# Patient Record
Sex: FEMALE | Race: WHITE | NOT HISPANIC OR LATINO | Employment: OTHER | URBAN - METROPOLITAN AREA
[De-identification: names, ages, dates, MRNs, and addresses within clinical notes are randomized per-mention and may not be internally consistent; named-entity substitution may affect disease eponyms.]

---

## 2018-10-06 ENCOUNTER — ANESTHESIA (INPATIENT)
Dept: RADIOLOGY | Facility: HOSPITAL | Age: 68
DRG: 023 | End: 2018-10-06
Payer: COMMERCIAL

## 2018-10-06 ENCOUNTER — APPOINTMENT (INPATIENT)
Dept: NON INVASIVE DIAGNOSTICS | Facility: HOSPITAL | Age: 68
DRG: 023 | End: 2018-10-06
Payer: COMMERCIAL

## 2018-10-06 ENCOUNTER — APPOINTMENT (INPATIENT)
Dept: RADIOLOGY | Facility: HOSPITAL | Age: 68
DRG: 023 | End: 2018-10-06
Payer: COMMERCIAL

## 2018-10-06 ENCOUNTER — HOSPITAL ENCOUNTER (INPATIENT)
Facility: HOSPITAL | Age: 68
LOS: 6 days | DRG: 023 | End: 2018-10-12
Attending: RADIOLOGY | Admitting: INTERNAL MEDICINE
Payer: COMMERCIAL

## 2018-10-06 ENCOUNTER — ANESTHESIA EVENT (INPATIENT)
Dept: RADIOLOGY | Facility: HOSPITAL | Age: 68
DRG: 023 | End: 2018-10-06
Payer: COMMERCIAL

## 2018-10-06 DIAGNOSIS — I63.9 ACUTE CVA (CEREBROVASCULAR ACCIDENT) (HCC): Primary | ICD-10-CM

## 2018-10-06 PROBLEM — E03.9 HYPOTHYROIDISM: Status: ACTIVE | Noted: 2018-10-06

## 2018-10-06 LAB
ABO GROUP BLD: NORMAL
ALBUMIN SERPL BCP-MCNC: 2.7 G/DL (ref 3.5–5)
ALP SERPL-CCNC: 59 U/L (ref 46–116)
ALT SERPL W P-5'-P-CCNC: 16 U/L (ref 12–78)
ANION GAP SERPL CALCULATED.3IONS-SCNC: 7 MMOL/L (ref 4–13)
AST SERPL W P-5'-P-CCNC: 10 U/L (ref 5–45)
BASE EXCESS BLDA CALC-SCNC: -2 MMOL/L (ref -2–3)
BILIRUB SERPL-MCNC: 0.25 MG/DL (ref 0.2–1)
BLD GP AB SCN SERPL QL: NEGATIVE
BUN SERPL-MCNC: 13 MG/DL (ref 5–25)
CA-I BLD-SCNC: 1.21 MMOL/L (ref 1.12–1.32)
CALCIUM SERPL-MCNC: 7.6 MG/DL (ref 8.3–10.1)
CHLORIDE SERPL-SCNC: 111 MMOL/L (ref 100–108)
CHOLEST SERPL-MCNC: 120 MG/DL (ref 50–200)
CO2 SERPL-SCNC: 23 MMOL/L (ref 21–32)
CREAT SERPL-MCNC: 0.48 MG/DL (ref 0.6–1.3)
ERYTHROCYTE [DISTWIDTH] IN BLOOD BY AUTOMATED COUNT: 13.5 % (ref 11.6–15.1)
EST. AVERAGE GLUCOSE BLD GHB EST-MCNC: 108 MG/DL
GFR SERPL CREATININE-BSD FRML MDRD: 100 ML/MIN/1.73SQ M
GLUCOSE SERPL-MCNC: 120 MG/DL (ref 65–140)
GLUCOSE SERPL-MCNC: 149 MG/DL (ref 65–140)
GLUCOSE SERPL-MCNC: 149 MG/DL (ref 65–140)
HBA1C MFR BLD: 5.4 % (ref 4.2–6.3)
HCO3 BLDA-SCNC: 24.1 MMOL/L (ref 24–30)
HCT VFR BLD AUTO: 29.8 % (ref 34.8–46.1)
HCT VFR BLD CALC: 30 % (ref 34.8–46.1)
HDLC SERPL-MCNC: 59 MG/DL (ref 40–60)
HGB BLD-MCNC: 9.5 G/DL (ref 11.5–15.4)
HGB BLDA-MCNC: 10.2 G/DL (ref 11.5–15.4)
LDLC SERPL CALC-MCNC: 56 MG/DL (ref 0–100)
MAGNESIUM SERPL-MCNC: 1.7 MG/DL (ref 1.6–2.6)
MCH RBC QN AUTO: 30.2 PG (ref 26.8–34.3)
MCHC RBC AUTO-ENTMCNC: 31.9 G/DL (ref 31.4–37.4)
MCV RBC AUTO: 95 FL (ref 82–98)
PCO2 BLD: 26 MMOL/L (ref 21–32)
PCO2 BLD: 46.2 MM HG (ref 42–50)
PH BLD: 7.33 [PH] (ref 7.3–7.4)
PHOSPHATE SERPL-MCNC: 3.4 MG/DL (ref 2.3–4.1)
PLATELET # BLD AUTO: 278 THOUSANDS/UL (ref 149–390)
PMV BLD AUTO: 9.7 FL (ref 8.9–12.7)
PO2 BLD: 86 MM HG (ref 35–45)
POTASSIUM BLD-SCNC: 3.8 MMOL/L (ref 3.5–5.3)
POTASSIUM SERPL-SCNC: 4.1 MMOL/L (ref 3.5–5.3)
PROT SERPL-MCNC: 5.9 G/DL (ref 6.4–8.2)
RBC # BLD AUTO: 3.15 MILLION/UL (ref 3.81–5.12)
RH BLD: POSITIVE
SAO2 % BLD FROM PO2: 96 % (ref 95–98)
SODIUM BLD-SCNC: 141 MMOL/L (ref 136–145)
SODIUM SERPL-SCNC: 141 MMOL/L (ref 136–145)
SPECIMEN EXPIRATION DATE: NORMAL
SPECIMEN SOURCE: ABNORMAL
TRIGL SERPL-MCNC: 23 MG/DL
TSH SERPL DL<=0.05 MIU/L-ACNC: 0.78 UIU/ML (ref 0.36–3.74)
WBC # BLD AUTO: 8.12 THOUSAND/UL (ref 4.31–10.16)

## 2018-10-06 PROCEDURE — 84100 ASSAY OF PHOSPHORUS: CPT | Performed by: STUDENT IN AN ORGANIZED HEALTH CARE EDUCATION/TRAINING PROGRAM

## 2018-10-06 PROCEDURE — 82948 REAGENT STRIP/BLOOD GLUCOSE: CPT

## 2018-10-06 PROCEDURE — C1894 INTRO/SHEATH, NON-LASER: HCPCS

## 2018-10-06 PROCEDURE — C1757 CATH, THROMBECTOMY/EMBOLECT: HCPCS

## 2018-10-06 PROCEDURE — 61630 BALO ANGIOPLASTY ICR PERQ: CPT

## 2018-10-06 PROCEDURE — 83735 ASSAY OF MAGNESIUM: CPT | Performed by: STUDENT IN AN ORGANIZED HEALTH CARE EDUCATION/TRAINING PROGRAM

## 2018-10-06 PROCEDURE — 86850 RBC ANTIBODY SCREEN: CPT | Performed by: NURSE ANESTHETIST, CERTIFIED REGISTERED

## 2018-10-06 PROCEDURE — C1760 CLOSURE DEV, VASC: HCPCS

## 2018-10-06 PROCEDURE — 85014 HEMATOCRIT: CPT

## 2018-10-06 PROCEDURE — 61645 PERQ ART M-THROMBECT &/NFS: CPT | Performed by: RADIOLOGY

## 2018-10-06 PROCEDURE — C1769 GUIDE WIRE: HCPCS

## 2018-10-06 PROCEDURE — 76937 US GUIDE VASCULAR ACCESS: CPT | Performed by: RADIOLOGY

## 2018-10-06 PROCEDURE — 82330 ASSAY OF CALCIUM: CPT

## 2018-10-06 PROCEDURE — 86900 BLOOD TYPING SEROLOGIC ABO: CPT | Performed by: NURSE ANESTHETIST, CERTIFIED REGISTERED

## 2018-10-06 PROCEDURE — 83036 HEMOGLOBIN GLYCOSYLATED A1C: CPT | Performed by: STUDENT IN AN ORGANIZED HEALTH CARE EDUCATION/TRAINING PROGRAM

## 2018-10-06 PROCEDURE — 82803 BLOOD GASES ANY COMBINATION: CPT

## 2018-10-06 PROCEDURE — 70450 CT HEAD/BRAIN W/O DYE: CPT

## 2018-10-06 PROCEDURE — 03CG3Z7 EXTIRPATION OF MATTER FROM INTRACRANIAL ARTERY USING STENT RETRIEVER, PERCUTANEOUS APPROACH: ICD-10-PCS | Performed by: INTERNAL MEDICINE

## 2018-10-06 PROCEDURE — 84132 ASSAY OF SERUM POTASSIUM: CPT

## 2018-10-06 PROCEDURE — 93306 TTE W/DOPPLER COMPLETE: CPT

## 2018-10-06 PROCEDURE — 99255 IP/OBS CONSLTJ NEW/EST HI 80: CPT | Performed by: PSYCHIATRY & NEUROLOGY

## 2018-10-06 PROCEDURE — C1725 CATH, TRANSLUMIN NON-LASER: HCPCS

## 2018-10-06 PROCEDURE — 80061 LIPID PANEL: CPT | Performed by: STUDENT IN AN ORGANIZED HEALTH CARE EDUCATION/TRAINING PROGRAM

## 2018-10-06 PROCEDURE — 037K3D6: ICD-10-PCS | Performed by: INTERNAL MEDICINE

## 2018-10-06 PROCEDURE — 99291 CRITICAL CARE FIRST HOUR: CPT | Performed by: INTERNAL MEDICINE

## 2018-10-06 PROCEDURE — 99252 IP/OBS CONSLTJ NEW/EST SF 35: CPT | Performed by: RADIOLOGY

## 2018-10-06 PROCEDURE — 82947 ASSAY GLUCOSE BLOOD QUANT: CPT

## 2018-10-06 PROCEDURE — 84443 ASSAY THYROID STIM HORMONE: CPT | Performed by: STUDENT IN AN ORGANIZED HEALTH CARE EDUCATION/TRAINING PROGRAM

## 2018-10-06 PROCEDURE — 03CK3Z7 EXTIRPATION OF MATTER FROM RIGHT INTERNAL CAROTID ARTERY USING STENT RETRIEVER, PERCUTANEOUS APPROACH: ICD-10-PCS | Performed by: INTERNAL MEDICINE

## 2018-10-06 PROCEDURE — 84295 ASSAY OF SERUM SODIUM: CPT

## 2018-10-06 PROCEDURE — 86920 COMPATIBILITY TEST SPIN: CPT

## 2018-10-06 PROCEDURE — C1876 STENT, NON-COA/NON-COV W/DEL: HCPCS

## 2018-10-06 PROCEDURE — 86901 BLOOD TYPING SEROLOGIC RH(D): CPT | Performed by: NURSE ANESTHETIST, CERTIFIED REGISTERED

## 2018-10-06 PROCEDURE — 80053 COMPREHEN METABOLIC PANEL: CPT | Performed by: STUDENT IN AN ORGANIZED HEALTH CARE EDUCATION/TRAINING PROGRAM

## 2018-10-06 PROCEDURE — 61645 PERQ ART M-THROMBECT &/NFS: CPT

## 2018-10-06 PROCEDURE — 0042T HB CT PERFUSION W/CONTRAST CBF: CPT

## 2018-10-06 PROCEDURE — 85027 COMPLETE CBC AUTOMATED: CPT | Performed by: STUDENT IN AN ORGANIZED HEALTH CARE EDUCATION/TRAINING PROGRAM

## 2018-10-06 PROCEDURE — 61635 INTRACRAN ANGIOPLSTY W/STENT: CPT

## 2018-10-06 PROCEDURE — 37215 TRANSCATH STENT CCA W/EPS: CPT | Performed by: RADIOLOGY

## 2018-10-06 RX ORDER — LEVOTHYROXINE SODIUM ANHYDROUS 100 UG/5ML
37.5 INJECTION, POWDER, LYOPHILIZED, FOR SOLUTION INTRAVENOUS DAILY
Status: DISCONTINUED | OUTPATIENT
Start: 2018-10-06 | End: 2018-10-07

## 2018-10-06 RX ORDER — MAGNESIUM SULFATE HEPTAHYDRATE 40 MG/ML
2 INJECTION, SOLUTION INTRAVENOUS ONCE
Status: COMPLETED | OUTPATIENT
Start: 2018-10-06 | End: 2018-10-06

## 2018-10-06 RX ORDER — ATORVASTATIN CALCIUM 40 MG/1
40 TABLET, FILM COATED ORAL EVERY EVENING
Status: DISCONTINUED | OUTPATIENT
Start: 2018-10-06 | End: 2018-10-12 | Stop reason: HOSPADM

## 2018-10-06 RX ORDER — LEVOTHYROXINE SODIUM 0.03 MG/1
25 TABLET ORAL DAILY
Status: ON HOLD | COMMUNITY
End: 2018-10-23

## 2018-10-06 RX ORDER — FENTANYL CITRATE 50 UG/ML
INJECTION, SOLUTION INTRAMUSCULAR; INTRAVENOUS AS NEEDED
Status: DISCONTINUED | OUTPATIENT
Start: 2018-10-06 | End: 2018-10-06 | Stop reason: SURG

## 2018-10-06 RX ORDER — SODIUM CHLORIDE 9 MG/ML
75 INJECTION, SOLUTION INTRAVENOUS CONTINUOUS
Status: DISCONTINUED | OUTPATIENT
Start: 2018-10-06 | End: 2018-10-07

## 2018-10-06 RX ORDER — SODIUM CHLORIDE 9 MG/ML
INJECTION, SOLUTION INTRAVENOUS CONTINUOUS PRN
Status: DISCONTINUED | OUTPATIENT
Start: 2018-10-06 | End: 2018-10-06 | Stop reason: SURG

## 2018-10-06 RX ORDER — HEPARIN SODIUM 5000 [USP'U]/ML
5000 INJECTION, SOLUTION INTRAVENOUS; SUBCUTANEOUS EVERY 8 HOURS SCHEDULED
Status: DISCONTINUED | OUTPATIENT
Start: 2018-10-06 | End: 2018-10-12 | Stop reason: HOSPADM

## 2018-10-06 RX ORDER — HEPARIN SODIUM 1000 [USP'U]/ML
INJECTION, SOLUTION INTRAVENOUS; SUBCUTANEOUS AS NEEDED
Status: DISCONTINUED | OUTPATIENT
Start: 2018-10-06 | End: 2018-10-06 | Stop reason: SURG

## 2018-10-06 RX ORDER — EPTIFIBATIDE 2 MG/ML
INJECTION, SOLUTION INTRAVENOUS
Status: COMPLETED | OUTPATIENT
Start: 2018-10-06 | End: 2018-10-06

## 2018-10-06 RX ORDER — EPTIFIBATIDE 0.75 MG/ML
0.5 INJECTION, SOLUTION INTRAVENOUS CONTINUOUS
Status: DISCONTINUED | OUTPATIENT
Start: 2018-10-06 | End: 2018-10-07

## 2018-10-06 RX ORDER — ONDANSETRON 2 MG/ML
INJECTION INTRAMUSCULAR; INTRAVENOUS AS NEEDED
Status: DISCONTINUED | OUTPATIENT
Start: 2018-10-06 | End: 2018-10-06 | Stop reason: SURG

## 2018-10-06 RX ORDER — FENTANYL CITRATE 50 UG/ML
25 INJECTION, SOLUTION INTRAMUSCULAR; INTRAVENOUS ONCE
Status: COMPLETED | OUTPATIENT
Start: 2018-10-06 | End: 2018-10-06

## 2018-10-06 RX ORDER — EPTIFIBATIDE 0.75 MG/ML
INJECTION, SOLUTION INTRAVENOUS
Status: COMPLETED | OUTPATIENT
Start: 2018-10-06 | End: 2018-10-06

## 2018-10-06 RX ADMIN — ONDANSETRON 4 MG: 2 INJECTION INTRAMUSCULAR; INTRAVENOUS at 10:14

## 2018-10-06 RX ADMIN — EPTIFIBATIDE 0.5 MCG/KG/MIN: 0.75 INJECTION, SOLUTION INTRAVENOUS at 14:11

## 2018-10-06 RX ADMIN — SODIUM CHLORIDE 75 ML/HR: 0.9 INJECTION, SOLUTION INTRAVENOUS at 14:12

## 2018-10-06 RX ADMIN — FENTANYL CITRATE 25 MCG: 50 INJECTION INTRAMUSCULAR; INTRAVENOUS at 23:46

## 2018-10-06 RX ADMIN — MAGNESIUM SULFATE HEPTAHYDRATE 2 G: 40 INJECTION, SOLUTION INTRAVENOUS at 17:05

## 2018-10-06 RX ADMIN — IODIXANOL 80 ML: 320 INJECTION, SOLUTION INTRAVASCULAR at 09:16

## 2018-10-06 RX ADMIN — EPTIFIBATIDE 0.5 MCG/KG/MIN: 0.75 INJECTION, SOLUTION INTRAVENOUS at 11:05

## 2018-10-06 RX ADMIN — HEPARIN SODIUM 5000 UNITS: 5000 INJECTION, SOLUTION INTRAVENOUS; SUBCUTANEOUS at 20:15

## 2018-10-06 RX ADMIN — SODIUM CHLORIDE: 0.9 INJECTION, SOLUTION INTRAVENOUS at 11:55

## 2018-10-06 RX ADMIN — FENTANYL CITRATE 25 MCG: 50 INJECTION, SOLUTION INTRAMUSCULAR; INTRAVENOUS at 11:56

## 2018-10-06 RX ADMIN — FENTANYL CITRATE 50 MCG: 50 INJECTION, SOLUTION INTRAMUSCULAR; INTRAVENOUS at 09:40

## 2018-10-06 RX ADMIN — LEVOTHYROXINE SODIUM ANHYDROUS 37.5 MCG: 100 INJECTION, POWDER, LYOPHILIZED, FOR SOLUTION INTRAVENOUS at 15:32

## 2018-10-06 RX ADMIN — SODIUM CHLORIDE: 0.9 INJECTION, SOLUTION INTRAVENOUS at 09:26

## 2018-10-06 RX ADMIN — PHENYLEPHRINE HYDROCHLORIDE 25 MCG/MIN: 10 INJECTION INTRAVENOUS at 09:59

## 2018-10-06 RX ADMIN — FENTANYL CITRATE 25 MCG: 50 INJECTION, SOLUTION INTRAMUSCULAR; INTRAVENOUS at 10:05

## 2018-10-06 RX ADMIN — HEPARIN SODIUM 2000 UNITS: 1000 INJECTION INTRAVENOUS; SUBCUTANEOUS at 11:38

## 2018-10-06 RX ADMIN — EPTIFIBATIDE 7587 MCG: 20 INJECTION INTRAVENOUS at 10:56

## 2018-10-06 NOTE — CONSULTS
Consultation - Neurosurgery   Felipa Lozano 77 y o  female MRN: [id number]  Unit/Bed#: ICU 09 Encounter: [id number]    Please note this report reflects my initial encounter with the patient at approximately 9:00 AM      Assessment/Plan     Assessment:  Cervical right ICA occlusion  Right MCA M1 occlusion    Plan:  Ms Ramesh Buchanan presents with a tandem occlusion in the cervical and intracranial anterior circulation on the right  CTP revealed 37 0 cc volume of infarct and 97 cc penumbra  She meets DEFUSE-3 eligibility therefore thrombectomy was offered  Given the likely need for carotid angioplasty and stenting which would then require antiplatelet therapy, I have explained that the risks of the procedure, which include worsening stroke or bleeding in the brain, are up to 25%  Following this discussion with her  and daughter, they have elected to proceed  History of Present Illness         History, ROS and PFSH unobtainable from any source  HPI: Felipa Lozano is a 77y o  year old female who presents with acute right MCA syndrome  Mrs Ramesh Buchanan went to bed last night in her usual state of health  She awoke at approximately 5:30 am with inability to move her left side and difficulty talking  She was brought to [facility] where a CTA revealed a right carotid occlusion and right MCA occlusion  She was emergently transported to [facility] for evaluation with CTP and possible thrombectomy  She did not receive tPA  NIHSS upon arrival at [place] was 16  Consults    Review of Systems    Historical Information   Past Medical History:   Diagnosis Date    Disease of thyroid gland      History reviewed  No pertinent surgical history    History   Alcohol Use    Yes     History   Drug Use No     History   Smoking Status    Never Smoker   Smokeless Tobacco    Never Used     Family History   Problem Relation Age of Onset    Family history unknown: Yes       Meds/Allergies   all current active meds have been reviewed  No Known Allergies    Objective     Intake/Output Summary (Last 24 hours) at 10/06/18 1453  Last data filed at 10/06/18 1300   Gross per 24 hour   Intake          1203 79 ml   Output             1350 ml   Net          -146 21 ml       Physical Exam  Neurologic Exam    Vitals:Pulse 78, temperature (!) 97 2 °F (36 2 °C), resp  rate 19, height 5' 4" (1 626 m), weight 82 7 kg (182 lb 5 1 oz), SpO2 100 %  ,Body mass index is 31 3 kg/m²  Lab Results: I have personally reviewed pertinent results  Imaging Studies: I have personally reviewed pertinent reports  EKG, Pathology, and Other Studies: I have personally reviewed pertinent reports        VTE Prophylaxis: Sequential compression device Z[place]ie [place])     Code Status: Level 1 - Full Code  Advance Directive and Living Will:      Power of :    POLST:      Counseling / Coordination of Care  None

## 2018-10-06 NOTE — ANESTHESIA PREPROCEDURE EVALUATION
Physical Exam    Airway       Dental       Cardiovascular      Pulmonary      Other Findings  EMERGENTLY RUSHED INTO IR LIMITED H&P      Anesthesia Plan  ASA Score- 4 Emergent    Anesthesia Type- IV sedation with anesthesia with ASA Monitors  Additional Monitors:   Airway Plan:         Plan Factors-    Induction- intravenous  Postoperative Plan- Plan for postoperative opioid use  Informed Consent- Anesthetic plan and risks discussed with patient  I personally reviewed this patient with the CRNA  Discussed and agreed on the Anesthesia Plan with the CRNA  Moise Falcon

## 2018-10-06 NOTE — PROGRESS NOTES
Ms Musa Danielson is immediately status post right ICA angioplasty and stenting as well as intracranial thrombectomy  She received  mg W[place] in the OSH ER  She requires transition to DAPT for her stent as well as for the residual thrombus in the ICA  Plan is to obtain a repeat head CT in the AM  If no evidence of symptomatic ICH or MCA infarct greater than 2/3rds the territory, she will require Plavix 300 mg load  Integrillin may be trurned off 4 hours after Plavix load  Keep SBP < 160  Will need to consider repeat CTA of the neck to assess for cervical ICA patency in the next few days

## 2018-10-06 NOTE — H&P
History and Physical - [address] 77 y o  female MRN: [id number]  Unit/Bed#: ICU 09 Encounter: [id number]     Reason for Admission / Chief Complaint: Acute CVA, status post mechanical thrombectomy, on Integrilin      History of Present Illness:  Bertin Martínez is a 77 y o  female with a past medical history of hypothyroidism, who presented to [address] [place] [address] as a stroke alert  Per patient's , she awoke at approximately 4:00 am this morning with slurred speech and left sided weakness  EMS was called  Patient found to have right MCA M1 occlusion as well as right ICA occlusion from origin to petrous segment  tPA was not administered but patient did undergo urgent endovascular intervention  Patient had successful balloon angioplasty and stenting of the right ICA origin  Approximately 40% residual stenosis was noted remaining through the stented segment  Focal non-flow-limiting thrombus remained just above the stent  The patient was started on Integrilin  Post endovascular intervention, the patient was brought to ICU for overnight monitoring and proceeding with stroke core measures  History obtained from chart review and the patient  Past Medical History:  Past Medical History:   Diagnosis Date    Disease of thyroid gland         Past Surgical History:  History reviewed  No pertinent surgical history       Past Family History:  Family History   Problem Relation Age of Onset    Family history unknown: Yes        Social History:  History   Smoking Status    Never Smoker   Smokeless Tobacco    Never Used     History   Alcohol Use    Yes     History   Drug Use No     Marital Status:      Medications:  Current Facility-Administered Medications   Medication Dose Route Frequency    atorvastatin (LIPITOR) tablet 40 mg  40 mg Oral QPM    iodixanol (VISIPAQUE) 320 MG/ML injection 250 mL  250 mL Intravenous Once in imaging     Home medications:  Prior to Admission medications    Not on File Allergies:  No Known Allergies     ROS:   Review of Systems   Neurological: Positive for speech difficulty and weakness  All other systems reviewed and are negative  Vitals:  Vitals:    10/06/18 1245 10/06/18 1300 10/06/18 1315 10/06/18 1415   Pulse: 78 78 76 78   Resp: (!) 10 (!) 19   Temp: (!) 96 1 °F (35 6 °C) (!) 96 4 °F (35 8 °C) (!) 96 4 °F (35 8 °C) (!) 97 2 °F (36 2 °C)   TempSrc:       SpO2: 100% 100% 100% 100%   Weight:  82 7 kg (182 lb 5 1 oz)     Height:  5' 4" (1 626 m)       Temperature:   Temp (24hrs), Av 4 °F (35 8 °C), Min:96 1 °F (35 6 °C), Max:97 2 °F (36 2 °C)    Current: Temperature: (!) 96 4 °F (35 8 °C)     Weights:   IBW: -92 5 kg  Body mass index is 31 3 kg/m²  Hemodynamic Monitoring:  N/A     Non-Invasive/Invasive Ventilation Settings:  Respiratory    Lab Data (Last 4 hours)    None         O2/Vent Data (Last 4 hours)    None              No results found for: PHART, SLQ1HYG, PO2ART, MXG6TOH, I7JSDPYN, BEART, SOURCE  SpO2: SpO2: 100 %, SpO2 Activity:  , SpO2 Device:  , Capnography:       Physical Exam:  Physical Exam   Constitutional: She is oriented to person, place, and time  She appears well-developed  Elderly, frail, female, appears stated age   HENT:   Head: Normocephalic and atraumatic  Nose: Nose normal    Mouth/Throat: No oropharyngeal exudate  Eyes: Pupils are equal, round, and reactive to light  Conjunctivae are normal  No scleral icterus  Neck: Neck supple  No JVD present  No tracheal deviation present  Cardiovascular: Normal rate, regular rhythm, normal heart sounds and intact distal pulses  Exam reveals no gallop and no friction rub  No murmur heard  Pulmonary/Chest: Effort normal and breath sounds normal  No respiratory distress  She has no wheezes  She has no rales  She exhibits no tenderness  Abdominal: Soft  Bowel sounds are normal  She exhibits no distension  There is no tenderness  There is no rebound and no guarding  Musculoskeletal: She exhibits no edema or deformity  Neurological: She is alert and oriented to person, place, and time  Left facial droop, left-sided hemiplegia, motor strength 4-/5 RUE and RLE, LLE sensory deficit all the way up to proximal thigh, LUE sensory deficit up to level of shoulder, left erika-neglect   Skin: Skin is warm and dry  No rash noted  She is not diaphoretic  No erythema  Angio access site C/D/I, no hematoma   Psychiatric: She has a normal mood and affect  Her behavior is normal    Nursing note and vitals reviewed  Labs:    Results from last 7 days  Lab Units 10/06/18  1348 10/06/18  1117   WBC Thousand/uL 8 12  --    HEMOGLOBIN g/dL 9 5*  --    I STAT HEMOGLOBIN g/dl  --  10 2*   HEMATOCRIT % 29 8* 30*   PLATELETS Thousands/uL 278  --         Results from last 7 days  Lab Units 10/06/18  1117   GLUCOSE, ISTAT mg/dl 149*                      No results found for: TROPONINI     Imaging: I have personally reviewed pertinent reports  and I have personally reviewed pertinent films in PACS  EKG: This was personally reviewed by myself     Micro:  No results found for: SHADIA Frank    Assessment/Plan:    Neurological/Psychiatric  · Right MCA M1 occlusion, TICI 0 and right ICA occlusion from origin to the petrous segment, status post balloon angioplasty and stenting of right ICA origin POD #0  · Stroke core measures  · Check echocardiogram  · Repeat CT head without contrast in AM  · Check MRI  · Neurology consult  · Check lipid panel, HbA1c, and baseline labs  · PT/OT/ST evaluation  · Will keep NPO for now  · Continue Integrilin infusion per neurosurgery  · If repeat CT head is negative for bleed, will discontinue Integrilin and start full-dose aspirin as well as Plavix load 300 mg tomorrow  · Start statin therapy when safely able to tolerate PO  · If fails swallow eval, will need placement of Keofed tube to begin nutrition and to administer essential medications  · BP goals of MAP >80 and SBP <160 per neurosurgery  · Post-endovascular intervention precautions  · Serial neuro checks  · STAT CT head for any acute change in neurologic examination and/or GCS >2    Cardiovascular  · Right ICA occlusion from origin to petrous segments as noted above with aforementioned intervention  · Noted to have focal non-flow-limiting thrombus remaining just distal to stent  · Management as noted above  · BP goals of MAP >80 and SBP <160 per neurosurgery  · Continue supportive care    Respiratory  · No acute issues  · Encourage incentive spirometry    Gastrointestinal  · No acute issues  · Bowel regimen    Renal  · No acute issues  · Monitor UOP with goal of 0 5-1 mL/kg/hr    Fluids/Electrolytes/Nutrition  · Continue NS at 75 mL/hr for now until safely tolerating oral intake  · Keep NPO for now  · If unable to pass swallow evaluation, will need placement of Keofed tube to begin enteral nutrition and for administration of essential medications  · Monitor electrolytes and replete as needed  · Goal K >4, Phos >3, Mg >2    Heme  · Acute blood loss anemia  · EBL approximately 1 liter intraoperatively  · Monitor blood counts  · Transfuse as needed for hemoglobin <7  · DVT prophylaxis with SCDs for now  · Hold pharmacologic DVT ppx until repeat CT head is performed and confirms there is no bleeding    Endocrine  · Hypothyroidism  · Reports taking levothyroxine 75 mcg daily as outpatient  · As patient is NPO, will order IV levothyroxine 37 5 mcg daily, first dose today  · Check TSH and FT4 levels  · Transition to PO formulation when able  · Monitor blood sugars  · Goal -180  · Avoid hypo or hyperglycemia    Infectious Disease  · No acute issues  · Monitor fever trends    MSK/Skin  · No acute issues  · Frequent repositioning at appropriate time post-procedure for pressure ulcer prophylaxis  · PT/OT evaluation    Disposition: Continue 24-hour monitoring in ICU     VTE Pharmacologic Prophylaxis: Reason for no pharmacologic prophylaxis Status post CVA, mechanical thrombectomy, on Integrilin  VTE Mechanical Prophylaxis: sequential compression device     Invasive lines and devices: Invasive Devices     Peripheral Intravenous Line            Peripheral IV 10/06/18 Left Antecubital less than 1 day    Peripheral IV 10/06/18 Right Antecubital less than 1 day          Arterial Line            Arterial Line 10/06/18 Left Radial less than 1 day          Drain            Urethral Catheter Non-latex; Temperature probe 6 Fr  less than 1 day                 Code Status: Level 1 - Full Code  POA:    POLST:       Given critical illness, patient length of stay will require greater than two midnights  Counseling / Coordination of Care    Portions of the record may have been created with voice recognition software  Occasional wrong word or "sound a like" substitutions may have occurred due to the inherent limitations of voice recognition software  Read the chart carefully and recognize, using context, where substitutions have occurred      Vivian Niite, DO

## 2018-10-06 NOTE — SPEECH THERAPY NOTE
Speech Language/Pathology    Speech/Language Pathology Progress Note    Patient Name: Jonathon Bateman  ROMANA'F Date: 10/6/2018     Consult rec'd for swallowing eval  Pt s/p IR procedure for R ICA occlusion    Spoke w/ RN, pt to remain flat; will follow up and complete evals as appropriate 10/7

## 2018-10-06 NOTE — SEDATION DOCUMENTATION
Cerebral arteriogram completed   Report to ICU RN tanisha   Patient taken to CT post IR procedure with ICU RN   R femoral site CDI no oozing no hematoma present

## 2018-10-06 NOTE — DISCHARGE INSTRUCTIONS
Today, you underwent a cerebral angiogram under the care of Dr Mirna Aranda *** for CVA/Stroke***  ? The following instructions will help you care for yourself, or be cared for upon your return home today  These are guidelines for your care right after your surgery only  ? Notify Your Doctor or Nurse if you have any of the following:  ? SYMPTOMS OF WOUND INFECTION--   Increased pain in or around the incision   Swelling around the incision  Any drainage from the incision  Incision separates or opens up  Warmth in the tissues around the incision  Redness or tenderness on the skin near the incision   Fever (temperature greater than 101 degrees F)   ? NEUROLOGICAL CHANGES--  Change in alertness  Increased sleepiness   Nausea and vomiting   New onset of numbness or weakness in arms or legs   New problems with your bowels or bladder  New or worse problems with balance or walking  Seizures, new or worsening  ? UNRELIEVED HEADACHE PAIN--  New or increased pain unrelieved with pain medications   Pain associated with nausea and vomiting   Pain associated with other symptoms  ? QUESTIONS OR PROBLEMS--  Any questions or problems that you are unsure about  Wound Care:  Keep Incision Clean and Dry   You may shower daily, but do not soak incision  Pat dry after showering  No tub baths, soaking, swimming for 1 week after angiogram    You do not need to cover the incision  Mild to moderate bruising and tenderness to the site is expected and may last up to 1-2 weeks after your procedure  ?  A closure device was placed at the catheter insertion site  This is MRI compatible  Remove the dressing 24 hours after your procedure  If your groin site is bleeding, apply firm pressure for 10 minutes  Reinforce dressing rather than removing and checking frequently  If continues to bleed through the dressing after 1 hour, contact your neurosurgeon's office  Anticipatory Education:  ?   PAIN MED W/ Acetaminophen (Tylenol)  --IF a prescription for pain medicine has been sent home with you:  --Narcotic pain medication may cause constipation  Be sure to take stool softeners or laxatives while you are on narcotic pain medication  --Do not drive after taking prescription pain medicine  ?  If this medicine is too strong, or no longer necessary, or we did NOT recommend/prescribe oral narcotics, you may take:   - Tylenol Extra-strength/Acetaminophen, 2 tablets every 4-6 hours as needed for mild pain  DO NOT TAKE MORE THAN 4000MG PER DAY from combined sources  NOTE: Remember to eat when taking pain medicines in order to avoid nausea  Watch for constipation  Eat plenty of fruits, vegetables, juices, and drink 6-8 glasses of water each day  Constipation: Stay active and drink at least 6-8 cups of fluid each day to prevent constipation  If you need a laxative or stool softener follow the package directions or consult with your local pharmacists if you have questions  ? After anesthesia, rest for 24 hours  Do not drive, drink alcohol beverages or make any important decisions during this time  General anesthesia may cause sore throat, jaw discomfort or muscle aches  These symptoms can last for one or two days  Activity: Please follow these instructions:  Advance your activity as you can tolerate  You may do light house work; nothing strenuous   You may walk all you want  You may go up and down the steps  Use the railing for support  Do not do excessive bending, straining or heavy lifting for 48 hours after your procedure  Do not drive or return to work until you are instructed   It is normal for your energy level and sleep patterns to change after surgery  Get extra sleep at night and take naps during the day to help you feel less tired  Take rest periods during the day  Complete recovery may take several weeks  ?  You may resume driving after 82-93 hours recovery  You may return to work after 48 hours of recovery     ?  Diet:  Your doctor has recommended that you follow these diet instructions at home  Refer to the patient education materials you received during your hospital stay  If you would like more nutrition counseling, ask your doctor about making an appointment with an outpatient dietitian  Resume your home diet  ? Medications:  Please resume your home medications as instructed  ? Home Supplies and Equipment:  none  Additional Contacts:  ? CONTACTS FOR NEUROSURGERY: You may call your neurosurgeons office if you have questions between 8:30 am and 4:30 pm  You may request to speak to the nurse practitioner who is available Monday through Friday  ?  For off hours or the weekend you may call your neurosurgeon's office to leave a message  Carotid Artery Disease   AMBULATORY CARE:   Carotid artery disease  is a condition that causes narrow or blocked carotid arteries  Your carotid arteries are the blood vessels that supply your brain with most of the blood it needs to work  You have 2 carotid arteries, one on each side of your neck  Call 911 for any of the following:   · You have any of the following signs of a stroke:      ¨ Numbness or drooping on one side of your face     ¨ Weakness in an arm or leg    ¨ Confusion or difficulty speaking    ¨ Dizziness, a severe headache, or vision loss    · You have any of the following signs of a heart attack:      ¨ Squeezing, pressure, or pain in your chest that lasts longer than 5 minutes or returns    ¨ Discomfort or pain in your back, neck, jaw, stomach, or arm     ¨ Trouble breathing    ¨ Nausea or vomiting    ¨ Lightheadedness or a sudden cold sweat, especially with chest pain or trouble breathing  Contact your healthcare provider if:   · You have questions or concerns about your condition or care  Signs and symptoms of carotid artery disease: You may have no signs or symptoms   Most commonly, carotid artery disease causes transient ischemic attacks (TIAs), or mini-strokes  You may have numbness, weakness, lack of movement, or vision or speech problems  A TIA goes away quickly and does not cause permanent damage  A TIA may be a warning sign that you are about to have a stroke  If you have any symptoms of a TIA or stroke, seek care immediately  Warning signs of a stroke: The word F A S T  can help you remember and recognize warning signs of a stroke  · F = Face:  One side of the face droops  · A = Arms:  One arm starts to drop when both arms are raised  · S = Speech:  Speech is slurred or sounds different than usual     · T = Time:  A person who is having a stroke needs to be seen immediately  A stroke is a medical emergency that needs immediate treatment  Some medicines and treatments work best if given within a few hours of a stroke  Treatment  for carotid artery disease depends on how narrow your arteries have become, your symptoms, and your general health  The goal of treatment is to lower your risk for a stroke  You may need any of the following:  · Take aspirin if directed  Your healthcare provider may suggest that you take an aspirin a day to prevent blood clots from forming in the carotid arteries  If your healthcare provider wants you to take aspirin daily, do not take acetaminophen or ibuprofen instead  · Control risk factors  High blood pressure, high cholesterol, heart disease, diabetes, and being overweight increase your risk for atherosclerosis  · Procedures can help open blocked arteries  A carotid endarterectomy is used to cut plaque out of the artery  An angioplasty is used to push the plaque against the artery wall with a balloon device  Sometimes a stent is placed during an angioplasty  A stent is a metal mesh tube that is placed in the artery to keep it open  Manage carotid artery disease:   · Eat a variety of healthy foods  Healthy foods include fruit, vegetables, whole-grain breads, low-fat dairy products, lean meat, and fish  Choose fish that are high in omega-3 fatty acids, such as salmon and fresh tuna  Ask your healthcare provider for more information on a heart healthy diet and the DASH eating plan  · Limit sodium (salt)  Sodium may increase your blood pressure  Add less table salt to your foods  Read food labels and choose foods that are low in sodium  Your healthcare provider may suggest you follow a low sodium diet  · Reach or maintain a healthy weight  Extra weight makes your heart work harder  Ask your healthcare provider how much you should weight  He can help you create a safe weight loss plan  Even a weight loss of 10% of your body weight can help your heart function better  · Exercise as directed  Exercise helps improve heart function and can help you manage your weight  Exercise can also help lower your cholesterol and blood sugar levels  Try to get at least 30 minutes of exercise at least 5 times each week  Try to be active every day  Your healthcare provider can help you create an exercise plan that works best for you  · Limit alcohol  Alcohol can increase your blood pressure and triglyceride levels  Men should limit alcohol to 2 drinks per day  Women should limit alcohol to 1 drink per day  A drink of alcohol is 12 ounces of beer, 5 ounces of wine, or 1½ ounces of liquor  · Do not smoke  Nicotine and other chemicals in cigarettes and cigars can cause heart and lung damage  Ask your healthcare provider for information if you currently smoke and need help to quit  E-cigarettes or smokeless tobacco still contain nicotine  Talk to your healthcare provider before you use these products  Follow up with your healthcare provider as directed:  Write down your questions so you remember to ask them during your visits  © 2017 [employer] [employer] Information is for End User's use only and may not be sold, redistributed or otherwise used for commercial purposes   All illustrations and images included in CareNotes® are the copyrighted property of [employer]  or [employer]  The above information is an  only  It is not intended as medical advice for individual conditions or treatments  Talk to your doctor, nurse or pharmacist before following any medical regimen to see if it is safe and effective for you  Effects of a Stroke   AMBULATORY CARE:   The effects of a stroke  may be different for everyone  They may depend on where the stroke happened in your brain and how much damage occurred there  Some people may make a full recovery  Other people may have long-term or lifelong effects  It is important to talk to your healthcare provider about any symptoms you have after a stroke  There are medicines and therapies to help manage the effects of a stroke  Early treatment for a stroke can improve your chances for recovery  What family or support persons should know about the effects of a stroke: It is important for family or support persons to know how a stroke has affected your loved one  Know when to call 911, seek immediate care, or call your loved one's healthcare provider  Call 911 or have someone else call 911 for any of the following:   · You have any of the following signs of a stroke:      ¨ Numbness or drooping on one side of your face     ¨ Weakness in an arm or leg    ¨ Confusion or difficulty speaking    ¨ Dizziness, a severe headache, or vision loss    ·            · You cannot be woken  · You fall and hit your head  · You have a seizure  · You feel lightheaded, short of breath, and have chest pain  · You cough up blood  Seek care immediately if:   · Your arm or leg is painful, red, or larger than normal      · You feel weak, dizzy, or faint  · You have a fall without hitting your head  · Your blood sugar level or blood pressure is higher or lower than your healthcare provider said it should be      · You bleed heavily or cannot stop bleeding from a cut or injury  · You have a new, severe headache  Contact your healthcare provider if:   · You have a fever  · You have a rash  · You have new or worsening symptoms  · You feel extremely sad or anxious  · You feel you are unable to cope with your condition  · You have trouble sleeping  · You have open sores  · You choke or cough when eating or drinking  · You have questions or concerns about your condition or care  Problems with language, speech, and memory:   · Difficulty finding the right words or putting complete sentences together    · Difficulty putting words together that make sense    · Difficulty paying attention or a short attention span    · Difficulty writing or reading    · Problems with memory or being disoriented to time, place, or situation    · Problems thinking clearly or feeling confused    · Difficulty understanding written or spoken language or learning something new  Muscle and nerve problems:  A stroke may affect one side of your body or part of one side  You may be at an increased risk for falling if you have difficulty moving your leg muscles   You may have any of the following:  · Inability to move your arm, leg, or one side of your face (paralysis)    · Muscle weakness, spasms, or muscles that stay in one position (contracted)    · Poor balance, difficulty walking, or difficulty grasping objects    · Changes in your vision or poor vision    · Ignoring, or being unaware of one side of your body    · Numbness of your arm, hand, fingers, leg, foot, or toes    · Pain, tickling, or prickling in weak or paralyzed parts of your body  Bowel and bladder problems:   · Loss of control of your urine or bowel movements    · Feeling like you have to urinate frequently, even when your bladder is not full    · Difficulty emptying your bladder or constipation  Swallowing and eating problems:   · Difficulty swallowing food    · Difficulty feeding yourself    · A lack of taste or a change in the way you taste things    · An increased risk for aspiration (food moves into the lungs) and pneumonia due to swallowing problems  Changes in personality or mood:   · Depression, sadness, irritability, or hopelessness    · Anger, frustration, or anxiety    · Difficulty controlling emotions or expressing inappropriate emotions    · Quick mood changes  Fatigue:   · Low energy levels    · Tiring easily    · A lack of motivation  Problems sleeping:   · Development of sleep apnea    · Changes in sleep such as sleeping too much or not enough  Problems with sexual function:   · Difficulty having or keeping an erection    · Vaginal dryness    · A decreased interest in sex  Self care after a stroke:   · Go to rehabilitation (rehab) as directed  Rehab is an important part of treatment  A speech therapist helps you relearn or improve your ability to talk and swallow  You may start slowly and start doing more difficult tasks over time  Physical therapists can help you gain strength and build endurance  Occupational therapists teach you new ways to do daily activities, such as getting dressed  Therapy can help you improve your ability to walk or keep your balance  Your therapy may include tasks or movements you will need to do for everyday activities  An example is being able to raise or lower yourself from a chair  · Prevent falls in your home  Remove anything you might trip over  Tape electrical cords down  Keep paths clear throughout your home  Make sure your home is well lighted  Put nonslip materials on surfaces that might be slippery  An example is your bathtub or shower floor  · Use assistive devices  A cane or walker may help you keep your balance as you walk  · Manage other medical conditions  Manage your heart conditions, blood pressure, and diabetes  Management of these conditions can reduce your risk for another stroke  Take your medicines as directed   Follow your healthcare provider's instructions for diet  · Join a support group  Life after a stroke can be difficult  It may be helpful to talk to others who have had a stroke  There are also support groups for family members or support persons of those whom have had a stroke  Ask your healthcare provider for more information about support groups  Know the signs of depression: Depression can happen after you have a stroke  Depression can lower your quality of life and cause you to stop doing things to help you be stronger  Depression can cause you to become less independent  Know the signs of depression so that you can receive help  Tell your family and friends that if they see these signs, to let your healthcare provider know  You may show any of the following signs of depression:  · Extreme sadness    · Avoiding social interaction with family or friends    · A lack of interest in things you once enjoyed    · Irritability    · Trouble sleeping    · Low energy levels    · A change in eating habits or sudden weight gain or loss  Driving after a stroke:  Do not drive a car without talking to your healthcare provider or occupational therapist  The effects of a stroke may make it difficult or unsafe for you to drive  You may need to have a  evaluation before you can safely and legally drive a car  You may also need to take a  training program or get special equipment installed in your car  Ask your healthcare provider for more information about driving after a stroke  For more information and support:   · National [employer]  [address] , [place] [address]  Phone: 0- 612 - [phone]  Web Address: [employer]  Follow up with your healthcare provider as directed: You may need to come in for regular tests of your brain function  If you are taking warfarin, you will need to come in for regular blood tests  Your INR levels will also need to be checked   These tests help make sure you are taking the right amount of warfarin  Write down your questions so you remember to ask them during your visits  © 2017 [employer] Information is for End User's use only and may not be sold, redistributed or otherwise used for commercial purposes  All illustrations and images included in CareNotes® are the copyrighted property of [employer]  or [employer]  The above information is an  only  It is not intended as medical advice for individual conditions or treatments  Talk to your doctor, nurse or pharmacist before following any medical regimen to see if it is safe and effective for you  Ischemic Stroke   WHAT YOU NEED TO KNOW:   An ischemic stroke occurs when blood flow to a part of your brain is blocked  The blockage is usually caused by a blood clot that gets stuck in a narrow blood vessel  When oxygen cannot get to an area of the brain, tissue in that area may get damaged  The damage can cause loss of body functions controlled by that area of the brain  DISCHARGE INSTRUCTIONS:   Call 911 for any of the following:   ·            · You have any of the following signs of a stroke:      ¨ Numbness or drooping on one side of your face     ¨ Weakness in an arm or leg    ¨ Confusion or difficulty speaking    ¨ Dizziness, a severe headache, or vision loss    · You have a seizure  · You feel lightheaded, short of breath, and have chest pain  · You cough up blood  · You have a severe headache, or loss of balance or coordination  Seek care immediately if:   · Your arm or leg feels warm, tender, and painful  It may look swollen and red  · You have double vision or vision loss  · You have unusual or heavy bleeding  Contact your healthcare provider or neurologist if:   · Your blood pressure is higher or lower than you were told it should be  · You have questions or concerns about your condition or care  Warning signs of a stroke:   The word F A S T  can help you remember and recognize signs of a stroke:  · F = Face:  One side of the face droops  · A = Arms:  One arm starts to drop when both arms are raised  · S = Speech:  Speech is slurred or sounds different than usual     · T = Time:  A person who is having a stroke needs to be seen immediately  A stroke is a medical emergency that needs immediate treatment  Some medicines and treatments work best if given within a few hours of a stroke  Early treatment can decrease the risk of long-term effects of a stroke  ·        Medicines: You may  need any of the following:  · Thrombolytics  help break apart clots  · Antiplatelets , such as aspirin, help prevent blood clots  Take your antiplatelet medicine exactly as directed  These medicines make it more likely for you to bleed or bruise  If you are told to take aspirin, do not take acetaminophen or ibuprofen instead  · Blood thinners    help prevent blood clots  Examples of blood thinners include heparin and warfarin  Clots can cause strokes, heart attacks, and death  The following are general safety guidelines to follow while you are taking a blood thinner:    ¨ Watch for bleeding and bruising while you take blood thinners  Watch for bleeding from your gums or nose  Watch for blood in your urine and bowel movements  Use a soft washcloth on your skin, and a soft toothbrush to brush your teeth  This can keep your skin and gums from bleeding  If you shave, use an electric shaver  Do not play contact sports  ¨ Tell your dentist and other healthcare providers that you take anticoagulants  Wear a bracelet or necklace that says you take this medicine  ¨ Do not start or stop any medicines unless your healthcare provider tells you to  Many medicines cannot be used with blood thinners  ¨ Tell your healthcare provider right away if you forget to take the medicine, or if you take too much  ¨ Warfarin  is a blood thinner that you may need to take   The following are things you should be aware of if you take warfarin  § Foods and medicines can affect the amount of warfarin in your blood  Do not make major changes to your diet while you take warfarin  Warfarin works best when you eat about the same amount of vitamin K every day  Vitamin K is found in green leafy vegetables and certain other foods  Ask for more information about what to eat when you are taking warfarin  § You will need to see your healthcare provider for follow-up visits when you are on warfarin  You will need regular blood tests  These tests are used to decide how much medicine you need  · Other medicines  may be given to treat other health conditions such as high cholesterol, high blood pressure, or diabetes  · Take your medicine as directed  Contact your healthcare provider if you think your medicine is not helping or if you have side effects  Tell him or her if you are allergic to any medicine  Keep a list of the medicines, vitamins, and herbs you take  Include the amounts, and when and why you take them  Bring the list or the pill bottles to follow-up visits  Carry your medicine list with you in case of an emergency  Follow up with your healthcare provider or neurologist as directed: You may need to come in for regular tests of your brain function  You may also need regular blood tests  Write down your questions so you remember to ask them during your visits  Self-care:   · Go to rehabilitation (rehab) as directed  Rehab is an important part of treatment  A speech therapist helps you relearn or improve your ability to talk and swallow  You may start slowly and start doing more difficult tasks over time  Physical therapists can help you gain strength and build endurance  Occupational therapists teach you new ways to do daily activities, such as getting dressed  Therapy can help you improve your ability to walk or keep your balance   Your therapy may include tasks or movements you will need to do for everyday activities  An example is being able to raise or lower yourself from a chair  · Make your home safe  Remove anything you might trip over  Tape electrical cords down  Keep paths clear throughout your home  Make sure your home is well lit  Put nonslip materials on surfaces that might be slippery  An example is your bathtub or shower floor  · Use walking devices as directed  A cane or walker may help you keep your balance as you walk  What you need to know about depression:  Depression can happen after a stroke  Talk to your healthcare provider if you have depression that continues or is getting worse  Your provider may be able to help treat your depression  Your provider can also recommend support groups for you to join  A support group is a place to talk with others who have had a stroke  It may also help to talk to friends and family members about how you are feeling  Tell your family and friends that if they see these signs, to let your healthcare provider know  You may show any of the following signs of depression:  · Extreme sadness    · Avoiding social interaction with family or friends    · A lack of interest in things you once enjoyed    · Irritability    · Trouble sleeping    · Low energy levels    · A change in eating habits or sudden weight gain or loss  Decrease your risk for another stroke:   · Manage health conditions  Take your medicine as directed  Check your blood pressure and blood sugar levels as directed  Keep a record and bring it to your follow-up visits  · Eat a variety of healthy foods  Healthy foods include whole-grain breads, low-fat dairy products, beans, fish, and lean meats  Eat at least 5 servings of fruits and vegetables each day  Choose foods that are low in salt, unhealthy fats (saturated and trans fat), salt, and sugar  Eat foods that are high in potassium, such as potatoes and bananas   Ask your dietitian what other nutrition guidelines you should follow  He or she can help you choose foods that are right for you  · Maintain a healthy weight  Ask your healthcare provider how much you should weigh  Ask him or her to help you create a weight loss plan if you are overweight  Ask about the best exercise plan for you  · Do not drink alcohol  Alcohol can increase your risk for a stroke  Alcohol may also increase your blood pressure or thin your blood  Blood thinning can increase your risk for hemorrhagic stroke  · Do not smoke cigarettes or use illegal drugs  Smoking and drugs increase your risk for a stroke  Nicotine and other chemicals in cigarettes and cigars can also cause lung damage  Ask your healthcare provider for information if you currently smoke or use drugs and need help to quit  E-cigarettes or smokeless tobacco still contain nicotine  Talk to your healthcare provider before you use these products  For support and more information:   · National Stroke Association  [address] , [place] [address]  Phone: 0- 241 - 230-3577  Web Address: [employer]  org  © 2017 [address] [employer] Information is for End User's use only and may not be sold, redistributed or otherwise used for commercial purposes  All illustrations and images included in CareNotes® are the copyrighted property of A D A M , Inc  or Godwin Rhoades  The above information is an  only  It is not intended as medical advice for individual conditions or treatments  Talk to your doctor, nurse or pharmacist before following any medical regimen to see if it is safe and effective for you  Angiogram   WHAT YOU NEED TO KNOW:   What do I need to know about an angiogram?  An angiogram is used to examine blood flow through your arteries  Arteries carry blood from your heart to your body  How do I prepare for the procedure? Your healthcare provider will tell you how to prepare for your procedure   He may tell you not to eat or drink anything after midnight on the day of your procedure  He will tell you what medicines to take or not take on the day of your procedure  Contrast liquid will be used during the procedure to help your arteries show up better in the pictures  Tell your healthcare provider if you have ever had an allergic reaction to contrast liquid  Arrange to have someone drive you home after your procedure  What will happen during the procedure? · You may be given medicine to help you relax or make you drowsy  You may get local anesthesia to numb the area where the angiogram catheter will go in  Hair may be removed from the procedure site  · A catheter will be put into an artery in your leg near your groin, or in your arm  The catheter travels through the artery to the area being studied  Contrast liquid is put through the catheter to help your blood vessels and organs show up better in the x-ray pictures  You may feel warm as the liquid is put into the catheter  You may get a headache or feel nauseated  These feelings should go away quickly  · Your healthcare providers will remove the catheter and apply pressure to the wound for several minutes  They may place a pressure bandage or other pressure device over the wound to help stop any bleeding  What will happen after the procedure? You will be on a heart monitor until you are fully awake  A heart monitor continuously records the electrical activity of your heart  Healthcare providers will frequently monitor your vital signs and pulses  They will also frequently check your wound for bleeding  Keep your leg straight  Do not  get out of bed until your healthcare provider says it is okay  After you are monitored for several hours, you may be able to go home  What are the risks of the procedure? · You may bleed heavily after your catheter is removed  The catheter may damage your artery, and you may need surgery to fix the damage   You could have kidney problems from the contrast liquid  You could have an allergic reaction to the contrast liquid or numbing medicine  · You may develop a blood clot that causes pain and swelling, and stops blood from flowing  A blood clot in your leg can break loose and travel to your lungs and become life-threatening  CARE AGREEMENT:   You have the right to help plan your care  Learn about your health condition and how it may be treated  Discuss treatment options with your caregivers to decide what care you want to receive  You always have the right to refuse treatment  The above information is an  only  It is not intended as medical advice for individual conditions or treatments  Talk to your doctor, nurse or pharmacist before following any medical regimen to see if it is safe and effective for you  © 2017 [address] [employer] Information is for End User's use only and may not be sold, redistributed or otherwise used for commercial purposes  All illustrations and images included in CareNotes® are the copyrighted property of [employer]  or Godwin Rhoades

## 2018-10-06 NOTE — CONSULTS
Consultation - Neurology   Eliane Gomez 77 y o  female MRN: [id number]  Unit/Bed#: ICU 09 Encounter: [id number]      Assessment/Plan   Assessment:  1  Acute right hemispheric stroke syndrome  2  Extensive right ICA occlusion, recannulized  with stent placement  3  Status post M1 thrombectomy  4  Though she has some persisting deficits, per nursing her left side is moving better than when she arrived in the ICU, and she has clear improvement in her speech intelligibility  Origin of her stroke is not yet clarified  She does report being told past that she had an irregular heartbeat  It is possible this is resultant AFib, verses existing carotid stenosis with subsequent occlusion     Plan:  1  Admit to stroke pathway  2   Currently on Integrilin drip  3  Repeat head CT in a m , and as per Dr Zoraida Brenner, hopefully start DA PT with Plavix load tomorrow  4  Continue on telemetry to evaluate for PAF  5  Continue on statin, check lipid profile, A1c  6  PT/OT/ST  7  Stat head CT for abrupt change in neurologic status  8  Blood pressure goals at this point require balance between optimizing perfusion, but avoiding elevated risk for hemorrhagic conversion  Suggest systolic blood pressure [phone]    Physician Requesting Consult: Sandeep Law MD  Reason for Consult / Principal Problem:  Acute stroke    HPI: Eliane Gomez is a 77y o  year old female who presents with stroke symptoms  She went to bed with her  last night in her usual state of health  Sometime around 5:45 a m , her  woke because she was struggling to to try and speak but her speech was garbled  He tried to assist her out of bed, but she went to the floor  He did notice at that time in addition to her speech changes that she had an asymmetric smile and difficulty moving her left side    EMS was summoned and she was brought to [facility]   The emergency room physician evidently initially spoke with the neurologist on call for [facility] (Dr Rory Reddy)  A stat head CT reportedly showed no acute changes  Subsequently had CTA head and neck  I was contacted shortly after 7:00 a m  to discuss her case further  Per ER physician her NIH SS at that time was 12  The formal read of her CTA returned while we were conversing, but it was already recognized by the ER physician, that there was a cut off or no flow in her right carotid artery  Additionally he recognized a cutoff in her right MCA intracranially, both those impressions were confirmed by Radiology report  In consultation also with the neurovascular interventionalist, Dr Nicole Guardado, we decided to have patient brought over for initial perfusion scan, with plans for potential intravascular treatment  She had a sizable penumbra, and thus had indicated procedure, which included recanalization of her internal carotid with stent placement, and subsequent thrombectomy of the M1 segment  The procedure was technically challenging but eventually stabilized, though he notes a persistent nonocclusive thrombus at the distal end of the stent  Inpatient consult to Neurology  Consult performed by: Demetra Ramirez ordered by: Prince Mathews          Review of Systems   HENT: Negative  Eyes: Negative  Respiratory: Negative  Cardiovascular: Positive for palpitations  Gastrointestinal: Negative  Endocrine: Negative  Genitourinary: Negative  Musculoskeletal: Negative  Neurological: Positive for speech difficulty, weakness and headaches  Negative for numbness  Hematological: Negative  Psychiatric/Behavioral: Negative  Historical Information   Past Medical History:   Diagnosis Date    Disease of thyroid gland      History reviewed  No pertinent surgical history    Social History   History   Alcohol Use    Yes     History   Drug Use No     History   Smoking Status    Never Smoker   Smokeless Tobacco    Never Used     Family History: non-contributory        Meds/Allergies   all current active meds have been reviewed and current meds:   Current Facility-Administered Medications   Medication Dose Route Frequency    atorvastatin (LIPITOR) tablet 40 mg  40 mg Oral QPM    eptifibatide (INTEGRILIN) 75 mg in 100 mL infusion (premix)  0 5 mcg/kg/min Intravenous Continuous    heparin (porcine) subcutaneous injection 5,000 Units  5,000 Units Subcutaneous Q8H [address]    iodixanol (VISIPAQUE) 320 MG/ML injection 250 mL  250 mL Intravenous Once in imaging    levothyroxine injection 37 5 mcg  37 5 mcg Intravenous Daily    sodium chloride 0 9 % infusion  75 mL/hr Intravenous Continuous       No Known Allergies    Objective   Vitals:Pulse 86, temperature 98 2 °F (36 8 °C), resp  rate 19, height 5' 4" (1 626 m), weight 82 7 kg (182 lb 5 1 oz), SpO2 99 %  ,Body mass index is 31 3 kg/m²  Intake/Output Summary (Last 24 hours) at 10/06/18 1555  Last data filed at 10/06/18 1400   Gross per 24 hour   Intake          1213 71 ml   Output             1425 ml   Net          -211 29 ml       Invasive Devices: Invasive Devices     Peripheral Intravenous Line            Peripheral IV 10/06/18 Left Antecubital less than 1 day    Peripheral IV 10/06/18 Right Antecubital less than 1 day          Arterial Line            Arterial Line 10/06/18 Left Radial less than 1 day          Drain            Urethral Catheter Non-latex; Temperature probe 6 Fr  less than 1 day                Physical Exam   Constitutional: She appears well-developed and well-nourished  No distress  HENT:   Head: Normocephalic and atraumatic  Mouth/Throat: Oropharynx is clear and moist    Eyes: Conjunctivae are normal  No scleral icterus  Neck: Normal range of motion  Neck supple  Cardiovascular: Regular rhythm  No murmur heard  Mildly tachycardic   Pulmonary/Chest: Effort normal and breath sounds normal  No respiratory distress  Abdominal: Soft   Bowel sounds are normal  There is no tenderness  Musculoskeletal: She exhibits edema  Skin: Skin is warm and dry  She is not diaphoretic  Vitals reviewed  Neurologic Exam     Mental Status   She is easily awakened, correctly oriented  She speaks fluently, able to repeat and follow commands  Able to name simple objects  Mild dysarthria present intermittently, but speech is 90% intelligible  Cranial Nerves   Pupils are equal and reactive to light  Extraocular movements were intact, requires a little extra cuing for full left deviation  She has full eyelid closure strength, but left lower facial weakness  Tongue protrudes midline  Head turning power intact  Decreased left shoulder shrug but not absent  Decreased sensation left versus right face     Motor Exam She exhibits essentially normal power in the right-sided limbs other than mild shoulder abductor and hip flexor weakness  On the left she is able to raise the arm off the bed (3/5 proximal) her biceps were probably 4/5  She did not produce distal movement at the wrist or hand  In her left lower, she can raise the the leg several inches off the bed and sustain it against gravity, but not resistance  Her knee extension though was better preserved at least 4/5  Sensory Exam   She has severely impaired sensation in the left-sided limbs, unable to appreciate light touch or pinprick  Seems to have at least some mild neglect  Sensation intact in the right-sided limbs  Gait, Coordination, and Reflexes Right finger-to-nose testing intact  Unable to perform on the left due to weakness  Heel-to-shin not tested  No attempt was made to have her stand and walk  Reflexes were slightly more brisk in the left versus right upper, grade 2 at both knees, diminished at the ankles  Plantar response is extensor on the left, flexor on the right       Lab Results:   I have personally reviewed pertinent reports    , CBC:   Results from last 7 days  Lab Units 10/06/18  1348 10/06/18  1117 WBC Thousand/uL 8 12  --    RBC Million/uL 3 15*  --    HEMOGLOBIN g/dL 9 5*  --    I STAT HEMOGLOBIN g/dl  --  10 2*   HEMATOCRIT % 29 8* 30*   MCV fL 95  --    PLATELETS Thousands/uL 278  --    , BMP/CMP:   Results from last 7 days  Lab Units 10/06/18  1349 10/06/18  1117   SODIUM mmol/L 141  --    POTASSIUM mmol/L 4 1  --    CHLORIDE mmol/L 111*  --    CO2 mmol/L 23  --    BUN mg/dL 13  --    CREATININE mg/dL 0 48*  --    GLUCOSE, ISTAT mg/dl  --  149*   CALCIUM mg/dL 7 6*  --    AST U/L 10  --    ALT U/L 16  --    ALK PHOS U/L 59  --    EGFR ml/min/1 73sq m 100  --      Imaging Studies: I have personally reviewed pertinent films in PACS  EKG, Pathology, and Other Studies: I have personally reviewed pertinent reports          Code Status: Level 1 - Full Code  Advance Directive and Living Will:      Power of :    POLST:      Counseling / Coordination of Care

## 2018-10-06 NOTE — BRIEF OP NOTE (RAD/CATH)
IR STROKE ALERT  Procedure Note    PATIENT NAME: Tl Knox  : 1950  MRN: [id number]     Pre-op Diagnosis: No diagnosis found  Post-op Diagnosis: No diagnosis found  Surgeon:   Rc Briones MD  Assistants:     No qualified resident was available, Resident is only observing    Estimated Blood Loss: 1 L  Findings: Right ICA occlusion from origin to petrous segment  Right MCA M1 occlusion, TICI 0     PUNCTURE: 09:47  FIRST PASS: 10:16 , Solumbra  RECAN: 10:21 , TICI 3    Successful balloon angioplasty and stenting of the rt ICA origin  Approximately 40% residual stenosis remains through the stented segment  Focal non-flow-limiting thrombus remains just above the stent  Right femoral sheath removed, closure device deployed       Specimens: none    Complications:  none    Anesthesia: TERRY Briones MD     Date: 10/6/2018  Time: 12:04 PM

## 2018-10-07 ENCOUNTER — APPOINTMENT (INPATIENT)
Dept: RADIOLOGY | Facility: HOSPITAL | Age: 68
DRG: 023 | End: 2018-10-07
Payer: COMMERCIAL

## 2018-10-07 LAB
ALBUMIN SERPL BCP-MCNC: 2.6 G/DL (ref 3.5–5)
ALP SERPL-CCNC: 53 U/L (ref 46–116)
ALT SERPL W P-5'-P-CCNC: 15 U/L (ref 12–78)
ANION GAP SERPL CALCULATED.3IONS-SCNC: 5 MMOL/L (ref 4–13)
AST SERPL W P-5'-P-CCNC: 11 U/L (ref 5–45)
BASOPHILS # BLD AUTO: 0.02 THOUSANDS/ΜL (ref 0–0.1)
BASOPHILS NFR BLD AUTO: 0 % (ref 0–1)
BILIRUB SERPL-MCNC: 0.32 MG/DL (ref 0.2–1)
BUN SERPL-MCNC: 14 MG/DL (ref 5–25)
CA-I BLD-SCNC: 1.08 MMOL/L (ref 1.12–1.32)
CALCIUM SERPL-MCNC: 7.7 MG/DL (ref 8.3–10.1)
CHLORIDE SERPL-SCNC: 110 MMOL/L (ref 100–108)
CO2 SERPL-SCNC: 25 MMOL/L (ref 21–32)
CREAT SERPL-MCNC: 0.52 MG/DL (ref 0.6–1.3)
EOSINOPHIL # BLD AUTO: 0.01 THOUSAND/ΜL (ref 0–0.61)
EOSINOPHIL NFR BLD AUTO: 0 % (ref 0–6)
ERYTHROCYTE [DISTWIDTH] IN BLOOD BY AUTOMATED COUNT: 13.7 % (ref 11.6–15.1)
GFR SERPL CREATININE-BSD FRML MDRD: 97 ML/MIN/1.73SQ M
GLUCOSE SERPL-MCNC: 106 MG/DL (ref 65–140)
GLUCOSE SERPL-MCNC: 116 MG/DL (ref 65–140)
GLUCOSE SERPL-MCNC: 131 MG/DL (ref 65–140)
HCT VFR BLD AUTO: 25.6 % (ref 34.8–46.1)
HGB BLD-MCNC: 8.2 G/DL (ref 11.5–15.4)
IMM GRANULOCYTES # BLD AUTO: 0.04 THOUSAND/UL (ref 0–0.2)
IMM GRANULOCYTES NFR BLD AUTO: 1 % (ref 0–2)
LYMPHOCYTES # BLD AUTO: 1.12 THOUSANDS/ΜL (ref 0.6–4.47)
LYMPHOCYTES NFR BLD AUTO: 13 % (ref 14–44)
MAGNESIUM SERPL-MCNC: 2.3 MG/DL (ref 1.6–2.6)
MCH RBC QN AUTO: 30.3 PG (ref 26.8–34.3)
MCHC RBC AUTO-ENTMCNC: 32 G/DL (ref 31.4–37.4)
MCV RBC AUTO: 95 FL (ref 82–98)
MONOCYTES # BLD AUTO: 0.56 THOUSAND/ΜL (ref 0.17–1.22)
MONOCYTES NFR BLD AUTO: 6 % (ref 4–12)
NEUTROPHILS # BLD AUTO: 7.06 THOUSANDS/ΜL (ref 1.85–7.62)
NEUTS SEG NFR BLD AUTO: 80 % (ref 43–75)
NRBC BLD AUTO-RTO: 0 /100 WBCS
PHOSPHATE SERPL-MCNC: 3.2 MG/DL (ref 2.3–4.1)
PLATELET # BLD AUTO: 264 THOUSANDS/UL (ref 149–390)
PMV BLD AUTO: 10 FL (ref 8.9–12.7)
POTASSIUM SERPL-SCNC: 3.9 MMOL/L (ref 3.5–5.3)
PROT SERPL-MCNC: 5.6 G/DL (ref 6.4–8.2)
RBC # BLD AUTO: 2.71 MILLION/UL (ref 3.81–5.12)
SODIUM SERPL-SCNC: 140 MMOL/L (ref 136–145)
WBC # BLD AUTO: 8.81 THOUSAND/UL (ref 4.31–10.16)

## 2018-10-07 PROCEDURE — G8996 SWALLOW CURRENT STATUS: HCPCS

## 2018-10-07 PROCEDURE — 70450 CT HEAD/BRAIN W/O DYE: CPT

## 2018-10-07 PROCEDURE — 93306 TTE W/DOPPLER COMPLETE: CPT | Performed by: INTERNAL MEDICINE

## 2018-10-07 PROCEDURE — 82948 REAGENT STRIP/BLOOD GLUCOSE: CPT

## 2018-10-07 PROCEDURE — G8988 SELF CARE GOAL STATUS: HCPCS

## 2018-10-07 PROCEDURE — G8987 SELF CARE CURRENT STATUS: HCPCS

## 2018-10-07 PROCEDURE — 99232 SBSQ HOSP IP/OBS MODERATE 35: CPT | Performed by: INTERNAL MEDICINE

## 2018-10-07 PROCEDURE — 97167 OT EVAL HIGH COMPLEX 60 MIN: CPT

## 2018-10-07 PROCEDURE — 82330 ASSAY OF CALCIUM: CPT | Performed by: STUDENT IN AN ORGANIZED HEALTH CARE EDUCATION/TRAINING PROGRAM

## 2018-10-07 PROCEDURE — 85025 COMPLETE CBC W/AUTO DIFF WBC: CPT | Performed by: STUDENT IN AN ORGANIZED HEALTH CARE EDUCATION/TRAINING PROGRAM

## 2018-10-07 PROCEDURE — G8978 MOBILITY CURRENT STATUS: HCPCS

## 2018-10-07 PROCEDURE — G8979 MOBILITY GOAL STATUS: HCPCS

## 2018-10-07 PROCEDURE — 80053 COMPREHEN METABOLIC PANEL: CPT | Performed by: STUDENT IN AN ORGANIZED HEALTH CARE EDUCATION/TRAINING PROGRAM

## 2018-10-07 PROCEDURE — 70551 MRI BRAIN STEM W/O DYE: CPT

## 2018-10-07 PROCEDURE — 92610 EVALUATE SWALLOWING FUNCTION: CPT

## 2018-10-07 PROCEDURE — G8997 SWALLOW GOAL STATUS: HCPCS

## 2018-10-07 PROCEDURE — 84100 ASSAY OF PHOSPHORUS: CPT | Performed by: STUDENT IN AN ORGANIZED HEALTH CARE EDUCATION/TRAINING PROGRAM

## 2018-10-07 PROCEDURE — 83735 ASSAY OF MAGNESIUM: CPT | Performed by: STUDENT IN AN ORGANIZED HEALTH CARE EDUCATION/TRAINING PROGRAM

## 2018-10-07 PROCEDURE — 99232 SBSQ HOSP IP/OBS MODERATE 35: CPT | Performed by: PSYCHIATRY & NEUROLOGY

## 2018-10-07 PROCEDURE — 97116 GAIT TRAINING THERAPY: CPT

## 2018-10-07 PROCEDURE — 97163 PT EVAL HIGH COMPLEX 45 MIN: CPT

## 2018-10-07 RX ORDER — ASPIRIN 325 MG
325 TABLET ORAL DAILY
Status: DISCONTINUED | OUTPATIENT
Start: 2018-10-07 | End: 2018-10-11

## 2018-10-07 RX ORDER — CLOPIDOGREL BISULFATE 75 MG/1
75 TABLET ORAL DAILY
Status: DISCONTINUED | OUTPATIENT
Start: 2018-10-08 | End: 2018-10-11

## 2018-10-07 RX ORDER — LEVOTHYROXINE SODIUM 0.07 MG/1
75 TABLET ORAL
Status: DISCONTINUED | OUTPATIENT
Start: 2018-10-07 | End: 2018-10-12 | Stop reason: HOSPADM

## 2018-10-07 RX ORDER — ASPIRIN 325 MG
325 TABLET ORAL ONCE
Status: COMPLETED | OUTPATIENT
Start: 2018-10-07 | End: 2018-10-07

## 2018-10-07 RX ORDER — CLOPIDOGREL BISULFATE 75 MG/1
300 TABLET ORAL ONCE
Status: COMPLETED | OUTPATIENT
Start: 2018-10-07 | End: 2018-10-07

## 2018-10-07 RX ORDER — ASPIRIN 300 MG/1
300 SUPPOSITORY RECTAL ONCE
Status: COMPLETED | OUTPATIENT
Start: 2018-10-07 | End: 2018-10-07

## 2018-10-07 RX ADMIN — CLOPIDOGREL 300 MG: 75 TABLET, FILM COATED ORAL at 09:36

## 2018-10-07 RX ADMIN — ASPIRIN 300 MG: 300 SUPPOSITORY RECTAL at 08:07

## 2018-10-07 RX ADMIN — HEPARIN SODIUM 5000 UNITS: 5000 INJECTION, SOLUTION INTRAVENOUS; SUBCUTANEOUS at 14:08

## 2018-10-07 RX ADMIN — EPTIFIBATIDE 0.5 MCG/KG/MIN: 0.75 INJECTION, SOLUTION INTRAVENOUS at 08:04

## 2018-10-07 RX ADMIN — HEPARIN SODIUM 5000 UNITS: 5000 INJECTION, SOLUTION INTRAVENOUS; SUBCUTANEOUS at 05:56

## 2018-10-07 RX ADMIN — ASPIRIN 325 MG: 325 TABLET ORAL at 12:53

## 2018-10-07 RX ADMIN — LEVOTHYROXINE SODIUM 75 MCG: 75 TABLET ORAL at 12:06

## 2018-10-07 RX ADMIN — ATORVASTATIN CALCIUM 40 MG: 40 TABLET, FILM COATED ORAL at 18:20

## 2018-10-07 RX ADMIN — HEPARIN SODIUM 5000 UNITS: 5000 INJECTION, SOLUTION INTRAVENOUS; SUBCUTANEOUS at 22:16

## 2018-10-07 NOTE — PROGRESS NOTES
Progress Note - [address] [facility] 77 y o  female MRN: [id number]  Unit/Bed#: ICU 09 Encounter: [id number]    Impression:  Principal Problem:    CVA (cerebral vascular accident) [facility])  Active Problems:    Hypothyroidism      Plan:  Neurological/Psychiatric  ? Right MCA M1 occlusion, TICI 0 and right ICA occlusion from origin to the petrous segment, status post balloon angioplasty and stenting of right ICA origin POD #1  ? Repeat CT in a m  ShowsInterval development of hypodensity within the right temporal lobe, right caudate nucleus and junction of the right temporal occipital lobes consistent with evolution of previously suspected right MCA infarct  Mild edema and regional mass effect without midline shift  Tiny linear focus of increased attenuation along the right temporal lobe image 24 for which petechial gyriform hemorrhage cannot be completely excluded  Otherwise, no evidence of intracranial hemorrhage detected  ?  Integrilin infusion per neurosurgery, if no evidence of symptomatic ICH or MCA infarct greater than 2/3, Integrilin drip to be turned off and patient to be loaded with 300 mg of Plavix  ? BP goals of MAP >80 and SBP <160 per neurosurgery  ? Neurology consulted, appreciate recommendations  ? Will check MRI       Cardiovascular  ? Right ICA occlusion from origin to petrous segments as noted above with aforementioned intervention  ? Noted to have focal non-flow-limiting thrombus remaining just distal to stent  ? Management as noted above  ? BP goals of MAP >80 and SBP <160 per neurosurgery  ? Continue supportive care  ? Continue atorvastatin 40 mg daily     Respiratory  ? No acute issues  ? Encourage incentive spirometry     Gastrointestinal  ? No acute issues  ? Bowel regimen     Renal  ? No acute issues  ? Monitor UOP with goal of 0 5-1 mL/kg/hr  ? Llamas in place     Fluids/Electrolytes/Nutrition  ? Continue NS at 75 mL/hr for now until safely tolerating oral intake  ?  Keep NPO for now, pending speech evaluation  ? Monitor electrolytes and replete as needed  § Goal K >4, Phos >3, Mg >2     Heme  ? Acute blood loss anemia  ? EBL approximately 1 liter intraoperatively  ? Monitor blood counts  ? Transfuse as needed for hemoglobin <7  ? DVT prophylaxis with SCDs, Heparin     Endocrine  ? Hypothyroidism  ? Reports taking levothyroxine 75 mcg daily as outpatient  ? As patient is NPO, will order IV levothyroxine 37 5 mcg daily  ? Check TSH and FT4 levels  ? Transition to PO formulation when able  ? Monitor blood sugars  ? Goal -180  ? Avoid hypo or hyperglycemia     Infectious Disease  ? No acute issues  ? Monitor fever trends     MSK/Skin  ? No acute issues  ? Frequent repositioning at appropriate time post-procedure for pressure ulcer prophylaxis  ? PT/OT evaluation     Disposition: Continue 24-hour monitoring in ICU      HPI/24hr events:  No acute events reported overnight    Physical Exam   Constitutional: She is oriented to person, place, and time  She appears well-developed and well-nourished  No distress  HENT:   Head: Normocephalic and atraumatic  Eyes: Pupils are equal, round, and reactive to light  Neck: Neck supple  No thyromegaly present  Cardiovascular: Normal rate, regular rhythm and normal heart sounds  Pulmonary/Chest: Effort normal and breath sounds normal  No respiratory distress  She has no wheezes  She has no rales  Abdominal: Soft  She exhibits no distension  There is no tenderness  Llamas in place   Musculoskeletal: She exhibits no edema  Neurological: She is alert and oriented to person, place, and time  Left sided neglect  Left Facial droop   Strength 4/5 LUE,LLE  Strength 5/5 RUE,RLE   Skin: Skin is warm  No rash noted  No erythema  Vitals reviewed      Vitals:   Vitals:    10/07/18 0215 10/07/18 0315 10/07/18 0415 10/07/18 0515   Pulse: 74 80 90 72   Resp: 17 17 19 15   Temp: 100 °F (37 8 °C) 99 3 °F (37 4 °C) 99 7 °F (37 6 °C) 99 3 °F (37 4 °C) TempSrc:       SpO2: 97% 98% 98% 98%   Weight:       Height:         Arterial Line BP: 134/44  Arterial Line MAP (mmHg): 72 mmHg    Temperature: Temp (24hrs), Av 8 °F (37 1 °C), Min:96 1 °F (35 6 °C), Max:100 °F (37 8 °C)  Current: Temperature: 99 3 °F (37 4 °C)    Weights: IBW: 54 7 kg  Body mass index is 31 3 kg/m²  Hemodynamic Monitoring:  N/A       Respiratory:  SpO2 Device: O2 Device: None (Room air)       Intake and Outputs:  Intake/Output Summary (Last 24 hours) at 10/07/18 0656  Last data filed at 10/07/18 0400   Gross per 24 hour   Intake           88 ml   Output              ml   Net           349 88 ml     I/O last 24 hours: In:  9 [I V :2291 1; IV Piggyback:53 8]  Out:  [NRMAR:4270; Blood:800]      Stool:      Nutrition:        Diet Orders            Start     Ordered    10/06/18 1308  Diet NPO  Diet effective now     Question Answer Comment   Diet Type NPO    RD to adjust diet per protocol?  Yes        10/06/18 1314            Labs:     Results from last 7 days  Lab Units 10/07/18  0438 10/06/18  1348 10/06/18  1117   WBC Thousand/uL 8 81 8 12  --    HEMOGLOBIN g/dL 8 2* 9 5*  --    I STAT HEMOGLOBIN g/dl  --   --  10 2*   HEMATOCRIT % 25 6* 29 8* 30*   PLATELETS Thousands/uL 264 278  --    NEUTROS PCT % 80*  --   --    MONOS PCT % 6  --   --        Results from last 7 days  Lab Units 10/07/18  0438 10/06/18  1349 10/06/18  1117   SODIUM mmol/L 140 141  --    POTASSIUM mmol/L 3 9 4 1  --    CHLORIDE mmol/L 110* 111*  --    CO2 mmol/L 25 23  --    BUN mg/dL 14 13  --    CREATININE mg/dL 0 52* 0 48*  --    CALCIUM mg/dL 7 7* 7 6*  --    ALK PHOS U/L 53 59  --    ALT U/L 15 16  --    AST U/L 11 10  --    GLUCOSE, ISTAT mg/dl  --   --  149*       Results from last 7 days  Lab Units 10/07/18  0438 10/06/18  1349   MAGNESIUM mg/dL 2 3 1 7       Results from last 7 days  Lab Units 10/07/18  0438 10/06/18  1349   PHOSPHORUS mg/dL 3 2 3 4                        Results from last 7 days  Lab Units 10/06/18  1348   HEMOGLOBIN A1C % 5 4       Results from last 7 days  Lab Units 10/06/18  1349   TSH 3RD GENERATON uIU/mL 0 784       Imaging:   10/07/2018 CT head  Interval development of hypodensity within the right temporal lobe, right caudate nucleus and junction of the right temporal occipital lobes consistent with evolution of previously suspected right MCA infarct  Mild edema and regional mass effect without midline shift    Tiny linear focus of increased attenuation along the right temporal lobe image 24 for which petechial gyriform hemorrhage cannot be completely excluded  Otherwise, no evidence of intracranial hemorrhage detected    Micro:   Blood Culture: No results found for: BLOODCX  Urine Culture: No results found for: URINECX  Sputum Culture: No components found for: SPUTUMCX  Wound Culure: No results found for: WOUNDCULT        Allergies: No Known Allergies    Medications:   Scheduled Meds:  Current Facility-Administered Medications:  atorvastatin 40 mg Oral QPM Bienvenido Swenson MD    eptifibatide 0 5 mcg/kg/min Intravenous Continuous Nae Lakes, DO Last Rate: 0 5 mcg/kg/min (10/06/18 1411)   heparin (porcine) 5,000 Units Subcutaneous [facility] Bienvenido Swenson MD    iodixanol 250 mL Intravenous Once in imaging Bienvenido Swenson MD    levothyroxine 37 5 mcg Intravenous Daily Nae Lakes, DO    sodium chloride 75 mL/hr Intravenous Continuous Nae Lakes, DO Last Rate: 75 mL/hr (10/06/18 1412)     Continuous Infusions:  eptifibatide 0 5 mcg/kg/min Last Rate: 0 5 mcg/kg/min (10/06/18 1411)   sodium chloride 75 mL/hr Last Rate: 75 mL/hr (10/06/18 1412)     PRN Meds:    iodixanol 250 mL Once in imaging       Invasive lines and devices:   Invasive Devices     Peripheral Intravenous Line            Peripheral IV 10/06/18 Right Antecubital 1 day    Peripheral IV 10/06/18 Left Antecubital less than 1 day          Arterial Line            Arterial Line 10/06/18 Left Radial less than 1 day          Drain Urethral Catheter Non-latex; Temperature probe 6 Fr  less than 1 day                Code Status: Level 1 - Full Code      Portions of the record may have been created with voice recognition software  Occasional wrong word or "sound a like" substitutions may have occurred due to the inherent limitations of voice recognition software  Read the chart carefully and recognize, using context, where substitutions have occurred      SIGNATURE: Adriana Zaragoza MD  DATE: October 7, 2018  TIME: 6:56 AM

## 2018-10-07 NOTE — PROGRESS NOTES
Progress Note - ICU Transfer to SD/MS tele   Shiraz Boswell 77 y o  female MRN: [id number]  [address]   Unit/Bed#: ICU 09 Encounter: [id number]    Code Status: Level 1 - Full Code  POA:    POLST:      Reason for ICU admission:     Active problems:   Principal Problem:    CVA (cerebral vascular accident) ([address] )  Active Problems:    Hypothyroidism  Resolved Problems:    * No resolved hospital problems  *      Consultants:  Neurosurgery  Neurology    History of Present Illness:   Per Dr Stephani Alexandre is a 77 y o  female with a past medical history of hypothyroidism, who presented to [facility] as a stroke alert  Per patient's , she awoke at approximately 4:00 am on 10/6 with slurred speech and left sided weakness  EMS was called  Patient found to have right MCA M1 occlusion as well as right ICA occlusion from origin to petrous segment  tPA was not administered but patient did undergo urgent endovascular intervention  Patient had successful balloon angioplasty and stenting of the right ICA origin  Approximately 40% residual stenosis was noted remaining through the stented segment  Focal non-flow-limiting thrombus remained just above the stent  The patient was started on Integrilin  Post endovascular intervention, the patient was brought to ICU for monitoring and proceeding with stroke core measures    Summary of clinical course:  Patient was observed overnight in the ICU, showed marked neurological improvement, her left-sided weakness improved and was actually 4/5 in upper and lower extremity on 10/07/2018 blood pressure goals map greater than 80, SBP < 160  CT head was repeated today which showed Mild edema and regional mass effect without midline shift, there was tiny focus of slightly higher attenuation in right temporal lobe but no other evidence of intracranial hemorrhage  MRI of the brain was done in the a m   And showed extensive infarcts in the right hemisphere but no hemorrhage was identified  Patient was given loading dose of Plavix 300 mg and aspirin 325 mg orally and 300 mg rectally and was weaned off Integrilin drip  Per Neurosurgery patient to be started on Plavix 75 mg daily, aspirin 325 mg daily from tomorrow  Integrilin drip was discontinued once patient was started on aspirin and Plavix  Echo showed EF 55% with no regional wall motion abnormality  DVT prophylaxis heparin  Patient was transferred to step-down level 2  Recent or scheduled procedures:  None    Outstanding/pending diagnostics:  None    Cultures:  None       Mobilization Plan:  Step-down level 2    Nutrition Plan:  Dysphagia level 2 diet      Home medications that are not reordered and reason why:  None       Spoke with Dr Jacobo Goldberg  regarding transfer  Please call  ICU with any questions or concerns  Portions of the record may have been created with voice recognition software  Occasional wrong word or "sound a like" substitutions may have occurred due to the inherent limitations of voice recognition software  Read the chart carefully and recognize, using context, where substitutions have occurred      Jame Snider MD

## 2018-10-07 NOTE — PHYSICAL THERAPY NOTE
Physical Therapy Evaluation     Patient's Name: Nakia Harding    Admitting Diagnosis  CVA (cerebral vascular accident) [facility]) [I63 9]    Problem List  Patient Active Problem List   Diagnosis    CVA (cerebral vascular accident) ([address] )    Hypothyroidism       Past Medical History  Past Medical History:   Diagnosis Date    Disease of thyroid gland        Past Surgical History  History reviewed  No pertinent surgical history  10/07/18 1112   Note Type   Note type Eval/Treat   Pain Assessment   Pain Assessment No/denies pain   Home Living   Type of [address] Two level;Performs ADLs on one level; Able to live on main level with bedroom/bathroom  (1 YOSELIN)   Bathroom Shower/Tub Walk-in shower   Bathroom Toilet Raised   Bathroom Equipment (None)   Bathroom Accessibility Accessible   Home Equipment Walker;Cane  (doesn't use at baseline, but available for use)   Prior Function   Level of [place] Independent with ADLs and functional mobility   Lives With Spouse   Receives Help From Family   ADL Assistance Independent   IADLs Independent   Falls in the last 6 months 0   Vocational Retired   Comments PTA, pt was I with ADLs and functional mobility  She enjoys painting her house and completing wood working projects with her   Restrictions/Precautions   Weight Bearing Precautions Per Order No   Other Precautions Chair Alarm;Cognitive;Visual impairment; Fall Risk;Telemetry;Multiple lines   General   Additional Pertinent History R MCA with left sided weakness in upper and lower extremities   Family/Caregiver Present Yes   Cognition   Overall Cognitive Status WFL   Arousal/Participation Alert   Attention Within functional limits   Orientation Level Oriented X4   Memory Within functional limits   Following Commands Follows one step commands without difficulty   RLE Assessment   RLE Assessment X   Strength RLE   RLE Overall Strength 4-/5   LLE Assessment   LLE Assessment X   Strength LLE   LLE Overall Strength 4-/5   Coordination   Movements are Fluid and Coordinated 0   Coordination and Movement Description Disdiadochokinesia of LUE, ataxic-like gait   Sensation WFL   Light Touch   RLE Light Touch Grossly intact   LLE Light Touch Grossly intact   Proprioception   LLE Proprioception (Unable to assess - difficulty allowing PROM)   Bed Mobility   Additional Comments Pt received ambulating with nursing from stretcher post MRI   Transfers   Sit to Stand 3  Moderate assistance   Additional items Assist x 1; Increased time required;Armrests; Verbal cues   Stand to Sit 3  Moderate assistance   Additional items Increased time required;Armrests;Assist x 2   Stand pivot 2  Maximal assistance   Additional items Assist x 2; Increased time required;Verbal cues  (at rw)   Ambulation/Elevation   Gait pattern L Foot drag;L Hemiparesis; Narrow SATISH  (Pushing to the left)   Gait Assistance 2  Maximal assist   Additional items Assist x 2;Verbal cues; Tactile cues   Assistive Device Rolling walker   Distance 5', 3'   Balance   Static Sitting Poor +   Static Standing Poor   Dynamic Standing Poor -   Ambulatory Poor -   Endurance Deficit   Endurance Deficit Yes   Endurance Deficit Description weakness, fatigue   Activity Tolerance   Activity Tolerance Patient limited by fatigue   Medical Staff Made Aware Yes, OT present due to complexity of evaluation   Nurse Made Aware Yes, RN allowed PT and OT to transfer pt back to room post MRI to assess mobility   Assessment   Prognosis Good   Problem List Decreased strength;Decreased endurance; Impaired balance;Decreased mobility; Decreased coordination; Impaired vision  (Left sided field cut and also possible neglect)   Assessment Pt is 77 y o  female seen for PT evaluation s/p admit to [facility] on 10/6/2018 w/ CVA (cerebral vascular accident) ([facility] [address] )  Pt was found to have slurred speech and L sided weakness with R MCA MI and R ICA occlusion    No tPA intervention; pt received successful balloon angioplasty and stenting of R ICA  PT consulted to assess pt's functional mobility and d/c needs  Order placed for PT eval and tx, with clearance from RN for ambulation  Comorbidities affecting pt's physical performance at time of assessment include: hypothyroidism  PTA, pt was ambulates community distances and elevations and retired  Personal factors affecting pt at time of IE include: ambulating w/ assistive device, inability to ambulate household distances, limited home support, visual impairments, inability to perform IADLs, inability to perform ADLs and inability to live alone  Please find objective findings from PT assessment regarding body systems outlined above with impairments and limitations including weakness, impaired balance, decreased endurance, impaired coordination, gait deviations, decreased functional mobility tolerance, fall risk and left sided field cut/visual impairment  The following objective measures performed on IE also reveal limitations: Barthel Index: 45/100  Pt's clinical presentation is currently unstable/unpredictable seen in pt's presentation of abnormal labs, lines, telemetry, continuous oxygen monitoring, continuous critical care  Pt to benefit from continued PT tx to address deficits as defined above and maximize level of functional independent mobility and consistency  From PT/mobility standpoint, recommendation at time of d/c would be PM&R consult pending progress in order to facilitate return to PLOF     Barriers to Discharge Decreased caregiver support  (Pt requires max A x2-3 for mobility)   Goals   Patient Goals To do chores for her kids and remodel house   STG Expiration Date 10/17/18   Short Term Goal #1 Pt will demonstrate: 1) increased BLE strength >/= 1 grade for improved transfers 2) compensation for visual deficits seen by proper head turning to avoid obstacles on the left during ambulation 3) sit to stand transfer with </= mod I 4) supine to sit transfer with </= min A x1 5) Amb >/=75' using rolling walker with </= mod A x1 with or without chair follow 6) Increased balance >/=1 grade for decreased risk for falls   Treatment Day 1   Plan   Treatment/Interventions Functional transfer training;LE strengthening/ROM; Therapeutic exercise; Endurance training;Patient/family training;Equipment eval/education; Bed mobility;Gait training;Spoke to nursing;OT   PT Frequency (4-6x/wk)   Recommendation   Recommendation (PM&R consult)   Equipment Recommended [address]   PT - OK to Discharge Yes   Additional Comments To inpatient rehab when medically stable   Barthel Index   Feeding 10   Bathing 0   Grooming Score 0   Dressing Score 5   Bladder Score 10   Bowels Score 10   Toilet Use Score 5   Transfers (Bed/Chair) Score 5   Mobility (Level Surface) Score 0   Stairs Score 0   Barthel Index Score 45       PT Treatment  Time In: 1057  Time Out: 1112  Total Time: 15    Pt agreeable for PT treatment session for ambulation  Pt was received ambulating from the stretcher to the bedside deion after approximately 1 foot from the stretcher  Despite demonstrating adequate BLE strength, pt demonstrates left knee buckle and difficulty with hip and knee flexion in the swing phase of gait that appear to be related to impaired initiation of movement and coordination  Pt has visual deficits preventing her from seeing to her left side  Additionally, she required vc and tactile cueing to upright posture due to pushing to the left  She had difficulty with maintaining a wide base of support for stability and required repeated vc  Pt was assisted into sitting with mod A x2 and wheeled back to beside her bed  A second ambulation trial took place, and pt demonstrated improved SATISH with vc to compensate for visual deficits, but she continued to demonstrate difficulty initiating stepping with the LLE and pushing heavily to the left denying awareness of postural positioning    Pt would continue to benefit from skilled PT to improve strength, endurance, balance, mobility, and coordination to return to PLOF      Alyssa Player, PT, DPT

## 2018-10-07 NOTE — OCCUPATIONAL THERAPY NOTE
[address] Evaluation     Patient Name: Hammad Ragsdale  LHXYL'R Date: 10/7/2018  Problem List  Patient Active Problem List   Diagnosis    CVA (cerebral vascular accident) ([address] )    Hypothyroidism     Past Medical History  Past Medical History:   Diagnosis Date    Disease of thyroid gland      Past Surgical History  History reviewed  No pertinent surgical history  10/07/18 1016   Note Type   Note type Eval/Treat   Restrictions/Precautions   Weight Bearing Precautions Per Order No   Other Precautions Chair Alarm;Multiple lines;Telemetry; Fall Risk;Pain;Cognitive;Visual impairment   Pain Assessment   Pain Assessment No/denies pain   Pain Score No Pain   Home Living   Type of Home House   Home Layout Two level;Performs ADLs on one level; Able to live on main level with bedroom/bathroom   Bathroom Shower/Tub Walk-in shower   Bathroom Toilet Raised   Bathroom Equipment Other (Comment)  (denies any)   Home Equipment Walker;Cane  (unused but available if needed)   Additional Comments pt reports living in 2 SH, 1 YOSELIN, 1st floor setup   Prior Function   Level of [place] Independent with ADLs and functional mobility   Lives With Spouse   Receives Help From Family   ADL Assistance Independent   IADLs Independent   Falls in the last 6 months 0   Vocational Retired   Comments Pt reports being I w/ ADLS, IADLS, transfers and functional mobility PTA   Lifestyle   Autonomy Pt reports being I w/ ADLS, IADLS, transfers and functional mobility PTA   Reciprocal Relationships Pt lives w/ spouse   Service to Others Pt is retired   Intrinsic Gratification pt enjoys painting the house and gardening   Psychosocial   Psychosocial (WDL) WDL   Length of Time/Family Visitation 16-30 min  (2 dghts and spouse present)   ADL   Eating Assistance 5  Supervision/Setup   Grooming Assistance 4  Minimal Assistance   [id number]  [address] [facility] [address]  [address] [facility] [address] [place] [address] Minimal Assistance   LB Dressing Assistance 2  Maximal Assistance   Toileting Assistance  2  Maximal Assistance   Functional Assistance 2  Maximal Assistance   Functional Deficit Steadying;Supervision/safety; Increased time to complete   Bed Mobility   Supine to Sit Unable to assess   Sit to Supine Unable to assess   Additional Comments Pt seated OOB in chair prior to and end of session   Transfers   Sit to Stand 3  Moderate assistance   Additional items Assist x 1; Increased time required;Verbal cues;Armrests   Stand to Sit 3  Moderate assistance   Additional items Increased time required;Verbal cues;Armrests;Assist x 2   Stand pivot 2  Maximal assistance   Additional items Assist x 2; Increased time required;Verbal cues   Additional Comments pt performed sit-stand from chair w/ Mod A x1 for safety, balance, moderate force production into standing and SBA of 2nd for safety  Pt performed stand-sit w/ Mod A x2 for VC and manual cues for proper technique and safety  Pt with difficulty advancing L LE  Pt performed SPT from stretcher to chair w/ Max A x2 for LE advancement/managment, RW support, VC for safety/technique and use of RW in standing for steadying balance   Functional Mobility   Functional Mobility 2  Maximal assistance   Additional Comments Pt ambulated few steps forward and backward w/ Max Ax 2 for safety/balance, LE management, RW management and VC for proper technique  Pt used RW for support   Leaned to L side and required verbal/manual cues for repositioning to midline   Additional items Rolling walker   Balance   Static Sitting Poor +   Static Standing Poor   Ambulatory Poor -   Activity Tolerance   Activity Tolerance Patient limited by fatigue   Medical Staff Made Aware PT, Cristina Phelps   Nurse Made Aware yes, [facility] Assessment   RUE Assessment X  (3+/5 grossly)   LUE Assessment   LUE Assessment X  (3-/5 grossly)   Hand Function   Gross Motor Coordination Impaired   Fine Motor Coordination Functional Sensation   Light Touch No apparent deficits   Additional Comments Digits were noticeably cold   Proprioception   Proprioception No apparent deficits  (however had difficulty placing glasses over bilateral ears )   Vision-Basic Assessment   Current Vision Wears glasses only for reading   Vision - Complex Assessment   Tracking Decreased smoothness of horizontal tracking;Decreased smoothness of eye movement to L superior field; Requires cues, head turns, or add eye shifts to track   Visual Fields Difficulty detecting stimulus with OD LUQ; Difficulty detecting stimulus with OD LLQ; Difficulty detecting stimulus with OS LUQ; Difficulty detecting stimulus with OS LLQ   Visual Screen Results Decreased visual acuity; Left homonymous peripheral visual field deficit; Left sided erika-inattention   Additional Comments Pt reports feeling no visual deficits however when asked to read therapist's name off name tag pt unable to identify 1st two letters on L side  When asked to read time on clock pt unable to identify it was 11:10 instead reported 1:10  Appears to have L field cut/visual deficit/inattention- possible homonymous hemianopsia pt however is unaware of any visual deficit  Cognition   Overall Cognitive Status WFL   Arousal/Participation Responsive; Cooperative   Attention Within functional limits   Orientation Level Oriented X4   Memory Within functional limits   Following Commands Follows one step commands without difficulty   Comments Pt is pleasant and cooperative   Assessment   Limitation Decreased ADL status; Decreased UE strength;Decreased UE ROM; Decreased Safe judgement during ADL;Decreased endurance;Visual deficit; Decreased self-care trans;Decreased high-level ADLs   Prognosis Fair   Assessment Pt is a 76 y/o female seen for OT jodi s/p adm to [facility] as a stroke alert- pt's  found pt w/ slurred speech and L sided weakness  Pt found to have R MCA MI occlusion as well as R ICA occlusion   tPA not administered but is s/p successful balloon angioplasty and stenting of R ICA  Pt is dx'd w/ acute R MCA syndrome  Comorbidities include a h/o disease of thyroid gland  Pt with active OT orders  Pt lives with spouse in 2 SH, 1 YOSELIN, 1st floor setup  Pt was I w/ ADLS and IADLS, drove, & required no use of DME PTA but can have a walker and cane available if needed  Pt is currently demonstrating the following occupational deficits: Min A UB ADLS, Max A LB ADLs, Mod A transfers and Max A X2 functional mobility w/ RW  These deficits that are impacting pt's baseline areas of occupation are a result of the following impairments: endurance, activity tolerance, functional mobility, forward functional reach, balance, trunk control, functional standing tolerance, decreased I w/ ADLS/IADLS, strength, ROM, visual deficits, decreased insight into deficits and coordination deficits  The following Occupational Performance Areas to address include: eating, grooming, bathing/shower, toilet hygiene, dressing, medication management, socialization, health maintenance, functional mobility, clothing management, cleaning, meal prep and household maintenance  Pt scored overall 45/100 on the Barthel Index  Based on the aforementioned OT evaluation, functional performance deficits, and assessments, pt has been identified as a high complexity evaluation  Recommend STR upon D/C when medically stable  Pt to continue to benefit from acute immediate OT services to address the following goals 3-5x/week to  w/in 10-14 days:    Goals   Patient Goals to paint house and do chores for kids   LTG Time Frame 10-14   Long Term Goal #1 see below listed goals   Plan   Treatment Interventions ADL retraining;Visual perceptual retraining;Functional transfer training;UE strengthening/ROM; Endurance training;Cognitive reorientation;Patient/family training;Equipment evaluation/education; Neuromuscular reeducation; Fine motor coordination activities; Compensatory technique education;Continued evaluation; Energy conservation; Activityengagement   Goal Expiration Date 10/21/18   OT Frequency 3-5x/wk   Recommendation   Recommendation Physiatry Consult   OT Discharge Recommendation Short Term Rehab   OT - OK to Discharge Yes  (when medically stable)   Barthel Index   Feeding 10   Bathing 0   Grooming Score 0   Dressing Score 5   Bladder Score 10   Bowels Score 10   Toilet Use Score 5   Transfers (Bed/Chair) Score 5   Mobility (Level Surface) Score 0   Stairs Score 0   Barthel Index Score 45   Modified Aries Scale   Modified Aries Scale 4     GOALS    1) Pt will improve activity tolerance to G for min 30 min txment sessions    2) Pt will complete ADLs/self care w/ Min A    3) Pt will complete toileting w/ Min A w/ G hygiene/thoroughness using DME PRN    4) Pt will improve fx'l tfers on/off all surfaces using dME PRN w/ G balance/safety including toileting w/ Min A    5) Pt will improve fx'l mobility during I/ADl/leisure tasks using DME PRN w/ g balance/safety w/ Min A    6) Pt will engage in ongoing cognitive assessment w/ G participation to A w/ safe d/c planning/recommendations    7) Pt will demonstrate G carryover of pt/caregiver education and training as appropriate w/ mod I w/o cues w/ G tolerance    8) Pt will improve UB fx'l use/ROM to [facility] and strength 1/2 MMT via AROM/AAROM/PROM in all planes as tolerated s/p skilled education of HEP w/ mod I w/o cues for carryover    9) Pt will engage in ongoing  assessments, screens, and activities t/o fx'l I/ADL/leisure tasks w/ G participation and mod I to A w/ adaptation and accomodations or rule out visual perceptual impairments    10) Pt will follow 100% simple one step verbal commands and be A/Ox4 consistently t/o use of external environmental cues w/ mod I    Yokasta Marinelli MS, OTR/L

## 2018-10-07 NOTE — PROGRESS NOTES
Patient walked with walker and heavy assist of 2 from chair to strecher, taken for MRI of the brain

## 2018-10-07 NOTE — PLAN OF CARE
Problem: PHYSICAL THERAPY ADULT  Goal: Performs mobility at highest level of function for planned discharge setting  See evaluation for individualized goals  Treatment/Interventions: Functional transfer training, LE strengthening/ROM, Therapeutic exercise, Endurance training, Patient/family training, Equipment eval/education, Bed mobility, Gait training, Spoke to nursing, OT  Equipment Recommended: Geovanny Salamanca       See flowsheet documentation for full assessment, interventions and recommendations  Prognosis: Good  Problem List: Decreased strength, Decreased endurance, Impaired balance, Decreased mobility, Decreased coordination, Impaired vision (Left sided field cut and also possible neglect)  Assessment: Pt is 77 y o  female seen for PT evaluation s/p admit to [facility] on 10/6/2018 w/ CVA (cerebral vascular accident) ([facility] [address] )  Pt was found to have slurred speech and L sided weakness with R MCA MI and R ICA occlusion  No tPA intervention; pt received successful balloon angioplasty and stenting of R ICA  PT consulted to assess pt's functional mobility and d/c needs  Order placed for PT eval and tx, with clearance from RN for ambulation  Comorbidities affecting pt's physical performance at time of assessment include: hypothyroidism  PTA, pt was ambulates community distances and elevations and retired  Personal factors affecting pt at time of IE include: ambulating w/ assistive device, inability to ambulate household distances, limited home support, visual impairments, inability to perform IADLs, inability to perform ADLs and inability to live alone  Please find objective findings from PT assessment regarding body systems outlined above with impairments and limitations including weakness, impaired balance, decreased endurance, impaired coordination, gait deviations, decreased functional mobility tolerance, fall risk and left sided field cut/visual impairment   The following objective measures performed on IE also reveal limitations: Barthel Index: 45/100  Pt's clinical presentation is currently unstable/unpredictable seen in pt's presentation of abnormal labs, lines, telemetry, continuous oxygen monitoring, continuous critical care  Pt to benefit from continued PT tx to address deficits as defined above and maximize level of functional independent mobility and consistency  From PT/mobility standpoint, recommendation at time of d/c would be PM&R consult pending progress in order to facilitate return to PLOF  Barriers to Discharge: Decreased caregiver support (Pt requires max A x2-3 for mobility)     Recommendation:  (PM&R consult)     PT - OK to Discharge: Yes    See flowsheet documentation for full assessment

## 2018-10-07 NOTE — SPEECH THERAPY NOTE
Speech Language/Pathology  Speech-Language Pathology Bedside Swallow Evaluation        Patient Name: Clotilde Russell    Today's Date: 10/7/2018     Problem List  Patient Active Problem List   Diagnosis    CVA (cerebral vascular accident) ([address] )    Hypothyroidism       Past Medical History  Past Medical History:   Diagnosis Date    Disease of thyroid gland        Past Surgical History  History reviewed  No pertinent surgical history  Summary    Pt presents with mild-moderate oropharyngeal dysphagia, characterized by intermittent anterior spill while drinking from straw due to reduced lip seal strength, mildly prolonged mastication of mech soft food (pt did not have lower denture), suspected mild premature spill into pharynx and suspected mildly reduced hyolaryngeal elevation  S/s of aspiration were present with thin liquids  There were no other s/s of aspiration with any other consistency trialed  More challenging solid consistencies were not attempted, as pt does not have lower dentures  Family is bringing in dentures; will plan to trial more challenging foods with upper and lower dentures in place  Educated pt and her  as well as RN on recommendations for diet, aspiration precautions, and POC  Recommendations:   Diet: mechanically altered/level 2 diet and nectar thick liquids   Meds: whole with puree   Aspiration precautions and compensatory swallowing strategies: upright posture, slow rate of feeding, small bites/sips and chew on R side of mouth, check L cheek for pocketing  Other Recommendations/ considerations: Pt may have ice chips, one at a time, after oral care  ST to see pt for dysphagia tx 3-5x per week  Complete speech/language assessment  Current Medical Status    Clotilde Russell is a 77 y o  female with a past medical history of hypothyroidism, who presented to [facility] as a stroke alert   Per patient's , she awoke at approximately 4:00 am this morning with slurred speech and left sided weakness  EMS was called  Patient found to have right MCA M1 occlusion as well as right ICA occlusion from origin to petrous segment  tPA was not administered but patient did undergo urgent endovascular intervention  Patient had successful balloon angioplasty and stenting of the right ICA origin  Approximately 40% residual stenosis was noted remaining through the stented segment  Focal non-flow-limiting thrombus remained just above the stent  The patient was started on Integrilin  Post endovascular intervention, the patient was brought to ICU for overnight monitoring and proceeding with stroke core measures  Pt's  present for eval      Past medical history:   Please see H&P for details    Special Studies:  WBC-WNL    CT-Interval development of hypodensity within the right temporal lobe, right caudate nucleus and junction of the right temporal occipital lobes consistent with evolution of previously suspected right MCA infarct  Mild edema and regional mass effect without   midline shift  Tiny linear focus of increased attenuation along the right temporal lobe image 24 for which petechial gyriform hemorrhage cannot be completely excluded    Otherwise, no evidence of intracranial hemorrhage detected    MRI-ordered    No CXR in chart    Social/Education/Vocational Hx:  Pt lives with family    Swallow Information   Current Risks for Dysphagia & Aspiration: CVA  Current Symptoms/Concerns: coughing with po  Current Diet: NPO   Baseline Diet: regular diet and thin liquids    Baseline Assessment   Behavior/Cognition: fairly alert, but getting progressively more sleepy during eval  Speech/Language Status: able to participate in conversation and able to follow commands  Patient Positioning: upright in chair     Swallow Mechanism Exam   Facial: left facial droop  Labial: decreased ROM left side  Lingual: decreased ROM, bilateral decreased strength and decreased coordination  Velum: symmetrical  Mandible: adequate ROM  Dentition: upper dentures and lower dentures are at home, family will bring in  Pt always eats with upper and lower dentures in place  Vocal quality:weak and strained , mild  Volitional Cough: strong/productive   Respiratory: room air    Consistencies Assessed and Performance   Consistencies Administered: ice chips, thin liquids, nectar thick, puree and mechanical soft solids    Oral Stage: Adequate bolus retrieval from spoon and good lip seal around cup  Mild intermittent anterior spill when sipping from straw  Pt did not have lower dentures, which she always wears while eating; mildly prolonged mastication of [facility] soft food  No pocketing or oral residue noted  Pharyngeal Stage: Swallows appear mildly delayed; hyolaryngeal elevation was palpated and judged mildly reduced  Strong coughing noted on thin liquids by cup and straw  No s/s of aspiration with ice chips nor with any other consistency trialed today  Esophageal Concerns: none reported      Results Reviewed with: patient, RN and family   Dysphagia Goals: 1  Pt will tolerate dysphagia 2 diet and NTL without s/s of aspiration across all meals and snacks  2  Pt will tolerate LRD without s/s of aspiration across all meals and snacks     Discharge recommendation: SNF, ARC and outpt    Speech Therapy Prognosis   Prognosis: good    Prognosis Considerations: age, medical status, cognitive status and therapeutic potential

## 2018-10-07 NOTE — CASE MANAGEMENT
Initial Clinical Review    Thank you,  [address] [facility] Review Department  Phone: [phone]; Fax [phone]  ATTENTION: Please call with any questions or concerns to [phone]  and carefully follow the prompts so that you are directed to the right person  Send all requests for admission clinical reviews, approved or denied determinations and any other requests to fax [phone]  All voicemails are confidential        Admission: Date/Time/Statement: 10/6/18 @ [facility]     Orders Placed This Encounter   Procedures    Inpatient Admission     Standing Status:   Standing     Number of Occurrences:   1     Order Specific Question:   Admitting Physician     Answer:   Skinny Dwyer [[id number]]     Order Specific Question:   Level of Care     Answer:   Critical Care [15]     Order Specific Question:   Estimated length of stay     Answer:   More than 2 Midnights     Order Specific Question:   Certification     Answer:   I certify that inpatient services are medically necessary for this patient for a duration of greater than two midnights  See H&P and MD Progress Notes for additional information about the patient's course of treatment  ED: Date/Time/Mode of Arrival:  Tx from [facility]   ED Arrival Information     Patient not seen in ED                       Chief Complaint: Acute CVA, status post mechanical thrombectomy, on Integrilin    History of Illness: 77 y o  female with a past medical history of hypothyroidism, who presented to [facility] as a stroke alert  Per patient's , she awoke at approximately 4:00 am this morning with slurred speech and left sided weakness  EMS was called  Patient found to have right MCA M1 occlusion as well as right ICA occlusion from origin to petrous segment  tPA was not administered but patient did undergo urgent endovascular intervention  Patient had successful balloon angioplasty and stenting of the right ICA origin  Approximately 40% residual stenosis was noted remaining through the stented segment  Focal non-flow-limiting thrombus remained just above the stent  The patient was started on Integrilin  Post endovascular intervention, the patient was brought to ICU for overnight monitoring and proceeding with stroke core measures  Vital Signs: T 96 1-100, HR 72-94, RR 8-25, //44, Pox %    Abnormal Labs/Diagnostic Test Results: Hgb 9 5, Hct 29 8, glucose 149    Past Medical/Surgical History:   Past Medical History:   Diagnosis Date    Disease of thyroid gland        Admitting Diagnosis: [address] (cerebral vascular accident) ([facility] [address] ) [I63 9]    Age/Sex: 77 y o  female    Assessment/Plan:   ? Right MCA M1 occlusion, TICI 0 and right ICA occlusion from origin to the petrous segment, status post balloon angioplasty and stenting of right ICA origin POD #0  ? Stroke core measures  ? Check echocardiogram  ? Repeat CT head without contrast in AM  ? Check MRI  ? Neurology/NS consult  ? Check lipid panel, HbA1c, and baseline labs  ? PT/OT/ST evaluation  ? Will keep NPO for now  ? Continue Integrilin infusion per neurosurgery  ? If repeat CT head is negative for bleed, will discontinue Integrilin and start full-dose aspirin as well as Plavix load 300 mg tomorrow  ? Start statin therapy when safely able to tolerate PO  ? If fails swallow eval, will need placement of Keofed tube to begin nutrition and to administer essential medications  ? BP goals of MAP >80 and SBP <160 per neurosurgery  ? Post-endovascular intervention precautions  ? Serial neuro checks q1h  ?  STAT CT head for any acute change in neurologic examination and/or GCS >2     Orders:  Scheduled Meds:   Current Facility-Administered Medications:  aspirin 325 mg Oral Daily Conchis Dominguez MD    atorvastatin 40 mg Oral QPM Conchis Dominguez MD    [START ON 10/8/2018] clopidogrel 75 mg Oral Daily Conchis Dominguez MD    eptifibatide 0 5 mcg/kg/min Intravenous Continuous Signa Kazakh, DO Last Rate: 0 5 mcg/kg/min (10/07/18 0804)   heparin (porcine) 5,000 Units Subcutaneous [facility] Sandra Lainez MD    iodixanol 250 mL Intravenous Once in imaging Sandra Lainez MD    levothyroxine 75 mcg Oral Early Morning Linden Davis MD      Continuous Infusions:   eptifibatide 0 5 mcg/kg/min Last Rate: 0 5 mcg/kg/min (10/07/18 0804)     PRN Meds: iodixanol    ICU  Npo  IS q 1h  Monitor I&O's  SCD's

## 2018-10-07 NOTE — PLAN OF CARE
Problem: SLP ADULT - SWALLOWING, IMPAIRED  Goal: Initial SLP swallow eval performed  Outcome: Completed Date Met: 10/07/18

## 2018-10-07 NOTE — PLAN OF CARE
Problem: OCCUPATIONAL THERAPY ADULT  Goal: Performs self-care activities at highest level of function for planned discharge setting  See evaluation for individualized goals  Treatment Interventions: ADL retraining, Visual perceptual retraining, Functional transfer training, UE strengthening/ROM, Endurance training, Cognitive reorientation, Patient/family training, Equipment evaluation/education, Neuromuscular reeducation, Fine motor coordination activities, Compensatory technique education, Continued evaluation, Energy conservation, Activityengagement          See flowsheet documentation for full assessment, interventions and recommendations  Limitation: Decreased ADL status, Decreased UE strength, Decreased UE ROM, Decreased Safe judgement during ADL, Decreased endurance, Visual deficit, Decreased self-care trans, Decreased high-level ADLs  Prognosis: Fair  Assessment: Pt is a 78 y/o female seen for OT eval s/p adm to [facility] as a stroke alert- pt's  found pt w/ slurred speech and L sided weakness  Pt found to have R MCA MI occlusion as well as R ICA occlusion  tPA not administered but is s/p successful balloon angioplasty and stenting of R ICA  Pt is dx'd w/ acute R MCA syndrome  Comorbidities include a h/o disease of thyroid gland  Pt with active OT orders  Pt lives with spouse in 2 , [address], 1st floor setup  Pt was I w/ ADLS and IADLS, drove, & required no use of DME PTA but can have a walker and cane available if needed  Pt is currently demonstrating the following occupational deficits: Min A UB ADLS, Max A LB ADLs, Mod A transfers and Max A X2 functional mobility w/ RW   These deficits that are impacting pt's baseline areas of occupation are a result of the following impairments: endurance, activity tolerance, functional mobility, forward functional reach, balance, trunk control, functional standing tolerance, decreased I w/ ADLS/IADLS, strength, ROM, visual deficits, decreased insight into deficits and coordination deficits  The following Occupational Performance Areas to address include: eating, grooming, bathing/shower, toilet hygiene, dressing, medication management, socialization, health maintenance, functional mobility, clothing management, cleaning, meal prep and household maintenance  Pt scored overall 45/100 on the Barthel Index  Based on the aforementioned OT evaluation, functional performance deficits, and assessments, pt has been identified as a high complexity evaluation  Recommend STR upon D/C when medically stable   Pt to continue to benefit from acute immediate OT services to address the following goals 3-5x/week to  w/in 10-14 days:   Recommendation: (S) Physiatry Consult  OT Discharge Recommendation: Short Term Rehab  OT - OK to Discharge: Yes (when medically stable)      Comments: Yokasta Marinelli MS, OTR/L

## 2018-10-07 NOTE — PROGRESS NOTES
Progress Note - Neurology   Hailey Torrez 77 y o  female MRN: [id number]  Unit/Bed#: ICU 09 Encounter: [id number]    Assessment:  1  Right MCA ischemic infarct, related to ICA/MCA occlusive disease, status post ICA stenting and MCA thrombectomy  She has made an excellent clinical recovery, with only minor residual left hemiparesis and neglect  2  Mild petechial hemorrhage on MRI (clinically insignificant-not identified on head CT)  3   Etiology yet to be determined, pre-existing carotid stenosis with subsequent occlusion versus central (cardiac) embolus  As per discussion with Dr Donnell Gates, the former is suspected based on angiographic findings, likely acute plaque rupture    Plan:  1  Plavix load given this morning, continues on aspirin as well  Integrilin to be discontinued shortly  2   Echo did not suggest an embolic source  I do not think she would need ASIM or loop  3  Continue high-dose statin (despite favorable lipid profile) the  4  A1c is 5 4  5  Continue with therapies      Subjective:   Patient admits to doing better  Does report headache, but other symptoms are denied  ROS:    Review of Systems   Constitutional: Positive for fatigue  HENT: Negative  Eyes: Negative  Respiratory: Negative  Cardiovascular: Negative  Gastrointestinal: Negative  Endocrine: Negative  Genitourinary: Negative  Musculoskeletal: Negative  Skin: Negative  Neurological: Positive for weakness and headaches  Negative for numbness  Psychiatric/Behavioral: Negative  Vitals: Blood pressure 161/57, pulse 76, temperature 98 4 °F (36 9 °C), temperature source Oral, resp  rate (!) 11, height 5' 1" (1 549 m), weight 82 7 kg (182 lb 5 1 oz), SpO2 99 %  ,Body mass index is 34 45 kg/m²  Physical Exam   Constitutional: She appears well-developed and well-nourished  No distress  HENT:   Head: Normocephalic and atraumatic  Eyes: Conjunctivae are normal  No scleral icterus     Neck: Normal range of motion  Neck supple  Cardiovascular: Normal rate and regular rhythm  No murmur heard  Pulmonary/Chest: Effort normal and breath sounds normal  No respiratory distress  Abdominal: Soft  Bowel sounds are normal    Musculoskeletal: Normal range of motion  She exhibits no edema  Neurological: She is alert  Skin: Skin is warm and dry  She is not diaphoretic  Nursing note and vitals reviewed  Neurologic Exam     Mental Status   She is correctly oriented x3  She speaks fluently without dysarthria or paraphasic errors  Able to follow all commands readily  Cranial Nerves   She required a little extraCuing for full left gaze deviation, and has a resting facial asymmetry with slightly asymmetric forced grin (decreased on left), but otherwise cranial nerves are intact  Motor Exam She has some mild (5-to 4+) residual left-sided weakness, with slow fine motor movements in that limb, but otherwise well-preserved power  Per nursing she was up and ambulating with a walker, noting some mild difficulty advancing left leg        Sensory Exam   She continues to demonstrate sensory asymmetry and extinction on the left, though some improvement compared with yesterday  Gait, Coordination, and Reflexes Finger-to-nose testing was slower on the left than the right, but without ataxia  Lab Results: I have personally reviewed pertinent reports  Imaging Studies: I have personally reviewed pertinent films in PACS  EKG, Pathology, and Other Studies: I have personally reviewed pertinent reports          Counseling / Coordination of Care  Total floor time 25 minutes

## 2018-10-08 PROBLEM — D50.0 NORMOCYTIC ANEMIA DUE TO BLOOD LOSS: Status: ACTIVE | Noted: 2018-10-08

## 2018-10-08 LAB
ABO GROUP BLD: NORMAL
ANION GAP SERPL CALCULATED.3IONS-SCNC: 5 MMOL/L (ref 4–13)
BASOPHILS # BLD AUTO: 0.03 THOUSANDS/ΜL (ref 0–0.1)
BASOPHILS NFR BLD AUTO: 0 % (ref 0–1)
BLD GP AB SCN SERPL QL: NEGATIVE
BUN SERPL-MCNC: 10 MG/DL (ref 5–25)
CALCIUM SERPL-MCNC: 8.2 MG/DL (ref 8.3–10.1)
CHLORIDE SERPL-SCNC: 107 MMOL/L (ref 100–108)
CO2 SERPL-SCNC: 28 MMOL/L (ref 21–32)
CREAT SERPL-MCNC: 0.6 MG/DL (ref 0.6–1.3)
EOSINOPHIL # BLD AUTO: 0.07 THOUSAND/ΜL (ref 0–0.61)
EOSINOPHIL NFR BLD AUTO: 1 % (ref 0–6)
ERYTHROCYTE [DISTWIDTH] IN BLOOD BY AUTOMATED COUNT: 13.2 % (ref 11.6–15.1)
ERYTHROCYTE [DISTWIDTH] IN BLOOD BY AUTOMATED COUNT: 13.8 % (ref 11.6–15.1)
EST. AVERAGE GLUCOSE BLD GHB EST-MCNC: 97 MG/DL
GFR SERPL CREATININE-BSD FRML MDRD: 93 ML/MIN/1.73SQ M
GLUCOSE SERPL-MCNC: 86 MG/DL (ref 65–140)
HBA1C MFR BLD: 5 % (ref 4.2–6.3)
HCT VFR BLD AUTO: 23.5 % (ref 34.8–46.1)
HCT VFR BLD AUTO: 28.2 % (ref 34.8–46.1)
HGB BLD-MCNC: 7.5 G/DL (ref 11.5–15.4)
HGB BLD-MCNC: 9.3 G/DL (ref 11.5–15.4)
IMM GRANULOCYTES # BLD AUTO: 0.05 THOUSAND/UL (ref 0–0.2)
IMM GRANULOCYTES NFR BLD AUTO: 1 % (ref 0–2)
LYMPHOCYTES # BLD AUTO: 2.02 THOUSANDS/ΜL (ref 0.6–4.47)
LYMPHOCYTES NFR BLD AUTO: 28 % (ref 14–44)
MCH RBC QN AUTO: 30 PG (ref 26.8–34.3)
MCH RBC QN AUTO: 31.2 PG (ref 26.8–34.3)
MCHC RBC AUTO-ENTMCNC: 31.9 G/DL (ref 31.4–37.4)
MCHC RBC AUTO-ENTMCNC: 33 G/DL (ref 31.4–37.4)
MCV RBC AUTO: 94 FL (ref 82–98)
MCV RBC AUTO: 95 FL (ref 82–98)
MONOCYTES # BLD AUTO: 0.5 THOUSAND/ΜL (ref 0.17–1.22)
MONOCYTES NFR BLD AUTO: 7 % (ref 4–12)
NEUTROPHILS # BLD AUTO: 4.66 THOUSANDS/ΜL (ref 1.85–7.62)
NEUTS SEG NFR BLD AUTO: 63 % (ref 43–75)
NRBC BLD AUTO-RTO: 0 /100 WBCS
PLATELET # BLD AUTO: 234 THOUSANDS/UL (ref 149–390)
PLATELET # BLD AUTO: 234 THOUSANDS/UL (ref 149–390)
PMV BLD AUTO: 10.4 FL (ref 8.9–12.7)
PMV BLD AUTO: 10.5 FL (ref 8.9–12.7)
POTASSIUM SERPL-SCNC: 3.5 MMOL/L (ref 3.5–5.3)
RBC # BLD AUTO: 2.5 MILLION/UL (ref 3.81–5.12)
RBC # BLD AUTO: 2.98 MILLION/UL (ref 3.81–5.12)
RH BLD: POSITIVE
SODIUM SERPL-SCNC: 140 MMOL/L (ref 136–145)
SPECIMEN EXPIRATION DATE: NORMAL
WBC # BLD AUTO: 7.33 THOUSAND/UL (ref 4.31–10.16)
WBC # BLD AUTO: 8.06 THOUSAND/UL (ref 4.31–10.16)

## 2018-10-08 PROCEDURE — 99024 POSTOP FOLLOW-UP VISIT: CPT | Performed by: PHYSICIAN ASSISTANT

## 2018-10-08 PROCEDURE — 30233N1 TRANSFUSION OF NONAUTOLOGOUS RED BLOOD CELLS INTO PERIPHERAL VEIN, PERCUTANEOUS APPROACH: ICD-10-PCS | Performed by: INTERNAL MEDICINE

## 2018-10-08 PROCEDURE — 83036 HEMOGLOBIN GLYCOSYLATED A1C: CPT | Performed by: FAMILY MEDICINE

## 2018-10-08 PROCEDURE — 99222 1ST HOSP IP/OBS MODERATE 55: CPT | Performed by: PHYSICAL MEDICINE & REHABILITATION

## 2018-10-08 PROCEDURE — 97116 GAIT TRAINING THERAPY: CPT

## 2018-10-08 PROCEDURE — 86923 COMPATIBILITY TEST ELECTRIC: CPT

## 2018-10-08 PROCEDURE — 85025 COMPLETE CBC W/AUTO DIFF WBC: CPT | Performed by: FAMILY MEDICINE

## 2018-10-08 PROCEDURE — 80048 BASIC METABOLIC PNL TOTAL CA: CPT | Performed by: FAMILY MEDICINE

## 2018-10-08 PROCEDURE — 85027 COMPLETE CBC AUTOMATED: CPT | Performed by: INTERNAL MEDICINE

## 2018-10-08 PROCEDURE — 99232 SBSQ HOSP IP/OBS MODERATE 35: CPT | Performed by: PSYCHIATRY & NEUROLOGY

## 2018-10-08 PROCEDURE — 97535 SELF CARE MNGMENT TRAINING: CPT

## 2018-10-08 PROCEDURE — 86901 BLOOD TYPING SEROLOGIC RH(D): CPT | Performed by: INTERNAL MEDICINE

## 2018-10-08 PROCEDURE — P9016 RBC LEUKOCYTES REDUCED: HCPCS

## 2018-10-08 PROCEDURE — 99232 SBSQ HOSP IP/OBS MODERATE 35: CPT | Performed by: INTERNAL MEDICINE

## 2018-10-08 PROCEDURE — 97530 THERAPEUTIC ACTIVITIES: CPT

## 2018-10-08 PROCEDURE — 86850 RBC ANTIBODY SCREEN: CPT | Performed by: INTERNAL MEDICINE

## 2018-10-08 PROCEDURE — 86900 BLOOD TYPING SEROLOGIC ABO: CPT | Performed by: INTERNAL MEDICINE

## 2018-10-08 PROCEDURE — C9113 INJ PANTOPRAZOLE SODIUM, VIA: HCPCS | Performed by: INTERNAL MEDICINE

## 2018-10-08 PROCEDURE — 92526 ORAL FUNCTION THERAPY: CPT

## 2018-10-08 RX ORDER — PANTOPRAZOLE SODIUM 40 MG/1
40 INJECTION, POWDER, FOR SOLUTION INTRAVENOUS
Status: DISCONTINUED | OUTPATIENT
Start: 2018-10-09 | End: 2018-10-08

## 2018-10-08 RX ORDER — PANTOPRAZOLE SODIUM 40 MG/1
40 INJECTION, POWDER, FOR SOLUTION INTRAVENOUS EVERY 12 HOURS SCHEDULED
Status: DISCONTINUED | OUTPATIENT
Start: 2018-10-08 | End: 2018-10-08

## 2018-10-08 RX ORDER — FAMOTIDINE 40 MG/5ML
20 POWDER, FOR SUSPENSION ORAL 2 TIMES DAILY
Status: DISCONTINUED | OUTPATIENT
Start: 2018-10-08 | End: 2018-10-12 | Stop reason: HOSPADM

## 2018-10-08 RX ADMIN — PANTOPRAZOLE SODIUM 40 MG: 40 INJECTION, POWDER, FOR SOLUTION INTRAVENOUS at 09:03

## 2018-10-08 RX ADMIN — ATORVASTATIN CALCIUM 40 MG: 40 TABLET, FILM COATED ORAL at 17:41

## 2018-10-08 RX ADMIN — ASPIRIN 325 MG ORAL TABLET 325 MG: 325 PILL ORAL at 08:19

## 2018-10-08 RX ADMIN — CLOPIDOGREL 75 MG: 75 TABLET, FILM COATED ORAL at 08:19

## 2018-10-08 RX ADMIN — HEPARIN SODIUM 5000 UNITS: 5000 INJECTION, SOLUTION INTRAVENOUS; SUBCUTANEOUS at 05:26

## 2018-10-08 RX ADMIN — HEPARIN SODIUM 5000 UNITS: 5000 INJECTION, SOLUTION INTRAVENOUS; SUBCUTANEOUS at 22:37

## 2018-10-08 RX ADMIN — HEPARIN SODIUM 5000 UNITS: 5000 INJECTION, SOLUTION INTRAVENOUS; SUBCUTANEOUS at 14:27

## 2018-10-08 RX ADMIN — LEVOTHYROXINE SODIUM 75 MCG: 75 TABLET ORAL at 05:26

## 2018-10-08 RX ADMIN — FAMOTIDINE 20 MG: 40 POWDER, FOR SUSPENSION ORAL at 20:31

## 2018-10-08 NOTE — SPEECH THERAPY NOTE
Speech/Language Pathology Progress Note    Patient Name: Michele Ballesteros  Today's Date: 10/8/2018       Subjective:  Pt stated she feels "alright" today, unhappy with nectar thick liquids ordered and said "I don't like that goop "    Objective:  Pt seen for diagnostic swallow therapy  Assessed with thin liquid and regular soft solid (toast with butter)  Thin liquids taken via small cup sips and small straw sips  Swallow initiation appears prompt with adequate hyolaryngeal excursion  No s/s aspiration with thin liquid swallows  Upper and lower dentures in place  Mastication was slow with toast, and mild L (weak side) pocketing noted after swallow  Pt was able to clear the residual with a liquid wash given verbal cues  Pt would benefit from a trial diet upgrade with ST prior to diet advancement  Assessment:  Tolerance of thin liquids appears improved/WFL  Need to assess full meal upgrade prior to advancing diet  Plan/Recommendations:  Upgrade liquids to thin  Continue Dysphagia 2/Mechanically altered diet (soft cooked, moist and minced foods) at this time, and trial Dysphagia 3/Soft to [employer] (no raw vegetables, no skins on fruit, avoid very dense chewy meat) with ST during a meal prior to upgrade

## 2018-10-08 NOTE — CONSULTS
PHYSICAL MEDICINE AND REHABILITATION CONSULT NOTE  Shahana Marino 77 y o  female MRN: [id number]  Unit/Bed#: [facility] 713-01 Encounter: [id number]    Requested by (Physician/Service): Yariel Hernandez MD  Reason for Consultation:  Assessment of rehabilitation needs  Reason for Hospitalization:  CVA (cerebral vascular accident) [facility]) [I63 [id number]]  Rehabilitation Diagnosis: CVA    CC: left sided weakness   History of Present Illness:  Shahana Marino is a 77 y o  female who  has a past medical history of Disease of thyroid gland  who presented to the [facility] with slurred speech and and left sided weakness  Patient was found to have Rt MCA infarct 2/2 to Rt ICA/MCA occlusive disease (thought to be due to plaque rupture of preexisting carotid dz), patient underwent ICA stenting and MCA thrombectomy     PM&R consulted for rehabilitation recommendations  Hypothyroidism: on levothyroxine  Stroke: s/p stenting and thrombectomy, also medical management with AP/statin  ABLA: Hg currently 7 5    Hospital Complications/Comorbidities:   Complications: As above  Comorbidities: As above        Functional History:     Prior to Admission:     Independent with ADLs and ambulation PTA      Present:  Physical Therapy Occupational Therapy   Mod tx, max amb 5' & 3' Max      Past Medical History:   Past Surgical History:   Family History:     Past Medical History:   Diagnosis Date    Disease of thyroid gland     History reviewed  No pertinent surgical history    Family History   Problem Relation Age of Onset    Family history unknown: Yes           Medications:    Current Facility-Administered Medications:     aspirin tablet 325 mg, 325 mg, Oral, Daily, Anthony Navarro MD, 325 mg at 10/08/18 0819    atorvastatin (LIPITOR) tablet 40 mg, 40 mg, Oral, QPM, Anthony Navarro MD, 40 mg at 10/07/18 1820    clopidogrel (PLAVIX) tablet 75 mg, 75 mg, Oral, Daily, Anthony Navarro MD, 75 mg at 10/08/18 0819    heparin (porcine) subcutaneous injection 5,000 Units, 5,000 Units, Subcutaneous, Q8H [address], Soila Gore MD, 5,000 Units at 10/08/18 0526    levothyroxine tablet 75 mcg, 75 mcg, Oral, Early Morning, Chato Rosales MD, 75 mcg at 10/08/18 0526    [START ON 10/9/2018] pantoprazole (PROTONIX) injection 40 mg, 40 mg, Intravenous, Q24H [address] Geovanna Luna MD    Allergies:   No Known Allergies     Social History:    Social History     Social History    Marital status: Unknown     Spouse name: N/A    Number of children: N/A    Years of education: N/A     Social History Main Topics    Smoking status: Never Smoker    Smokeless tobacco: Never Used    Alcohol use Yes    Drug use: No    Sexual activity: Not Currently     Other Topics Concern    None     Social History Narrative    None        Nuris Drummond Lives with: lives with their spouse  She lives in [facility]) single family home  The living area: can live on one level    There 1 step to enter the home  The patient will have 24 hour ARC Supervision/physical assistance: supervision/physical assistance available upon discharge  Review of Systems: A 10-point review of systems was performed  Negative except as listed above       Physical Exam:  Vital Signs:      Temp:  [98 4 °F (36 9 °C)-99 5 °F (37 5 °C)] 98 9 °F (37 2 °C)  HR:  [] 86  Resp:  [11-22] 20  BP: (133-175)/(55-77) 133/55   Intake/Output Summary (Last 24 hours) at 10/08/18 1152  Last data filed at 10/08/18 1100   Gross per 24 hour   Intake            723 2 ml   Output             2000 ml   Net          -1276 8 ml        Laboratory:      Lab Results   Component Value Date    HGB 7 5 (L) 10/08/2018    HCT 23 5 (L) 10/08/2018    WBC 7 33 10/08/2018     Lab Results   Component Value Date    BUN 10 10/08/2018     10/08/2018    K 3 5 10/08/2018     10/08/2018    GLUCOSE 149 (H) 10/06/2018    CREATININE 0 60 10/08/2018     No results found for: PROTIME, INR     General: alert, no apparent distress, cooperative and comfortable  HEENT: Normal, normocephalic, atraumatic  Eye: No conjunctival injection   Ears: Normal external ears  Nose: Normal external nose, mucus membranes  Neck: trachea midline  Pulmonary: chest wall abnormality: none and respirations unlabored   Cardiovascular: +S1/2, no significant LE edema   Abdomen: soft NT, no organomegaly   MSK/Extremity: 4-/5 LUE/LLE, 5/5 RUE/RLE     Neurologic: peripheral Lt field cut, mild facial asymmetry, mild tongue deviation, lt touch grossly intact, no gross dysmetria on F-N testing (however sluggish)    Psych: Appropriate affect, alert and oriented to person, place and time       Imaging: Reviewed  Ct Head Wo Contrast    Result Date: 10/7/2018  Impression: Interval development of hypodensity within the right temporal lobe, right caudate nucleus and junction of the right temporal occipital lobes consistent with evolution of previously suspected right MCA infarct  Mild edema and regional mass effect without  midline shift  Tiny linear focus of increased attenuation along the right temporal lobe image 24 for which petechial gyriform hemorrhage cannot be completely excluded  Otherwise, no evidence of intracranial hemorrhage detected  I personally discussed this study with Dr Alex Combs on 10/7/2018 at 5:34 AM  Workstation performed: [id number]     Ct Head Wo Contrast    Result Date: 10/6/2018  Impression: Subtle loss of gray-white differentiation in the right hemisphere with loss of the insular ribbon sign and loss of gray-white differentiation in the right frontotemporal junction suggesting recent infarct  12 mm focus of hyperdensity in the white matter  of the right temporal lobe is likely contrast staining from recent procedure and follow-up imaging is recommended  No definite evidence of hemorrhage   Workstation performed: [id number]     Mri Brain Wo Contrast    Result Date: 10/7/2018  Impression: Extensive recent infarcts in the right hemisphere including right centrum semiovale and lentiform nucleus, right temporal lobe/temporal opercular junction, and right occipital cortex  No hemorrhage identified  Workstation performed: [id number]     Ct Cerebral Perfusion    Result Date: 10/6/2018  Impression: CT perfusion performed  Data available on PACS  Workstation performed: [id number]       Assessment and Recommendations:  Patient is 76 yo female presented with slurred speech and and left sided weakness  Patient was found to have Rt MCA infarct 2/2 to Rt ICA/MCA occlusive disease (thought to be due to plaque rupture of preexisting carotid dz), patient underwent ICA stenting and MCA thrombectomy   PM&R consulted for rehabilitation recommendations  Impairments:  Impaired functional mobility and ability to perform ADL's      Recommendations:      - Continue PT/OT while inpatient  - will likely need acute inpatient rehab upon medical clearance for dc from acute care     Thank you for allowing the PM&R service to participate in the care of this patient  We will continue to follow Tate Gonsalez progress with you   Please do not hesitate to call with questions or concerns

## 2018-10-08 NOTE — OCCUPATIONAL THERAPY NOTE
Occupational Therapy Treatment Note     10/08/18 1201   Restrictions/Precautions   Weight Bearing Precautions Per Order No   Other Precautions Chair Alarm;Cognitive; Fall Risk   Lifestyle   Autonomy Pt reports being I w/ ADLS, IADLS, transfers and functional mobility PTA   Reciprocal Relationships Pt lives w/ spouse   Service to Others Pt is retired   Intrinsic Gratification pt enjoys painting the house and gardening   Pain Assessment   Pain Assessment No/denies pain   Pain Score No Pain   ADL   Where Assessed Standing at [employer] Assistance 4  Minimal Assistance   Grooming Deficit Standing with assistive device; Setup; Increased time to complete;Supervision/safety;Verbal cueing;Wash/dry hands;Brushing hair   Transfers   Sit to Stand 3  Moderate assistance   Additional items Assist x 1   Stand to Sit 3  Moderate assistance   Additional items Assist x 1   Functional Mobility   Functional Mobility 3  Moderate assistance   Additional items Rolling walker   Therapeutic Exercise - ROM   UE-ROM Yes   ROM - Left Upper Extremities    L Shoulder AROM;AAROM;Prolonged stretch; Extension;Flexion;ABduction;Horizontal ABduction   L Elbow AROM;AAROM;Elbow flexion;Elbow extension   L Wrist AROM;AAROM;Wrist flexion;Wrist extension;Radial deviation;Ulnar deviation   L Weight/Reps/Sets 10x 1 set each with rest breaks  Cognition   Overall Cognitive Status WFL   Arousal/Participation Responsive; Cooperative   Attention Within functional limits   Orientation Level Oriented X4   Memory Within functional limits   Following Commands Follows one step commands without difficulty   Assessment   Assessment Pt participated in occupational therapy with focus on activity tolerance, AROM/AAROM/SROM and PROM to pt  L UE for decreased swelling and increased function, standing balance and tolerance for pt engagement in functional self-care task/grooming tasks  Pt cleared by RN/ for pt participation in therapy    Pt received out of bed to walk with physical therapy  Pt Identifiers confirmed and pt pleasant and agreeable to participate in OT   Pt family members/ and daughter    present supportive throughout session  Pt requires assist with functional mob and use of RW 2* pt balance and coordination deficits  Pt able to tolerate and demonstrate good carryover of L UE home exercise program to pt affected L UE  Hemiparesis  Pt will require in-pt rehab to continue to address pt above noted deficits which currently impair pt ADL and functional mob      Plan   Treatment Interventions ADL retraining   Goal Expiration Date 10/21/18   Treatment Day 1   OT Frequency 3-5x/wk   Recommendation   OT Discharge Recommendation Short Term Rehab   Barthel Index   Feeding 10   Bathing 0   Grooming Score 0   Dressing Score 5   Bladder Score 10   Bowels Score 10   Toilet Use Score 5   Transfers (Bed/Chair) Score 5   Mobility (Level Surface) Score 0   Stairs Score 0   Barthel Index Score 45   Modified Bucks Scale   Modified Bucks Scale 4       Pop MAURICEA/L

## 2018-10-08 NOTE — PLAN OF CARE
Activity Intolerance/Impaired Mobility     Mobility/activity is maintained at optimum level for patient Progressing        Communication Impairment     Ability to express needs and understand communication [address] Discharge to post-acute care or home with appropriate resources Progressing        Neurological Deficit     Neurological status is stable or improving Progressing        Nutrition     Nutrition/Hydration status is improving Progressing        Nutrition/Hydration-ADULT     Nutrient/Hydration intake appropriate for improving, restoring or maintaining nutritional needs Progressing        Potential for Aspiration     Non-ventilated patient's risk of aspiration is minimized Progressing        Potential for Falls     Patient will remain free of falls Progressing        Prexisting or High Potential for Compromised Skin Integrity     Skin integrity is maintained or improved Progressing

## 2018-10-08 NOTE — PHYSICAL THERAPY NOTE
Physical Therapy Treatment Note     10/08/18 1145   Pain Assessment   Pain Assessment 0-10   Pain Score No Pain   Restrictions/Precautions   Weight Bearing Precautions Per Order No   Other Precautions Cognitive; Chair Alarm; Bed Alarm;Multiple lines; Fall Risk  (L field cut and neglect)   General   Chart Reviewed Yes   Family/Caregiver Present Yes  (, dtr for part of session)   Cognition   Overall Cognitive Status Impaired   Arousal/Participation Responsive   Attention VAISHNAVI   Orientation Level Oriented to person;Oriented to place;Oriented to situation   Memory Unable to assess   Following Commands Follows one step commands with increased time or repetition   Subjective   Subjective cooperative, continue to note L sided neglect and field cut  Attends w/ maximal cues for redirection   Bed Mobility   Supine to Sit 4  Minimal assistance   Additional items Assist x 1   Additional Comments sat EOB x 5 min prior to gait   Transfers   Sit to Stand 4  Minimal assistance   Additional items Assist x 1   Stand to Sit 4  Minimal assistance   Additional items Assist x 1   Ambulation/Elevation   Gait pattern L Hemiparesis; Improper Weight shift;Decreased foot clearance; Inconsistent frank; Foward flexed; Ataxia  (L foot drag, ataxia, decreased sequencing, L knee hu)   Gait Assistance 4  Minimal assist  (mod A when fatigued)   Additional items Assist x 1   Assistive Device Rolling walker   Distance 40'x1, 20'x2, 10'x1 including time in BR  Increased L sided lean and knee buckling when fatigued    Needs constant cues to scan to l sided and for L sided awareness   Balance   Static Sitting Fair +   Dynamic Sitting Fair -   Static Standing Poor +   Dynamic Standing Poor   Ambulatory Poor   Endurance Deficit   Endurance Deficit Yes   Endurance Deficit Description fatigue, weakness, l neglect w  field cut   Activity Tolerance   Activity Tolerance Patient limited by fatigue;Treatment limited secondary to medical complications (Comment)   Nurse Made Aware yes   Assessment   Prognosis Good   Problem List Decreased strength;Decreased endurance; Impaired balance;Decreased mobility; Decreased coordination;Decreased cognition; Impaired judgement;Decreased safety awareness; Impaired vision   Assessment Pt seen for session, gait and PT 1:1 activity x 25 min for setup, bed mob, sitting time, gait as above, and repositioning p session  cooperative but needs constant cues to attend to L side  Less assist needed for all mobility tasks, w/ improved use of RW   able to ambulate an increased distance, but needs cues for sequencing, L sided awarensss  remains appropriate for rehab at d/c   Goals   Patient Goals to get stronger   STG Expiration Date 10/17/18   Treatment Day 2   Plan   Treatment/Interventions Functional transfer training;LE strengthening/ROM; Therapeutic exercise; Endurance training;Patient/family training;Equipment eval/education; Bed mobility;Gait training   Progress Progressing toward goals   PT Frequency (4-6x/wk)   Recommendation   Recommendation (recommend rehab at d/c)   Equipment Recommended Geovanny Salamanca   PT - OK to Discharge Yes  (to rehab when stable)   Bertha Bell PT, DPT CSRS

## 2018-10-08 NOTE — PROGRESS NOTES
Progress Note - Neurosurgery   Niya Oms 77 y o  female MRN: [id number]  Unit/Bed#: [facility] 713-01 Encounter: [id number]    Assessment:  1  Cervical right ICA occlusion s/p stenting   2  Right MCA M1 occlusion s/p thrombectomy   3  hypothyroidism    Plan:  · Exam: GCS 15  AAOx3  Right gaze preference but EOMI  Appreciated subtle left facial droop  LUE weakness  LT intact  Speech is fluent  +left UE PD  Finger to nose intact  · Imaging: personally reviewed and reviewed by attending:  · 10/7/18 - MRI brain wo: 1) extensive recent infarcts in the right hemisphere including right centrum semiovale and lentiform nucleus, right temporal lobe/temporal opercular junction, and right occipital cortex  No hemorrhage identified   · 10/7/18 - CT head wo: 1) interval development of hypodensity within the right temporal lobe, right caudate nucleus and junction of the right temporal occipital lobes consistent with evolution of previously suspected right MCA infarct  Mild edema and regional mass effect without midline shift  2) Tiny linear focus of increased attenuation along the right temporal lobe image 24 for which petechial gyriform hemorrhage cannot be completely excluded  · Plan for CT head and CTA head and neck tomorrow  Neurology following for CVA:   · MRI completed  · Echocardiogram without atrial dilation, wall motion abnormalities or septal defect  · Continue Aspirin 325mg and Plavix 75mg  · Plan to check P2Y12 on Thursday  · PT/OT: recommend rehab  · PMR consult placed   · DVT ppx: SCDs, HSQ  · Medical management per primary team   · Hgb 7 5 - received 1u PRBCs  · Neurosurgery will continue to follow, call with questions or concerns  Subjective/Objective   Chief Complaint: "i'm doing fine"    Subjective: patient states she's doing good  She has no complaints overnight  She denies headaches, dizziness, change in vision, chest pain, SOB, abdominal pain/N/V, weakness/numbness       Objective: sitting up in chair, NAD      I/O       10/06 0701 - 10/07 0700 10/07 0701 - 10/08 0700 10/08 0701 - 10/09 0700    P  O   950     I V  (mL/kg) 2291 1 (27 7) 483 (5 8)     IV Piggyback 53 8      Total Intake(mL/kg) 2344 9 (28 4) 1433 (17 3)     Urine (mL/kg/hr) 1195 1260 (0 6) 800 (1 8)    Blood 800      Total Output 1995 1260 800    Net +349 9 +173 -800           Unmeasured Urine Occurrence   1 x          Invasive Devices     Peripheral Intravenous Line            Peripheral IV 10/06/18 Right Antecubital 2 days    Peripheral IV 10/06/18 Left Antecubital 1 day                Physical Exam:  Vitals: Blood pressure 134/65, pulse 95, temperature 99 2 °F (37 3 °C), temperature source Oral, resp  rate 20, height 5' 1" (1 549 m), weight 82 7 kg (182 lb 5 1 oz), SpO2 100 %  ,Body mass index is 34 45 kg/m²  General appearance: alert, appears stated age, cooperative and no distress  Head: Normocephalic, without obvious abnormality  Eyes: EOMI with right sided gaze preference, PERRL  Lungs: non labored breathing   Heart: regular heart rate  Neurologic:   Mental status: Alert, oriented x4, thought content appropriate  Speech is fluent, clear  Able to repeat a sentence with dysarthria  3/3 object identification and at 5 minute recall  Able to briskly perform math  Cranial nerves: grossly intact (Cranial nerves II-XII)  Subtle left facial weakness appreciated at rest  Tongue is slightly deviated to the right  Sensory: normal to LT in the face, bilateral upper and lower extremities  DSS intact  Motor: moving all extremities with LUE weakness 4/5 in proximal LUE and 4-/5 in distal LUE, otherwise strength 5/5  Reflexes: 2+ and symmetric  negative anna, negative clonus  Coordination: right finger to nose normal; left finger to nose dysmetria  +left downward drift  3/3 JPS intact in right hallux; 3/6 JPS intact in left hallux         Lab Results:    Results from last 7 days  Lab Units 10/08/18  0501 10/07/18  0438 10/06/18  1348   WBC Thousand/uL 7  33 8 81 8 12   HEMOGLOBIN g/dL 7 5* 8 2* 9 5*   HEMATOCRIT % 23 5* 25 6* 29 8*   PLATELETS Thousands/uL 234 264 278   NEUTROS PCT % 63 80*  --    MONOS PCT % 7 6  --        Results from last 7 days  Lab Units 10/08/18  0501 10/07/18  0438 10/06/18  1349 10/06/18  1117   SODIUM mmol/L 140 140 141  --    POTASSIUM mmol/L 3 5 3 9 4 1  --    CHLORIDE mmol/L 107 110* 111*  --    CO2 mmol/L 28 25 23  --    BUN mg/dL 10 14 13  --    CREATININE mg/dL 0 60 0 52* 0 48*  --    CALCIUM mg/dL 8 2* 7 7* 7 6*  --    ALK PHOS U/L  --  53 59  --    ALT U/L  --  15 16  --    AST U/L  --  11 10  --    GLUCOSE, ISTAT mg/dl  --   --   --  149*       Results from last 7 days  Lab Units 10/07/18  0438 10/06/18  1349   MAGNESIUM mg/dL 2 3 1 7       Results from last 7 days  Lab Units 10/07/18  0438 10/06/18  1349   PHOSPHORUS mg/dL 3 2 3 4         No results found for: TROPONINT  ABG:No results found for: PHART, RRW2FXF, PO2ART, ANJ7LBD, O1RAMKTI, BEART, SOURCE    Imaging Studies: I have personally reviewed pertinent reports  and I have personally reviewed pertinent films in PACS    MRI brain wo contrast   Final Result      Extensive recent infarcts in the right hemisphere including right centrum semiovale and lentiform nucleus, right temporal lobe/temporal opercular junction, and right occipital cortex  No hemorrhage identified  Workstation performed: [id number]         CT head wo contrast   Final Result      Interval development of hypodensity within the right temporal lobe, right caudate nucleus and junction of the right temporal occipital lobes consistent with evolution of previously suspected right MCA infarct  Mild edema and regional mass effect without    midline shift  Tiny linear focus of increased attenuation along the right temporal lobe image 24 for which petechial gyriform hemorrhage cannot be completely excluded  Otherwise, no evidence of intracranial hemorrhage detected        I personally discussed this study with Dr Alex Combs on 10/7/2018 at 5:34 AM                            Workstation performed: [id number]         CT head wo contrast   Final Result      Subtle loss of gray-white differentiation in the right hemisphere with loss of the insular ribbon sign and loss of gray-white differentiation in the right frontotemporal junction suggesting recent infarct  12 mm focus of hyperdensity in the white matter    of the right temporal lobe is likely contrast staining from recent procedure and follow-up imaging is recommended  No definite evidence of hemorrhage  Workstation performed: [id number]         CT cerebral perfusion   Final Result      CT perfusion performed  Data available on PACS  Workstation performed: [id number]         IR stroke alert    (Results Pending)   CTA head and neck w wo contrast    (Results Pending)       EKG, Pathology, and Other Studies: I have personally reviewed pertinent reports        VTE  Prophylaxis: Sequential compression device (Venodyne)  and Heparin

## 2018-10-08 NOTE — PLAN OF CARE
Problem: PHYSICAL THERAPY ADULT  Goal: Performs mobility at highest level of function for planned discharge setting  See evaluation for individualized goals  Treatment/Interventions: Functional transfer training, LE strengthening/ROM, Therapeutic exercise, Endurance training, Patient/family training, Equipment eval/education, Bed mobility, Gait training, Spoke to nursing, OT  Equipment Recommended: Kenrick Johnson       See flowsheet documentation for full assessment, interventions and recommendations  Outcome: Progressing  Prognosis: Good  Problem List: Decreased strength, Decreased endurance, Impaired balance, Decreased mobility, Decreased coordination, Decreased cognition, Impaired judgement, Decreased safety awareness, Impaired vision  Assessment: Pt seen for session, gait and PT 1:1 activity x 25 min for setup, bed mob, sitting time, gait as above, and repositioning p session  cooperative but needs constant cues to attend to L side  Less assist needed for all mobility tasks, w/ improved use of RW   able to ambulate an increased distance, but needs cues for sequencing, L sided awarensss  remains appropriate for rehab at d/c  Barriers to Discharge: Decreased caregiver support (Pt requires max A x2-3 for mobility)     Recommendation:  (recommend rehab at d/c)     PT - OK to Discharge: (S) Yes (to rehab when stable)    See flowsheet documentation for full assessment

## 2018-10-08 NOTE — PROGRESS NOTES
Progress Note - Neurology   Tl Knox 77 y o  female MRN: [id number]  Unit/Bed#: [facility] 713-01 Encounter: [id number]    Assessment/ Plan:  1)  Right MCA acute infarction, likely secondary to ICA/MCA occlusive disease, s/p stenting and MCA thrombectomy, with clinically insignificant mild petechial hemorrhage   -MRI B demonstrates extensive right infarctions in the right centrum semiovale and lentiform nucleus, right temporal lobe, temporal opercular junction and right occipital cortex   -CTA head and neck pending tomorrow   -echo without atrial dilation, wall motion abnormalities or atrial septal defect   -Recommend loop monitor, EP consulted    -continue aspirin and Plavix   -continue statin   -PT/OT/speech   -will continue follow, please monitor exam and notify with changes    Subjective:   Patient is a 49-year-old female with a past medical history of hypothyroidism who presented to [facility] on 10/6/18 as a stroke alert, in subsequently found to have a right MCA M1 occlusion and right ICA occlusion  TPA not administered, patient did undergo origin endovascular intervention and received balloon angioplasty and stenting of the right ICA origin  She was placed on Integrilin and brought to the ICU for monitoring  The patient made vas neurological improvement in the ICU  She was transferred to the floor with a plan of initiating dual antiplatelet therapy when Integrilin was discontinued  No acute events overnight  On exam today, the patient is sitting in a chair no acute distress  A 12 point review systems was completed and is negative  She does demonstrate a flat affect  Additionally, pt with L central facial droop, L UE ataxia and mild strength deficit  Remainder of exam as detailed below       ROS:  See Subjective    Medications:  Scheduled Meds:  Current Facility-Administered Medications:  aspirin 325 mg Oral Daily Rc Briones MD   atorvastatin 40 mg Oral QPM Rc Briones MD   clopidogrel 75 mg Oral Daily Helen Ashley MD   heparin (porcine) 5,000 Units Subcutaneous [facility] Helen Ashley MD   levothyroxine 75 mcg Oral Early Morning Pinky Serrano MD   [START ON 10/9/2018] pantoprazole 40 mg Intravenous Q24H [address] Richard Rivero MD     Continuous Infusions:   PRN Meds:        Vitals: Blood pressure 141/65, pulse 94, temperature 99 2 °F (37 3 °C), temperature source Oral, resp  rate 20, height 5' 1" (1 549 m), weight 82 7 kg (182 lb 5 1 oz), SpO2 99 %  ,Body mass index is 34 45 kg/m²  Physical Exam:  Physical Exam   Constitutional: She is oriented to person, place, and time  She appears well-developed and well-nourished  No distress  HENT:   Head: Normocephalic and atraumatic  Right Ear: External ear normal    Left Ear: External ear normal    Mouth/Throat: Oropharynx is clear and moist  No oropharyngeal exudate  Eyes: Conjunctivae are normal  Right eye exhibits no discharge  Left eye exhibits no discharge  No scleral icterus  Neck: Normal range of motion  Neck supple  Pulmonary/Chest: Effort normal  No respiratory distress  Musculoskeletal: Normal range of motion  She exhibits no edema, tenderness or deformity  Neurological: She is oriented to person, place, and time  Skin: Skin is warm and dry  No rash noted  She is not diaphoretic  No erythema  No pallor  Psychiatric: Her speech is normal    Flat affect   Nursing note and vitals reviewed  Neurologic Exam     Mental Status   Oriented to person, place, and time  Follows 2 step commands  Attention: normal  Concentration: normal    Speech: speech is normal   Level of consciousness: alert  Knowledge: good  Normal comprehension  Cranial Nerves   Cranial nerves II through XII intact       With exception of L central facial droop      Motor Exam   Muscle bulk: normal  Overall muscle tone: normalRUE 5/5  LUE 4+/5     Moves LE equally antigravity      Sensory Exam   Light touch normal      Gait, Coordination, and Reflexes     Gait  Gait: (deferred for safety )  LUE mild ataxia noted grossly and with finger to nose        Lab, Imaging and other studies: I have personally reviewed pertinent reports  I personally reviewed pertinent images in PACs  VTE Prophylaxis: Sequential compression device (Venodyne)  and Heparin SQ    Counseling / Coordination of Care  Total time spent today 25 minutes  Greater than 50% of total time was spent with the patient and / or family counseling and / or coordination of care  A description of the counseling / coordination of care: Patient was seen examined by myself the attending physician  The chart was reviewed thoroughly, including laboratory values and imaging studies  The patient was counseled the room

## 2018-10-08 NOTE — PLAN OF CARE
Problem: OCCUPATIONAL THERAPY ADULT  Goal: Performs self-care activities at highest level of function for planned discharge setting  See evaluation for individualized goals  Treatment Interventions: ADL retraining, Visual perceptual retraining, Functional transfer training, UE strengthening/ROM, Endurance training, Cognitive reorientation, Patient/family training, Equipment evaluation/education, Neuromuscular reeducation, Fine motor coordination activities, Compensatory technique education, Continued evaluation, Energy conservation, Activityengagement          See flowsheet documentation for full assessment, interventions and recommendations  Outcome: Progressing  Limitation: Decreased ADL status, Decreased UE strength, Decreased UE ROM, Decreased Safe judgement during ADL, Decreased endurance, Visual deficit, Decreased self-care trans, Decreased high-level ADLs  Prognosis: Fair  Assessment: Pt participated in occupational therapy with focus on activity tolerance, AROM/AAROM/SROM and PROM to pt  L UE for decreased swelling and increased function, standing balance and tolerance for pt engagement in functional self-care task/grooming tasks  Pt cleared by RN/ for pt participation in therapy  Pt received out of bed to walk with physical therapy  Pt Identifiers confirmed and pt pleasant and agreeable to participate in OT   Pt family members/ and daughter    present supportive throughout session  Pt requires assist with functional mob and use of RW 2* pt balance and coordination deficits  Pt able to tolerate and demonstrate good carryover of L UE home exercise program to pt affected L UE  Hemiparesis  Pt will require in-pt rehab to continue to address pt above noted deficits which currently impair pt ADL and functional mob     Recommendation: (S) Physiatry Consult  OT Discharge Recommendation: Short Term Rehab  OT - OK to Discharge: Yes (when medically stable)

## 2018-10-08 NOTE — SOCIAL WORK
CM met pt at bedside and made aware of CM role at dc  Pt denies having a LW or POA  Primary contact is  [phone] alternate daughter [phone]  Pt lives with  in a 2 story house with 2 YOSELIN and has a 1st floor master bedroom with bathroom  Pt was IPTA with all ADL's, drive and retired  Pt has no DME  Pt has 2 walkers, cane,and shower bench available at home  Pt denies hx with HHC, STR, alc, drug and IP psych tx  Preferred pharmacy is [facility]  PCP is Dr Bruna Joyce  Pt has transport available when dc  CM reviewed d/c planning process including the following: identifying help at home, patient preference for d/c planning needs, Discharge Lounge, [facility] Meds to Bed program, availability of treatment team to discuss questions or concerns patient and/or family may have regarding understanding medications and recognizing signs and symptoms once discharged  CM also encouraged patient to follow up with all recommended appointments after discharge  Patient advised of importance for patient and family to participate in managing patients medical well being

## 2018-10-09 ENCOUNTER — APPOINTMENT (INPATIENT)
Dept: RADIOLOGY | Facility: HOSPITAL | Age: 68
DRG: 023 | End: 2018-10-09
Payer: COMMERCIAL

## 2018-10-09 PROBLEM — I63.239 CAROTID STENOSIS, SYMPTOMATIC, WITH INFARCTION (HCC): Status: ACTIVE | Noted: 2018-10-09

## 2018-10-09 LAB
ABO GROUP BLD BPU: NORMAL
ANION GAP SERPL CALCULATED.3IONS-SCNC: 4 MMOL/L (ref 4–13)
BASOPHILS # BLD AUTO: 0.03 THOUSANDS/ΜL (ref 0–0.1)
BASOPHILS NFR BLD AUTO: 0 % (ref 0–1)
BPU ID: NORMAL
BUN SERPL-MCNC: 9 MG/DL (ref 5–25)
CALCIUM SERPL-MCNC: 8.4 MG/DL (ref 8.3–10.1)
CHLORIDE SERPL-SCNC: 108 MMOL/L (ref 100–108)
CO2 SERPL-SCNC: 27 MMOL/L (ref 21–32)
CREAT SERPL-MCNC: 0.63 MG/DL (ref 0.6–1.3)
CROSSMATCH: NORMAL
EOSINOPHIL # BLD AUTO: 0.15 THOUSAND/ΜL (ref 0–0.61)
EOSINOPHIL NFR BLD AUTO: 2 % (ref 0–6)
ERYTHROCYTE [DISTWIDTH] IN BLOOD BY AUTOMATED COUNT: 13.3 % (ref 11.6–15.1)
GFR SERPL CREATININE-BSD FRML MDRD: 91 ML/MIN/1.73SQ M
GLUCOSE SERPL-MCNC: 91 MG/DL (ref 65–140)
HCT VFR BLD AUTO: 26 % (ref 34.8–46.1)
HGB BLD-MCNC: 8.5 G/DL (ref 11.5–15.4)
IMM GRANULOCYTES # BLD AUTO: 0.03 THOUSAND/UL (ref 0–0.2)
IMM GRANULOCYTES NFR BLD AUTO: 0 % (ref 0–2)
LYMPHOCYTES # BLD AUTO: 2.23 THOUSANDS/ΜL (ref 0.6–4.47)
LYMPHOCYTES NFR BLD AUTO: 31 % (ref 14–44)
MCH RBC QN AUTO: 30.6 PG (ref 26.8–34.3)
MCHC RBC AUTO-ENTMCNC: 32.7 G/DL (ref 31.4–37.4)
MCV RBC AUTO: 94 FL (ref 82–98)
MONOCYTES # BLD AUTO: 0.57 THOUSAND/ΜL (ref 0.17–1.22)
MONOCYTES NFR BLD AUTO: 8 % (ref 4–12)
NEUTROPHILS # BLD AUTO: 4.1 THOUSANDS/ΜL (ref 1.85–7.62)
NEUTS SEG NFR BLD AUTO: 59 % (ref 43–75)
NRBC BLD AUTO-RTO: 0 /100 WBCS
PLATELET # BLD AUTO: 234 THOUSANDS/UL (ref 149–390)
PMV BLD AUTO: 9.9 FL (ref 8.9–12.7)
POTASSIUM SERPL-SCNC: 3.5 MMOL/L (ref 3.5–5.3)
RBC # BLD AUTO: 2.78 MILLION/UL (ref 3.81–5.12)
SODIUM SERPL-SCNC: 139 MMOL/L (ref 136–145)
UNIT DISPENSE STATUS: NORMAL
UNIT PRODUCT CODE: NORMAL
UNIT RH: NORMAL
WBC # BLD AUTO: 7.11 THOUSAND/UL (ref 4.31–10.16)

## 2018-10-09 PROCEDURE — 70496 CT ANGIOGRAPHY HEAD: CPT

## 2018-10-09 PROCEDURE — 80048 BASIC METABOLIC PNL TOTAL CA: CPT | Performed by: INTERNAL MEDICINE

## 2018-10-09 PROCEDURE — 85025 COMPLETE CBC W/AUTO DIFF WBC: CPT | Performed by: INTERNAL MEDICINE

## 2018-10-09 PROCEDURE — 92526 ORAL FUNCTION THERAPY: CPT

## 2018-10-09 PROCEDURE — 97116 GAIT TRAINING THERAPY: CPT

## 2018-10-09 PROCEDURE — 99232 SBSQ HOSP IP/OBS MODERATE 35: CPT | Performed by: INTERNAL MEDICINE

## 2018-10-09 PROCEDURE — 97530 THERAPEUTIC ACTIVITIES: CPT

## 2018-10-09 PROCEDURE — 99024 POSTOP FOLLOW-UP VISIT: CPT | Performed by: PHYSICIAN ASSISTANT

## 2018-10-09 PROCEDURE — 99232 SBSQ HOSP IP/OBS MODERATE 35: CPT | Performed by: PSYCHIATRY & NEUROLOGY

## 2018-10-09 PROCEDURE — 70498 CT ANGIOGRAPHY NECK: CPT

## 2018-10-09 RX ADMIN — FAMOTIDINE 20 MG: 40 POWDER, FOR SUSPENSION ORAL at 09:07

## 2018-10-09 RX ADMIN — HEPARIN SODIUM 5000 UNITS: 5000 INJECTION, SOLUTION INTRAVENOUS; SUBCUTANEOUS at 13:18

## 2018-10-09 RX ADMIN — ASPIRIN 325 MG ORAL TABLET 325 MG: 325 PILL ORAL at 09:07

## 2018-10-09 RX ADMIN — HEPARIN SODIUM 5000 UNITS: 5000 INJECTION, SOLUTION INTRAVENOUS; SUBCUTANEOUS at 22:07

## 2018-10-09 RX ADMIN — LEVOTHYROXINE SODIUM 75 MCG: 75 TABLET ORAL at 05:02

## 2018-10-09 RX ADMIN — CLOPIDOGREL 75 MG: 75 TABLET, FILM COATED ORAL at 09:07

## 2018-10-09 RX ADMIN — FAMOTIDINE 20 MG: 40 POWDER, FOR SUSPENSION ORAL at 17:08

## 2018-10-09 RX ADMIN — ATORVASTATIN CALCIUM 40 MG: 40 TABLET, FILM COATED ORAL at 17:08

## 2018-10-09 RX ADMIN — HEPARIN SODIUM 5000 UNITS: 5000 INJECTION, SOLUTION INTRAVENOUS; SUBCUTANEOUS at 05:02

## 2018-10-09 RX ADMIN — IOHEXOL 100 ML: 350 INJECTION, SOLUTION INTRAVENOUS at 08:49

## 2018-10-09 NOTE — ASSESSMENT & PLAN NOTE
Most likely 2/2 acute blood loss during procedure  R/o occult bleeding - FOBT pending  S/p PRBC so no iron studies - monitor as outpt

## 2018-10-09 NOTE — PLAN OF CARE
Problem: SLP ADULT - SWALLOWING, IMPAIRED  Goal: Advance to least restrictive diet without signs or symptoms of aspiration for planned discharge setting  See evaluation for individualized goals  Patient will:    1  Tolerate LRD without s/s of aspiration across all meals and snacks     Outcome: Progressing

## 2018-10-09 NOTE — ASSESSMENT & PLAN NOTE
Right MCA stroke most likely 2/2 ICA and MCA occlusive disease  Now s/p thrombectomy and stent placement  And on DAPT  Possible non specific micro hemorrhages on MRI  TTE done, no ASD noted  Minimal residual deficits    PLAN:    -continue aspirin, plavix and statin  -monitor BP, goal systolic BP between 769-956  -appreciate neurology and neurosurgery input  -CTA and CT head tomorrow for follow up   -EP consulted for possible loop recorder placement  -PT recommends rehab   -PM & R consulted

## 2018-10-09 NOTE — PROGRESS NOTES
Progress Note - Mike Read 1950, 77 y o  female MRN: [id number]    Unit/Bed#: [facility] 713-01 Encounter: [id number]    Primary Care Provider: No primary care provider on file  Date and time admitted to hospital: 10/6/2018  8:56 AM    Carotid stenosis, symptomatic, with infarction [facility])   Assessment & Plan    Right MCA stroke most likely 2/2 ICA and MCA occlusive disease  Now s/p thrombectomy and stent placement - clinically improving  Possible non specific micro hemorrhages on MRI - no AC till cleared by neuro teams  TTE - nil acute  Rpt CTA head - focal filling defect identified at the distal aspect of the stent within the right carotid system  Neurosurgery Dr Gisel Hagan is monitoring - note input       Normocytic anemia due to blood loss   Assessment & Plan    Most likely 2/2 acute blood loss during procedure  R/o occult bleeding - FOBT pending  S/p PRBC so no iron studies - monitor as outpt     Hypothyroidism   Assessment & Plan    Continue current dose of LT4      * CVA (cerebral vascular accident) [facility])   Assessment & Plan    Right MCA stroke most likely 2/2 ICA and MCA occlusive disease  Now s/p thrombectomy and stent placement  And on DAPT    -continue aspirin, plavix and statin  -monitor BP, goal systolic BP between 035-706  -appreciate neurology and neurosurgery input  -PT recommends rehab   -PM & R consulted  VTE Pharmacologic Prophylaxis: Enoxaparin (Lovenox)  Mechanical: Mechanical VTE prophylaxis in place  Patient Centered Rounds: I have performed bedside rounds with nursing staff today  Discussions with Specialists or Other Care Team Provider:     Education and Discussions with Family / Patient:     Time Spent for Care: More than 50% of total time spent on counseling and coordination of care as described above      Current Length of Stay: 3 day(s)    Current Patient Status: Inpatient   Certification Statement: The patient will continue to require additional inpatient hospital stay due to as above    Discharge Plan:    Code Status: Level 1 - Full Code      Subjective: no new complaints    Objective:     Vitals:   Temp (24hrs), Av 2 °F (36 8 °C), Min:97 4 °F (36 3 °C), Max:98 7 °F (37 1 °C)    Temp:  [97 4 °F (36 3 °C)-98 7 °F (37 1 °C)] 97 4 °F (36 3 °C)  HR:  [72-91] 80  Resp:  [18-20] 18  BP: (129-153)/(59-70) 153/70  SpO2:  [97 %-99 %] 99 %  Body mass index is 34 45 kg/m²  Input and Output Summary (last 24 hours): Intake/Output Summary (Last 24 hours) at 10/09/18 1537  Last data filed at 10/09/18 0812   Gross per 24 hour   Intake              770 ml   Output             2100 ml   Net            -1330 ml       Physical Exam   HENT:   Head: Normocephalic  Eyes: Pupils are equal, round, and reactive to light  Cardiovascular: Normal heart sounds  Pulmonary/Chest: Effort normal    Abdominal: Soft  Neurological: She is alert  Skin: There is pallor  Additional Data:     Labs:      Results from last 7 days  Lab Units 10/09/18  0616   WBC Thousand/uL 7 11   HEMOGLOBIN g/dL 8 5*   HEMATOCRIT % 26 0*   PLATELETS Thousands/uL 234   NEUTROS PCT % 59   LYMPHS PCT % 31   MONOS PCT % 8   EOS PCT % 2       Results from last 7 days  Lab Units 10/09/18  0616  10/07/18  0438  10/06/18  1117   SODIUM mmol/L 139  < > 140  < >  --    POTASSIUM mmol/L 3 5  < > 3 9  < >  --    CHLORIDE mmol/L 108  < > 110*  < >  --    CO2 mmol/L 27  < > 25  < >  --    BUN mg/dL 9  < > 14  < >  --    CREATININE mg/dL 0 63  < > 0 52*  < >  --    CALCIUM mg/dL 8 4  < > 7 7*  < >  --    ALK PHOS U/L  --   --  53  < >  --    ALT U/L  --   --  15  < >  --    AST U/L  --   --  11  < >  --    GLUCOSE, ISTAT mg/dl  --   --   --   --  149*   < > = values in this interval not displayed  * I Have Reviewed All Lab Data Listed Above      Imaging:  Imaging Personally Reviewed by Myself Includes:     Cultures:   Blood Culture: No results found for: BLOODCX  Urine Culture: No results found for: URINECX  Sputum Culture: No components found for: SPUTUMCX  Wound Culture: No results found for: WOUNDCULT    Last 24 Hours Medication List:     Current Facility-Administered Medications:  aspirin 325 mg Oral Daily Dayanna Garay MD   atorvastatin 40 mg Oral QPM Dayanna Garay MD   clopidogrel 75 mg Oral Daily Dayanna Garay MD   famotidine 20 mg Oral BID Dayanna Garay MD   heparin (porcine) 5,000 Units Subcutaneous [facility] Dayanna Garay MD   levothyroxine 75 mcg Oral Early Morning Hebert MD Tamar        Today, Patient Was Seen By: Melissa Godoy MD    ** Please Note: Dragon 360 Dictation voice to text software may have been used in the creation of this document   **

## 2018-10-09 NOTE — PLAN OF CARE
Problem: PHYSICAL THERAPY ADULT  Goal: Performs mobility at highest level of function for planned discharge setting  See evaluation for individualized goals  Treatment/Interventions: Functional transfer training, LE strengthening/ROM, Therapeutic exercise, Endurance training, Patient/family training, Equipment eval/education, Bed mobility, Gait training, Spoke to nursing, OT  Equipment Recommended: Carlos Franklin       See flowsheet documentation for full assessment, interventions and recommendations  Outcome: Progressing  Prognosis: Good  Problem List: Decreased strength, Decreased endurance, Impaired balance, Decreased mobility, Decreased coordination, Decreased cognition, Impaired judgement, Decreased safety awareness, Impaired vision  Assessment: Pt continues to make improvement with functional mobility  Pt performed functional transfers with decreased assist, but required instructions throughout for sequencing to improve safety and technique with fair carryover between attempts  Pt able to sit at EOB unsupported without LOB, but did require instructions for posture as she occasionally demonstrated posterior & L leaning  Pt ambulated in hallway repeated household & community distances, but demonstrated multiple L LOB throughout  Pt required instructoin to sit as she is unaware of her limitations  Pt instructed in uprright posture with forward gaze/scanning to L & increasing SATISH  Pt demonstrated poor carryover of these instructions with continued L neglect and scissoring gait which significantly increases pt fall risk at this time  Pt returned to bed at end of session and was instructed in log roll technique to improve transfer into bed, but still required assist for LE managment  Pt will continue to benefit from therapy services to address noted deficits & improve functional mobility, balance, ambulation, and safety awareness for safe mobility    Barriers to Discharge: Decreased caregiver support Recommendation:  (recommend rehab at d/c)     PT - OK to Discharge: (S) Yes (to rehab when stable)    See flowsheet documentation for full assessment

## 2018-10-09 NOTE — PROGRESS NOTES
Progress Note - Neurosurgery   Niya Ramos 77 y o  female MRN: [id number]  Unit/Bed#: [facility] 713-01 Encounter: [id number]    Assessment:  1  Cervical right ICA occlusion s/p stenting   2  Right MCA M1 occlusion s/p thrombectomy   3  hypothyroidism    Plan:  · Exam: GCS 15  AAOx3  Right gaze preference but EOMI  Appreciated subtle left facial droop  LUE weakness  LT intact  Speech is fluent  +left UE PD  Finger to nose intact  · Imaging: personally reviewed and reviewed by attending:  · 10/7/18 - MRI brain wo: 1) extensive recent infarcts in the right hemisphere including right centrum semiovale and lentiform nucleus, right temporal lobe/temporal opercular junction, and right occipital cortex  No hemorrhage identified   · 10/7/18 - CT head wo: 1) interval development of hypodensity within the right temporal lobe, right caudate nucleus and junction of the right temporal occipital lobes consistent with evolution of previously suspected right MCA infarct  Mild edema and regional mass effect without midline shift  2) Tiny linear focus of increased attenuation along the right temporal lobe image 24 for which petechial gyriform hemorrhage cannot be completely excluded  · 10/9/18 - CTA head and neck w/wo: 1) there is focal filling defect identified at the distal aspect of the stent within the right carotid system  This appears slightly smaller than the filling defect identified on angiogram performed yesterday despite limitations of comparing digital subtraction angiography to CT angiography  This is most suggestive of thrombus  However, a noncalcified atherosclerotic plaque could have this appearance  There is mild narrowing of the proximal cervical internal carotid artery within the stent  2) Distally, the intracranial internal carotid artery enhances appropriately and is normal in caliber with normal M1 segment and middle cerebral artery branches  3) there is a small vertebrobasilar system   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, terminating within the posterior inferior cerebellar artery and both posterior cerebral arteries arise from the internal carotid arteries  · Plant to repeat CTA in 2 weeks  · STAT CT head without contrast if decline in GCS >2pts/1hr  Neurology following for CVA:   · MRI completed  · Echocardiogram without atrial dilation, wall motion abnormalities or septal defect  · Continue Aspirin 325mg and Plavix 75mg  · Plan to check P2Y12 on Thursday   · Patient will likely have to transition to [facility] and plavix but   · PT/OT: recommend rehab  · PMR consult placed   · DVT ppx: SCDs, HSQ  · Medical management per primary team   · Received 1u PRBCs on 10/8/18 - repeat Hgb 8 5  · Patient is not medically cleared from a neurosurgical/endovascular standpoint  Will continue to follow, call with questions or concerns  Subjective/Objective   Chief Complaint: "I'm okay"    Subjective: patient states she's doing "okay"  She denies any complications or events overnight  She denies any headaches, change in vision, dizziness, chest pain, SOB, abdominal pain/N/V, weakness/numbness or tingling    Objective: sitting up in chair, NAD  I/O       10/07 0701 - 10/08 0700 10/08 0701 - 10/09 0700 10/09 0701 - 10/10 0700    P  O  950 480 180    I V  (mL/kg) 483 (5 8)      Blood  350     Total Intake(mL/kg) 1433 (17 3) 830 (10) 180 (2 2)    Urine (mL/kg/hr) 1260 (0 6) 2100 (1 1) 800 (3 2)    Total Output 1260 2100 800    Net +173 -1270 -620           Unmeasured Urine Occurrence  1 x           Invasive Devices     Peripheral Intravenous Line            Peripheral IV 10/06/18 Right Antecubital 3 days    Peripheral IV 10/06/18 Left Antecubital 2 days                Physical Exam:  Vitals: Blood pressure 129/59, pulse 72, temperature 98 2 °F (36 8 °C), temperature source Oral, resp  rate 18, height 5' 1" (1 549 m), weight 82 7 kg (182 lb 5 1 oz), SpO2 98 %  ,Body mass index is 34 45 kg/m²      General appearance: alert, appears stated age, cooperative and no distress  Head: Normocephalic, without obvious abnormality  Eyes: EOMI with appreciated right gaze preference, PERRL  Visual fields grossly intact  Lungs: non labored breathing  Heart: regular heart rate  Neurologic:   Mental status: Alert, oriented x4, thought content appropriate  Speech is fluent, clear  Able to repeat a sentence  3/3 object recognition intact  Able to perform simple math  Cranial nerves: grossly intact (Cranial nerves II-XII)  Facial symmetry at rest and with expression  Tongue is slightly deviated to the left  Sensory: normal to LT in the face, bilateral upper and lower extremities  Left sided inattention with DSS  Motor: moving all extremities with subtle weakness in the LUE, otherwise strength 5/5     LUE: shoulder shru/5, deltoid: 5--5/5, biceps/triceps: 5/5, WF: 3/5, WE: 3-4-/5, IOs: 3/5, : 4/5   Reflexes: 2+ and symmetric  Positive bilateral anna, negative clonus  Coordination: finger to nose normal bilaterally, +subtle left downward drift   3/3 JPS intact in bilateral index      Lab Results:    Results from last 7 days  Lab Units 10/09/18  0616 10/08/18  1815 10/08/18  0501 10/07/18  0438   WBC Thousand/uL 7 11 8 06 7 33 8 81   HEMOGLOBIN g/dL 8 5* 9 3* 7 5* 8 2*   HEMATOCRIT % 26 0* 28 2* 23 5* 25 6*   PLATELETS Thousands/uL 234 234 234 264   NEUTROS PCT % 59  --  63 80*   MONOS PCT % 8  --  7 6       Results from last 7 days  Lab Units 10/09/18  0616 10/08/18  0501 10/07/18  0438 10/06/18  1349  10/06/18  1117   SODIUM mmol/L 139 140 140 141  < >  --    POTASSIUM mmol/L 3 5 3 5 3 9 4 1  < >  --    CHLORIDE mmol/L 108 107 110* 111*  < >  --    CO2 mmol/L 27 28 25 23  < >  --    BUN mg/dL 9 10 14 13  < >  --    CREATININE mg/dL 0 63 0 60 0 52* 0 48*  < >  --    CALCIUM mg/dL 8 4 8 2* 7 7* 7 6*  < >  --    ALK PHOS U/L  --   --  53 59  --   --    ALT U/L  --   --  15 16  --   --    AST U/L  --   --  11 10  --   --    GLUCOSE, ISTAT mg/dl --   --   --   --   --  149*   < > = values in this interval not displayed  Results from last 7 days  Lab Units 10/07/18  0438 10/06/18  1349   MAGNESIUM mg/dL 2 3 1 7       Results from last 7 days  Lab Units 10/07/18  0438 10/06/18  1349   PHOSPHORUS mg/dL 3 2 3 4         No results found for: TROPONINT  ABG:No results found for: PHART, YWM2AWZ, PO2ART, ZII6KMV, Z6LBSOLU, BEART, SOURCE    Imaging Studies: I have personally reviewed pertinent reports  and I have personally reviewed pertinent films in PACS  CTA head and neck w wo contrast   Final Result      There is a focal filling defect identified at the distal aspect of the stent within the right carotid system  This appears slightly smaller than the filling defect identified on angiogram performed yesterday despite limitations of comparing digital    subtraction angiography to CT angiography  This is most suggestive of thrombus  However, a noncalcified atherosclerotic plaque could have this appearance  There is mild narrowing of the proximal cervical internal carotid artery within the stent  Distally, the intracranial internal carotid artery enhances appropriately and is normal in caliber with normal M1 segment and middle cerebral artery branches  Findings were directly discussed with Dr Srinivas Mccarthy, at the time of dictation  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, terminating within the posterior inferior cerebellar artery and both posterior cerebral arteries arises from the internal carotid arteries  Noncontrast imaging of the brain demonstrates expected evolution of the right MCA territory infarctions which are more well-defined and hypodense  No bulmaro hemorrhagic transformation identified                    Workstation performed: [id number]         MRI brain wo contrast   Final Result      Extensive recent infarcts in the right hemisphere including right centrum semiovale and lentiform nucleus, right temporal lobe/temporal opercular junction, and right occipital cortex  No hemorrhage identified  Workstation performed: [id number]         CT head wo contrast   Final Result      Interval development of hypodensity within the right temporal lobe, right caudate nucleus and junction of the right temporal occipital lobes consistent with evolution of previously suspected right MCA infarct  Mild edema and regional mass effect without    midline shift  Tiny linear focus of increased attenuation along the right temporal lobe image 24 for which petechial gyriform hemorrhage cannot be completely excluded  Otherwise, no evidence of intracranial hemorrhage detected  I personally discussed this study with Dr Silvano Jones on 10/7/2018 at 5:34 AM                            Workstation performed: [id number]         CT head wo contrast   Final Result      Subtle loss of gray-white differentiation in the right hemisphere with loss of the insular ribbon sign and loss of gray-white differentiation in the right frontotemporal junction suggesting recent infarct  12 mm focus of hyperdensity in the white matter    of the right temporal lobe is likely contrast staining from recent procedure and follow-up imaging is recommended  No definite evidence of hemorrhage  Workstation performed: [id number]         CT cerebral perfusion   Final Result      CT perfusion performed  Data available on PACS  Workstation performed: [id number]         IR stroke alert    (Results Pending)       EKG, Pathology, and Other Studies: I have personally reviewed pertinent reports        VTE  Prophylaxis: Sequential compression device (Venodyne)  and Heparin

## 2018-10-09 NOTE — SOCIAL WORK
Met with patient and   Discussed recommendation for acute rehab  They would like referrals to [facility] and will advise of choice after speaking with their daughter    [facility]

## 2018-10-09 NOTE — ASSESSMENT & PLAN NOTE
Most likely 2/2 acute blood loss during procedure, less likely active occult bleeding  PLAN:  · Transfuse prn to keep Hb >8, s/p 1 unit PRBC today  · Empiric H2 blocker  · Check stool occult blood  · trend CBC Q12 hrly today

## 2018-10-09 NOTE — ASSESSMENT & PLAN NOTE
Right MCA stroke most likely 2/2 ICA and MCA occlusive disease     Now s/p thrombectomy and stent placement - clinically improving  Possible non specific micro hemorrhages on MRI - no AC till cleared by neuro teams  TTE - nil acute  Rpt CTA head - focal filling defect identified at the distal aspect of the stent within the right carotid system  Neurosurgery Dr Leron Kehr is monitoring - note input

## 2018-10-09 NOTE — PHYSICAL THERAPY NOTE
Physical Therapy Progress Note     10/09/18 9242   Pain Assessment   Pain Assessment No/denies pain   Restrictions/Precautions   Other Precautions Bed Alarm;Telemetry; Fall Risk;Cognitive   General   Family/Caregiver Present Yes   Subjective   Subjective Pt pleasant and eager to participate in therapy  No new complaint this session  Bed Mobility   Supine to Sit 5  Supervision   Additional items Assist x 1;Bedrails; Increased time required;Verbal cues   Sit to Supine 4  Minimal assistance   Additional items Assist x 1; Increased time required;Verbal cues;LE management   Transfers   Sit to Stand 4  Minimal assistance   Additional items Assist x 1; Armrests; Increased time required;Verbal cues; Impulsive   Stand to Sit 4  Minimal assistance   Additional items Assist x 1; Armrests; Increased time required;Verbal cues; Impulsive   Ambulation/Elevation   Gait pattern L Hemiparesis; Improper Weight shift;Narrow SATISH; Forward Flexion;Decreased foot clearance;Scissoring; Ataxia; Excessively slow   Gait Assistance 4  Minimal assist  (increased assist with minor LOB)   Additional items Assist x 1  (+ chair follow)   Assistive Device Rolling walker   Distance 125', 185', 60'   Balance   Static Sitting Fair +   Static Standing Fair -   Ambulatory Poor   Endurance Deficit   Endurance Deficit Yes   Endurance Deficit Description fatigue, L neglect, multiple LOB   Activity Tolerance   Activity Tolerance Patient tolerated treatment well;Patient limited by fatigue   Nurse Made Liliane Medina RN   Assessment   Prognosis Good   Problem List Decreased strength;Decreased endurance; Impaired balance;Decreased mobility; Decreased coordination;Decreased cognition; Impaired judgement;Decreased safety awareness; Impaired vision   Assessment Pt continues to make improvement with functional mobility    Pt performed functional transfers with decreased assist, but required instructions throughout for sequencing to improve safety and technique with fair carryover between attempts  Pt able to sit at EOB unsupported without LOB, but did require instructions for posture as she occasionally demonstrated posterior & L leaning  Pt ambulated in hallway repeated household & community distances, but demonstrated multiple L LOB throughout  Pt required instructoin to sit as she is unaware of her limitations  Pt instructed in uprright posture with forward gaze/scanning to L & increasing SATISH  Pt demonstrated poor carryover of these instructions with continued L neglect and scissoring gait which significantly increases pt fall risk at this time  Pt returned to bed at end of session and was instructed in log roll technique to improve transfer into bed, but still required assist for LE managment  Pt will continue to benefit from therapy services to address noted deficits & improve functional mobility, balance, ambulation, and safety awareness for safe mobility  Barriers to Discharge Decreased caregiver support   Goals   Patient Goals to go to rehab   STG Expiration Date 10/17/18   Treatment Day 3   Plan   Treatment/Interventions Functional transfer training;LE strengthening/ROM; Therapeutic exercise; Endurance training;Patient/family training;Equipment eval/education; Bed mobility;Gait training   Progress Progressing toward goals   PT Frequency (4-6x/week)   Recommendation   Recommendation (recommend rehab at d/c)   Equipment Recommended Yaakov Quiñonez PTA

## 2018-10-09 NOTE — ASSESSMENT & PLAN NOTE
Right MCA stroke most likely 2/2 ICA and MCA occlusive disease  Now s/p thrombectomy and stent placement  And on DAPT    -continue aspirin, plavix and statin  -monitor BP, goal systolic BP between 954-289  -appreciate neurology and neurosurgery input  -PT recommends rehab   -PM & R consulted

## 2018-10-09 NOTE — SPEECH THERAPY NOTE
Speech/Language Pathology Progress Note      Subjective:  Pt was alert and oriented today, pleasant and cooperative for ST session  Objective:  Pt seen for diagnostic swallow therapy at lunch today with trial of dysphagia 3 diet, including chopped chicken, chopped cooked green beans, and mashed potatoes  Pt noted to take small bites and masticate slowly but thoroughly  Improved awareness of clearing L buccal cavity today and no pocketing was noted between bites  Thin liquids again tolerated without s/s aspiration  Assessment:  Pt able to tolerate dysphagia level 3 diet without s/s aspiration  Upgraded diet does require additional time for mastication, however pt and SLP in agreement pt is ready for dysphagia level 3 and is able to take her time and eat safely on this level diet  Plan/Recommendations:  Upgrade diet to dysphagia level 3, continue thin liquid  Continue ST follow up for diet advancement

## 2018-10-09 NOTE — PROGRESS NOTES
Progress Note - Neurology   Tl Knox 77 y o  female MRN: [id number]  Unit/Bed#: [facility] 713-01 Encounter: [id number]    Assessment/ Plan:  1)  Right MCA acute infarction, likely secondary to ICA/MCA occlusive disease, s/p stenting and MCA thrombectomy, with clinically insignificant mild petechial hemorrhage   -MRI B demonstrates extensive right infarctions in the right centrum semiovale and lentiform nucleus, right temporal lobe, temporal opercular junction and right occipital cortex   -CTA head and neck demonstrates distal focal filling defect within the stent of the right carotid artery - per neurosurgery appears stable     -Per Neurosurgery:     -P2Y12 on Thursday      -Likely transition to [facility] and Plavix     -Repeat CTA Head and Neck in 2 weeks    -echo without atrial dilation, wall motion abnormalities or atrial septal defect   -Will defer loop monitor at this time    -continue aspirin and Plavix for now, additional adjustments to AC/ AP per neurosurgery    -continue statin   -PT/OT/speech   -Pt to follow up in Stroke Clinic, apt requested  Please call with questions  Subjective:   Patient is a 66-year-old female with a past medical history of hypothyroidism who presented to [facility] on 10/6/18 as a stroke alert, in subsequently found to have a right MCA M1 occlusion and right ICA occlusion  TPA not administered, patient did undergo origin endovascular intervention and received balloon angioplasty and stenting of the right ICA origin  She was placed on Integrilin and brought to the ICU for monitoring  The patient made vas neurological improvement in the ICU  She was transferred to the floor with a plan of initiating dual antiplatelet therapy when Integrilin was discontinued  No acute events overnight  CTA head and neck demonstrates a distal thrombus in the right ICA stent, per Neurosurgery, this is stable  Please see neurosurgery note for full details      On exam today, the patient was awake, alert, polite and interactive  She continues to demonstrate left central facial droop, and left upper extremity 4/5 weakness with ataxia  A 12 point review of systems was completed and is negative  Remainder of neurological exam is detailed below  ROS:  See Subjective    Medications:  Scheduled Meds:    Current Facility-Administered Medications:  aspirin 325 mg Oral Daily Soila Gore MD   atorvastatin 40 mg Oral QPM Soila Gore MD   clopidogrel 75 mg Oral Daily Soila Gore MD   famotidine 20 mg Oral BID Soila Gore MD   heparin (porcine) 5,000 Units Subcutaneous [facility] Soila Gore MD   levothyroxine 75 mcg Oral Early Morning Chato Rosales MD     Continuous Infusions:   PRN Meds:        Vitals: Blood pressure 129/59, pulse 72, temperature 98 2 °F (36 8 °C), temperature source Oral, resp  rate 18, height 5' 1" (1 549 m), weight 82 7 kg (182 lb 5 1 oz), SpO2 98 %  ,Body mass index is 34 45 kg/m²  Physical Exam:  Physical Exam   Constitutional: She is oriented to person, place, and time  She appears well-developed and well-nourished  No distress  HENT:   Head: Normocephalic and atraumatic  Right Ear: External ear normal    Left Ear: External ear normal    Mouth/Throat: Oropharynx is clear and moist  No oropharyngeal exudate  Eyes: Conjunctivae are normal  Right eye exhibits no discharge  Left eye exhibits no discharge  No scleral icterus  Neck: Normal range of motion  Neck supple  Pulmonary/Chest: Effort normal  No respiratory distress  Musculoskeletal: Normal range of motion  She exhibits no edema, tenderness or deformity  Neurological: She is oriented to person, place, and time  Finger-nose-finger test: Ataxia noted on left  Skin: Skin is warm and dry  No rash noted  She is not diaphoretic  No erythema  No pallor  Psychiatric: She has a normal mood and affect  Her speech is normal and behavior is normal    Nursing note and vitals reviewed       Neurologic Exam     Mental Status Oriented to person, place, and time  Follows 2 step commands  Attention: normal  Concentration: normal    Speech: speech is normal   Level of consciousness: alert  Knowledge: good  Cranial Nerves   Cranial nerves II through XII intact  With exception of left widened palpebral fissure and flat nasolabial fold, able to overcome with grin     Motor Exam   Muscle bulk: normal  Overall muscle tone: normal    Strength   Strength 5/5 except as noted  Left upper extremity 4/5 throughout     Sensory Exam   Light touch normal      Gait, Coordination, and Reflexes     Gait  Gait: (Deferred for safety)    Coordination   Finger-nose-finger test: Ataxia noted on left  Tremor   Resting tremor: absent      Lab, Imaging and other studies: I have personally reviewed pertinent reports  I personally reviewed pertinent images in PACs  VTE Prophylaxis: Sequential compression device (Venodyne)  and Heparin SQ    Counseling / Coordination of Care  Total time spent today 20 minutes  Greater than 50% of total time was spent with the patient and / or family counseling and / or coordination of care  A description of the counseling / coordination of care: Patient was seen examined by myself the attending physician  The chart was reviewed thoroughly, including laboratory values and imaging studies  The patient was counseled the room  The pt was discussed with Neurosurgery

## 2018-10-09 NOTE — PROGRESS NOTES
Progress Note - Wilda Kidd 1950, 77 y o  female MRN: [id number]    Unit/Bed#: [facility] 713-01 Encounter: [id number]    Primary Care Provider: No primary care provider on file  Date and time admitted to hospital: 10/6/2018  8:56 AM        * CVA (cerebral vascular accident) [facility])   Assessment & Plan    Right MCA stroke most likely 2/2 ICA and MCA occlusive disease  Now s/p thrombectomy and stent placement  And on DAPT  Possible non specific micro hemorrhages on MRI  TTE done, no ASD noted  Minimal residual deficits    PLAN:    -continue aspirin, plavix and statin  -monitor BP, goal systolic BP between 298-673  -appreciate neurology and neurosurgery input  -CTA and CT head tomorrow for follow up   -EP consulted for possible loop recorder placement  -PT recommends rehab   -PM & R consulted  Normocytic anemia due to blood loss   Assessment & Plan    Most likely 2/2 acute blood loss during procedure, less likely active occult bleeding  PLAN:  · Transfuse prn to keep Hb >8, s/p 1 unit PRBC today  · Empiric H2 blocker  · Check stool occult blood  · trend CBC Q12 hrly today     Hypothyroidism   Assessment & Plan    Continue current dose of LT4  VTE Pharmacologic Prophylaxis:   Pharmacologic: Enoxaparin (Lovenox)  Mechanical VTE Prophylaxis in Place: Yes    Patient Centered Rounds: I have performed bedside rounds with nursing staff today  Discussions with Specialists or Other Care Team Provider: neurosurgery, neurology    Education and Discussions with Family / Patient: discussed plan of care with pt and family at bedside  Time Spent for Care: 30 minutes  More than 50% of total time spent on counseling and coordination of care as described above      Current Length of Stay: 2 day(s)    Current Patient Status: Inpatient   Certification Statement: The patient will continue to require additional inpatient hospital stay due to ongoing evaluation    Discharge Plan: rehab when medically stable    Code Status: Level 1 - Full Code      Subjective:   Pt is feeling well this morning  Reports mild headache but not severe enough to warrant medications  No vision problems, dizziness, palpitations  Objective:     Vitals:   Temp (24hrs), Av °F (37 2 °C), Min:98 5 °F (36 9 °C), Max:99 5 °F (37 5 °C)    HR:  [86-95] 91  Resp:  [20] 20  BP: (133-149)/(55-65) 149/65  SpO2:  [99 %-100 %] 99 %  Body mass index is 34 45 kg/m²  Input and Output Summary (last 24 hours): Intake/Output Summary (Last 24 hours) at 10/08/18 2020  Last data filed at 10/08/18 1837   Gross per 24 hour   Intake              680 ml   Output             2250 ml   Net            -1570 ml       Physical Exam:     Physical Exam   Constitutional: She is oriented to person, place, and time  No distress  Eyes: Pupils are equal, round, and reactive to light  Cardiovascular: Normal rate, regular rhythm, normal heart sounds and intact distal pulses  No murmur heard  Pulmonary/Chest: Breath sounds normal  No respiratory distress  She has no wheezes  She has no rales  Abdominal: Soft  Bowel sounds are normal  She exhibits no distension  There is no tenderness  Musculoskeletal: She exhibits no edema  Neurological: She is alert and oriented to person, place, and time  RUE/ RLE: 5/5  LLE: wiggles toes but unable to lift LLE  LUE: weak  while squeezing fingers     Skin: Skin is warm           Additional Data:     Labs:      Results from last 7 days  Lab Units 10/08/18  1815 10/08/18  0501   WBC Thousand/uL 8 06 7 33   HEMOGLOBIN g/dL 9 3* 7 5*   HEMATOCRIT % 28 2* 23 5*   PLATELETS Thousands/uL 234 234   NEUTROS PCT %  --  63   LYMPHS PCT %  --  28   MONOS PCT %  --  7   EOS PCT %  --  1       Results from last 7 days  Lab Units 10/08/18  0501 10/07/18  0438  10/06/18  1117   SODIUM mmol/L 140 140  < >  --    POTASSIUM mmol/L 3 5 3 9  < >  --    CHLORIDE mmol/L 107 110*  < >  --    CO2 mmol/L 28 25  < >  --    BUN mg/dL 10 14  < >  --    CREATININE mg/dL 0 60 0 52*  < >  --    ANION GAP mmol/L 5 5  < >  --    CALCIUM mg/dL 8 2* 7 7*  < >  --    ALBUMIN g/dL  --  2 6*  < >  --    TOTAL BILIRUBIN mg/dL  --  0 32  < >  --    ALK PHOS U/L  --  53  < >  --    ALT U/L  --  15  < >  --    AST U/L  --  11  < >  --    GLUCOSE, ISTAT mg/dl  --   --   --  149*   < > = values in this interval not displayed  Results from last 7 days  Lab Units 10/07/18  1143 10/06/18  2356 10/06/18  1741   POC GLUCOSE mg/dl 116 131 120       Results from last 7 days  Lab Units 10/08/18  0501 10/06/18  1348   HEMOGLOBIN A1C % 5 0 5 4               * I Have Reviewed All Lab Data Listed Above  * Additional Pertinent Lab Tests Reviewed: [place] [address] Admission Reviewed    Imaging:    MRI brain wo contrast   Final Result by Emily Ding DO (10/07 1055)      Extensive recent infarcts in the right hemisphere including right centrum semiovale and lentiform nucleus, right temporal lobe/temporal opercular junction, and right occipital cortex  No hemorrhage identified  Workstation performed: [id number]         CT head wo contrast   Final Result by Salas Vasquez DO (10/07 0533)      Interval development of hypodensity within the right temporal lobe, right caudate nucleus and junction of the right temporal occipital lobes consistent with evolution of previously suspected right MCA infarct  Mild edema and regional mass effect without    midline shift  Tiny linear focus of increased attenuation along the right temporal lobe image 24 for which petechial gyriform hemorrhage cannot be completely excluded  Otherwise, no evidence of intracranial hemorrhage detected        I personally discussed this study with Dr Katerina Moe on 10/7/2018 at 5:34 AM                            Workstation performed: [id number]         CT head wo contrast   Final Result by Emily Ding DO (10/06 6086)      Subtle loss of gray-white differentiation in the right hemisphere with loss of the insular ribbon sign and loss of gray-white differentiation in the right frontotemporal junction suggesting recent infarct  12 mm focus of hyperdensity in the white matter    of the right temporal lobe is likely contrast staining from recent procedure and follow-up imaging is recommended  No definite evidence of hemorrhage  Workstation performed: [id number]         CT cerebral perfusion   Final Result by [facility]  (10/06 1541)      CT perfusion performed  Data available on PACS  Workstation performed: [id number]         IR stroke alert    (Results Pending)   CTA head and neck w wo contrast    (Results Pending)   CT head wo contrast    (Results Pending)     Recent Cultures (last 7 days):           Last 24 Hours Medication List:     Current Facility-Administered Medications:  aspirin 325 mg Oral Daily India Purvis MD   atorvastatin 40 mg Oral QPM India Purvis MD   clopidogrel 75 mg Oral Daily India Purvis MD   famotidine 20 mg Oral BID India Purvis MD   heparin (porcine) 5,000 Units Subcutaneous [facility] India Purvis MD   levothyroxine 75 mcg Oral Early Morning Vergia MD David        Today, Patient Was Seen By: Johan Liao MD    ** Please Note: Dictation voice to text software may have been used in the creation of this document   **

## 2018-10-10 LAB
ALBUMIN SERPL BCP-MCNC: 2.6 G/DL (ref 3.5–5)
ALP SERPL-CCNC: 64 U/L (ref 46–116)
ALT SERPL W P-5'-P-CCNC: 20 U/L (ref 12–78)
ANION GAP SERPL CALCULATED.3IONS-SCNC: 5 MMOL/L (ref 4–13)
AST SERPL W P-5'-P-CCNC: 19 U/L (ref 5–45)
BASOPHILS # BLD AUTO: 0.02 THOUSANDS/ΜL (ref 0–0.1)
BASOPHILS NFR BLD AUTO: 0 % (ref 0–1)
BILIRUB SERPL-MCNC: 0.54 MG/DL (ref 0.2–1)
BUN SERPL-MCNC: 10 MG/DL (ref 5–25)
CALCIUM SERPL-MCNC: 8.5 MG/DL (ref 8.3–10.1)
CHLORIDE SERPL-SCNC: 107 MMOL/L (ref 100–108)
CO2 SERPL-SCNC: 27 MMOL/L (ref 21–32)
CREAT SERPL-MCNC: 0.62 MG/DL (ref 0.6–1.3)
EOSINOPHIL # BLD AUTO: 0.23 THOUSAND/ΜL (ref 0–0.61)
EOSINOPHIL NFR BLD AUTO: 3 % (ref 0–6)
ERYTHROCYTE [DISTWIDTH] IN BLOOD BY AUTOMATED COUNT: 13.3 % (ref 11.6–15.1)
GFR SERPL CREATININE-BSD FRML MDRD: 92 ML/MIN/1.73SQ M
GLUCOSE SERPL-MCNC: 90 MG/DL (ref 65–140)
HCT VFR BLD AUTO: 26.6 % (ref 34.8–46.1)
HGB BLD-MCNC: 9 G/DL (ref 11.5–15.4)
IMM GRANULOCYTES # BLD AUTO: 0.04 THOUSAND/UL (ref 0–0.2)
IMM GRANULOCYTES NFR BLD AUTO: 1 % (ref 0–2)
LYMPHOCYTES # BLD AUTO: 1.61 THOUSANDS/ΜL (ref 0.6–4.47)
LYMPHOCYTES NFR BLD AUTO: 24 % (ref 14–44)
MCH RBC QN AUTO: 31.8 PG (ref 26.8–34.3)
MCHC RBC AUTO-ENTMCNC: 33.8 G/DL (ref 31.4–37.4)
MCV RBC AUTO: 94 FL (ref 82–98)
MONOCYTES # BLD AUTO: 0.47 THOUSAND/ΜL (ref 0.17–1.22)
MONOCYTES NFR BLD AUTO: 7 % (ref 4–12)
NEUTROPHILS # BLD AUTO: 4.41 THOUSANDS/ΜL (ref 1.85–7.62)
NEUTS SEG NFR BLD AUTO: 65 % (ref 43–75)
NRBC BLD AUTO-RTO: 0 /100 WBCS
PLATELET # BLD AUTO: 268 THOUSANDS/UL (ref 149–390)
PMV BLD AUTO: 10 FL (ref 8.9–12.7)
POTASSIUM SERPL-SCNC: 3.6 MMOL/L (ref 3.5–5.3)
PROT SERPL-MCNC: 5.9 G/DL (ref 6.4–8.2)
RBC # BLD AUTO: 2.83 MILLION/UL (ref 3.81–5.12)
SODIUM SERPL-SCNC: 139 MMOL/L (ref 136–145)
WBC # BLD AUTO: 6.78 THOUSAND/UL (ref 4.31–10.16)

## 2018-10-10 PROCEDURE — 80053 COMPREHEN METABOLIC PANEL: CPT | Performed by: INTERNAL MEDICINE

## 2018-10-10 PROCEDURE — 97530 THERAPEUTIC ACTIVITIES: CPT

## 2018-10-10 PROCEDURE — 97116 GAIT TRAINING THERAPY: CPT

## 2018-10-10 PROCEDURE — 99232 SBSQ HOSP IP/OBS MODERATE 35: CPT | Performed by: INTERNAL MEDICINE

## 2018-10-10 PROCEDURE — 92526 ORAL FUNCTION THERAPY: CPT

## 2018-10-10 PROCEDURE — 97535 SELF CARE MNGMENT TRAINING: CPT

## 2018-10-10 PROCEDURE — 97110 THERAPEUTIC EXERCISES: CPT

## 2018-10-10 PROCEDURE — 85025 COMPLETE CBC W/AUTO DIFF WBC: CPT | Performed by: INTERNAL MEDICINE

## 2018-10-10 RX ORDER — DOCUSATE SODIUM 100 MG/1
100 CAPSULE, LIQUID FILLED ORAL 2 TIMES DAILY
Status: DISCONTINUED | OUTPATIENT
Start: 2018-10-10 | End: 2018-10-12 | Stop reason: HOSPADM

## 2018-10-10 RX ORDER — POLYETHYLENE GLYCOL 3350 17 G/17G
17 POWDER, FOR SOLUTION ORAL DAILY PRN
Status: DISCONTINUED | OUTPATIENT
Start: 2018-10-10 | End: 2018-10-12 | Stop reason: HOSPADM

## 2018-10-10 RX ADMIN — FAMOTIDINE 20 MG: 40 POWDER, FOR SUSPENSION ORAL at 08:07

## 2018-10-10 RX ADMIN — DOCUSATE SODIUM 100 MG: 100 CAPSULE, LIQUID FILLED ORAL at 17:27

## 2018-10-10 RX ADMIN — HEPARIN SODIUM 5000 UNITS: 5000 INJECTION, SOLUTION INTRAVENOUS; SUBCUTANEOUS at 21:56

## 2018-10-10 RX ADMIN — POLYETHYLENE GLYCOL 3350 17 G: 17 POWDER, FOR SOLUTION ORAL at 14:11

## 2018-10-10 RX ADMIN — ASPIRIN 325 MG ORAL TABLET 325 MG: 325 PILL ORAL at 08:06

## 2018-10-10 RX ADMIN — LEVOTHYROXINE SODIUM 75 MCG: 75 TABLET ORAL at 05:49

## 2018-10-10 RX ADMIN — ATORVASTATIN CALCIUM 40 MG: 40 TABLET, FILM COATED ORAL at 17:27

## 2018-10-10 RX ADMIN — HEPARIN SODIUM 5000 UNITS: 5000 INJECTION, SOLUTION INTRAVENOUS; SUBCUTANEOUS at 13:23

## 2018-10-10 RX ADMIN — HEPARIN SODIUM 5000 UNITS: 5000 INJECTION, SOLUTION INTRAVENOUS; SUBCUTANEOUS at 05:49

## 2018-10-10 RX ADMIN — FAMOTIDINE 20 MG: 40 POWDER, FOR SUSPENSION ORAL at 17:27

## 2018-10-10 RX ADMIN — CLOPIDOGREL 75 MG: 75 TABLET, FILM COATED ORAL at 08:06

## 2018-10-10 NOTE — SPEECH THERAPY NOTE
Speech/Language Pathology Progress Note    Patient Name: Onalee Cockayne  Today's Date: 10/10/2018       Subjective:  "I'm sleepy today "    Objective:  Pt seen for dysphagia therapy this morning in room, pt seated upright in chair at bedside  Diet has been upgraded to dysphagia 3 as of yesterday  Pt continues to present with mild L facial weakness  Speech is WFL, clear and intelligible however mild pocketing is occasionally noted on the L side during PO intake  Pt also presents with left neglect, which she is aware of but often requires cues to turn her head and attend to items on the left side of her visual field  Pt was started with oral exercises today to improve symmetry/strength for decreased pocketing during PO intake  Mild L labial weakness and slight L tongue deviation noted  SLP reviewed exercises with pt and circled target exercises  Pt able to execute the exercises appropriately given min-mod cueing  Encouraged to use mirror on the exercise sheet for visual feedback  Mild saliva leakage noted on the left side with completion of exercises  Assessment:  Pt is expected to have good success with oral exercises, and SLP will provide reinforcement as well as additional diet upgrade trials as appropriate  Plan/Recommendations:  Continue skilled ST for dysphagia therapy, continue dysphagia 3 diet and thin liquids at this time  Pt encouraged to complete oral exercises 2-3x/day

## 2018-10-10 NOTE — PLAN OF CARE
Problem: PHYSICAL THERAPY ADULT  Goal: Performs mobility at highest level of function for planned discharge setting  See evaluation for individualized goals  Treatment/Interventions: Functional transfer training, LE strengthening/ROM, Therapeutic exercise, Endurance training, Patient/family training, Equipment eval/education, Bed mobility, Gait training, Spoke to nursing, OT  Equipment Recommended: Yuliya Stubbs       See flowsheet documentation for full assessment, interventions and recommendations  Outcome: Progressing  Prognosis: Good  Problem List: Decreased strength, Decreased endurance, Impaired balance, Decreased mobility, Decreased coordination, Impaired judgement, Decreased safety awareness  Assessment: Pt is progressing well with the use of a rolling walker  Daughter initially present at the beginning of the session  Limited by weakness to L side  Pt continues to have L foot drag with gait training however is aware  Attempts to correct during gait training  Requires contact guard to min assist with use of the rolling walker  Pt also requires 3 brief rest breaks during ambulation  Completed seated exercises to conclude session  Pt would benefit from continued physical therapy to maximize functional mobility and safety  Barriers to Discharge: Decreased caregiver support     Recommendation:  (Rehab)     PT - OK to Discharge: (S) Yes (to rehab when stable)    See flowsheet documentation for full assessment

## 2018-10-10 NOTE — OCCUPATIONAL THERAPY NOTE
Occupational Therapy Treatment Note     10/10/18 0911   Restrictions/Precautions   Weight Bearing Precautions Per Order No   Other Precautions Bed Alarm; Chair Alarm; Fall Risk;Telemetry;Multiple lines   Lifestyle   Autonomy Pt reports being I w/ ADLS, IADLS, transfers and functional mobility PTA   Reciprocal Relationships Pt lives w/ spouse   Service to Others Pt is retired   Intrinsic Gratification pt enjoys painting the house and gardening   Pain Assessment   Pain Assessment 0-10   Pain Score No Pain   ADL   Where Assessed Standing at [employer] Assistance 5  Supervision/Setup   Grooming Deficit Teeth care;Denture care   UB Bathing Assistance 5  Supervision/Setup   UB Bathing Deficit Increased time to complete;Setup   LB Bathing Assistance 4  Minimal Assistance   LB Bathing Deficit Buttocks   UB Dressing Assistance 3  Moderate Assistance   UB Dressing Deficit Thread RUE; Thread LUE   LB Dressing Assistance 4  Minimal Assistance   LB Dressing Deficit Thread LLE into pants   Bed Mobility   Supine to Sit 4  Minimal assistance   Additional items Assist x 1   Transfers   Sit to Stand 4  Minimal assistance   Additional items Assist x 1   Stand to Sit 5  Supervision   Additional items Increased time required   Functional Mobility   Functional Mobility 4  Minimal assistance   Additional items Rolling walker   Cognition   Overall Cognitive Status WFL   Arousal/Participation Responsive; Cooperative   Attention Within functional limits   Orientation Level Oriented X4   Memory Within functional limits   Following Commands Follows one step commands without difficulty   Assessment   Assessment Pt participated in occupational therapy with focus on activity tolerance, bed mob, functional transfers/mob, standing balance and tolerance for pt engagement in functional self-care task/oral care and UB and LB self-care  Pt cleared by JENNY/Iris for pt participation in occupational therapy    Pt received HOB raised/supine pt sitting upright and agreeable to therapy following pt Identifiers confirmed/pt also known to OT from previous treatment session  Pt reported her goal to return to home to build a cabinet for her daughter  Pt family member/  and later daughter present supportive throughout session  Pt requires   for sitting balance 2* pt pushing with  UE and pt balance deficits  Pt required assist with Lb dressing and bathing 2* pt balance and coordination deficits  Pt will require in-pt rehab to continue to address pt noted deficits with decreased strength, balance and coordination which currently impair pt ADL and functional mob  Plan   Treatment Interventions ADL retraining;Functional transfer training; Endurance training;Patient/family training   Goal Expiration Date 10/21/18   Treatment Day 2   OT Frequency 3-5x/wk   Recommendation   OT Discharge Recommendation Short Term Rehab   Barthel Index   Feeding 10   Bathing 0   Grooming Score 0   Dressing Score 5   Bladder Score 10   Bowels Score 10   Toilet Use Score 5   Transfers (Bed/Chair) Score 5   Mobility (Level Surface) Score 0   Stairs Score 0   Barthel Index Score 45   Modified [place] Scale   Modified [place] Scale 4       Prince Quick  PALMER/L

## 2018-10-10 NOTE — PLAN OF CARE
Problem: OCCUPATIONAL THERAPY ADULT  Goal: Performs self-care activities at highest level of function for planned discharge setting  See evaluation for individualized goals  Treatment Interventions: ADL retraining, Visual perceptual retraining, Functional transfer training, UE strengthening/ROM, Endurance training, Cognitive reorientation, Patient/family training, Equipment evaluation/education, Neuromuscular reeducation, Fine motor coordination activities, Compensatory technique education, Continued evaluation, Energy conservation, Activityengagement          See flowsheet documentation for full assessment, interventions and recommendations  Outcome: Progressing  Limitation: Decreased ADL status, Decreased UE strength, Decreased UE ROM, Decreased Safe judgement during ADL, Decreased endurance, Visual deficit, Decreased self-care trans, Decreased high-level ADLs  Prognosis: Fair  Assessment: Pt participated in occupational therapy with focus on activity tolerance, bed mob, functional transfers/mob, standing balance and tolerance for pt engagement in functional self-care task/oral care and UB and LB self-care  Pt cleared by JENNY/Iris for pt participation in occupational therapy  Pt received HOB raised/supine pt sitting upright and agreeable to therapy following pt Identifiers confirmed/pt also known to OT from previous treatment session  Pt reported her goal to return to home to build a cabinet for her daughter  Pt family member/  and later daughter present supportive throughout session  Pt requires   for sitting balance 2* pt pushing with  UE and pt balance deficits  Pt required assist with Lb dressing and bathing 2* pt balance and coordination deficits  Pt will require in-pt rehab to continue to address pt noted deficits with decreased strength, balance and coordination which currently impair pt ADL and functional mob     Recommendation: (S) Physiatry Consult  OT Discharge Recommendation: Short Term Rehab  OT - OK to Discharge: Yes (when medically stable)

## 2018-10-10 NOTE — UTILIZATION REVIEW
Thank you,  [address]  Utilization Review Department  Phone: [phone]; Fax [phone]  ATTENTION: Please call with any questions or concerns to [phone]  and carefully follow the prompts so that you are directed to the right person  Send all requests for admission clinical reviews, approved or denied determinations and any other requests to fax [phone]  All voicemails are confidential          Continued Stay Review    Date: 10/10/18 Wednesday ACUTE MED SURG LEVEL OF CARE    Vital Signs: /64 (BP Location: Right arm) Comment: Map 85  Pulse 91   Temp 98 4 °F (36 9 °C) (Oral)   Resp 18   Ht 5' 1" (1 549 m)   Wt 82 7 kg (182 lb 5 1 oz)   SpO2 100%   BMI 34 45 kg/m²       10/09 0701  10/10 0700 10/10 0701  10/10 1527      Temperature (°F) 97 498 4 9898 4      Pulse 7283 7891      Respirations 1618 18      Blood Pressure 112/58153/70 135/71149/64      SpO2 (%) 9799 98100          Diet Dysphagia/Modified Consistency; Dysphagia 3-Dental Soft;  Thin Liquid     IV ACCESS    Medications:   Scheduled Meds:   Current Facility-Administered Medications:  aspirin 325 mg Oral Daily Alyson Mayen MD   atorvastatin 40 mg Oral QPM Alyson Mayen MD   clopidogrel 75 mg Oral Daily Alyson Mayen MD   docusate sodium 100 mg Oral BID Alyce England MD   famotidine 20 mg Oral BID Alyson Mayen MD   heparin (porcine) 5,000 Units Subcutaneous [facility] Alyson Mayen MD   levothyroxine 75 mcg Oral Early Morning Radha Henry MD   polyethylene glycol 17 g Oral Daily PRN Alyce England MD       PRN Meds:      polyethylene glycol 17 Gms po qDay prn given x 1/ 24 hrs      LABS/Diagnostic Results:   Comprehensive metabolic panel [[id number]] (Abnormal) Collected: 10/10/18 0616   Lab Status: Final result Specimen: Blood from Arm, Left Updated: 10/10/18 0715    Sodium 139 136 - 145 mmol/L     Potassium 3 6 3 5 - 5 3 mmol/L     Chloride 107 100 - 108 mmol/L     CO2 27 21 - 32 mmol/L     ANION GAP 5 4 - 13 mmol/L     BUN 10 5 - 25 mg/dL     Creatinine 0 62 0 60 - 1 30 mg/dL     Comment: Standardized to IDMS reference method       Glucose 90 65 - 140 mg/dL         Calcium 8 5 8 3 - 10 1 mg/dL     AST 19 5 - 45 U/L         ALT 20 12 - 78 U/L         Alkaline Phosphatase 64 46 - 116 U/L     Total Protein 5 9 (L) 6 4 - 8 2 g/dL     Albumin 2 6 (L) 3 5 - 5 0 g/dL     Total Bilirubin 0 54 0 20 - 1 00 mg/dL     eGFR 92 ml/min/1 73sq m          CBC and differential [[id number]] (Abnormal) Collected: 10/10/18 0616   Lab Status: Final result Specimen: Blood from Arm, Left Updated: 10/10/18 0657    WBC 6 78 4 31 - 10 16 Thousand/uL     RBC 2 83 (L) 3 81 - 5 12 Million/uL     Hemoglobin 9 0 (L) 11 5 - 15 4 g/dL     Hematocrit 26 6 (L) 34 8 - 46 1 %     MCV 94 82 - 98 fL     MCH 31 8 26 8 - 34 3 pg     MCHC 33 8 31 4 - 37 4 g/dL     RDW 13 3 11 6 - 15 1 %     MPV 10 0 8 9 - 12 7 fL     Platelets 755 048 - 343 Thousands/uL     nRBC 0 /100 WBCs     Neutrophils Relative 65 43 - 75 %     Immat GRANS % 1 0 - 2 %     Lymphocytes Relative 24 14 - 44 %     Monocytes Relative 7 4 - 12 %     Eosinophils Relative 3 0 - 6 %     Basophils Relative 0 0 - 1 %     Neutrophils Absolute 4 41 1 85 - 7 62 Thousands/µL     Immature Grans Absolute 0 04 0 00 - 0 20 Thousand/uL     Lymphocytes Absolute 1 61 0 60 - 4 47 Thousands/µL     Monocytes Absolute 0 47 0 17 - 1 22 Thousand/µL     Eosinophils Absolute 0 23 0 00 - 0 61 Thousand/µL     Basophils Absolute 0 02 0 00 - 0 10 Thousands/µL        CT BRAIN + NECK -  There is a focal filling defect identified at the distal aspect of the stent within the right carotid system   This appears slightly smaller than the filling defect identified on angiogram performed yesterday despite limitations of comparing digital   subtraction angiography to CT angiography   This is most suggestive of thrombus   However, a noncalcified atherosclerotic plaque could have this appearance  Suraj Cheek is mild narrowing of the proximal cervical internal carotid artery within the stent     Distally, the intracranial internal carotid artery enhances appropriately and is normal in caliber with normal M1 segment and middle cerebral artery branches   Findings were directly discussed with Dr Eliezer Narayanan, at the time of dictation  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, terminating within the posterior inferior cerebellar artery and both posterior cerebral arteries arises from the internal carotid arteries  Noncontrast imaging of the brain demonstrates expected evolution of the right MCA territory infarctions which are more well-defined and hypodense   No bulmaro hemorrhagic transformation identified  Age/Sex: 77 y o  female       Assessment/Plan:       Carotid stenosis, symptomatic, with infarction [facility])   Assessment & Plan     Right MCA stroke most likely 2/2 ICA and MCA occlusive disease  Now s/p thrombectomy and stent placement - clinically improving  Possible non specific micro hemorrhages on MRI - no AC till cleared by neuro teams  TTE - nil acute  Rpt CTA head - focal filling defect identified at the distal aspect of the stent within the right carotid system  Neurosurgery Dr Dennis Green is monitoring - note input         Normocytic anemia due to blood loss   Assessment & Plan     Most likely 2/2 acute blood loss during procedure  R/o occult bleeding - FOBT pending  S/p PRBC so no iron studies - monitor as outpt      Hypothyroidism   Assessment & Plan     Continue current dose of LT4       * CVA (cerebral vascular accident) [facility])   Assessment & Plan     Right MCA stroke most likely 2/2 ICA and MCA occlusive disease  Now s/p thrombectomy and stent placement   And on DAPT     -continue aspirin, plavix and statin  -monitor BP, goal systolic BP between 569-970  -appreciate neurology and neurosurgery input  -PT recommends rehab   -PM & R consulted          VTE Pharmacologic Prophylaxis: Enoxaparin (Lovenox)  Mechanical: Mechanical VTE prophylaxis in place       Current Patient Status: Inpatient   Certification Statement: The patient will continue to require additional inpatient hospital stay due to as above          Discharge Plan:   Charlotte Mohan 10/10/18  Plan   Treatment/Interventions Functional transfer training;LE strengthening/ROM; Therapeutic exercise; Endurance training;Patient/family training;Bed mobility;Gait training;Spoke to nursing   Progress Progressing toward goals   PT Frequency (4-6x/week)   Recommendation   Recommendation (Rehab)     CASE MANAGEMENT FOLLOWING CLOSELY FOR ALL DISCHARGE NEEDS

## 2018-10-10 NOTE — PHYSICAL THERAPY NOTE
Physical Therapy Progress Note     10/10/18 1145   Pain Assessment   Pain Assessment No/denies pain   Pain Score No Pain   Restrictions/Precautions   Weight Bearing Precautions Per Order No   Other Precautions Chair Alarm; Fall Risk;Telemetry   General   Chart Reviewed Yes   Family/Caregiver Present Yes  (daughter)   Cognition   Overall Cognitive Status WFL   Arousal/Participation Alert; Responsive   Subjective   Subjective Pt denies pain and agrees to participate  Transfers   Sit to Stand (Contact guard to min assist)   Additional items Assist x 1; Armrests; Verbal cues   Stand to Sit 5  Supervision   Additional items Assist x 1;Verbal cues;Armrests   Ambulation/Elevation   Gait pattern L Hemiparesis;L Foot drag;Narrow SATISH; Decreased foot clearance; Short stride  (slow)   Gait Assistance 4  Minimal assist   Additional items Assist x 1;Verbal cues   Assistive Device Rolling walker   Distance 300 feet   Balance   Static Sitting Fair +   Dynamic Sitting Fair -   Static Standing Fair -   Dynamic Standing Poor +   Ambulatory Poor +   Endurance Deficit   Endurance Deficit Yes   Endurance Deficit Description fatigue and weakness   Activity Tolerance   Activity Tolerance Patient limited by fatigue   Nurse [address] to see per RN Belén Mota   Exercises   Glute Sets Sitting;20 reps   Hip Flexion Sitting;20 reps;AROM; Bilateral   Knee AROM Long Arc Quad Sitting;20 reps;AROM; Bilateral   Ankle Pumps Sitting;20 reps;AROM; Bilateral   Assessment   Prognosis Good   Problem List Decreased strength;Decreased endurance; Impaired balance;Decreased mobility; Decreased coordination; Impaired judgement;Decreased safety awareness   Assessment Pt is progressing well with the use of a rolling walker  Daughter initially present at the beginning of the session  Limited by weakness to L side  Pt continues to have L foot drag with gait training however is aware  Attempts to correct during gait training    Requires contact guard to min assist with use of the rolling walker  Pt also requires 3 brief rest breaks during ambulation  Completed seated exercises to conclude session  Pt would benefit from continued physical therapy to maximize functional mobility and safety  Barriers to Discharge Decreased caregiver support   Goals   Patient Goals To go home   STG Expiration Date 10/17/18   Treatment Day 4   Plan   Treatment/Interventions Functional transfer training;LE strengthening/ROM; Therapeutic exercise; Endurance training;Patient/family training;Bed mobility;Gait training;Spoke to nursing   Progress Progressing toward goals   PT Frequency (4-6x/week)   Recommendation   Recommendation (Rehab)   Equipment Recommended Adrianne Quintana  (GEMMA)     Jolie Barahona, PTA

## 2018-10-11 LAB — PA ADP BLD-ACNC: 289 PRU (ref 194–418)

## 2018-10-11 PROCEDURE — 92526 ORAL FUNCTION THERAPY: CPT

## 2018-10-11 PROCEDURE — 85576 BLOOD PLATELET AGGREGATION: CPT | Performed by: INTERNAL MEDICINE

## 2018-10-11 PROCEDURE — 99232 SBSQ HOSP IP/OBS MODERATE 35: CPT | Performed by: INTERNAL MEDICINE

## 2018-10-11 PROCEDURE — 97116 GAIT TRAINING THERAPY: CPT

## 2018-10-11 RX ORDER — ASPIRIN 81 MG/1
81 TABLET ORAL DAILY
Status: DISCONTINUED | OUTPATIENT
Start: 2018-10-13 | End: 2018-10-12 | Stop reason: HOSPADM

## 2018-10-11 RX ADMIN — CLOPIDOGREL 75 MG: 75 TABLET, FILM COATED ORAL at 08:57

## 2018-10-11 RX ADMIN — LEVOTHYROXINE SODIUM 75 MCG: 75 TABLET ORAL at 05:54

## 2018-10-11 RX ADMIN — FAMOTIDINE 20 MG: 40 POWDER, FOR SUSPENSION ORAL at 18:13

## 2018-10-11 RX ADMIN — HEPARIN SODIUM 5000 UNITS: 5000 INJECTION, SOLUTION INTRAVENOUS; SUBCUTANEOUS at 13:50

## 2018-10-11 RX ADMIN — FAMOTIDINE 20 MG: 40 POWDER, FOR SUSPENSION ORAL at 08:57

## 2018-10-11 RX ADMIN — ATORVASTATIN CALCIUM 40 MG: 40 TABLET, FILM COATED ORAL at 18:13

## 2018-10-11 RX ADMIN — TICAGRELOR 180 MG: 90 TABLET ORAL at 13:51

## 2018-10-11 RX ADMIN — ASPIRIN 325 MG ORAL TABLET 325 MG: 325 PILL ORAL at 08:57

## 2018-10-11 RX ADMIN — TICAGRELOR 90 MG: 90 TABLET ORAL at 23:19

## 2018-10-11 RX ADMIN — HEPARIN SODIUM 5000 UNITS: 5000 INJECTION, SOLUTION INTRAVENOUS; SUBCUTANEOUS at 23:19

## 2018-10-11 RX ADMIN — DOCUSATE SODIUM 100 MG: 100 CAPSULE, LIQUID FILLED ORAL at 18:13

## 2018-10-11 RX ADMIN — DOCUSATE SODIUM 100 MG: 100 CAPSULE, LIQUID FILLED ORAL at 08:57

## 2018-10-11 RX ADMIN — HEPARIN SODIUM 5000 UNITS: 5000 INJECTION, SOLUTION INTRAVENOUS; SUBCUTANEOUS at 05:54

## 2018-10-11 NOTE — PROGRESS NOTES
Progress Note - Emmaus Bending 1950, 77 y o  female MRN: [id number]    Unit/Bed#: [facility] 713-01 Encounter: [id number]    Primary Care Provider: No primary care provider on file  Date and time admitted to hospital: 10/6/2018  8:56 AM    Carotid stenosis, symptomatic, with infarction [facility])   Assessment & Plan    Right MCA stroke most likely 2/2 ICA and MCA occlusive disease  Now s/p thrombectomy and stent placement - clinically improving  Possible non specific micro hemorrhages on MRI - no AC till cleared by neuro teams  TTE - nil acute  Rpt CTA head - focal filling defect identified at the distal aspect of the stent within the right carotid system  Neurosurgery Dr Jame Patel is monitoring - monitor another 24 to 48 hrs        Normocytic anemia due to blood loss   Assessment & Plan    Most likely 2/2 acute blood loss during procedure  R/o occult bleeding - FOBT pending  S/p PRBC so no iron studies - monitor as outpt     Hypothyroidism   Assessment & Plan    Continue current dose of LT4      * CVA (cerebral vascular accident) [facility])   Assessment & Plan    Right MCA stroke most likely 2/2 ICA and MCA occlusive disease  Now s/p thrombectomy and stent placement  And on DAPT    -continue aspirin, plavix and statin  -monitor BP, goal systolic BP between 808-648  -appreciate neurology and neurosurgery input  -PT recommends rehab as inpatient       VTE Pharmacologic Prophylaxis: Enoxaparin (Lovenox)  Mechanical: Mechanical VTE prophylaxis in place  Patient Centered Rounds: I have performed bedside rounds with nursing staff today  Discussions with Specialists or Other Care Team Provider:     Education and Discussions with Family / Patient: family at bedside    Time Spent for Care: More than 50% of total time spent on counseling and coordination of care as described above      Current Length of Stay: 4 day(s)    Current Patient Status: Inpatient   Certification Statement:     Discharge Plan:    Code Status: Level 1 - Full Code      Subjective: no complaints    Objective:     Vitals:   Temp (24hrs), Av 3 °F (36 8 °C), Min:98 °F (36 7 °C), Max:98 5 °F (36 9 °C)    Temp:  [98 °F (36 7 °C)-98 5 °F (36 9 °C)] 98 2 °F (36 8 °C)  HR:  [78-91] 81  Resp:  [16-18] 18  BP: (117-149)/(50-71) 117/50  SpO2:  [96 %-100 %] 96 %  Body mass index is 34 45 kg/m²  Input and Output Summary (last 24 hours): Intake/Output Summary (Last 24 hours) at 10/10/18 2302  Last data filed at 10/10/18 2100   Gross per 24 hour   Intake              540 ml   Output             1100 ml   Net             -560 ml       Physical Exam   HENT:   Head: Normocephalic  Eyes: Pupils are equal, round, and reactive to light  Cardiovascular: Normal heart sounds  Pulmonary/Chest: Effort normal    Abdominal: Soft  Neurological: She is alert  Skin: There is pallor  Additional Data:     Labs:      Results from last 7 days  Lab Units 10/10/18  0616   WBC Thousand/uL 6 78   HEMOGLOBIN g/dL 9 0*   HEMATOCRIT % 26 6*   PLATELETS Thousands/uL 268   NEUTROS PCT % 65   LYMPHS PCT % 24   MONOS PCT % 7   EOS PCT % 3       Results from last 7 days  Lab Units 10/10/18  0616  10/06/18  1117   SODIUM mmol/L 139  < >  --    POTASSIUM mmol/L 3 6  < >  --    CHLORIDE mmol/L 107  < >  --    CO2 mmol/L 27  < >  --    BUN mg/dL 10  < >  --    CREATININE mg/dL 0 62  < >  --    CALCIUM mg/dL 8 5  < >  --    ALK PHOS U/L 64  < >  --    ALT U/L 20  < >  --    AST U/L 19  < >  --    GLUCOSE, ISTAT mg/dl  --   --  149*   < > = values in this interval not displayed  * I Have Reviewed All Lab Data Listed Above      Imaging:  Imaging Personally Reviewed by Myself Includes:     Cultures:   Blood Culture: No results found for: BLOODCX  Urine Culture: No results found for: URINECX  Sputum Culture: No components found for: SPUTUMCX  Wound Culture: No results found for: WOUNDCULT    Last 24 Hours Medication List:     Current Facility-Administered Medications:  aspirin 325 mg Oral Daily Carolina Deutsch MD   atorvastatin 40 mg Oral QPM Carolina Deutsch MD   clopidogrel 75 mg Oral Daily Carolina Deutsch MD   docusate sodium 100 mg Oral BID Clarissa Tejeda MD   famotidine 20 mg Oral BID Carolina Deutsch MD   heparin (porcine) 5,000 Units Subcutaneous [facility] Carolina Deutsch MD   levothyroxine 75 mcg Oral Early Morning Mac Page MD   polyethylene glycol 17 g Oral Daily PRN Clarissa Tejeda MD        Today, Patient Was Seen By: Clarissa Tejeda MD    ** Please Note: Dragon 360 Dictation voice to text software may have been used in the creation of this document   **

## 2018-10-11 NOTE — ASSESSMENT & PLAN NOTE
Right MCA stroke most likely 2/2 ICA and MCA occlusive disease     Now s/p thrombectomy and stent placement - clinically improving  Possible non specific micro hemorrhages on MRI - no AC till cleared by neuro teams  TTE - nil acute  Rpt CTA head - focal filling defect identified at the distal aspect of the stent within the right carotid system  Neurosurgery Dr Benna Lefort is monitoring - monitor another 24 to 48 hrs

## 2018-10-11 NOTE — ASSESSMENT & PLAN NOTE
Right MCA stroke most likely 2/2 ICA and MCA occlusive disease  Now s/p thrombectomy and stent placement   And on DAPT    -continue aspirin, plavix and statin  -monitor BP, goal systolic BP between 873-165  -appreciate neurology and neurosurgery input  -PT recommends rehab as inpatient

## 2018-10-11 NOTE — SPEECH THERAPY NOTE
Speech/Language Pathology Progress Note    Patient Name: Bertin Martínez  Today's Date: 10/11/2018      Subjective:  "I think they have a room for me upstairs" (Acute rehab)    Objective:  Pt seen for diagnostic swallow therapy at lunch time today  Assessed with Dysphagia 3 diet tray and thin liquids including chopped chicken, veggies, and mashed potatoes  Pt demonstrated slow but adequate mastication of chicken, no s/s aspiration and independent management of clearing L buccal cavity with lingual sweep  Discussed potential diet upgrade with pt, however she stated she does not mind the chopped food at this time  Pt was also given toast with butter, which she managed with adequate mastication, bolus formation/transfer, and complete oral clearance  With verbal cues, pt was able to turn her head to the left and attend to the left side of her meal tray, where she noticed her peaches had spilled and placed the back upright  Assessment:  Pt is tolerating current diet and also stated she is completing her oral exercises regularly  Pt/spouse report plan to transfer to [facility] by tomorrow  Plan/Recommendations:  Continue skilled ST, Dysphagia 3 diet and thin liquids  Continue oral exercises and f/u with speech therapy, either with continued acute care or upon transfer to acute rehab

## 2018-10-11 NOTE — PHYSICAL THERAPY NOTE
Physical Therapy Progress Note     10/11/18 5780   Pain Assessment   Pain Assessment No/denies pain   Pain Score No Pain   Restrictions/Precautions   Weight Bearing Precautions Per Order No   Other Precautions Chair Alarm; Fall Risk;Telemetry   General   Family/Caregiver Present Yes  (sister)   Cognition   Overall Cognitive Status WFL   Arousal/Participation Alert; Responsive   Subjective   Subjective Pt denies pain and agrees to participate   Transfers   Sit to Stand (Contact Guard assist)   Additional items Assist x 1;Verbal cues   Stand to Sit 5  Supervision   Additional items Assist x 1;Verbal cues   Ambulation/Elevation   Gait pattern L Foot drag;L Hemiparesis; Narrow SATISH  (slow, short step length)   Gait Assistance 4  Minimal assist   Additional items Assist x 1   Assistive Device Rolling walker   Distance 250 feet   Stair Management Assistance Not tested   Balance   Static Sitting Fair +   Dynamic Sitting Fair -   Static Standing Fair -   Dynamic Standing Poor +   Ambulatory Poor +   Endurance Deficit   Endurance Deficit Yes   Endurance Deficit Description fatigue and weakness   Activity Tolerance   Activity Tolerance Patient limited by fatigue;Patient tolerated treatment well   Nurse [address] to see per JENNY Pickens   Assessment   Prognosis Good   Problem List Decreased strength;Decreased endurance; Impaired balance;Decreased mobility; Decreased coordination   Assessment Pt is progressing well with the use of a rolling walker  Continues to require min assist due to L foot drag/L sided weakness  Cues also required for pushing from R side with use of rolling walker  Pt however is aware of LLE weakness and attempts to correct while gait training  Family present today  Pt is cooperative and pleasant  Pt would benefit from continued physical therapy to maximize functional mobility and safety     Barriers to Discharge Decreased caregiver support   Goals   Patient Goals To walk more   STG Expiration Date 10/17/18   Treatment Day 5   Plan   Treatment/Interventions Functional transfer training;LE strengthening/ROM; Therapeutic exercise; Endurance training;Patient/family training;Bed mobility;Gait training;Spoke to nursing   Progress Progressing toward goals   PT Frequency (4-6x/week)   Recommendation   Recommendation (Rehab)   Equipment Recommended Elizabeth Ruggiero  (GEMMA)     Manisha Frey, PTA

## 2018-10-11 NOTE — ASSESSMENT & PLAN NOTE
Right MCA stroke most likely 2/2 ICA and MCA occlusive disease  Now s/p thrombectomy and stent placement   And on DAPT    -continue aspirin, plavix and statin  -monitor BP, goal systolic BP between 884-827  -appreciate neurology and neurosurgery input  -PT recommends rehab as inpatient

## 2018-10-11 NOTE — PROGRESS NOTES
Progress Note - Hammad Ragsdale 1950, 77 y o  female MRN: [id number]    Unit/Bed#: [facility] 713-01 Encounter: [id number]    Primary Care Provider: No primary care provider on file  Date and time admitted to hospital: 10/6/2018  8:56 AM    Carotid stenosis, symptomatic, with infarction [facility])   Assessment & Plan    Right MCA stroke most likely 2/2 ICA and MCA occlusive disease  Now s/p thrombectomy and stent placement - clinically improving  Possible non specific micro hemorrhages on MRI - no AC till cleared by neuro teams  TTE - nil acute  Rpt CTA head - focal filling defect identified at the distal aspect of the stent within the right carotid system  D/w Neurosurgery Dr Joe Bello - plan for brilinta/ rpt CT       Normocytic anemia due to blood loss   Assessment & Plan    Most likely 2/2 acute blood loss during procedure  R/o occult bleeding - FOBT pending  S/p PRBC so no iron studies - monitor as outpt     Hypothyroidism   Assessment & Plan    Continue current dose of LT4      * CVA (cerebral vascular accident) [facility])   Assessment & Plan    Right MCA stroke most likely 2/2 ICA and MCA occlusive disease  Now s/p thrombectomy and stent placement  And on DAPT    -continue aspirin, plavix and statin  -monitor BP, goal systolic BP between 960-825  -appreciate neurology and neurosurgery input  -PT recommends rehab as inpatient         VTE Pharmacologic Prophylaxis: Enoxaparin (Lovenox)  Mechanical: Mechanical VTE prophylaxis in place  Patient Centered Rounds: I have performed bedside rounds with nursing staff today  Discussions with Specialists or Other Care Team Provider:     Education and Discussions with Family / Patient:     Time Spent for Care: More than 50% of total time spent on counseling and coordination of care as described above      Current Length of Stay: 5 day(s)    Current Patient Status: Inpatient   Certification Statement: The patient will continue to require additional inpatient hospital stay due to as above    Discharge Plan:    Code Status: Level 1 - Full Code      Subjective: nil acute    Objective:     Vitals:   Temp (24hrs), Av 4 °F (36 9 °C), Min:98 °F (36 7 °C), Max:99 °F (37 2 °C)    Temp:  [98 °F (36 7 °C)-99 °F (37 2 °C)] 99 °F (37 2 °C)  HR:  [81-87] 86  Resp:  [18] 18  BP: (117-144)/(50-67) 144/58  SpO2:  [96 %-99 %] 97 %  Body mass index is 34 45 kg/m²  Input and Output Summary (last 24 hours): Intake/Output Summary (Last 24 hours) at 10/11/18 1903  Last data filed at 10/11/18 1820   Gross per 24 hour   Intake              460 ml   Output             1025 ml   Net             -565 ml       Physical Exam   HENT:   Head: Normocephalic  Eyes: Pupils are equal, round, and reactive to light  Cardiovascular: Normal heart sounds  Pulmonary/Chest: Effort normal    Abdominal: Soft  Neurological: She is alert  Skin: There is pallor  Additional Data:     Labs:      Results from last 7 days  Lab Units 10/10/18  0616   WBC Thousand/uL 6 78   HEMOGLOBIN g/dL 9 0*   HEMATOCRIT % 26 6*   PLATELETS Thousands/uL 268   NEUTROS PCT % 65   LYMPHS PCT % 24   MONOS PCT % 7   EOS PCT % 3       Results from last 7 days  Lab Units 10/10/18  0616  10/06/18  1117   SODIUM mmol/L 139  < >  --    POTASSIUM mmol/L 3 6  < >  --    CHLORIDE mmol/L 107  < >  --    CO2 mmol/L 27  < >  --    BUN mg/dL 10  < >  --    CREATININE mg/dL 0 62  < >  --    CALCIUM mg/dL 8 5  < >  --    ALK PHOS U/L 64  < >  --    ALT U/L 20  < >  --    AST U/L 19  < >  --    GLUCOSE, ISTAT mg/dl  --   --  149*   < > = values in this interval not displayed  * I Have Reviewed All Lab Data Listed Above      Imaging:  Imaging Personally Reviewed by Myself Includes:     Cultures:   Blood Culture: No results found for: BLOODCX  Urine Culture: No results found for: URINECX  Sputum Culture: No components found for: SPUTUMCX  Wound Culture: No results found for: WOUNDCULT    Last 24 Hours Medication List: Current Facility-Administered Medications:  [START ON 10/13/2018] aspirin 81 mg Oral Daily Stefano Leos MD   atorvastatin 40 mg Oral QPM Stefano Leos MD   docusate sodium 100 mg Oral BID Gloria Jo MD   famotidine 20 mg Oral BID Stefano Leos MD   heparin (porcine) 5,000 Units Subcutaneous [facility] Stefano Leos MD   levothyroxine 75 mcg Oral Early Morning Eddie Rodrigues MD   polyethylene glycol 17 g Oral Daily PRN Gloria oJ MD   ticagrelor 90 mg Oral Q12H [facility] Stefano Leos MD        Today, Patient Was Seen By: Gloria Jo MD    ** Please Note: Dragon 360 Dictation voice to text software may have been used in the creation of this document   **

## 2018-10-11 NOTE — SOCIAL WORK
Received phone call from Alissa Ayala liagallo, who stated that pt has been approved and insurance auth received for [facility] when pt is medically stable for discharge and a bed is available

## 2018-10-11 NOTE — ASSESSMENT & PLAN NOTE
Right MCA stroke most likely 2/2 ICA and MCA occlusive disease     Now s/p thrombectomy and stent placement - clinically improving  Possible non specific micro hemorrhages on MRI - no AC till cleared by neuro teams  TTE - nil acute  Rpt CTA head - focal filling defect identified at the distal aspect of the stent within the right carotid system  D/w Neurosurgery Dr Gallardo Class - plan for brilinta/ rpt CT

## 2018-10-12 ENCOUNTER — HOSPITAL ENCOUNTER (INPATIENT)
Facility: HOSPITAL | Age: 68
LOS: 11 days | Discharge: HOME | DRG: 057 | End: 2018-10-23
Attending: PHYSICAL MEDICINE & REHABILITATION | Admitting: PHYSICAL MEDICINE & REHABILITATION
Payer: COMMERCIAL

## 2018-10-12 ENCOUNTER — APPOINTMENT (INPATIENT)
Dept: RADIOLOGY | Facility: HOSPITAL | Age: 68
DRG: 023 | End: 2018-10-12
Payer: COMMERCIAL

## 2018-10-12 VITALS
RESPIRATION RATE: 18 BRPM | WEIGHT: 182.32 LBS | SYSTOLIC BLOOD PRESSURE: 154 MMHG | OXYGEN SATURATION: 99 % | HEIGHT: 61 IN | DIASTOLIC BLOOD PRESSURE: 72 MMHG | HEART RATE: 90 BPM | TEMPERATURE: 99.4 F | BODY MASS INDEX: 34.42 KG/M2

## 2018-10-12 DIAGNOSIS — I63.031 CEREBROVASCULAR ACCIDENT (CVA) DUE TO THROMBOSIS OF RIGHT CAROTID ARTERY (HCC): ICD-10-CM

## 2018-10-12 DIAGNOSIS — K59.01 SLOW TRANSIT CONSTIPATION: ICD-10-CM

## 2018-10-12 DIAGNOSIS — I63.239 CAROTID STENOSIS, SYMPTOMATIC, WITH INFARCTION (HCC): ICD-10-CM

## 2018-10-12 DIAGNOSIS — F06.31 DEPRESSION DUE TO ACUTE STROKE (HCC): ICD-10-CM

## 2018-10-12 DIAGNOSIS — I63.9 DEPRESSION DUE TO ACUTE STROKE (HCC): ICD-10-CM

## 2018-10-12 DIAGNOSIS — I65.29 INTERNAL CAROTID ARTERY OCCLUSION, UNSPECIFIED LATERALITY: Primary | ICD-10-CM

## 2018-10-12 DIAGNOSIS — D50.0 NORMOCYTIC ANEMIA DUE TO BLOOD LOSS: ICD-10-CM

## 2018-10-12 DIAGNOSIS — E03.9 HYPOTHYROIDISM, UNSPECIFIED TYPE: Primary | ICD-10-CM

## 2018-10-12 PROBLEM — G47.00 INSOMNIA: Status: ACTIVE | Noted: 2018-10-12

## 2018-10-12 LAB — PA ADP BLD-ACNC: 55 PRU (ref 194–418)

## 2018-10-12 PROCEDURE — 97530 THERAPEUTIC ACTIVITIES: CPT

## 2018-10-12 PROCEDURE — 92526 ORAL FUNCTION THERAPY: CPT

## 2018-10-12 PROCEDURE — 70450 CT HEAD/BRAIN W/O DYE: CPT

## 2018-10-12 PROCEDURE — 99239 HOSP IP/OBS DSCHRG MGMT >30: CPT | Performed by: INTERNAL MEDICINE

## 2018-10-12 PROCEDURE — 85576 BLOOD PLATELET AGGREGATION: CPT | Performed by: RADIOLOGY

## 2018-10-12 PROCEDURE — 99223 1ST HOSP IP/OBS HIGH 75: CPT | Performed by: PHYSICAL MEDICINE & REHABILITATION

## 2018-10-12 PROCEDURE — G0008 ADMIN INFLUENZA VIRUS VAC: HCPCS | Performed by: PHYSICAL MEDICINE & REHABILITATION

## 2018-10-12 PROCEDURE — 90662 IIV NO PRSV INCREASED AG IM: CPT | Performed by: PHYSICAL MEDICINE & REHABILITATION

## 2018-10-12 RX ORDER — ASPIRIN 81 MG/1
81 TABLET ORAL DAILY
Status: CANCELLED | OUTPATIENT
Start: 2018-10-13

## 2018-10-12 RX ORDER — BISACODYL 10 MG
10 SUPPOSITORY, RECTAL RECTAL DAILY PRN
Status: DISCONTINUED | OUTPATIENT
Start: 2018-10-12 | End: 2018-10-13

## 2018-10-12 RX ORDER — FAMOTIDINE 40 MG/5ML
20 POWDER, FOR SUSPENSION ORAL 2 TIMES DAILY
Status: CANCELLED | OUTPATIENT
Start: 2018-10-12

## 2018-10-12 RX ORDER — ASPIRIN 81 MG/1
81 TABLET ORAL DAILY
Status: DISCONTINUED | OUTPATIENT
Start: 2018-10-13 | End: 2018-10-23 | Stop reason: HOSPADM

## 2018-10-12 RX ORDER — DOCUSATE SODIUM 100 MG/1
100 CAPSULE, LIQUID FILLED ORAL 2 TIMES DAILY
Status: CANCELLED | OUTPATIENT
Start: 2018-10-12

## 2018-10-12 RX ORDER — FAMOTIDINE 40 MG/5ML
20 POWDER, FOR SUSPENSION ORAL 2 TIMES DAILY
Status: DISCONTINUED | OUTPATIENT
Start: 2018-10-12 | End: 2018-10-23 | Stop reason: HOSPADM

## 2018-10-12 RX ORDER — DOCUSATE SODIUM 100 MG/1
100 CAPSULE, LIQUID FILLED ORAL 2 TIMES DAILY
Status: DISCONTINUED | OUTPATIENT
Start: 2018-10-12 | End: 2018-10-23 | Stop reason: HOSPADM

## 2018-10-12 RX ORDER — LEVOTHYROXINE SODIUM 0.07 MG/1
75 TABLET ORAL
Status: CANCELLED | OUTPATIENT
Start: 2018-10-13

## 2018-10-12 RX ORDER — POLYETHYLENE GLYCOL 3350 17 G/17G
17 POWDER, FOR SOLUTION ORAL DAILY PRN
Status: CANCELLED | OUTPATIENT
Start: 2018-10-12

## 2018-10-12 RX ORDER — POLYETHYLENE GLYCOL 3350 17 G/17G
17 POWDER, FOR SOLUTION ORAL DAILY PRN
Status: DISCONTINUED | OUTPATIENT
Start: 2018-10-12 | End: 2018-10-13

## 2018-10-12 RX ORDER — ATORVASTATIN CALCIUM 40 MG/1
40 TABLET, FILM COATED ORAL EVERY EVENING
Status: CANCELLED | OUTPATIENT
Start: 2018-10-12

## 2018-10-12 RX ORDER — HEPARIN SODIUM 5000 [USP'U]/ML
5000 INJECTION, SOLUTION INTRAVENOUS; SUBCUTANEOUS EVERY 8 HOURS SCHEDULED
Status: DISCONTINUED | OUTPATIENT
Start: 2018-10-12 | End: 2018-10-23 | Stop reason: HOSPADM

## 2018-10-12 RX ORDER — LEVOTHYROXINE SODIUM 0.07 MG/1
75 TABLET ORAL
Status: DISCONTINUED | OUTPATIENT
Start: 2018-10-13 | End: 2018-10-12

## 2018-10-12 RX ORDER — HEPARIN SODIUM 5000 [USP'U]/ML
5000 INJECTION, SOLUTION INTRAVENOUS; SUBCUTANEOUS EVERY 8 HOURS SCHEDULED
Status: CANCELLED | OUTPATIENT
Start: 2018-10-12

## 2018-10-12 RX ORDER — ATORVASTATIN CALCIUM 40 MG/1
40 TABLET, FILM COATED ORAL EVERY EVENING
Status: DISCONTINUED | OUTPATIENT
Start: 2018-10-12 | End: 2018-10-23 | Stop reason: HOSPADM

## 2018-10-12 RX ORDER — LEVOTHYROXINE SODIUM 88 UG/1
88 TABLET ORAL
Status: DISCONTINUED | OUTPATIENT
Start: 2018-10-13 | End: 2018-10-23 | Stop reason: HOSPADM

## 2018-10-12 RX ORDER — POLYETHYLENE GLYCOL 3350 17 G/17G
17 POWDER, FOR SOLUTION ORAL ONCE
Status: COMPLETED | OUTPATIENT
Start: 2018-10-12 | End: 2018-10-12

## 2018-10-12 RX ORDER — SENNOSIDES 8.6 MG
2 TABLET ORAL DAILY
Status: DISCONTINUED | OUTPATIENT
Start: 2018-10-13 | End: 2018-10-23 | Stop reason: HOSPADM

## 2018-10-12 RX ADMIN — TICAGRELOR 90 MG: 90 TABLET ORAL at 21:20

## 2018-10-12 RX ADMIN — POLYETHYLENE GLYCOL 3350 17 G: 17 POWDER, FOR SOLUTION ORAL at 17:19

## 2018-10-12 RX ADMIN — HEPARIN SODIUM 5000 UNITS: 5000 INJECTION, SOLUTION INTRAVENOUS; SUBCUTANEOUS at 06:37

## 2018-10-12 RX ADMIN — INFLUENZA A VIRUS A/MICHIGAN/45/2015 X-275 (H1N1) ANTIGEN (FORMALDEHYDE INACTIVATED), INFLUENZA A VIRUS A/SINGAPORE/INFIMH-16-0019/2016 IVR-186 (H3N2) ANTIGEN (FORMALDEHYDE INACTIVATED), AND INFLUENZA B VIRUS B/MARYLAND/15/2016 BX-69A (A B/COLORADO/6/2017-LIKE VIRUS) ANTIGEN (FORMALDEHYDE INACTIVATED) 0.5 ML: 60; 60; 60 INJECTION, SUSPENSION INTRAMUSCULAR at 18:55

## 2018-10-12 RX ADMIN — DOCUSATE SODIUM 100 MG: 100 CAPSULE, LIQUID FILLED ORAL at 08:40

## 2018-10-12 RX ADMIN — FAMOTIDINE 20 MG: 40 POWDER, FOR SUSPENSION ORAL at 18:15

## 2018-10-12 RX ADMIN — HEPARIN SODIUM 5000 UNITS: 5000 INJECTION INTRAVENOUS; SUBCUTANEOUS at 21:20

## 2018-10-12 RX ADMIN — FAMOTIDINE 20 MG: 40 POWDER, FOR SUSPENSION ORAL at 08:40

## 2018-10-12 RX ADMIN — HEPARIN SODIUM 5000 UNITS: 5000 INJECTION, SOLUTION INTRAVENOUS; SUBCUTANEOUS at 15:25

## 2018-10-12 RX ADMIN — DOCUSATE SODIUM 100 MG: 100 CAPSULE, LIQUID FILLED ORAL at 17:19

## 2018-10-12 RX ADMIN — ATORVASTATIN CALCIUM 40 MG: 40 TABLET, FILM COATED ORAL at 17:19

## 2018-10-12 RX ADMIN — LEVOTHYROXINE SODIUM 75 MCG: 75 TABLET ORAL at 06:37

## 2018-10-12 RX ADMIN — TICAGRELOR 90 MG: 90 TABLET ORAL at 10:35

## 2018-10-12 NOTE — ASSESSMENT & PLAN NOTE
She is at a high risk of embolism from residual thrombus  - She needs to get a  repeat CTA (10/26), and if a filling defect is still present will need repeat thrombectomy, angioplasty, and stenting  She should continue her DAPT and statin  She will follow-up with Dr Bautista Grewal on 11/2 after her CTA Head/Neck is done  The order was placed through Epic  Her blood pressure was typically <140 during her stay  Once or twice, she went as high as 190  She is not currently on blood pressure medication  She was told to keep a log of her blood pressures and follow-up with her primary care physician within 1 week

## 2018-10-12 NOTE — PROGRESS NOTES
PHYSICAL MEDICINE AND REHABILITATION   PREADMISSION ASSESSMENT     Projected [facility] Diagnoses:  Impairment of mobility, safety, Activities of Daily Living (ADLs), and cognitive/communication skills due to Stroke:  01 1  Left Body Involvement (Right Brain)   Etiology: Right MCA Infarct due to Right ICA/ MCA Occlusion s/p Right ICA Angioplasty and Stenting and MCA Thrombectomy  Date of Onset: 10/6/18   Date of surgery: 10/16/18: Balloon angioplasty and stenting of the right ICA  and  MCA Thrombectomy    PATIENT INFORMATION  Name: Allyssa Devlin Phone #: [phone] (home)   Address: [address] [facility]  [place] [address]  YOB: 1950 Age: 77 y o  SS#   Marital Status: Unknown  Ethnicity: Unknown per Facesheet  Employment Status: retired  Extended Emergency Contact Information  Primary Emergency Contact: Janene [address] [place] [address] Phone: [phone]  Relation: Child  Secondary Emergency Contact: [address] [place] [address]  Mobile Phone: [phone]  Relation: Daughter  Advance Directive: Level 1 Full Code, has NO advanced directive - not interested in additional information    INSURANCE/COVERAGE:     Primary Payor: [facility] REP / Plan: Camille Palacio  REP / Product Type: Medicare HMO /   Secondary Payer: Private Pay    Payer Contact:  Payer Contact:   Contact Phone:  Contact Phone:   Authorization #: [id number]  Coverage Dates:10/12-10/18  LCD:10/18   Medicare ID #: [id number]  Medical Record #: [id number]    REFERRAL SOURCE:   Referring provider: Rayna Brenner MD  Referring facility: [address] [place] [address]   Room: [address] [place] [address] [facility]  PCP: No primary care provider on file  PCP phone number: None    MEDICAL INFORMATION  HPI: Patient is a 77year old female with PMH of hypothyroidism  Per patient's wife patient awoke at 4:00 am with slurred speech and right sided weakness    EMS was called and patient was brought to the ED at [facility] where CTA showed right carotid occlusion and right MCA occlusion  He was emergently transferred to [facility] and  brought to IR  IR stroke alert showed right proximal ICA occlusion with extensive cervical segment clot burden, right MCA M1 TICI O segment occlusion  He under went an emergent successful mechanical thrombectomy with revascularization of the right MCA territory, successful cervical right ICA mechanical thrombectomy, successful proximal ICA angioplasty ans stenting with 40% residual stenosis remaining and focal non flow limiting thrombus immediately distal to the stent with Dr Michael Packer of neurosurgery  Post IR CT of the head showed subtle loss of insular ribbon sign and loss of gray-white differention in right frontotemporal junction suggesting recent infarct, 12mm focus of hyperdensity in white matter of right temporal lobe that was likely contrast staining from recent procedure with follow up imaging recommended  TPA was not warranted prior to procedure  He was started on Integrilin drip  Left facial droop and left hemineglect present on exam   MAP goals >than 80 and SBPP <160 were set  He was with 1 liter of estimated blood loss intraoperatively  Repeat CT of the head showed interval development of hypodensity within the right temporal lobe, right caudate nucleus, and junction of right temporal occipital lobes consistent with evolution of previously suspected right MCA infarct with mild edema and regional mass effect without midline shift  MRI of the brain showed extensive recent infarcts in the right hemisphere including right centrum semiovale and lentiform nucleus, right temporal lobe/ temporal opercular junction, and right occipital cortex  Neurology was consulted and patient was loaded with ASA and plavix with orders to continue with plavix 75mg daily and  mg daily  She was also started on a high dose statin    Echo was completed that showed EF of 50% with no regional wall motion abnormalities and per neurology was non contributory with no cardioembolic source so no ASIM was pursued  On 10/8 patient's hemoglobin dropped to 7 5 and she was transfused with 1 unit of PRBCs  Repeat CTA of the head and neck showed some filling defect, presumed to be a non occlusive thrombus in and around the right carotid stent which remains patent  Per neurology all stroke burden was in the right MCA territory and unlikely contributed to ipsilateral carotid stenosis/ occlusion  Thrombus was showing signs of dissipation  Recommended that she continue on DAPT, however she is at high risk of for embolizing fragment  CTA of head and neck was recommended for 2 weeks out and if no changes she will need to undergo repeat thrombectomy, angioplasty, and stenting  On 10/11 P2Y12 was 289  Per neurosurgery ASA and plavix was discontinued and  brilinta 90mg Q12 hours was started  Repeat P2Y12 was 55  Repeat CT of the head was stable  She is to be re-started on ASA 81mg on 10/13  Of note patient was evaluated by speech and found to have mild-moderate oropharyngeal dysphagia she was started on a level 2 diet with nectar thickened liquids which has since been upgraded to level 3 dental soft with thin liquids  She is tolerating this diet without complications  PT, OT, and PM&R have all evaluated the patient and are recommending inpatient rehab  She is medically stable for transfer at this time  Past Medical History:   Past Surgical History:    Allergies:     Past Medical History:   Diagnosis Date    Disease of thyroid gland     Past Surgical History:   Procedure Laterality Date    IR STROKE ALERT  10/6/2018     No Known Allergies      Comorbidities: dysarthria, left sided weakness, left facial droop, left hemineglect, right MCA M1 occlusion, right ICA occlusion, ABLA, dysphagia, and history of hypothyroid     CURRENT VITAL SIGNS:   Temp:  [97 8 °F (36 6 °C)-99 °F (37 2 °C)] 97 8 °F (36 6 °C)  HR:  [85-90] 90  Resp:  [18] 18  BP: (136-146)/(58-67) 146/67   Intake/Output Summary (Last 24 hours) at 10/12/18 1126  Last data filed at 10/12/18 0746   Gross per 24 hour   Intake              660 ml   Output              400 ml   Net              260 ml        LABORATORY RESULTS:      Lab Results   Component Value Date    HGB 9 0 (L) 10/10/2018    HCT 26 6 (L) 10/10/2018    WBC 6 78 10/10/2018     Lab Results   Component Value Date    BUN 10 10/10/2018     10/10/2018    K 3 6 10/10/2018     10/10/2018    GLUCOSE 149 (H) 10/06/2018    CREATININE 0 62 10/10/2018     No results found for: PROTIME, INR     DIAGNOSTIC STUDIES:  Cta Head And Neck W Wo Contrast    Result Date: 10/9/2018  Impression: There is a focal filling defect identified at the distal aspect of the stent within the right carotid system  This appears slightly smaller than the filling defect identified on angiogram performed yesterday despite limitations of comparing digital subtraction angiography to CT angiography  This is most suggestive of thrombus  However, a noncalcified atherosclerotic plaque could have this appearance  There is mild narrowing of the proximal cervical internal carotid artery within the stent  Distally, the intracranial internal carotid artery enhances appropriately and is normal in caliber with normal M1 segment and middle cerebral artery branches  Findings were directly discussed with Dr Samia Baumann, at the time of dictation  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, terminating within the posterior inferior cerebellar artery and both posterior cerebral arteries arises from the internal carotid arteries  Noncontrast imaging of the brain demonstrates expected evolution of the right MCA territory infarctions which are more well-defined and hypodense  No bulmaro hemorrhagic transformation identified   Workstation performed: [id number]     Ct Head Wo Contrast    Result Date: 10/7/2018  Impression: Interval development of hypodensity within the right temporal lobe, right caudate nucleus and junction of the right temporal occipital lobes consistent with evolution of previously suspected right MCA infarct  Mild edema and regional mass effect without  midline shift  Tiny linear focus of increased attenuation along the right temporal lobe image 24 for which petechial gyriform hemorrhage cannot be completely excluded  Otherwise, no evidence of intracranial hemorrhage detected  I personally discussed this study with Dr Elissa Dwyer on 10/7/2018 at 5:34 AM  Workstation performed: [id number]     Ct Head Wo Contrast    Result Date: 10/6/2018  Impression: Subtle loss of gray-white differentiation in the right hemisphere with loss of the insular ribbon sign and loss of gray-white differentiation in the right frontotemporal junction suggesting recent infarct  12 mm focus of hyperdensity in the white matter  of the right temporal lobe is likely contrast staining from recent procedure and follow-up imaging is recommended  No definite evidence of hemorrhage  Workstation performed: [id number]     Mri Brain Wo Contrast    Result Date: 10/7/2018  Impression: Extensive recent infarcts in the right hemisphere including right centrum semiovale and lentiform nucleus, right temporal lobe/temporal opercular junction, and right occipital cortex  No hemorrhage identified  Workstation performed: [id number]     Ct Cerebral Perfusion    Result Date: 10/6/2018  Impression: CT perfusion performed  Data available on PACS  Workstation performed: [id number]     Ir Stroke Alert    Result Date: 10/9/2018  Impression: Right proximal ICA occlusion with extensive cervical segment clot burden  Right MCA M1 segment occlusion, TICI 0  Successful mechanical thrombectomy with revascularization of the right MCA territory, TICI 3   Successful cervical right ICA mechanical thrombectomy  Successful proximal right ICA angioplasty and stenting with approximately 40% residual stenosis remaining and a focal nonflow-limiting thrombus immediately distal to the stent  Workstation performed: [id number]       PRECAUTIONS/SPECIAL NEEDS:  Tobacco:   History   Smoking Status    Never Smoker   Smokeless Tobacco    Never Used   , Alcohol:    History   Alcohol Use    Yes   , Anticoagulation:  aspirin 81 mg orally every day, heparin and Brilinta 90mg Q12Hrs, Safety Concerns, Pain Management, Aspiration Risk/Precautions, Dietary Restrictions: Dysphagia 3-Dental Soft; Thin Liquid and Fall Precautions    MEDICATIONS:     Current Facility-Administered Medications:     [START ON 10/13/2018] aspirin (ECOTRIN LOW STRENGTH) EC tablet 81 mg, 81 mg, Oral, Daily, Deepak Ortega MD    atorvastatin (LIPITOR) tablet 40 mg, 40 mg, Oral, QPM, Deepak Ortega MD, 40 mg at 10/11/18 1813    docusate sodium (COLACE) capsule 100 mg, 100 mg, Oral, BID, Jad Thrasher MD, 100 mg at 10/12/18 0840    famotidine (PEPCID) oral suspension 20 mg, 20 mg, Oral, BID, Deepak Ortega MD, 20 mg at 10/12/18 0840    heparin (porcine) subcutaneous injection 5,000 Units, 5,000 Units, Subcutaneous, Q8H [address], Deepak Ortega MD, 5,000 Units at 10/12/18 7838    levothyroxine tablet 75 mcg, 75 mcg, Oral, Early Morning, Deanne Pugh MD, 75 mcg at 10/12/18 7637    polyethylene glycol (MIRALAX) packet 17 g, 17 g, Oral, Daily PRN, Jad Thrasher MD, 17 g at 10/10/18 1411    ticagrelor (BRILINTA) tablet 90 mg, 90 mg, Oral, Q12H [address], Deepak Ortega MD, 90 mg at 10/12/18 1035    SKIN INTEGRITY:   Right groin incision with MYNX, NADER, and C/D/I    PRIOR LEVEL OF FUNCTION:  She lives in [facility] single family home  Shiraz Boswell is  and lives with their spouse  Self Care: Independent, Indoor Mobility: Independent, Stairs (in/outdoor):  Independent and Cognition: Independent    HOME ENVIRONMENT:  The living area: can live on one level  There is 1 step to enter the home  The patient will have 24 hour supervision/physical assistance available upon discharge  Patient's  is home and can assist as needed  PREVIOUS DME:  Equipment in home (previous DME): Tub Bench, Rolling Walker, Single [employer] and Raised Toilet    FUNCTIONAL STATUS:  Physical Therapy Occupational Therapy Speech Therapy   As per PTA:         10/11/18 1530   Pain Assessment   Pain Assessment No/denies pain   Pain Score No Pain   Restrictions/Precautions   Weight Bearing Precautions Per Order No   Other Precautions Chair Alarm; Fall Risk;Telemetry   General   Family/Caregiver Present Yes  (sister)   Cognition   Overall Cognitive Status WFL   Arousal/Participation Alert; Responsive   Subjective   Subjective Pt denies pain and agrees to participate   Transfers   Sit to Stand (Contact Guard assist)   Additional items Assist x 1;Verbal cues   Stand to Sit 5  Supervision   Additional items Assist x 1;Verbal cues   Ambulation/Elevation   Gait pattern L Foot drag;L Hemiparesis; Narrow SATISH  (slow, short step length)   Gait Assistance 4  Minimal assist   Additional items Assist x 1   Assistive Device Rolling walker   Distance 250 feet   Stair Management Assistance Not tested   Balance   Static Sitting Fair +   Dynamic Sitting Fair -   Static Standing Fair -   Dynamic Standing Poor +   Ambulatory Poor +   Endurance Deficit   Endurance Deficit Yes   Endurance Deficit Description fatigue and weakness   Activity Tolerance   Activity Tolerance Patient limited by fatigue;Patient tolerated treatment well   Nurse [address] to see per RN Nelia   Assessment   Prognosis Good   Problem List Decreased strength;Decreased endurance; Impaired balance;Decreased mobility; Decreased coordination   Assessment Pt is progressing well with the use of a rolling walker  Continues to require min assist due to L foot drag/L sided weakness    Cues also required for pushing from R side with use of rolling walker  Pt however is aware of LLE weakness and attempts to correct while gait training  Family present today  Pt is cooperative and pleasant  Pt would benefit from continued physical therapy to maximize functional mobility and safety  As per PALMER:      10/10/18 0911   Restrictions/Precautions   Weight Bearing Precautions Per Order No   Other Precautions Bed Alarm; Chair Alarm; Fall Risk;Telemetry;Multiple lines   Lifestyle   Autonomy Pt reports being I w/ ADLS, IADLS, transfers and functional mobility PTA   Reciprocal Relationships Pt lives w/ spouse   Service to Others Pt is retired   Intrinsic Gratification pt enjoys painting the house and gardening   Pain Assessment   Pain Assessment 0-10   Pain Score No Pain   ADL   Where Assessed Standing at [employer] Assistance 5  Supervision/Setup   Grooming Deficit Teeth care;Denture care   UB Bathing Assistance 5  Supervision/Setup   UB Bathing Deficit Increased time to complete;Setup   LB Bathing Assistance 4  Minimal Assistance   LB Bathing Deficit Buttocks   UB Dressing Assistance 3  Moderate Assistance   UB Dressing Deficit Thread RUE; Thread LUE   LB Dressing Assistance 4  Minimal Assistance   LB Dressing Deficit Thread LLE into pants   Bed Mobility   Supine to Sit 4  Minimal assistance   Additional items Assist x 1   Transfers   Sit to Stand 4  Minimal assistance   Additional items Assist x 1   Stand to Sit 5  Supervision   Additional items Increased time required   Functional Mobility   Functional Mobility 4  Minimal assistance   Additional items Rolling walker   Cognition   Overall Cognitive Status WFL   Arousal/Participation Responsive; Cooperative   Attention Within functional limits   Orientation Level Oriented X4   Memory Within functional limits   Following Commands Follows one step commands without difficulty   Assessment   Assessment Pt participated in occupational therapy with focus on activity tolerance, bed mob, functional transfers/mob, standing balance and tolerance for pt engagement in functional self-care task/oral care and UB and LB self-care  Pt cleared by JENNY/Iris for pt participation in occupational therapy  Pt received HOB raised/supine pt sitting upright and agreeable to therapy following pt Identifiers confirmed/pt also known to OT from previous treatment session  Pt reported her goal to return to home to build a cabinet for her daughter  Pt family member/  and later daughter present supportive throughout session  Pt requires   for sitting balance 2* pt pushing with  UE and pt balance deficits  Pt required assist with Lb dressing and bathing 2* pt balance and coordination deficits  Pt will require in-pt rehab to continue to address pt noted deficits with decreased strength, balance and coordination which currently impair pt ADL and functional mob  As per SLP on 10/12/18:     Objective:  Pt seen for diagnostic swallow therapy during lunch  Pt again stated she prefers dysphagia 3 diet because the food comes already chopped, otherwise she would have to cut everything up herself  Meal today included cottage cheeses and chopped fruit, as well as egg salad which was turned into a sandwich  Pt continues to have good recall of safe PO strategies, however self monitoring skills are reduced and pt required cues to refrain from talking while masticating  Pt chews on the L side of oral cavity which occasionally results in pocketing as this is her weaker side, however today pt was able to clear masticated food independently  Liquid wash used to effectively clear min oral residual  No s/s aspiration noted with food or liquid today       Assessment:  Mild L sided facial asymmetry still observed, however [facility] with clear/precise articulation and appropriate rate, and pt is managing PO with limited-no pocketing      Plan/Recommendations:  Pt may soon be appropriate for D/C from ST services   Pt will likely transfer to the [facility] today or tomorrow  Consider regular diet trial to assess pt's ability to modify/cut foods and manage regular textures independently  CURRENT GAP IN FUNCTION  Prior to admission patient was completely independent with functional mobility, ADLs, and IADLs including driving with no assistive devices  Estimated length of stay: 10 to 14 days    Anticipated Post-Discharge Disposition/Treatment  Disposition: Return to previous home/apartment  Outpatient Services: Physical Therapy (PT), Occupational Therapy (OT) and Speech Therapy    BARRIERS TO DISCHARGE  Weakness, Balance Difficulty, Fatigue, Home Accessibility, Caregiver Accessibility, Financial Resources, Equipment Needs and Resource Availability    INTERVENTIONS FOR DISCHARGE  Adaptive equipment, Patient/Family/Caregiver Education, Community Resources, Financial Assistance, Arrange DME needs, Medication Changes as per MD, Therapy exercises and Center of balance support     REQUIRED THERAPY:  Patient will require PT, OT and ST 60 minutes each per day, five days per week to achieve rehab goals  REQUIRED FUNCTIONAL AND MEDICAL MANAGEMENT FOR INPATIENT REHABILITATION:  Skin:  Right groin incision with MYNX, NADER, and C/D/I, Deep Vein Thrombosis (DVT) Prophylaxis:  heparin, SCD's while in bed and Brilinta, nursing management for education and neuro checks, internal medicine to monitor and manage medical conditions, PM&R to maximize function and provide medical over sight, PT/OT/ST intervention, patient and family education and training, participation in stroke education series, and any consults as needed  RECOMMENDED LEVEL OF CARE:  Patient awoke at 4:00 am with slurred speech and right sided weakness on 10/6/18  EMS was called and patient was brought to the ED at [facility] where CTA showed right carotid occlusion and right MCA occlusion  He was emergently transferred to [facility] and  brought to IR    IR stroke alert showed right proximal ICA occlusion with extensive cervical segment clot burden, right MCA M1 TICI O segment occlusion  He under went an emergent successful mechanical thrombectomy with revascularization of the right MCA territory, successful cervical right ICA mechanical thrombectomy, successful proximal ICA angioplasty ans stenting with 40% residual stenosis remaining and focal non flow limiting thrombus immediately distal to the stent with Dr Greene [place] of neurosurgery  Post IR CT of the head showed subtle loss of insular ribbon sign and loss of gray-white differention in right frontotemporal junction suggesting recent infarct, 12mm focus of hyperdensity in white matter of right temporal lobe that was likely contrast staining  Repeat CT of the head showed interval development of hypodensity within the right temporal lobe, right caudate nucleus, and junction of right temporal occipital lobes consistent with evolution of previously suspected right MCA infarct with mild edema and regional mass effect without midline shift  MRI of the brain showed extensive recent infarcts in the right hemisphere including right centrum semiovale and lentiform nucleus, right temporal lobe/ temporal opercular junction, and right occipital cortex  Neurology was consulted and patient was loaded with ASA and plavix with orders to continue with plavix 75mg daily and  mg daily  She was also started on a high dose statin  Echo was completed that showed EF of 50% with no regional wall motion abnormalities and per neurology was non contributory with no cardioembolic source  so no ASMI was pursued  Repeat CTA of the head and neck showed some filling defect, presumed to be a non occlusive thrombus in and around the right carotid stent which remains patent  Per neurology all stroke burden was in the right MCA territory and unlikely contributed to ipsilateral carotid stenosis/ occlusion  Thrombus was showing signs of dissipation  Recommended that she continue on DAPT, however she is at high risk of for embolizing fragment  Prior to admission  patient was completely independent with functional mobility, ADLs, and IADLs including driving with no assistive devices  At this time she is contact guard with transfers and min assist with ambulation with rolling walker as well as mod assist with upper body ADLs and and min assist with lower body ADLs  Nursing management is being recommended for education as well as neuro checks, internal medicine to monitor and manage medical conditions, PM&R to maximize function as well as provide medical oversight, and inpatient rehab to maximize self car and mobility to a supervision-modified independent level upon discharge to home with support and assistance of her

## 2018-10-12 NOTE — SPEECH THERAPY NOTE
Speech/Language Pathology Progress Note    Patient Name: Rika Chinchilla  Today's Date: 10/12/2018       Subjective:  "They say I can go up to rehab today or tomorrow"    Objective:  Pt seen for diagnostic swallow therapy during lunch  Pt again stated she prefers dysphagia 3 diet because the food comes already chopped, otherwise she would have to cut everything up herself  Meal today included cottage cheeses and chopped fruit, as well as egg salad which was turned into a sandwich  Pt continues to have good recall of safe PO strategies, however self monitoring skills are reduced and pt required cues to refrain from talking while masticating  Pt chews on the L side of oral cavity which occasionally results in pocketing as this is her weaker side, however today pt was able to clear masticated food independently  Liquid wash used to effectively clear min oral residual  No s/s aspiration noted with food or liquid today  Assessment:  Mild L sided facial asymmetry still observed, however speech is [facility] with clear/precise articulation and appropriate rate, and pt is managing PO with limited-no pocketing  Plan/Recommendations:  Pt may soon be appropriate for D/C from ST services  Pt will likely transfer to the [facility] today or tomorrow  Consider regular diet trial to assess pt's ability to modify/cut foods and manage regular textures independently

## 2018-10-12 NOTE — PROGRESS NOTES
Progress Note - Neurosurgery   Hammad Ragsdale 77 y o  female MRN: [id number]  Unit/Bed#: [facility] 713-01 Encounter: [id number]       Assessment:  1  Cervical right ICA occlusion s/p stenting   2  Right MCA M1 occlusion s/p thrombectomy   3  hypothyroidism     Plan:  Plavix non-responder  Load with Brilinta this AM  Check p2y12 in am  Restart ASA 81 mg on Saturday  CT head tomorrow  CTA in 2 weeks  Subjective/Objective   Chief Complaint: stroke    Subjective: No complaints  Wants to [facility] rehab  Objective: Intake/Output       10/11/18 0701 - 10/12/18 0700      [id number] 5216-7446 Total       Intake    P O   340  -- 340    Total Intake 340 -- 340       Output    Urine  600  -- 600    Urine 600 -- 600    Total Output 600 -- 600       Net I/O     -260 -- -260          Invasive Devices     Peripheral Intravenous Line            Peripheral IV 10/10/18 Left Forearm 1 day                Physical Exam:   General appearance: alert, appears stated age, cooperative and no distress  Head: Normocephalic, without obvious abnormality  Eyes: EOMI with appreciated right gaze preference, PERRL  Visual fields grossly intact  Lungs: non labored breathing  Heart: regular heart rate  Neurologic:   Mental status: Alert, oriented x4, thought content appropriate  Speech is fluent, clear  Cranial nerves: grossly intact   Sensory: normal to LT in the face, bilateral upper and lower extremities  Left sided inattention with DSS  Motor: moving all extremities with subtle weakness in the LUE, otherwise strength 5/5     LUE: shoulder shru/5, deltoid: 5--5/5, biceps/triceps: 5/5, WF: 3/5, WE: 3-4-/5, IOs: 3/5, : 4/5   Reflexes: 2+ and symmetric  Positive bilateral anna, negative clonus  Coordination: finger to nose normal bilaterally, +subtle left downward drift  Vitals: Blood pressure 140/67, pulse 86, temperature 98 6 °F (37 °C), temperature source Oral, resp   rate 18, height 5' 1" (1 549 m), weight 82 7 kg (182 lb 5 1 oz), SpO2 98 %  ,Body mass index is 34 45 kg/m²  Hemodynamic Monitoring: MAP: Arterial Line MAP (mmHg): 78 mmHg    Lab Results: I have personally reviewed pertinent results  Imaging Studies: I have personally reviewed pertinent reports  EKG, Pathology, and Other Studies: I have personally reviewed pertinent reports        VTE Pharmacologic Prophylaxis: Heparin    VTE Mechanical Prophylaxis: sequential compression device

## 2018-10-12 NOTE — PLAN OF CARE
Problem: Nutrition/Hydration-ADULT  Goal: Nutrient/Hydration intake appropriate for improving, restoring or maintaining nutritional needs  Monitor and assess patient's nutrition/hydration status for malnutrition (ex- brittle hair, bruises, dry skin, pale skin and conjunctiva, muscle wasting, smooth red tongue, and disorientation)  Collaborate with interdisciplinary team and initiate plan and interventions as ordered  Monitor patient's weight and dietary intake as ordered or per policy  Utilize nutrition screening tool and intervene per policy  Determine patient's food preferences and provide high-protein, high-caloric foods as appropriate       INTERVENTIONS:  - Monitor oral intake, urinary output, labs, and treatment plans  - Assess nutrition and hydration status and recommend course of action  - Evaluate amount of meals eaten  - Assist patient with eating if necessary   - Allow adequate time for meals  - Recommend/ encourage appropriate diets, oral nutritional supplements, and vitamin/mineral supplements  - Order, calculate, and assess calorie counts as needed  - Assess need for intravenous fluids  - Provide specific nutrition/hydration education as appropriate  - Include patient/family/caregiver in decisions related to nutrition    Outcome: Adequate for Discharge

## 2018-10-12 NOTE — DISCHARGE SUMMARY
[facility] Internal Medicine    Patient Information: Eliane Gomez 77 y o  female MRN: [id number]  Unit/Bed#: [facility] 713-01 Encounter: [id number]    Discharging Physician / Practitioner: Elizabeth Saxena MD  PCP: No primary care provider on file  Admission Date: 10/6/2018  Discharge Date: 10/12/18    Reason for Admission: cva    Discharge Diagnoses:     Principal Problem:    CVA (cerebral vascular accident) [facility])  Active Problems:    Hypothyroidism    Normocytic anemia due to blood loss    Carotid stenosis, symptomatic, with infarction [facility])  Resolved Problems:    * No resolved hospital problems  *      Consultations During Hospital Stay:  · Dr Yariel Gill Neurology  · Dr Conway Arts Neurointirventionalist surgery    Procedures Performed:     · CT Head - no hemorrhage  · CTA head and neck - focal filling defect identified at the distal aspect of the stent within the right carotid system suggestive of thrombus  Hypoplastic rt vertebrobasilar artery  · MRI Brain -  recent infarcts in the right hemisphere including right centrum semiovale and lentiform nucleus, right temporal lobe/temporal opercular junction, and right occipital cortex  No hemorrhage identified  · Emergent Mechanical Thrombectomy and revascularization of Rt MCA  · Proximal Rt ICA stent placement  with residual non flow limiting thrombus immediately distal to stent  Incidental Findings:   · none    Test Results Pending at Discharge (will require follow up):   · none     Outpatient Tests Requested:  · none    Complications: none    Hospital Course:     Eliane Gomez is a 77 y o  female patient who originally presented to [facility] and then transferred to [address] [place] [address] on 10/6/2018  Per patient's wife patient awoke at 4:00 am with slurred speech and right sided weakness  EMS was called and patient was brought to the ED at [facility] where CTA showed right carotid occlusion and right MCA occlusion    He was emergently transferred to [facility] and  brought to IR  IR stroke alert showed right proximal ICA occlusion with extensive cervical segment clot burden, right MCA M1 TICI O segment occlusion  He under went an emergent successful mechanical thrombectomy with revascularization of the right MCA territory, successful cervical right ICA mechanical thrombectomy, successful proximal ICA angioplasty ans stenting with 40% residual stenosis remaining and focal non flow limiting thrombus immediately distal to the stent with Dr Claudine Harris of neurosurgery  Post IR CT of the head showed subtle loss of insular ribbon sign and loss of gray-white differention in right frontotemporal junction suggesting recent infarct  TPA was not warranted prior to procedure  He was started on Integrilin drip  Left facial droop and left hemineglect present on initial exam   There was with 1 liter of estimated blood loss intraoperatively  Repeat CT of the head showed interval development of hypodensity within the right temporal lobe, right caudate nucleus, and junction of right temporal occipital lobes consistent with evolution of previously suspected right MCA infarct with mild edema and regional mass effect without midline shift  MRI of the brain showed extensive recent infarcts in the right hemisphere including right centrum semiovale and lentiform nucleus, right temporal lobe/ temporal opercular junction, and right occipital cortex  Neurology was consulted and patient was loaded with ASA and plavix with orders to continue with plavix 75mg daily and  mg daily  She was also started on a high dose statin  Echo was completed that showed EF of 50% with no regional wall motion abnormalities and per neurology was non contributory with no cardioembolic source so no ASIM was pursued  On 10/8 patient's hemoglobin dropped to 7 5 and she was transfused with 1 unit of PRBCs  Hb stable after that    Repeat CTA of the head and neck showed some filling defect, presumed to be a non occlusive thrombus in and around the right carotid stent which remains patent  CTA of head and neck was recommended for 2 weeks out and if no changes, she will need to undergo repeat thrombectomy, angioplasty and stenting  On 10/11 P2Y12 was 289  Per neurosurgery, brilinta 90mg Q12 hours was started  Repeat P2Y12 was 55  Repeat CT of the head was stable  She is to be re-started on ASA 81mg on 10/13  Of note patient was evaluated by speech and found to have mild-moderate oropharyngeal dysphagia she was started on a level 2 diet with nectar thickened liquids which has since been upgraded to level 3 dental soft with thin liquids  She is tolerating this diet without complications  PT, OT, and PM&R have all evaluated the patient and are recommending inpatient rehab  Please see hospital records for details  Condition at Discharge: fair     Discharge Day Visit / Exam:     Vitals: Blood Pressure: 154/72 (10/12/18 1158)  Pulse: 90 (10/12/18 1158)  Temperature: 99 4 °F (37 4 °C) (10/12/18 1158)  Temp Source: Oral (10/12/18 1158)  Respirations: 18 (10/12/18 1158)  Height: 5' 1" (154 9 cm) (10/07/18 0900)  Weight - Scale: 82 7 kg (182 lb 5 1 oz) (10/07/18 0900)  SpO2: 99 % (10/12/18 1158)  Exam:   Physical Exam   HENT:   Head: Normocephalic  Eyes: Pupils are equal, round, and reactive to light  Cardiovascular: Normal heart sounds  Pulmonary/Chest: Effort normal    Abdominal: Soft  Neurological: She is alert  Skin: There is pallor  Discharge instructions/Information to patient and family:   See after visit summary for information provided to patient and family  Provisions for Follow-Up Care:  See after visit summary for information related to follow-up care and any pertinent home health orders  Disposition: ARC    Discharge Statement:  I spent 35 minutes discharging the patient  This time was spent on the day of discharge   I had direct contact with the patient on the day of discharge  Greater than 50% of the total time was spent examining patient, answering all patient questions, arranging and discussing plan of care with patient as well as directly providing post-discharge instructions  Additional time then spent on discharge activities  Discharge Medications:  See after visit summary for reconciled discharge medications provided to patient and family  ** Please Note: Dragon 360 Dictation voice to text software may have been used in the creation of this document   **

## 2018-10-12 NOTE — ORTHOTIC NOTE
Orthotic Note            Date: 10/12/2018      Patient Name: Kathy Freire        10 mins    Reason for Consult:  Patient Active Problem List   Diagnosis    Cerebrovascular [facility] (CVA) due to thrombosis [facility]    Hypothyroidism    Normocytic anemia due to blood loss    [facility], [facility]    Slow transit constipation    Insomnia   Procare Multi Podus Boot   Per Amy De Leon MD     Per Neurosurgery, [employer] delivered, fit, and donned Procare Multi Podus Boot onto pt's LLE while supine in bed  Pt tolerated fitting well and stated it is very comfortable  Educated pt on proper cleaning and donning/doffing techniques  RN aware and will continue to follow up as needed  Recommendations:  Please call [employer] ext [phone] in regards to any bracing instructions and/or adjustments       [facility] Restorative Technician, BS

## 2018-10-12 NOTE — PLAN OF CARE
Problem: PHYSICAL THERAPY ADULT  Goal: Performs mobility at highest level of function for planned discharge setting  See evaluation for individualized goals  Treatment/Interventions: Functional transfer training, LE strengthening/ROM, Therapeutic exercise, Endurance training, Patient/family training, Equipment eval/education, Bed mobility, Gait training, Spoke to nursing, OT  Equipment Recommended: Elizabeth Ruggiero       See flowsheet documentation for full assessment, interventions and recommendations  Outcome: Progressing  Prognosis: Good  Problem List: Decreased strength, Decreased endurance, Impaired balance, Decreased mobility, Decreased coordination  Assessment: Pt was seen today for gait training with a rolling walker  Pt  and daughter present during session  Continues to be limited by L foot drag/L hemiparesis  Cues required for safety  Limited by fatigue today as pt states "I didn't sleep well last night "  Declined further activity today  Pt would benefit from continued physical therapy to maximize functional mobility and safety  Barriers to Discharge: Decreased caregiver support     Recommendation:  (Rehab)     PT - OK to Discharge: (S) Yes (to rehab when stable)    See flowsheet documentation for full assessment

## 2018-10-12 NOTE — PLAN OF CARE
Activity Intolerance/Impaired Mobility     Mobility/activity is maintained at optimum level for patient Adequate for Discharge        Communication Impairment     Ability to express needs and understand communication Adequate for Discharge        DISCHARGE PLANNING - CARE MANAGEMENT     Discharge to post-acute care or home with appropriate resources Adequate for Discharge        Neurological Deficit     Neurological status is stable or improving Adequate for Discharge        Nutrition     Nutrition/Hydration status is improving Adequate for Discharge        Nutrition/Hydration-ADULT     Nutrient/Hydration intake appropriate for improving, restoring or maintaining nutritional needs Adequate for Discharge        Potential for Aspiration     Non-ventilated patient's risk of aspiration is minimized Adequate for Discharge        Potential for Falls     Patient will remain free of falls Adequate for Discharge        Prexisting or High Potential for Compromised Skin Integrity     Skin integrity is maintained or improved Adequate for Discharge

## 2018-10-12 NOTE — CONSULTS
Consultation - Felipa Lozano 77 y o  female MRN: [id number]    Unit/Bed#: -01 Encounter: [id number]        History of Present Illness     HPI: Felipa Lozano is a 77 y o  female, with PMH of hypothyroidism, who woke up at [address] [place] [address] with dysarthria and Lt sided weakness 10/6/18  Pt was found to have a Rt MCA M1 occlusion and Rt ICA occlusion from origin to petrous segment  CT perfusion study revealed 37cc volume of infarct and 97cc penumbra  Head CT had findings suggestive of recent infarct in the Rt frontotemporal junction  tPA was not administered but pt did go to IR for endovascular intervention  Pt balloon angioplasty and stenting of the Rt ICA origin by Dr Jakob Earl  Follow-up head CT revealed Rt temporal lobe, Rt caudate nucleus, and Rt temporal occipital lobe junction hypodensity and possible petechial gyriform hemorrhage  Pt was on an integrillin drip post procedure  Brain MRI revealed extensive recent infarcts in the Rt hemisphere: Rt centrum semiovale and lentiform nucleus, Rt temporal lobe/temporal opercular junction and Rt occipital cortex  Pt was loaded with ASA and Plavix and the Integrillin drip was stopped  ECHO was not suggestive of an embolic source  CTA revealed improving thrombus  P2Y12 revealed that pt was a Plavix non-responder and she was changed to [place]  Head CT revealed stable evolving infarcts in the Rt hemisphere without hemorrhage  ROS:  Constitutional: Negative  HENT: Negative  Respiratory: Negative  Cardiovascular: Negative  Gastrointestinal: Negative  Musculoskeletal: Negative  Neurological: Lt weakness, Lt neglect    Psychiatric/Behavioral: Negative          Historical Information   Past Medical History:   Diagnosis Date    Disease of thyroid gland      Past Surgical History:   Procedure Laterality Date    IR STROKE ALERT  10/6/2018     Social History   History   Alcohol Use    Yes     History   Drug Use No     History   Smoking Status    Never Smoker Smokeless Tobacco    Never Used     Family History   Problem Relation Age of Onset    Family history unknown: Yes       Meds/Allergies   current meds:  No current facility-administered medications for this encounter  PTA meds:   Prescriptions Prior to Admission   Medication    levothyroxine 25 mcg tablet     No Known Allergies    Objective   Vitals: There were no vitals taken for this visit  Physical Exam   Constitutional: Pt is oriented to person, place, and time  HENT:  Normocephalic  EOM are normal  PERRLA  Neck supple  Cardiovascular: Normal rate and regular rhythm  Pulmonary: Breath sounds normal  No respiratory distress  Pt has no wheezes nor rales  Abdominal: Soft  Bowel sounds are normal  Pt exhibits no distension  There is no tenderness  There is no rebound and no guarding  Neurological: Pt is alert and oriented to person, place, and time  CN II - XII  Strength 5/5 Lt UE and LE  Rt UE 4/5, Rt LE 4-/5  Lt ataxia on finger to nose  Psychiatric: Pt has a normal mood and affect  Lab Results:   Results from last 7 days  Lab Units 10/10/18  0616 10/09/18  0616   WBC Thousand/uL 6 78 7 11   HEMOGLOBIN g/dL 9 0* 8 5*   HEMATOCRIT % 26 6* 26 0*   PLATELETS Thousands/uL 268 234       Results from last 7 days  Lab Units 10/10/18  0616 10/09/18  0616  10/06/18  1117   SODIUM mmol/L 139 139  < >  --    POTASSIUM mmol/L 3 6 3 5  < >  --    CHLORIDE mmol/L 107 108  < >  --    CO2 mmol/L 27 27  < >  --    BUN mg/dL 10 9  < >  --    CREATININE mg/dL 0 62 0 63  < >  --    GLUCOSE, ISTAT mg/dl  --   --   --  149*   CALCIUM mg/dL 8 5 8 4  < >  --    < > = values in this interval not displayed  Results from last 7 days  Lab Units 10/08/18  0501   HEMOGLOBIN A1C % 5 0           Glucose, i-STAT (mg/dl)   Date Value   10/06/2018 149 (H)       Labs reviewed    Imaging: reviewed  EKG, Pathology, and Other Studies: I have personally reviewed pertinent reports      VTE Prophylaxis: Heparin    Code Status: Prior   Advance Directive and Living Will:      Power of :    POLST:      Assessment/Plan     # Rt MCA CVA: Secondary to ICA/MCA occlusive disease s/p angioplasty, stenting and MCA thrombectomy  Pt was a Plavix non-responder and was loaded with Brillinta this AM  Check P2Y12 10/13/18  ASA 81mg to be resumed 10/13/18  Continue atorvastatin  # Rt ICA stenosis: s/p angioplasty and stenting  Pt will need a repeat CTA in 2 weeks  # Acute blood loss anemia: Pt was transfused with 1U PRBCs  Will continue to monitor Hgb  # Hypothyroidism: Continue levothyroxine  Counseling / Coordination of Care  Total floor / unit time spent today 60 minutes  Greater than 50% of total time was spent with the patient and / or family counseling and / or coordination of care        Nora Yap PA-C

## 2018-10-12 NOTE — ASSESSMENT & PLAN NOTE
Improved  Patient would like to try environmental approach to improve sleep  - If no improvement, can try melatonin

## 2018-10-12 NOTE — SOCIAL WORK
Received a call from [facility] who states pt is approved and a bed is available today  Pt is scheduled for a 3:30pm  to   CM notified pt's bedside RN Niurka Bowser

## 2018-10-12 NOTE — OCCUPATIONAL THERAPY NOTE
O T  Cancellation: Patient unavailable for skilled O T  session with 2 attempts  Receiving services from other discipline and [facility]  Will continue to follow on caseload     SPENCER Ahuja

## 2018-10-12 NOTE — ASSESSMENT & PLAN NOTE
Here placed on Levothyroxine 75mcg, TSH 0 784 on 10/6  After contacting her pharmacy, she was changed to her home dose of 88mcg daily

## 2018-10-12 NOTE — ASSESSMENT & PLAN NOTE
She was severely constipated when you first arrived  On a regimen of senna/colace her bowel movements improved  She was instructed to take these medications as needed at home, and if she does not have a bowel movement with them for 2-3 days to take some miralax

## 2018-10-12 NOTE — ASSESSMENT & PLAN NOTE
FOBTx3 were negative She did not develop any signs of active bleeding or hemodynamic instability  Her most recent hemoglobin at discharge was 9 1  No signs of active bleeding, hemodynamic instability  She did receive 1 unit pRBC during her acute inpatient hospitalization

## 2018-10-12 NOTE — H&P
PHYSICAL MEDICINE AND REHABILITATION H&P/ADMISSION NOTE  Mike Read 77 y o  female MRN: [id number]  Unit/Bed#: -01 Encounter: [id number]     Rehab Diagnosis: Impairment of mobility, safety and Activities of Daily Living (ADLs) due to Stroke:  01 1  Left Body Involvement (Right Brain)    History of Present Illness:   Mike Read is a 77 y o  female who presented to the [facility] on 10/6 after developing slurred speech and left sided weakness  She has a past medical history significant for hypothyroidism  She was found to have a R MCA M1 occlusion, as wellas right ICa occlusion  tPA was not administered, but she did undergo urgent endovascular inetrvention with balloon angioplasty and stenting of the right ICA origin with approximately 40% residual stenosis remaining through the stented segment  Non-flow limiting thrombus remained just above the stent  She had clear improvement in her speech and intellgibility following her procedure  She was transitioned to DAPT with aspirin and Plavix  Echo showed EF 55% with no RWMA or other embolic source  10/7 MRI Brain showed extensive recent infarcts in the right hemisphere including right centrum semiovale and lentiform nucleus, right temporal lobe/temporal opercular junction, and right occipital cortex  No hemorrhage  On 10/8, her hemoglobin dropped to 7 5, requiring a transfusion with 1 unit of pRBCs  Her hemoglobin remained stable for the remainder of her stay  A repeat CTA of the head and neck showed some filling defect, presumed to be a non-occlusive thrombus in and around the right carotid stent which remains patent  After a P2Y12 was done and was 289, neurosurgery started Brillinta 90mg Q12 and repeat P2Y12 was 55  Repeat CTH on day of discharge was stable  She is going to restart Aspirin 81mg tomorrow  She needs a repeat CTA of head and neck in 2 weeks   If there are no changes, she will need to go repeat thrombectomy, angioplasty, and stenting  Her diet was also upgraded to a dental soft with thin liquids, and is tolerating this diet without complications  Subjective: At this time, she is feeling well  She denies any acute visual changes, diplopia  She denies any hearing deficit  She denies any significant difficulty swallowing  She denies any headache, chest pain, shortness of breath, abdominal pain, nausea, vomiting  She has been constipated since before her inpatient stay  She is passing gas and tolerating a PO diet  She denies any dysuria, incontinence, or difficulty urinating  She has had some trouble sleeping lately, because it's "much noisier" in the hospital  She is pleased with her recovery with therapy so far  She also recognizes that she does not attend to her left side  Review of Systems: A 10-point review of systems was performed  Negative except as listed above  Plan:     * CVA (cerebral vascular accident) [facility])   Assessment & Plan    Right MCA stroke most likely 2/2 ICA and MCA occlusive disease  Now s/p thrombectomy and stent placement  And on DAPT  - Aspirin tomorrow  - Continue ticagrelor and atorvastatin  - monitor BP, goal systolic BP between 173-352  PRAFO for LLE  Neurochecks Q4  DVT ppx: heparin Q8     Carotid stenosis, symptomatic, with infarction [facility])   Assessment & Plan    High risk of embolism from residual thrombus  - In 2 weeks repeat CTA, if filling defect still present will need repeat thrombectomy, angioplasty, and stenting   - Aspirin tomorrow  - Continue brilinta and atorvastatin  - Neurochecks      Insomnia   Assessment & Plan    Patient would like to try environmental approach to improve sleep  - If no improvement, can try melatonin  Slow transit constipation   Assessment & Plan    Started on senna 2 tabs daily  Give miralax today  If no BM tomorrow, try dulc tabs, sorbitol, or bisacodyl suppository  Normocytic anemia due to blood loss   Assessment & Plan    FOBT pending     Hgb improved to 9 10/10  Check CBC in AM  Likely post-procedure  S/P 1 unit pRBC     Hypothyroidism   Assessment & Plan    Here placed on Levothyroxine 75mcg, TSH 0 784 on 10/6    - At home per chart was only on 25mcg daily  - Will clarify with patient, PCP, and pharmacy  Drug regimen reviewed, all potential adverse effects identified and addressed:    Scheduled Meds:    Current Facility-Administered Medications:  [START ON 10/13/2018] aspirin 81 mg Oral Daily Araceli Hooks MD   atorvastatin 40 mg Oral QPM Araceli Hooks MD   bisacodyl 10 mg Rectal Daily PRN Araceli Hooks MD   docusate sodium 100 mg Oral BID Araceli Hooks MD   famotidine 20 mg Oral BID Araceli Hooks MD   heparin (porcine) 5,000 Units Subcutaneous [facility] Araceli Hooks MD   [START ON 10/13/2018] levothyroxine 75 mcg Oral Early Morning Araceli Hooks MD   polyethylene glycol 17 g Oral Once Araceli Hooks MD   polyethylene glycol 17 g Oral Daily PRN MD Sheeba Graves ON 10/13/2018] senna 2 tablet Oral Daily Araceli Hooks MD   ticagrelor 90 mg Oral Q12H [address] Araceli Hooks MD        Restrictions include:  none Fall precautions      Functional History - Prior to Admission:      Functional Status: Patient was independent with mobility/ambulation, transfers, ADL's, IADL's  And was driving  Functional Status Upon Admission to [facility]:  Mobility: Luis Armando 250' with a RW  Transfers: S-CGA  ADLs: [address] lives with their spouse  She lives in [facility]) single family home  The living area: can live on one level  Equipment in home: [address], [address] and Raised toilet  There 1 step to enter the home  Patient/family's goals: Return to previous home/apartment    The patient will have 24 hour supervision/physical assistance available upon discharge      Physical Exam:  Temp:  [97 8 °F (36 6 °C)-99 4 °F (37 4 °C)] 99 4 °F (37 4 °C)  HR:  [85-90] 90  Resp:  [18] 18  BP: (136-154)/(63-72) 154/72  SpO2: [98 %-99 %] 99 %    Gen: No acute distress, Well-nourished, well-appearing  HEENT: Moist mucus membranes, Normocephalic/atraumatic  Pupils are equal  Noted right gaze preference  Cardiovascular: Regular rate, rhythm, Distal pulses palpable  Heme/Extr: No edema, clubbing, cyanosis  Pulmonary: Non-labored breathing  : No sykes  GI: Soft, non-tender, non-distended  MSK: AROM is WFL in all extremities  No effusions or deformities  Bulk is symmetric  See below for MMT scores  Tight left heel cord  Integumentary: Skin is warm, dry  No rashes or ulcers  Neuro: AAOx3, CN 2-12 grossly intact except for subtle central CN7 deficit on the left  Double simultaneous extinction, L inattention  Speech is intact, fluent  Appropriate to questioning  Tone is normal  Recall after 45 minutes is 3/3              DTRs: 3+ with overflow in biceps, triceps, brachioradialis, patella and 2/4 in achilles bilaterally  Plantar response is equivocal on the left, and flexor on the right  Adam's is present bilaterally              Coord: Mild ataxia on the left, but not dysmetria with FTN  Mild dysdiadochokinesia  Very subtle fix  + drift  MMT:   Strength:   Right  Left  Site  Right  Left  Site    5 5  S Ab: Shoulder Abductors  5  4  HF: Hip Flexors    5 5  EF: Elbow Flexors  5  5 KF: Knee Flexors    5  5  EE: Elbow Extensors  5  5  KE: Knee Extensors    5  4+ WE: Wrist Extensors  5  5  DR: Dorsi Flexors    5  4  Grasp 5  4  PF: Plantar Flexors    5  3  HI: Hand Intrinsics  5  5  EHL: Extensor Hallucis Longus   Psych: Normal mood and affect       Laboratory:      Results from last 7 days  Lab Units 10/10/18  0616 10/09/18  0616 10/08/18  1815   HEMOGLOBIN g/dL 9 0* 8 5* 9 3*   HEMATOCRIT % 26 6* 26 0* 28 2*   WBC Thousand/uL 6 78 7 11 8 06       Results from last 7 days  Lab Units 10/10/18  0616 10/09/18  0616 10/08/18  0501 10/07/18  0438 10/06/18  1349  10/06/18  1117   BUN mg/dL 10 9 10 14 13  < >  --    SODIUM mmol/L 139 139 140 140 141  < >  --    POTASSIUM mmol/L 3 6 3 5 3 5 3 9 4 1  < >  --    CHLORIDE mmol/L 107 108 107 110* 111*  < >  --    GLUCOSE, ISTAT mg/dl  --   --   --   --   --   --  149*   CREATININE mg/dL 0 62 0 63 0 60 0 52* 0 48*  < >  --    AST U/L 19  --   --  11 10  --   --    ALT U/L 20  --   --  15 16  --   --    < > = values in this interval not displayed  Wt Readings from Last 1 Encounters:   10/07/18 82 7 kg (182 lb 5 1 oz)     Estimated body mass index is 34 45 kg/m² as calculated from the following:    Height as of 10/7/18: 5' 1" (1 549 m)  Weight as of 10/7/18: 82 7 kg (182 lb 5 1 oz)  Imaging: reviewed reports  10/12 CTH: Stable evolving infarcts in the right hemisphere without acute intracranial hemorrhage  10/08 CTA Head/Neck:   There is a focal filling defect identified at the distal aspect of the stent within the right carotid system  This appears slightly smaller than the filling defect identified on angiogram performed yesterday despite limitations of comparing digital   subtraction angiography to CT angiography  This is most suggestive of thrombus  However, a noncalcified atherosclerotic plaque could have this appearance  There is mild narrowing of the proximal cervical internal carotid artery within the stent  Distally, the intracranial internal carotid artery enhances appropriately and is normal in caliber with normal M1 segment and middle cerebral artery branches  Findings were directly discussed with Dr Lexi Robels, at the time of dictation      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, terminating within the posterior inferior cerebellar artery and both posterior cerebral arteries arises from the internal carotid arteries      Noncontrast imaging of the brain demonstrates expected evolution of the right MCA territory infarctions which are more well-defined and hypodense  No bulmaro hemorrhagic transformation identified    10/06 MRI Brain:  Extensive recent infarcts in the right hemisphere including right centrum semiovale and lentiform nucleus, right temporal lobe/temporal opercular junction, and right occipital cortex  No hemorrhage identified  Rehabilitation Prognosis: good     Tolerance for three hours of therapy a day: good     Family/Patient Goals:  Patient/family's goals: Return to previous home/apartment  Patient will receive PT, OT and ST 60 minutes each per day, five days per week to achieve rehab goals  Mobility Goals:   Bed Mobility: Independent  Bed to wheelchair transfer: Moderate Cedar  Sit to stand: Moderate Cedar  Ambulation: Moderate Cedar  Stairs: Moderate Cedar    Activities of Daily Living (ADLs) Goals:  Eating: Independent  Hygiene and Grooming: Independent  Bathing: Moderate Cedar  Upper Extremity Dressing: Moderate Cedar  Diet / Swallowing: Independent  Lower Extremity Dressing: Moderate Cedar  Tub/shower Transfers: Moderate Cedar  Toilet Transfers: Moderate Cedar  Toileting / Hygiene: Moderate Cedar  Directing cares: Moderate Cedar    Cognition / Communication:  Cognition grossly intact    Discharge Planning:  Rehabilitation and discharge goals discussed with the patient and/or family  Case Managment and Social Work to review patient/family resources and to coordinate Discharge Planning  Estimated length of stay: 10 to 14 days    Patient and Family Education and Training:  Rehabilitation and discharge goals discussed with the patient and/or family  Patient/family education/training needs to be discussed in weekly team meeting  Other equipment: None at this time   To be determined in team      Past Medical History:   Past Surgical History:   Family History:   Social history:   Past Medical History:   Diagnosis Date    Disease of thyroid gland     Past Surgical History:   Procedure Laterality Date    IR STROKE ALERT 10/6/2018     Family History   Problem Relation Age of Onset    Family history unknown: Yes      Social History     Social History    Marital status: Unknown     Spouse name: N/A    Number of children: N/A    Years of education: N/A     Social History Main Topics    Smoking status: Never Smoker    Smokeless tobacco: Never Used    Alcohol use Yes    Drug use: No    Sexual activity: Not Currently     Other Topics Concern    Not on file     Social History Narrative    No narrative on file          Current Medical Diagnosis Allergies   Patient Active Problem List   Diagnosis    [facility] [address] [facility]    Hypothyroidism    Normocytic anemia due to blood loss    [facility]    No Known Allergies        Medical Necessity Criteria for [facility] Admission:Management of right groin incision, nursing/MD close oversight due to risk of repeat embolism, worsening infarction, or hemorrhage  Close neurologic checks, carotid artery disease, managing/monitoring bowel/bladder, and addressing complications of stroke such as mood, sleep, pain, and spasticity    In addition, the preadmission screen, post-admission physical evaluation, overall plan of care and admissions order demonstrate a reasonable expectation that the following criteria were met at the time of admission to the [facility]  1  The patient requires active and ongoing therapeutic intervention of multiple therapy disciplines (physical therapy, occupational therapy, speech-language pathology, or prosthetics/orthotics), one of which is physical or occupational therapy      2  Patient requires an intensive rehabilitation therapy program, as defined in Chapter 1, section 110 2 2 of the CMS Medicare Policy Manual  This intensive rehabilitation therapy program will consist of at least 3 hours of therapy per day at least 5 days per week or at least 15 hours of intensive rehabilitation therapy within a 7 consecutive day period, beginning with the date of admission to the [facility]  3  The patient is reasonably expected to actively participate in, and benefit significantly from, the intensive rehabilitation therapy program as defined in Chapter 1, section 110 2 2 of the CMS Medicare Policy Manual at this time of admission to the [facility]  She can reasonably be expected to make measurable improvement (that will be of practical value to improve the patients functional capacity or adaptation to impairments) as a result of the rehabilitation treatment, as defined in section 110 3, and such improvement can be expected to be made within the prescribed period of time  As noted in the CMS Medicare Policy Manual, the patient need not be expected to achieve complete independence in the domain of self-care nor be expected to return to his or her prior level of functioning in order to meet this standard  4  The patient must require physician supervision by a rehabilitation physician  As such, a rehabilitation physician will conduct face-to-face visits with the patient at least 3 days per week throughout the patients stay in the [facility] to assess the patient both medically and functionally, as well as to modify the course of treatment as needed to maximize the patients capacity to benefit from the rehabilitation process  5  The patient requires an intensive and coordinated interdisciplinary approach to providing rehabilitation, as defined in Chapter 1, section 110 2 5 of the CMS Medicare Policy Manual  This will be achieved through periodic team conferences, conducted at least once in a 7-day period, and comprising of an interdisciplinary team of medical professionals consisting of: a rehabilitation physician, registered nurse,  and/or , and a licensed/certified therapist from each therapy discipline involved in treating the patient       Changes Since Pre-admission Assessment: None -This patient's participation in rehab continues to be reasonable, necessary and appropriate  CMS Required Post-Admission Physician Evaluation Elements  History and Physical, including medical history, functional history and active comorbidities as in above text      PostAdmission Physician Evaluation:  The patient has the potential to make improvement and is in need of physical, occupational, and/or therapy services  The patient may also need nutritional services  Given the patient's complex medical condition and risk of further medical complications, rehabilitative services cannot be safely provided at a lower level of care, such as a skilled nursing facility  I have reviewed the patient's functional and medical status at the time of the preadmission screening and they are the same as on the day of this admission  I acknowledge that I have personally performed a full physical examination on this patient within 24 hours of admission  The patient and/or family demonstrated understanding the rehabilitation program and the discharge process after we discussed them      Agree in entirety: yes  Minor adaptions: none    Major changes: none     Karlene Wiggins MD  Physical Medicine and Rehabilitation    ** Please Note: Fluency Direct voice to text software may have been used in the creation of this document   **

## 2018-10-12 NOTE — ASSESSMENT & PLAN NOTE
Right MCA stroke most likely 2/2 ICA and MCA occlusive disease  Now s/p thrombectomy and stent placement and on DAPT  Aspirin was started after her admission to the unit  She should continue her aspirin, ticagrelor, and atorvastatin  Her blood pressures have been within goal  She was also on DVT prophylaxis with heparin, which was discontinued at discharge  She has a significant L inattention and possibly visual field deficit  She was given a referral to neuro-optometry with Dr Lita Duane at [address] [place] [address] for further evaluation as an outpatient

## 2018-10-12 NOTE — PHYSICAL THERAPY NOTE
Physical Therapy Progress Note     10/12/18 1518   Pain Assessment   Pain Assessment No/denies pain   Pain Score No Pain   Restrictions/Precautions   Weight Bearing Precautions Per Order No   Other Precautions Chair Alarm; Fall Risk;Telemetry   General   Family/Caregiver Present Yes  ( and daughter)   Cognition   Overall Cognitive Status WFL   Arousal/Participation Alert; Responsive   Subjective   Subjective Pt denies pain and agrees to participate  Transfers   Sit to Stand 5  Supervision   Additional items Assist x 1;Verbal cues;Armrests   Stand to Sit 5  Supervision   Additional items Assist x 1;Verbal cues;Armrests   Ambulation/Elevation   Gait pattern L Foot drag;L Hemiparesis; Narrow SATISH  (slow, short step length)   Gait Assistance 4  Minimal assist   Additional items Assist x 1;Verbal cues   Assistive Device Rolling walker   Distance 300 feet   Stair Management Assistance Not tested   Balance   Static Sitting Good   Dynamic Sitting Fair -   Static Standing Fair -   Dynamic Standing Poor +   Ambulatory Poor +   Endurance Deficit   Endurance Deficit Yes   Endurance Deficit Description fatigue   Activity Tolerance   Activity Tolerance Patient limited by fatigue   Nurse [facility] to see per JENNY Wheatley Party   Assessment   Prognosis Good   Problem List Decreased strength;Decreased endurance; Impaired balance;Decreased mobility; Decreased coordination   Assessment Pt was seen today for gait training with a rolling walker  Pt  and daughter present during session  Continues to be limited by L foot drag/L hemiparesis  Cues required for safety  Limited by fatigue today as pt states "I didn't sleep well last night "  Declined further activity today  Pt would benefit from continued physical therapy to maximize functional mobility and safety     Barriers to Discharge Decreased caregiver support   Goals   Patient Goals To get stronger   STG Expiration Date 10/17/18   Treatment Day 6   Plan Treatment/Interventions Functional transfer training;LE strengthening/ROM; Therapeutic exercise; Endurance training;Patient/family training;Bed mobility;Gait training;Spoke to nursing   Progress Progressing toward goals   PT Frequency (4-6x/week)   Recommendation   Recommendation (Rehab)   Equipment Recommended Yolie Johnson  (GEMMA)     Jay Clark, TINO

## 2018-10-13 LAB
ANION GAP SERPL CALCULATED.3IONS-SCNC: 4 MMOL/L (ref 4–13)
BUN SERPL-MCNC: 13 MG/DL (ref 5–25)
CALCIUM SERPL-MCNC: 8.7 MG/DL (ref 8.3–10.1)
CHLORIDE SERPL-SCNC: 107 MMOL/L (ref 100–108)
CO2 SERPL-SCNC: 27 MMOL/L (ref 21–32)
CREAT SERPL-MCNC: 0.68 MG/DL (ref 0.6–1.3)
ERYTHROCYTE [DISTWIDTH] IN BLOOD BY AUTOMATED COUNT: 13.7 % (ref 11.6–15.1)
GFR SERPL CREATININE-BSD FRML MDRD: 89 ML/MIN/1.73SQ M
GLUCOSE SERPL-MCNC: 95 MG/DL (ref 65–140)
HCT VFR BLD AUTO: 28.5 % (ref 34.8–46.1)
HGB BLD-MCNC: 9.3 G/DL (ref 11.5–15.4)
MCH RBC QN AUTO: 31.1 PG (ref 26.8–34.3)
MCHC RBC AUTO-ENTMCNC: 32.6 G/DL (ref 31.4–37.4)
MCV RBC AUTO: 95 FL (ref 82–98)
PLATELET # BLD AUTO: 368 THOUSANDS/UL (ref 149–390)
PMV BLD AUTO: 9.1 FL (ref 8.9–12.7)
POTASSIUM SERPL-SCNC: 4.3 MMOL/L (ref 3.5–5.3)
RBC # BLD AUTO: 2.99 MILLION/UL (ref 3.81–5.12)
SODIUM SERPL-SCNC: 138 MMOL/L (ref 136–145)
WBC # BLD AUTO: 6.58 THOUSAND/UL (ref 4.31–10.16)

## 2018-10-13 PROCEDURE — 97530 THERAPEUTIC ACTIVITIES: CPT

## 2018-10-13 PROCEDURE — 97161 PT EVAL LOW COMPLEX 20 MIN: CPT

## 2018-10-13 PROCEDURE — 92523 SPEECH SOUND LANG COMPREHEN: CPT

## 2018-10-13 PROCEDURE — 97535 SELF CARE MNGMENT TRAINING: CPT

## 2018-10-13 PROCEDURE — G0515 COGNITIVE SKILLS DEVELOPMENT: HCPCS

## 2018-10-13 PROCEDURE — 85027 COMPLETE CBC AUTOMATED: CPT | Performed by: PHYSICAL MEDICINE & REHABILITATION

## 2018-10-13 PROCEDURE — 97112 NEUROMUSCULAR REEDUCATION: CPT

## 2018-10-13 PROCEDURE — 92610 EVALUATE SWALLOWING FUNCTION: CPT

## 2018-10-13 PROCEDURE — 97166 OT EVAL MOD COMPLEX 45 MIN: CPT

## 2018-10-13 PROCEDURE — 80048 BASIC METABOLIC PNL TOTAL CA: CPT | Performed by: PHYSICAL MEDICINE & REHABILITATION

## 2018-10-13 RX ORDER — POLYETHYLENE GLYCOL 3350 17 G/17G
17 POWDER, FOR SOLUTION ORAL DAILY PRN
Status: DISCONTINUED | OUTPATIENT
Start: 2018-10-13 | End: 2018-10-23 | Stop reason: HOSPADM

## 2018-10-13 RX ORDER — BISACODYL 10 MG
10 SUPPOSITORY, RECTAL RECTAL DAILY PRN
Status: DISCONTINUED | OUTPATIENT
Start: 2018-10-13 | End: 2018-10-23 | Stop reason: HOSPADM

## 2018-10-13 RX ADMIN — ATORVASTATIN CALCIUM 40 MG: 40 TABLET, FILM COATED ORAL at 18:29

## 2018-10-13 RX ADMIN — TICAGRELOR 90 MG: 90 TABLET ORAL at 09:40

## 2018-10-13 RX ADMIN — DOCUSATE SODIUM 100 MG: 100 CAPSULE, LIQUID FILLED ORAL at 09:40

## 2018-10-13 RX ADMIN — HEPARIN SODIUM 5000 UNITS: 5000 INJECTION INTRAVENOUS; SUBCUTANEOUS at 13:44

## 2018-10-13 RX ADMIN — LEVOTHYROXINE SODIUM 88 MCG: 88 TABLET ORAL at 05:20

## 2018-10-13 RX ADMIN — ASPIRIN 81 MG: 81 TABLET, COATED ORAL at 09:40

## 2018-10-13 RX ADMIN — TICAGRELOR 90 MG: 90 TABLET ORAL at 21:17

## 2018-10-13 RX ADMIN — FAMOTIDINE 20 MG: 40 POWDER, FOR SUSPENSION ORAL at 18:29

## 2018-10-13 RX ADMIN — DOCUSATE SODIUM 100 MG: 100 CAPSULE, LIQUID FILLED ORAL at 18:29

## 2018-10-13 RX ADMIN — HEPARIN SODIUM 5000 UNITS: 5000 INJECTION INTRAVENOUS; SUBCUTANEOUS at 05:20

## 2018-10-13 RX ADMIN — FAMOTIDINE 20 MG: 40 POWDER, FOR SUSPENSION ORAL at 09:40

## 2018-10-13 RX ADMIN — HEPARIN SODIUM 5000 UNITS: 5000 INJECTION INTRAVENOUS; SUBCUTANEOUS at 21:16

## 2018-10-13 RX ADMIN — SENNOSIDES 17.2 MG: 8.6 TABLET, FILM COATED ORAL at 09:39

## 2018-10-13 NOTE — PROGRESS NOTES
10/13/18 1030   Patient Data   Rehab Impairment Impairment of mobility, safety, Activities of Daily Living (ADLs), and cognitive/communication skills due to Stroke   Etiologic Diagnosis Right MCA Infarct due to Right ICA/ MCA Occlusion s/p Right ICA Angioplasty and Stenting and MCA Thrombectomy   Date of Onset 10/06/18   Support System   Name gaby Maldonado     Home Setup   Type of Home Multi Level   Number of Stairs 0   Number of Stairs in Home 12  (to upstairs and to the basement)   In Home Hand Rail Right;Left   First Floor Setup Available Yes   Prior Level of Function   Self-Care 3  Independent - Patient completed the activities by him/herself, with or without an assistive device, with no assistance from a helper  Indoor-Mobility (Ambulation) 3  Independent - Patient completed the activities by him/herself, with or without an assistive device, with no assistance from a helper  Stairs 3  Independent - Patient completed the activities by him/herself, with or without an assistive device, with no assistance from a helper  Functional Cognition 3  Independent - Patient completed the activities by him/herself, with or without an assistive device, with no assistance from a helper     Patient Preference   Nickname (Patient Preference) amrita    Psychosocial   Patient Behaviors/Mood Appropriate for age   Restrictions/Precautions   Precautions Fall Risk   Pain Assessment   Pain Score No Pain   QI: Sit to Lying   Assistance Needed Supervision   Sit to Lying CARE Score 4   QI: Lying to Sitting on Side of Bed   Assistance Needed Supervision   Lying to Sitting on Side of Bed CARE Score 4   QI: Sit to Stand   Assistance Needed Incidental touching   Sit to Stand CARE Score 4   QI: Chair/Bed-to-Chair Transfer   Assistance Needed Incidental touching   Chair/Bed-to-Chair Transfer CARE Score 4   QI: Car Transfer   Assistance Needed Physical assistance   Assistance Provided by [place] 25%-49%   Car Transfer CARE Score 3   Transfer Bed/Chair/Wheelchair   Limitations Noted In Balance; Coordination; Endurance;LE Strength;Vision   Stand Pivot Minimal Assist   Sit to Stand (CGA)   Stand to Sit (CGA)   Supine to Sit Supervision   Sit to Supine Supervision   Bed, Chair, Wheelchair Transfer (FIM) 4 - [place] lifts one extremity during transfer   QI: [facility] Needed Physical assistance   Assistance Provided by [place] Less than 25%   Walk 10 Feet CARE Score 3   QI: Walk 50 Feet with Two Turns   Assistance Needed Physical assistance   Assistance Provided by [place] Less than 25%   Walk 50 Feet with Two Turns CARE Score 3   QI: Walk 150 Feet   Assistance Needed Physical assistance   Assistance Provided by [place] Less than 25%   Walk 150 Feet CARE Score 3   QI: Walking 10 Feet on Uneven Surfaces   Reason if not Attempted Safety concerns   Walking 10 Feet on Uneven Surfaces CARE Score 88   Ambulation   Does the patient walk? 2  Yes   Primary Discharge Mode of Locomotion Walk   Walk Assist Level Minimum Assist;Contact Guard   Gait Pattern Decreased foot clearance; Step through; Improper weight shift;Decreased L stance;Decreased R stance;L hemiparesis   Assist Device (none)   Distance Walked (feet) 100 ft  (x4, 150 x6)   Limitations Noted In Balance; Endurance; Heel Strike;Swing;Strength;Speed   Walking (FIM) 2 - Patient ambulates between 50 - 149 feet regardless of assist/device/set up   Wheelchair mobility   QI: Does the patient use a wheelchair? 0  No   QI: 1 Step (Curb)   Assistance Needed Physical assistance   Assistance Provided by [place] 25%-49%   1 Step (Curb) CARE Score 3   QI: 4 Steps   Assistance Needed Physical assistance; Adaptive equipment   Assistance Provided by [place] Less than 25%   4 Steps CARE Score 3   QI: 12 Steps   Assistance Needed Physical assistance; Adaptive equipment   Assistance Provided by [place] Less than 25%   12 Steps CARE Score 3   Stairs   Type  Steps   # of Steps 12   Weight Bearing Precautions Fall Risk   Assist Devices Single Rail;Bilateral Rail   Findings practiced with nonreciprocal pattern, up with RLE first and down LLE and then the opposite sequenceMinA   Stairs (FIM) 4 - Patient requires steadying assist or light touching AND patient goes up and down full flight (12- 14 stairs)   Comprehension   QI: Comprehension 4  Undestands: Clear comprehension without cues or repetitions   Comprehension (FIM) 6 - Understands complex/abstract but requires more time   Expression   QI: Expression 4  Express complex messages without difficulty and with speech that is clear and easy to [place]   Expression (FIM) 6 - Expresses complex/abstract but requires device (trach valve, communication boards)   Social Interaction   Social Interaction (FIM) 5 - Needs monitoring/encouragement  to participate/interact   Problem Solving   Problem solving (FIM) 5 - Solves basic problems 90% of time   Memory   Memory (FIM) 5 - [place] cues patient   RLE Assessment   RLE Assessment WFL   Strength LLE   LLE Overall Strength 4/5   Coordination   Movements are Fluid and Coordinated 1   Cognition   Arousal/Participation Cooperative   Objective Measure   PT Measure(s) TINETTI 18/28, indicating fall risk  TUG 15 sec, mTUG 13 sec, cTUG 17  Self selected velocity gait speed /10M walk = 0 79 m/s  faster gait speed : 1 04 m/s  PT Findings practiced walking carrying a cup with lid, then a smaller cup with no lid in LUE, and then carrying a basin filled wtih person care items, carrying each item 150' loop  patient ed to her and her  regarding stroke care, SE classes, reviewing role PT and planning for safe dc home  Reviewed fall risk factors and need to prioritize balance/safety for recommendations for home  Discussed how L visual deficit influences safety/balance      Discharge Information   Patient's Discharge Plan return home   Patient's Rehab Expectations to get back to doing what she was doing at home    Barriers to Discharge Home Decreased Strength;Decreased Endurance; Safety Considerations  (dec vision, dec righting reactions )   Impressions Pt presents s/p R MCA CVA, with residual deficits in LLE strength and AROM and righting reactions  Pt presents w/L visual deficit, which also contributes to dec safety at this time, as she does not consistently always scan to L side, but she is aware of the deficit  Pt's  was present and they both demonstrated understanding of the pt ed that was provided, and the goal of practicing tasks that she does at home (laundry, cooking, cleaning, gardening, etc) to make the best recommendations for safe dc home  Discussed the LTG of walking without an AD, however will need to assess fall risk factors during her stay  Pt ambulates with L dec foot clearance; she had difficulty getting into the car with lifting LLE in first and then sitting, versus sitting and then swinging her legs into the car  Pt had difficulty lfiting LLE up onto steps, even after acsending RLE first, resulting in her toes catching  Pt will benefit from skilled PT to further progress in these areas to prepare for a safe dc home and progress cristina GOMES  Pt presents with fall risk per [place]etti and TUG tests, and currently is ambulating at a speed lower than WNL      PT Therapy Minutes   PT Time In 1030   PT Time Out 1130   PT Total Time (minutes) 60   PT Mode of treatment - Individual (minutes) 60   PT Mode of treatment - Concurrent (minutes) 0   PT Mode of treatment - Group (minutes) 0   PT Mode of treatment - Co-treat (minutes) 0   PT Mode of Teatment - Total time(minutes) 60 minutes

## 2018-10-13 NOTE — PROGRESS NOTES
SLP TAA     10/13/18 1130   Patient Data   Rehab Impairment Impairment of mobility, safety, Activities of Daily Living (ADLs), and cognitive/communication skills due to Stroke   Etiologic Diagnosis Right MCA Infarct due to Right ICA/ MCA Occlusion s/p Right ICA Angioplasty and Stenting and MCA Thrombectomy   Date of Onset 10/06/18   Prior Level of Function   Functional Cognition 3  Independent - Patient completed the activities by him/herself, with or without an assistive device, with no assistance from a helper  Restrictions/Precautions   Precautions Aspiration;Cognitive; Fall Risk;Visual deficit   Pain Assessment   Pain Assessment No/denies pain   QI: Eating   Assistance Needed Set-up / clean-up   Assistance Provided by [place] No physical assistance   Eating CARE Score 5   Eating Assessment   Swallow Precautions Yes   Bedside Swallow Results Yes   VBS Study Results No   Food To Mouth Yes   Able To Cut Yes   Positioning Upright;Out of Bed   Safety Needs Cues   Meal Assessed Lunch   QI: Swallowing/Nutritional Status Modified food consistency   Current Diet Dysphia III; Thin   Intake Mode PO;Self   Dentures Upper; Lower   Finishes Timely Yes   Opens Packages Yes   Findings Pt demonstrating mild oropharyngeal dysphagia at this time  Refer to SLP Rehab note for details  Eating (FIM) 5 - Patient needs help to open contianers or set up tray   Transfer Bed/Chair/Wheelchair   Bed, Chair, Wheelchair Transfer (FIM) 4 - Patient requires steadying assist or light touching   Comprehension   Assist Devices Glasses   Auditory Basic;Complex   Visual Basic;Complex   Findings Pt completing formalized cognitive linguistic assessment  Refer to SLP Rehab note for details  QI: Comprehension 3  Usually Understands: Understands most conversations, but misses some part/intent of message  Requires cues at times to understand     Comprehension (FIM) 4 - Understands basic info/conversation 75-90% of time   Expression   Verbal Basic;Complex Non-Verbal Basic;Complex   Intelligibility Sentence   Findings Pt completing formalized cognitive linguistic assessment  Refer to SLP Rehab note for details  QI: Expression 4  Express complex messages without difficulty and with speech that is clear and easy to [place]   Expression (FIM) 5 - Needs help/cues only RARELY (< 10% of the time)   Social Interaction   Cooperation with staff   Participation Individual   Behaviors observed Appropriate   Findings Pt completing formalized cognitive linguistic assessment  Refer to SLP Rehab note for details  Social Interaction (FIM) 5 - Interacts appropriately with others 90% of time   Problem Solving   Complex Manages medications;Manages discharge planning   Routine Manages call bell   Findings Pt completing formalized cognitive linguistic assessment  Refer to SLP Rehab note for details  Problem solving (FIM) 4 - Solves basic problems 75-89% of time   Memory   Recognize People Yes   Initiates Tasks Yes   Short-Term Impaired   Long Term Intact   Findings Pt completing formalized cognitive linguistic assessment  Refer to SLP Rehab note for details  Memory (FIM) 4 - Recognizes/recalls/performs 75-89%   Discharge Information   Patient's Discharge Plan home w/ family support   Patient's Rehab Expectations "To get back to normal"   Barriers to Discharge Home Decreased Cognitive Function; Safety Considerations; Other  (L inattention/?neglect)   Impressions Pt is a good rehab candidate to achieve supervision level goals for cognitive skills upon anticipated d/c home w/ family support  Additionally, pt will benefit from SLP services to establish safest least restrictive diet w/o increased aspiration sxs  Pt to benefit from SLP services at this time to maximize skills      SLP Therapy Minutes   SLP Time In 1130   SLP Time Out 1230   SLP Total Time (minutes) 60   SLP Mode of treatment - Individual (minutes) 60   SLP Mode of treatment - Concurrent (minutes) 0   SLP Mode of treatment - Group (minutes) 0   SLP Mode of treatment - Co-treat (minutes) 0   SLP Mode of Teatment - Total time(minutes) 60 minutes

## 2018-10-13 NOTE — PROGRESS NOTES
Internal Medicine Progress Note  Patient: Tl Knox  Age/sex: 77 y o  female  Medical Record #: [id number]      ASSESSMENT/PLAN:  Tl Knox is seen and examined and mangement for following issues:    # Rt MCA CVA: Secondary to ICA/MCA occlusive disease s/p angioplasty, stenting and MCA thrombectomy  Pt was a Plavix non-responder and was loaded with Brillinta this AM  Check P2Y12 10/13/18  ASA 81mg to be resumed 10/13/18  Continue atorvastatin      # Rt ICA stenosis: s/p angioplasty and stenting  Pt will need a repeat CTA in 2 weeks      # Acute blood loss anemia: Pt was transfused with 1U PRBCs  Will continue to monitor Hgb      # Hypothyroidism: Continue levothyroxine  Subjective: Patient seen and examined  New or overnight issues      ROS:   GI: denies abdominal pain, change bowel habits or reflux symptoms  Neuro: No new neurologic changes  Respiratory: No Cough, SOB  Cardiovascular: No CP, palpitations     Scheduled Meds:    Current Facility-Administered Medications:  aspirin 81 mg Oral Daily Evelyn Beasley MD   atorvastatin 40 mg Oral QPM Evelyn Beasley MD   bisacodyl 10 mg Rectal Daily PRN Evelyn Beasley MD   docusate sodium 100 mg Oral BID Evelyn Beasley MD   famotidine 20 mg Oral BID Evelyn Beasley MD   heparin (porcine) 5,000 Units Subcutaneous [facility] Evelyn Beasley MD   levothyroxine 88 mcg Oral Early Morning Evelyn Beasley MD   polyethylene glycol 17 g Oral Daily PRN Evelyn Beasley MD   senna 2 tablet Oral Daily Evelyn Beasley MD   ticagrelor 90 mg Oral Q12H [address] Evelyn Beasley MD       Labs:       Results from last 7 days  Lab Units 10/13/18  0708 10/10/18  0616   WBC Thousand/uL 6 58 6 78   HEMOGLOBIN g/dL 9 3* 9 0*   HEMATOCRIT % 28 5* 26 6*   PLATELETS Thousands/uL 368 268       Results from last 7 days  Lab Units 10/13/18  0708 10/10/18  0616   SODIUM mmol/L 138 139   POTASSIUM mmol/L 4 3 3 6   CHLORIDE mmol/L 107 107   CO2 mmol/L 27 27   BUN mg/dL 13 10   CREATININE mg/dL 0 68 0  62   CALCIUM mg/dL 8 7 8 5       Results from last 7 days  Lab Units 10/08/18  0501   HEMOGLOBIN A1C % 5 0            Glucose, i-STAT (mg/dl)   Date Value   10/06/2018 149 (H)       Labs reviewed    Physical Examination:  Vitals:   Vitals:    10/12/18 2104 10/12/18 2235 10/13/18 0703 10/13/18 1307   BP: (!) 176/61 137/69 122/69 152/75   BP Location: Left arm Left arm Right arm Right arm   Pulse: 102 102 79 103   Resp: 18 18 18   Temp: 98 1 °F (36 7 °C)  98 3 °F (36 8 °C) 98 6 °F (37 °C)   TempSrc: Oral  Oral Oral   SpO2: 98%  98% 99%   Weight:       Height:           PERRLA EOMI no JVD  RESP: CTAB, no R/R/W  CV: +S1 S2, regular rate, no rubs  ABD: soft, NT, ND, normal BS   EXT:no edema  Neuro: AxOx3      [x ] Total time spent: 30 Mins and greater than 50% of this time was spent counseling/coordinating care  ** Please Note: Dragon 360 Dictation voice to text software may have been used in the creation of this document   **

## 2018-10-13 NOTE — PROGRESS NOTES
SLP Cognitive and Dysphagia Assessments       10/13/18 1130   Pain Assessment   Pain Assessment No/denies pain   Restrictions/Precautions   Precautions Aspiration;Cognitive; Fall Risk;Visual deficit   Cognitive Linguisitic Assessments   Cognitive Linguistic Quick Test (CLQT) Pt completed the CLQT+  Overall severity rating when compared to age matched peers between 65-80 yrs of age was 2 8, which indicates mild cognitive linugistic deficits  The following subtest scores are as follows: Attention: score of 103, indicating mild deficits; Memory: score of 116, indicating mild deficits; Executive Functions: score of 10, indicating moderate deficits; Language: score of 28, indicating WNL; Visuospatial Skills: score of 29, indicating moderate deficits and Clock Drawing: score of 11, indicating skills to be WNL  However, pt noted to have increased difficulty in scanning to the L on many written tasks  Pt was below criterion cutoff score on 4 out of 10 items  Pt w/ some insight to deficits, primarily due to decreased attention (?possible visual field cut) to the L  Will benefit from SLP services to maximoze cognitive linguistc skills at this time  Memory Skills   Memory (FIM) 4 - Recognizes/recalls/performs 75-89%   Social Interaction (FIM) 5 - Interacts appropriately with others 90% of time   Speech/Swallow Mechanism Exam   Labial Symmetry Abnormal symmetry left   Labial Strength WFL   Labial ROM WFL   Facial Symmetry Left droop   Facial Strength WFL   Facial ROM WFL   Lingual Symmetry WFL   Lingual Strength WFL   Lingual ROM WFL   Dentition Dentures top;Dentures bottom   Volitional Cough Strong   Speech/Language/Cognition Assessmetn   Treatment Assessment Pt completing formalized cognitive assessment, CLQT+  Overall cognitive skills are deemeded to be mildly impaired at this time  Pt will benefit from SLP services at this time to maximize skills   Educated both pt and pt's  about SLP services and are in agreement to complete sessions at this time  Swallow Information   Current Risks for Dysphagia & Aspiration Dysarthria;General debilitation;New Neuro event;Brain injury;Cognitive deficit   Current Symptoms/Concerns Cough;Clear throat;During meals;Decreased oral intake   Current Diet Dysphagia advance; Thin liquid   Baseline Diet Regular; Thin liquids   Consistencies Assessed and Performance   Materials Admnistered Soft/Level 3;Regular/Solid; Thin liquid   Oral Stage Mild impaired   Phargngeal Stage Mild impaired;Aspiration risk   Swallow Mechanics Mild delayed;Swallow initation;Good [place] rise;Aspiration risk   Esophageal Concerns No s/s reported   Recommendations   Diet Solid Recommendation Regular consistency   Diet Liquid Recommendation Thin liquid   Recommended Form of Meds As tolerated   General Precautions Aspiration precautions; Feed only when alert;Minimize distractions;Upright as possible for all oral intake;Remain upright for 45 mins after meals; Other (Comment)  (OOB for meals)   Compensatory Swallowing Strategies Place food/straw in on right side; Alternate solids and liquids; Check for pocketing of food on the left;Cue for lingual sweep;Voluntary throat clear/cough to clear penetration   Results Reviewed with RN;PT/Family/Caregiver   QI: Eating   Assistance Needed Set-up / clean-up   Assistance Provided by [place] No physical assistance   Eating CARE Score 5   Swallow Assessment   Swallow Treatment Assessment Pt observed w/ lunch, where current diet is level 3 w/ thin liquids but trial tray of regular diet was ordered  Pt was setup w/ tray but consumed 25% of meal and 240cc of thin liquids by cup  Pt demonstrating functional mastication of soft and solids (chicken noodle soup, turkey wrap w/ lettuce and tomato, peaches) w/o increased oral residual/pocketing  No anterior spillage noted w/ consistencies   Questions decreased bolus control/transfer of ambient/thin liquids from soup by tsp, due to increased throat clearing elicited w/ consistency  Of note, a-p transfer of thins by cup was [facility]  Swallow initiation was prompt w/ thins but mildly delayed w/ soft/solids  Hyolaryngeal elevation was [facility]  Again, throat clearing elicited only after ambient fluid of mixed when taken by tsp only (no noodles, etc on tsp when taking broth)  No overt signs/sx of aspiration w/ remainder of meal or thins by cup  Will recommend to upgrade diet to regular w/ thin liquids at this time  Will benefit from SLP services to establish safet least restrictive diet at this time  Swallow Assessment Prognosis   Prognosis Good   Prognosis Considerations Co-morbidities; Medical prognosis; Severity of impairments;New learning ability   SLP Therapy Minutes   SLP Time In 1130   SLP Time Out 1230   SLP Total Time (minutes) 60   SLP Mode of treatment - Individual (minutes) 60   SLP Mode of treatment - Concurrent (minutes) 0   SLP Mode of treatment - Group (minutes) 0   SLP Mode of treatment - Co-treat (minutes) 0   SLP Mode of Teatment - Total time(minutes) 60 minutes   Therapy Time missed   Time missed?  No   Daily FIM Score   Problem solving (FIM) 4 - Solves basic problems 75-89% of time   Comprehension (FIM) 4 - Understands basic info/conversation 75-90% of time   Expression (FIM) 5 - Needs help/cues only RARELY (< 10% of the time)   Eating (FIM) 5 - Patient needs help to open contianers or set up tray

## 2018-10-13 NOTE — PROGRESS NOTES
OT EVALUATION   10/13/18 0700   Patient Data   Rehab Impairment Impairment of mobility, safety, Activities of Daily Living (ADLs), and cognitive/communication skills due to Stroke   Etiologic Diagnosis Right MCA Infarct due to Right ICA/ MCA Occlusion s/p Right ICA Angioplasty and Stenting and MCA Thrombectomy   Date of Onset 10/06/18   Home Setup   Type of Home Multi Level   Method of Entry Stairs   Number of Stairs 1   Number of Stairs in Home 12   In Home Hand Rail Right;Left  (R HR ascending for 1/2 steps, L HR for 1/2)   First Floor Bathroom Full;Tub; Shower   First Floor Setup Available Yes   Available Equipment (none, no h/o use)   Prior IADL Participation   Meal Preparation Full Participation   Laundry Full Participation   Home Cleaning Full Participation   Prior Level of Function   Self-Care 3  Independent - Patient completed the activities by him/herself, with or without an assistive device, with no assistance from a helper  Functional Cognition 3  Independent - Patient completed the activities by him/herself, with or without an assistive device, with no assistance from a helper  Psychosocial   Psychosocial (WDL) WDL   Patient Behaviors/Mood Appropriate for situation;Brightens with approach   Restrictions/Precautions   Precautions Fall Risk   Pain Assessment   Pain Assessment No/denies pain   Pain Score No Pain   QI: Eating   Assistance Needed Supervision;Verbal cues   Eating CARE Score 4   Eating Assessment   Findings cues to attend to L field 2* visual deficits and dec attention   Eating (FIM) 5 - Patient only requires cueing to complete tube feeds   QI: Oral Hygiene   Assistance Needed Incidental touching   Oral Hygiene CARE Score 4   Grooming   Able To Initiate Tasks;Comb/Brush Hair;Wash/Dry Face;Brush/Clean Teeth;Wash/Dry Hands; Acquire Items   Limitation Noted In Safety  (balance, vision)   Findings Pt completed in stance at sink [facility] level with no LOB noted with UE release   Grooming (FIM) 4 - Patient requires steadying assist or light touching   QI: Shower/Bathe Self   Assistance Needed Incidental touching   Shower/Bathe Self CARE Score 4   Bathing   Assessed Bath Style Sponge Bath   Anticipated D/C Bath Style Shower   Able to [place] No   Able to [employer] Temperature Yes   Able to Wash/Rinse/Dry (body part) Left Arm;Right Arm;L Upper Leg;R Upper Leg;L Lower Leg/Foot;R Lower Leg/Foot; Abdomen;Perineal Area; Chest;Buttocks   Limitations Noted in Balance;Vision   Positioning Seated;Standing   Findings  Pt completed sponge bathing 2* IV in place; pt in stance at sink with CGA for steadying   Bathing (FIM) 4 - Patient requires steadying assist or light touching   QI: Upper Body Dressing   Assistance Needed Incidental touching   Upper Body Dressing CARE Score 4   QI: Lower Body Dressing   Assistance Needed Physical assistance   Assistance Provided by [place] 25%-49%   Lower Body Dressing CARE Score 3   QI: Putting On/Taking Off Footwear   Assistance Needed Incidental touching   Putting On/Taking Off Footwear CARE Score 4   QI: Picking Up Object   Reason if not Attempted Safety concerns   Picking Up Object CARE Score 88   Dressing/Undressing Clothing   Remove UB Clothes Pullover Shirt   Remove LB Clothes Pants; Undergarment;Socks   [address]; Undergarment;Socks   Limitations Noted In Balance; Safety;Strength;ROM; Timeliness   Positioning Supported Sit;Standing   Findings Pt completed dressing tasks in stance with steadying assistance for all aspects  Pt req A to don/doff clothing over L LE 2* weakness   UB Dressing (FIM) 5 - Patient requires supervision/monitoring   LB Dressing (FIM) 3 - Patient completes  50-74% of all tasks   QI: [address] Needed Incidental touching   [address] Score 4   Toileting   Able to [address] down yes, up yes     Able to [address] Yes   Manage Hygiene Bladder   Findings Pt reports having hard time having BM, reports she drinks coffee at home which "helps"   Toileting (FIM) 4 - Patient completes 75% of all tasks   QI: Roll Left and Right   Assistance Needed Supervision   Roll Left and Right CARE Score 4   QI: Lying to Sitting on Side of Bed   Assistance Needed Physical assistance   Assistance Provided by [place] 25%-49%   Lying to Sitting on Side of Bed CARE Score 3   QI: Sit to Stand   Assistance Needed Incidental touching   Sit to Stand CARE Score 4   QI: Chair/Bed-to-Chair Transfer   Assistance Needed Incidental touching   Chair/Bed-to-Chair Transfer CARE Score 4   Transfer Bed/Chair/Wheelchair   Limitations Noted In Balance; Endurance;LE Strength   Adaptive Equipment Roller [employer] use of RW;   [employer], Chair, Wheelchair Transfer (FIM) 4 - Patient requires steadying assist or light touching   QI: Toilet Transfer   Assistance Needed Incidental touching   Toilet Transfer CARE Score 4   Toilet Transfer   Toilet Transfer (FIM) 4 - Patient requires steadying assist or light touching   Tub/Shower Transfer   Not Assessed Medical;Sponge Bath   Findings IV in place   Comprehension   QI: Comprehension 4  Undestands: Clear comprehension without cues or repetitions   Comprehension (FIM) 6 - Has only MILD difficulty with complex/abstract info   Expression   QI: Expression 4  Express complex messages without difficulty and with speech that is clear and easy to [place]   Expression (FIM) 7 - Expresses complex/abstract ideas in a reasonable time w/o devices or helper     Social Interaction   Social Interaction (FIM) 5 - Needs monitoring/encouragement  to participate/interact   Problem Solving   Problem solving (FIM) 5 - Solves complex problems But requires cues from helper   Memory   Memory (FIM) 6 - Recognizes with extra time   LUE Assessment   LUE Assessment X  (generalized weakness, ROM WFL)   Cognition   Comments Pt at times demo dec insight into deficits which improves with education   Vision Vision Comments Pt with noted L visual field cut and suspected L inattention, cues to attend to L visual field and body with xfers and mobilty   Objective Measure   OT Findings vision screen to be completed   Discharge Information   Vocational Plan Retired/not working   Patient's Discharge Plan return home with spouse   Patient's Rehab Expectations "to get back to my normal life and hopefully walk without this walker"   Barriers to Discharge Home Decreased Strength; Safety Considerations   Impressions Pt is a 76 y/o female s/p Right MCA Infarct due to Right ICA/ MCA Occlusion s/p Right ICA Angioplasty and Stenting and MCA Thrombectomy  Pt presenting with slurred speech and dec balance,endurance, and strength at home, unable to get OOB in which pt's spouse called EMS  PTA, pt reports she was fully indep and completed all ADLs/IADLs indep with no physical assist required  Pt currently presents with deficits in balance, vision/attention, LUE hemiparesis/impaired coordination, and overall safety limiting indep with ADL fxn and xfers  Pt lives with her spouse who is retired and home 24/7 to assist prn  Pt is G rehab candidate to achieve mod I/S goals in 7-10 days     OT Therapy Minutes   OT Time In 0700   OT Time Out 0830   OT Total Time (minutes) 90   OT Mode of treatment - Individual (minutes) 90   OT Mode of treatment - Concurrent (minutes) 0   OT Mode of treatment - Group (minutes) 0   OT Mode of treatment - Co-treat (minutes) 0   OT Mode of Teatment - Total time(minutes) 90 minutes

## 2018-10-14 PROCEDURE — 97530 THERAPEUTIC ACTIVITIES: CPT

## 2018-10-14 PROCEDURE — 97110 THERAPEUTIC EXERCISES: CPT

## 2018-10-14 PROCEDURE — 97112 NEUROMUSCULAR REEDUCATION: CPT

## 2018-10-14 PROCEDURE — 92526 ORAL FUNCTION THERAPY: CPT

## 2018-10-14 RX ADMIN — FAMOTIDINE 20 MG: 40 POWDER, FOR SUSPENSION ORAL at 09:02

## 2018-10-14 RX ADMIN — DOCUSATE SODIUM 100 MG: 100 CAPSULE, LIQUID FILLED ORAL at 17:22

## 2018-10-14 RX ADMIN — TICAGRELOR 90 MG: 90 TABLET ORAL at 09:02

## 2018-10-14 RX ADMIN — HEPARIN SODIUM 5000 UNITS: 5000 INJECTION INTRAVENOUS; SUBCUTANEOUS at 13:13

## 2018-10-14 RX ADMIN — SENNOSIDES 17.2 MG: 8.6 TABLET, FILM COATED ORAL at 09:02

## 2018-10-14 RX ADMIN — FAMOTIDINE 20 MG: 40 POWDER, FOR SUSPENSION ORAL at 17:22

## 2018-10-14 RX ADMIN — TICAGRELOR 90 MG: 90 TABLET ORAL at 21:20

## 2018-10-14 RX ADMIN — ATORVASTATIN CALCIUM 40 MG: 40 TABLET, FILM COATED ORAL at 17:22

## 2018-10-14 RX ADMIN — DOCUSATE SODIUM 100 MG: 100 CAPSULE, LIQUID FILLED ORAL at 09:02

## 2018-10-14 RX ADMIN — LEVOTHYROXINE SODIUM 88 MCG: 88 TABLET ORAL at 06:49

## 2018-10-14 RX ADMIN — HEPARIN SODIUM 5000 UNITS: 5000 INJECTION INTRAVENOUS; SUBCUTANEOUS at 06:49

## 2018-10-14 RX ADMIN — ASPIRIN 81 MG: 81 TABLET, COATED ORAL at 09:02

## 2018-10-14 RX ADMIN — HEPARIN SODIUM 5000 UNITS: 5000 INJECTION INTRAVENOUS; SUBCUTANEOUS at 21:21

## 2018-10-14 NOTE — PLAN OF CARE
Activity Intolerance/Impaired Mobility     Mobility/activity is maintained at optimum level for patient Progressing        Communication Impairment     Ability to express needs and understand communication [address] Discharge to home or other facility with appropriate resources Progressing        INFECTION - ADULT     Absence or prevention of progression during hospitalization Progressing     Absence of fever/infection during neutropenic period Progressing        Knowledge Deficit     Patient/family/caregiver demonstrates understanding of disease process, treatment plan, medications, and discharge instructions Progressing        Neurological Deficit     Neurological status is stable or improving Progressing        Nutrition     Nutrition/Hydration status is improving Progressing        Nutrition/Hydration-ADULT     Nutrient/Hydration intake appropriate for improving, restoring or maintaining nutritional needs Progressing        PAIN - ADULT     Verbalizes/displays adequate comfort level or baseline comfort level Progressing        Potential for Aspiration     Non-ventilated patient's risk of aspiration is minimized Progressing     Ventilated patient's risk of aspiration is minimized Progressing        Potential for Falls     Patient will remain free of falls Progressing        Prexisting or High Potential for Compromised Skin Integrity     Skin integrity is maintained or improved Progressing        SAFETY ADULT     Maintain or return to baseline ADL function Progressing     Maintain or return mobility status to optimal level Progressing

## 2018-10-14 NOTE — PROGRESS NOTES
10/14/18 8589   Pain Assessment   Pain Assessment No/denies pain   Pain Score No Pain   Restrictions/Precautions   Precautions Aspiration;Cognitive; Fall Risk;Visual deficit   Weight Bearing Restrictions No   ROM Restrictions No   QI: Sit to Stand   Assistance Needed Incidental touching   Assistance Provided by [place] No physical assistance   Sit to Stand CARE Score 4   QI: Chair/Bed-to-Chair Transfer   Assistance Needed Incidental touching   Assistance Provided by [place] No physical assistance   Chair/Bed-to-Chair Transfer CARE Score 4   Transfer Bed/Chair/Wheelchair   Limitations Noted In Balance; Endurance;LE Strength   Adaptive Equipment None   Bed, Chair, Wheelchair Transfer (FIM) 4 - Patient requires steadying assist or light touching   Exercise Tools   UE Ergometer Pt completes scifit at level 1 for 5 minutes prograde and 5 minutes retrograde to increase overall UB strength, endurance, and  strength  Pt requires VC's to sustain cylindrical  on L handle  Pt able to reposition independently after cues to identify it was slipping  Theraputty Pt completes bead retrieval from medium soft theraputty with focus on use of LUE to promote  strength and coordination  pt noted to drop 3 beads  Container placed to pt's L side to promote visual scanning to L side  Cognition   Overall Cognitive Status WFL   Arousal/Participation Cooperative   Attention Within functional limits   Orientation Level Oriented X4   Memory Within functional limits   Following Commands Follows one step commands without difficulty   Vision   Visual Field Cut Compensatory techniques;Education   Visual Field Cut Comments Pt engaged in various visual scanning activities  Pt given visual scanning sheet and was asked to find all of the letter O's without use of any compensatory strategies  Pt missed 2 O's  Pt then given same worksheet post education on compensatory strategies and was able to locate 100% of the letter K's   Pt educated on using other pages to occlude other lines to focus on one line at a time, moving paper into R visual field, and using tracker on L side to ensure she is scanning all the way to the L  Pt with increased independence noted  Pt issued visual perceptual activity of circling the item that is different with 90% accuracy  When other items were occluded and in her R visual field she was able to correct error  Activity Tolerance   Activity Tolerance Patient tolerated treatment well   Assessment   Treatment Assessment Pt participated in skilled OT services with focus on visual compensatory strategies, functional mobility, and LUE coordination/strengthening  Pt demos L neglect throughout session and L visual field cut, refer above for details  Pt is insightful into her visual deficits and demos G carryover of compensatory strategies by end of session  Pt reports having difficulty managing her Android smartphone 2* to visual deficits, may benefit from further assessment next session to increase independence with managing  Pt will continue to benefit from skilled OT services with focus on visual compensatory techniques, standing balance, L attention, and LUE strength/coordination  Prognosis Good   Problem List Decreased strength;Decreased endurance; Impaired balance;Decreased mobility; Decreased coordination   Plan   Treatment/Interventions ADL retraining;Functional transfer training; Therapeutic exercise; Endurance training;Equipment eval/education; Compensatory technique education   Progress Progressing toward goals   Recommendation   OT Discharge Recommendation Home with family support   OT Therapy Minutes   OT Time In 0830   OT Time Out 1000   OT Total Time (minutes) 90   OT Mode of treatment - Individual (minutes) 90   OT Mode of treatment - Concurrent (minutes) 0   OT Mode of treatment - Group (minutes) 0   OT Mode of treatment - Co-treat (minutes) 0   OT Mode of Teatment - Total time(minutes) 90 minutes   Therapy Time missed   Time missed?  No

## 2018-10-14 NOTE — PROGRESS NOTES
10/14/18 0800   Pain Assessment   Pain Assessment No/denies pain   Restrictions/Precautions   Precautions Aspiration;Cognitive; Fall Risk;Visual deficit   Swallow Information   Current Risks for Dysphagia & Aspiration General debilitation;New Neuro event;Brain injury;Cognitive deficit; Dysarthria   Current Symptoms/Concerns Clear throat;During meals   Current Diet Dysphagia advance; Thin liquid   Baseline Diet Regular; Thin liquids   Consistencies Assessed and Performance   Materials Admnistered Soft/Level 3;Regular/Solid; Thin liquid   Oral Stage WFL   Phargngeal Stage WFL   Swallow Mechanics WFL;Swallow initation; Appears prompt;Good Larygneal rise   Recommendations   Diet Solid Recommendation Regular consistency   Diet Liquid Recommendation Thin liquid   Recommended Form of Meds As tolerated   General Precautions Aspiration precautions; Feed only when alert;Minimize distractions;Upright as possible for all oral intake;Remain upright for 45 mins after meals; Other (Comment)  (OOB for meals)   Compensatory Swallowing Strategies Alternate solids and liquids;Voluntary throat clear/cough to clear penetration   QI: [facility] Provided by [place] No physical assistance   Eating CARE Score 6   Swallow Assessment   Swallow Treatment Assessment Pt observed w/ breakfast, where it was recommended for diet to be upgraded but order still not completed  TRay received was regular w/ thin liquids  Pt was able to setup tray and feed self w/o difficulty  Consumed 50% of meal and 180cc of thins by cup  Mastication of soft/solids (Yakut toast, doyle, home fries, fresh fruit cup) was full/functional w/o exhibiting oral residual/pocketing  No anterior spillage noted w/ consistencies  A-p transfer of thins by cup was [facility] today  Swallow initiation was prompt and hyolrayngeal elevation was [facility]  No overt signs/sxs of aspiration noted w/ meal today   Will continue to recommend upgrade to regular diet w/ thin liquids at this time  Will f/u briefly to monitor tolerance of diet upgrade  Swallow Assessment Prognosis   Prognosis Good   Prognosis Considerations Co-morbidities; Medical prognosis   SLP Therapy Minutes   SLP Time In 0800   SLP Time Out 0830   SLP Total Time (minutes) 30   SLP Mode of treatment - Individual (minutes) 30   SLP Mode of treatment - Concurrent (minutes) 0   SLP Mode of treatment - Group (minutes) 0   SLP Mode of treatment - Co-treat (minutes) 0   SLP Mode of Teatment - Total time(minutes) 30 minutes   Therapy Time missed   Time missed?  No   Daily FIM Score   Eating (FIM) 6 - Patient requires modified diet

## 2018-10-14 NOTE — PROGRESS NOTES
Internal Medicine Progress Note  Patient: Rika Chinchilla  Age/sex: 77 y o  female  Medical Record #: [id number]      ASSESSMENT/PLAN:  Rika Chinchilla is seen and examined and mangement for following issues:    # Rt MCA CVA: Secondary to ICA/MCA occlusive disease s/p angioplasty, stenting and MCA thrombectomy  Pt was a Plavix non-responder and was loaded with Brillinta this AM  Check P2Y12 10/13/18  ASA 81mg to be resumed 10/13/18  Continue atorvastatin      # Rt ICA stenosis: s/p angioplasty and stenting  Pt will need a repeat CTA in 2 weeks      # Acute blood loss anemia: Pt was transfused with 1U PRBCs  Will continue to monitor Hgb      # Hypothyroidism: Continue levothyroxine  Subjective: Patient seen and examined  New or overnight issues      ROS:   GI: denies abdominal pain, change bowel habits or reflux symptoms  Neuro: No new neurologic changes  Respiratory: No Cough, SOB  Cardiovascular: No CP, palpitations     Scheduled Meds:    Current Facility-Administered Medications:  aspirin 81 mg Oral Daily Michael Zhang MD   atorvastatin 40 mg Oral QPM Michael Zhang MD   bisacodyl 10 mg Rectal Daily PRN Bunny Vazquez DO   docusate sodium 100 mg Oral BID Michael Zhang MD   famotidine 20 mg Oral BID Michael Zhang MD   heparin (porcine) 5,000 Units Subcutaneous [facility] Michael Zhang MD   levothyroxine 88 mcg Oral Early Morning Michael Zhang MD   polyethylene glycol 17 g Oral Daily PRN Bunny Vazquez DO   senna 2 tablet Oral Daily Michael Zhang MD   ticagrelor 90 mg Oral Q12H Rishabh Emery MD       Labs:       Results from last 7 days  Lab Units 10/13/18  0708 10/10/18  0616   WBC Thousand/uL 6 58 6 78   HEMOGLOBIN g/dL 9 3* 9 0*   HEMATOCRIT % 28 5* 26 6*   PLATELETS Thousands/uL 368 268       Results from last 7 days  Lab Units 10/13/18  0708 10/10/18  0616   SODIUM mmol/L 138 139   POTASSIUM mmol/L 4 3 3 6   CHLORIDE mmol/L 107 107   CO2 mmol/L 27 27   BUN mg/dL 13 10   CREATININE mg/dL 0 68 0 62 CALCIUM mg/dL 8 7 8 5       Results from last 7 days  Lab Units 10/08/18  0501   HEMOGLOBIN A1C % 5 0              Glucose, i-STAT (mg/dl)   Date Value   10/06/2018 149 (H)       Labs reviewed    Physical Examination:  Vitals:   Vitals:    10/13/18 0703 10/13/18 1307 10/13/18 2020 10/14/18 0500   BP: 122/69 152/75 127/62 128/59   BP Location: Right arm Right arm Left arm    Pulse: 79 103 77 85   Resp: 18 18 18 20   Temp: 98 3 °F (36 8 °C) 98 6 °F (37 °C) 98 °F (36 7 °C) 98 4 °F (36 9 °C)   TempSrc: Oral Oral Oral Oral   SpO2: 98% 99% 98% 98%   Weight:       Height:           PERRLA EOMI no JVD  RESP: CTAB, no R/R/W  CV: +S1 S2, regular rate, no rubs  ABD: soft, NT, ND, normal BS   EXT:no edema  Neuro: AxOx3      [x ] Total time spent: 30 Mins and greater than 50% of this time was spent counseling/coordinating care  ** Please Note: Dragon 360 Dictation voice to text software may have been used in the creation of this document   **

## 2018-10-15 LAB — HEMOCCULT STL QL: NEGATIVE

## 2018-10-15 PROCEDURE — 97530 THERAPEUTIC ACTIVITIES: CPT

## 2018-10-15 PROCEDURE — 97112 NEUROMUSCULAR REEDUCATION: CPT

## 2018-10-15 PROCEDURE — 82272 OCCULT BLD FECES 1-3 TESTS: CPT | Performed by: PHYSICAL MEDICINE & REHABILITATION

## 2018-10-15 PROCEDURE — 99232 SBSQ HOSP IP/OBS MODERATE 35: CPT | Performed by: PHYSICAL MEDICINE & REHABILITATION

## 2018-10-15 PROCEDURE — 97535 SELF CARE MNGMENT TRAINING: CPT

## 2018-10-15 PROCEDURE — G0515 COGNITIVE SKILLS DEVELOPMENT: HCPCS

## 2018-10-15 RX ORDER — HYDRALAZINE HYDROCHLORIDE 10 MG/1
10 TABLET, FILM COATED ORAL EVERY 8 HOURS PRN
Status: DISCONTINUED | OUTPATIENT
Start: 2018-10-15 | End: 2018-10-23 | Stop reason: HOSPADM

## 2018-10-15 RX ADMIN — HEPARIN SODIUM 5000 UNITS: 5000 INJECTION INTRAVENOUS; SUBCUTANEOUS at 05:50

## 2018-10-15 RX ADMIN — HEPARIN SODIUM 5000 UNITS: 5000 INJECTION INTRAVENOUS; SUBCUTANEOUS at 21:35

## 2018-10-15 RX ADMIN — TICAGRELOR 90 MG: 90 TABLET ORAL at 08:58

## 2018-10-15 RX ADMIN — ATORVASTATIN CALCIUM 40 MG: 40 TABLET, FILM COATED ORAL at 18:31

## 2018-10-15 RX ADMIN — TICAGRELOR 90 MG: 90 TABLET ORAL at 21:35

## 2018-10-15 RX ADMIN — ASPIRIN 81 MG: 81 TABLET, COATED ORAL at 08:58

## 2018-10-15 RX ADMIN — DOCUSATE SODIUM 100 MG: 100 CAPSULE, LIQUID FILLED ORAL at 08:58

## 2018-10-15 RX ADMIN — LEVOTHYROXINE SODIUM 88 MCG: 88 TABLET ORAL at 05:50

## 2018-10-15 RX ADMIN — HEPARIN SODIUM 5000 UNITS: 5000 INJECTION INTRAVENOUS; SUBCUTANEOUS at 14:31

## 2018-10-15 RX ADMIN — FAMOTIDINE 20 MG: 40 POWDER, FOR SUSPENSION ORAL at 08:58

## 2018-10-15 RX ADMIN — FAMOTIDINE 20 MG: 40 POWDER, FOR SUSPENSION ORAL at 18:31

## 2018-10-15 RX ADMIN — DOCUSATE SODIUM 100 MG: 100 CAPSULE, LIQUID FILLED ORAL at 18:31

## 2018-10-15 RX ADMIN — SENNOSIDES 17.2 MG: 8.6 TABLET, FILM COATED ORAL at 08:58

## 2018-10-15 NOTE — PROGRESS NOTES
10/15/18 1230   Pain Assessment   Pain Assessment 0-10   Pain Score No Pain   Restrictions/Precautions   Precautions Fall Risk;Visual deficit   Lifestyle   Reciprocal Relationships pt's spouse present   QI: Oral Hygiene   Assistance Needed Supervision   Oral Hygiene CARE Score 4   Grooming   Findings CS in stance at sink with b/l UE release, cues to visually target soap oriented on pt's L   Grooming (FIM) 5 - Patient requires verbal cues   QI: Sit to [address] to Lying CARE Score 4   QI: Lying to Sitting on Side of Bed   Assistance Needed Supervision   Lying to Sitting on Side of Bed CARE Score 4   QI: Sit to Stand   Assistance Needed Supervision   Sit to Stand CARE Score 4   Transfer Bed/Chair/Wheelchair   Findings CS with no AD   Bed, Chair, Wheelchair Transfer (FIM) 5 - Patient requires supervision/monitoring   QI: [address] Needed Supervision   Toileting Hygiene CARE Score 4   Toileting   Toileting (FIM) 5 - Patient requires supervision/monitoring   QI: Toilet Transfer   Assistance Needed Supervision   Toilet Transfer CARE Score 4   Toilet Transfer   Findings CS no AD, pt noted hitting wall on L; pt able to self correct, cues to scan L<>R for environmental barriers   Toilet Transfer (FIM) 5 - Patient requires supervision/monitoring   Functional Standing Tolerance   Activity Xbox Kinect:    Comments pt engaged in standing balance and L attention task to play game of "Break the Glass"  Pt req to complete fxnl reaching and attend to all visual fields in stance  pt with dec attention to items oriented to the L of midline  CGA for all tasks   Vision   Vision Comments Pt engaged in visual field screen to identify areas of visual deficit and visual perceptual impairment  OT placed paper tape on pt's glasses and asked to identify items outside of occluded visual field  oculomotor skills and visual field WFL on pt's R eye   Pt with noted impaired L periphery with suspected visual field cut oriented to L of pt's L visual field of L eye  Pt provided with edu on need to turn head to compensate for visual field deficit  CUes for carryover with fxnl tasks   Perception   Visual Closure 25pc Puzzle: Pt engaged in 25 pc puzzle for visual perceptual task to inc visual closure skills, inc L attention and to inc carryover of visual scanning compensatory strategy  Pt req inc time and max VCs to initiate puzzle and place pieces appropriately  Pt fading to mod VCs as visual closure demands decreased    Assessment   Treatment Assessment Pt has demonstrated improved fxnl xfers and ADL fxn, pts spouse present for tx  OT spoke with PT and pt and spouse appropriate for FT  OT edu on need to stay on pt's L and CGA for fxnl xfers and mobility/ADL fxn in room  Pts spouse demo G understadning and demo ability to provide CGA  OT Therapy Minutes   OT Time In 1230   OT Time Out 1410   OT Total Time (minutes) 100   OT Mode of treatment - Individual (minutes) 100   OT Mode of treatment - Concurrent (minutes) 0   OT Mode of treatment - Group (minutes) 0   OT Mode of treatment - Co-treat (minutes) 0   OT Mode of Teatment - Total time(minutes) 100 minutes   Therapy Time missed   Time missed?  No

## 2018-10-15 NOTE — TREATMENT PLAN
Individualized Plan of [facility] 77 y o  female MRN: [id number]  Unit/Bed#: -01 Encounter: [id number]     PATIENT INFORMATION  ADMISSION DATE: 10/12/2018  4:27 PM LUKE CATEGORY:Stroke:  01 1  Left Body Involvement (Right Brain)   ADMISSION DIAGNOSIS: CVA (cerebral vascular accident) ([facility] [address] ) [I63 9]  EXPECTED LOS: 10-14 days     MEDICAL/FUNCTIONAL PROGNOSIS  Based on my assessment of the patient's medical conditions and current functional status, the prognosis for attaining medical and functional goals or the IRF stay is:  Good    Medical Goals: Patient will be able to manage medical conditions and comorbid conditions with medications and follow up upon completion of rehab program    [address]: Home - with supervision     ANTICIPATED FOLLOW-UP SERVICE:   Outpatient Therapy Services: PT and OT        DISCIPLINE SPECIFIC PLANS:  Required Disciplines & Services: Rehabillitation Nursing, Case Management and Psychology    REQUIRED THERAPY:  Therapy Hours per Day Days per Week Total Days   Physical Therapy 1 5 5   Occupational Therapy 1 5 5   Speech/Language Therapy 1 5 5   NOTE: Additional therapy time(s) may be added as appropriate to meet patient needs and to achieve functional goals      Patient will either participate in above therapy regimen or participate in 900 minutes of therapy within 7 day week consisting of PT, OT and SLP due to the following medical procedure/condition:Stroke:  01 1  Left Body Involvement (Right Brain)    ANTICIPATED FUNCTIONAL OUTCOMES:  ADL: Patient will be independent with ADLs with least restrictive device upon completion of rehab program   Bladder/Bowel: Patient will be independent with bladder/bowel management with least restrictive device upon completion of rehab program   Transfers: Patient will be independent with transfers with least restrictive device upon completion of rehab program Locomotion: Patient will be independent with locomotion with least restrictive device upon completion of rehab program   Cognitive: Patient will require supervision for basic and complex tasks upon completion of rehab program     DISCHARGE PLANNING NEEDS  Equipment needs: No needs      REHAB ANTICIPATED PARTICIPATION RESTRICTIONS:  None

## 2018-10-15 NOTE — PROGRESS NOTES
Physical Medicine and Rehabilitation Progress Note  Clotilde Prior 77 y o  female MRN: [id number]  Unit/Bed#: -01 Encounter: [id number]    HPI: Marylene Scot a 77 y o  female who [address] 10/6 after developing slurred speech and left sided weakness  She has a past medical history significant for hypothyroidism  She was found to have a R MCA M1 occlusion, as wellas right ICa occlusion  tPA was not administered, but she did undergo urgent endovascular inetrvention with balloon angioplasty and stenting of the right ICA origin with approximately 40% residual stenosis remaining through the stented segment  Non-flow limiting thrombus remained just above the stent  She had clear improvement in her speech and intellgibility following her procedure  She was transitioned to DAPT with aspirin and Plavix  Echo showed EF 55% with no RWMA or other embolic source  10/7 MRI Brain showed extensive recent infarcts in the right hemisphere including right centrum semiovale and lentiform nucleus, right temporal lobe/temporal opercular junction, and right occipital cortex  No hemorrhage  On 10/8, her hemoglobin dropped to 7 5, requiring a transfusion with 1 unit of pRBCs  Her hemoglobin remained stable for the remainder of her stay  A repeat CTA of the head and neck showed some filling defect, presumed to be a non-occlusive thrombus in and around the right carotid stent which remains patent  After a P2Y12 was done and was 289, neurosurgery started Brillinta 90mg Q12 and repeat P2Y12 was 55  Repeat CTH on day of discharge was stable  She is going to restart Aspirin 81mg tomorrow  She needs a repeat CTA of head and neck in 2 weeks  If there are no changes, she will need to go repeat thrombectomy, angioplasty, and stenting  Her diet was also upgraded to a dental soft with thin liquids, and is tolerating this diet without complications       Chief Complaint/Subjective: No acute events over the weekend  Still with L inattention  Feeling well, mild right sided posterior headache not associated with N/V, new focal deficits, cervical pain  She prefers not to take medication for it  She denies any new numbness/weakness/visual disturbance  Her sleep has improved  Reports a BM last night  ROS:  A 10 point review of systems was negative except for what is noted in the HPI  Assessment/Plan:      * Cerebrovascular accident (CVA) due to thrombosis of right carotid artery [facility])   Assessment & Plan    Right MCA stroke most likely 2/2 ICA and MCA occlusive disease  Now s/p thrombectomy and stent placement  And on DAPT  - Continue aspirin  - Continue ticagrelor and atorvastatin  - monitor BP, goal systolic BP between 540-032  PRAFO for LLE  Neurochecks Q4  DVT ppx: heparin Q8     Carotid stenosis, symptomatic, with infarction [facility])   Assessment & Plan    High risk of embolism from residual thrombus  - In 2 weeks repeat CTA, if filling defect still present will need repeat thrombectomy, angioplasty, and stenting   - Continue brilinta/ASA and atorvastatin  - Neurochecks      Insomnia   Assessment & Plan    Improved  Patient would like to try environmental approach to improve sleep  - If no improvement, can try melatonin  Slow transit constipation   Assessment & Plan    Started on senna 2 tabs daily  Continue colace  Miralax/bisacodyl PRN     Normocytic anemia due to blood loss   Assessment & Plan    FOBT pending  Hgb improved to 9 10/10  Check CBC in AM  Likely post-procedure  S/P 1 unit pRBC     Hypothyroidism   Assessment & Plan    Here placed on Levothyroxine 75mcg, TSH 0 784 on 10/6    - At home per chart was only on 25mcg daily    - Discussed with pharmacy [facility])  She is on 88mcg  Adjusted           Scheduled Meds:  Current Facility-Administered Medications:  aspirin 81 mg Oral Daily Kael Paige MD   atorvastatin 40 mg Oral QPM Kael Paige MD   bisacodyl 10 mg Rectal Daily PRN Mirtha Genera, DO   docusate sodium 100 mg Oral BID Smitha Medina MD   famotidine 20 mg Oral BID Smitha Medina MD   heparin (porcine) 5,000 Units Subcutaneous [facility] Smitha Medina MD   levothyroxine 88 mcg Oral Early Morning Smitha Medina MD   polyethylene glycol 17 g Oral Daily PRN Mirtha Genera, DO   senna 2 tablet Oral Daily Smitha Medina MD   ticagrelor 90 mg Oral Q12H [facility] Smitha Medina MD      Health Maintenance  #Bowel: Last BM 10/14  Patient reported  Continue docusate/senna  As needed miralax/bisacodyl  #Bladder: No sykes  Continent  #Skin/Pressure Injury Prevention: Turn Q2hr in bed, with weight shifts [id number] in wheelchair  #DVT Prophylaxis:  Heparin Q8 and mechanical SCDs  #Code Status: Full  #FEN:  Reg/thins  #Dispo:  TBD at team 10/17  Objective:    Functional Update:  10/13 PT:  S for bed mobility, Incidental touching S-Luis Armando for transfers, min-modA car transfers, ambulates 100' x4, 150' x6 with CGA-Luis Armando  Light touch/ steadying for full flight of stairs  10/14 OT: L neglect throughout session, L visual field cut  Good awareness  10/14 SLP: Dysphagia has resolved  Reg/thins  Increased difficulty with scanning  Some insight to deficits  Allergies per EMR    Physical Exam:  Temp:  [98 1 °F (36 7 °C)-98 7 °F (37 1 °C)] 98 7 °F (37 1 °C)  HR:  [75-96] 96  Resp:  [18-20] 20  BP: (108-153)/(60-72) 144/68  Oxygen Therapy  SpO2: 98 %    Gen: No acute distress, Well-nourished, well-appearing  HEENT: Moist mucus membranes  Cardiovascular: Regular rate, rhythm, S1/S2  Distal pulses palpable  Heme/Extr: No edema  Pulmonary: Non-labored breathing  Lungs CTAB  : No sykes  GI: Soft, non-tender, non-distended  BS+   MSK: AROM is WFL in all extremities  No effusions or deformities  Bulk is symmetric  Integumentary: Skin is warm, dry  No rashes or ulcers  Neuro: AAOx3, Speech is intact  Appropriate to questioning  Strengths >4/5 throughout  Psych: Normal mood and affect  Diagnostic Studies: reviewed, no new imaging    Laboratory:      Results from last 7 days  Lab Units 10/13/18  0708 10/10/18  0616 10/09/18  0616   HEMOGLOBIN g/dL 9 3* 9 0* 8 5*   HEMATOCRIT % 28 5* 26 6* 26 0*   WBC Thousand/uL 6 58 6 78 7 11       Results from last 7 days  Lab Units 10/13/18  0708 10/10/18  0616 10/09/18  0616   BUN mg/dL 13 10 9   SODIUM mmol/L 138 139 139   POTASSIUM mmol/L 4 3 3 6 3 5   CHLORIDE mmol/L 107 107 108   CREATININE mg/dL 0 68 0 62 0 63   AST U/L  --  19  --    ALT U/L  --  20  --             Patient Active Problem List   Diagnosis    Cerebrovascular accident (CVA) due to thrombosis of right carotid artery ([facility] [address] )    Hypothyroidism    Normocytic anemia due to blood loss    Carotid stenosis, symptomatic, with infarction (HCC)    Slow transit constipation    Insomnia       ** Please Note: Fluency Direct voice to text software may have been used in the creation of this document  **    Total visit time:  At least 25 minutes, with more than 50% spent counseling/coordinating care

## 2018-10-15 NOTE — PROGRESS NOTES
10/15/18 1000   Pain Assessment   Pain Assessment No/denies pain   Restrictions/Precautions   Precautions Fall Risk   Weight Bearing Restrictions No   ROM Restrictions No   Cognition   Arousal/Participation Cooperative   Subjective   Subjective Pt reports she is ready for therapy  QI: Sit to Stand   Assistance Needed Supervision   Assistance Provided by [place] No physical assistance   Comment CS   Sit to Stand CARE Score 4   QI: Chair/Bed-to-Chair Transfer   Assistance Needed Supervision   Assistance Provided by [place] No physical assistance   Comment CS   Chair/Bed-to-Chair Transfer CARE Score 4   Transfer Bed/Chair/Wheelchair   Limitations Noted In Balance;LE Strength; Endurance   Adaptive Equipment None   Stand Pivot Supervision   Sit to Stand Supervision   Stand to Sit Supervision   Findings CS   Bed, Chair, Wheelchair Transfer (FIM) 5 - Patient requires supervision/monitoring   QI: Walk 10 [address] Provided by [place] No physical assistance   Comment CS   Walk 10 Feet CARE Score 4   QI: Walk 50 Feet with Two [address] Provided by [place] No physical assistance   Comment    Walk 50 Feet with Two Turns CARE Score 4   QI: Walk 150 Feet   Assistance Needed Supervision   Assistance Provided by [place] No physical assistance   Comment CS   Walk 150 Feet CARE Score 4   Ambulation   Does the patient walk? 2  Yes   Primary Discharge Mode of Locomotion Walk   Walk Assist Level Close Supervision   Gait Pattern Decreased foot clearance;L foot drag;Step through; Improper weight shift   Assist Device (none)   Distance Walked (feet) 700 ft   Limitations Noted In Balance; Heel Strike;Speed;Strength;Swing   Findings 150'x1, 350'x1, 700'x2, 750'x1, 1000'+ walk through hospital to [address] [place] [address] and back   Walking (FIM) 5 - Patient requires supervision/monitoring AND distance 150 feet or more, no rest   Wheelchair mobility   QI: Does the patient use a wheelchair? 0  No   QI: 4 Steps   Assistance Needed Supervision; Adaptive equipment   Assistance Provided by [place] No physical assistance   Comment recipricol and non-recipricol pattern, B/L and R handrail use   4 Steps CARE Score 4   QI: 12 Steps   Assistance Needed Supervision; Adaptive equipment   Assistance Provided by Ama No physical assistance   Comment recipricol and non-recipricol pattern, B/L and R handrail use   12 Steps CARE Score 4   Stairs   Type Stairs  (6"  steps)   # of Steps 24   Weight Bearing Precautions Fall Risk   Assist Devices Bilateral Rail;Single Rail   Findings recipricol and non-recipricol pattern, B/L and R handrail use   Stairs (FIM) 5 - Patient requires supervision/monitoring AND goes up and down full flight (12- 14 stairs)   QI: Toilet Transfer   Assistance Needed Supervision   Toilet Transfer CARE Score 4   Toilet Transfer   Surface Assessed Standard Toilet   Limitations Noted In Balance   Toilet Transfer (FIM) 5 - Patient requires supervision/monitoring   Therapeutic Interventions   Strengthening 6"  steps up/down 12x   Neuromuscular Re-Education walk 350'x1 and 700'x1 with horizontal head turns, VOR1 horizontal 1 min (no complaints of dizziness/symptom provocation), 700'x1 and 750'x1 at increased pace, walking backwards 30'x6, walking sideways 30'x6, walking over obstacles 30'x8, walking around obstacles 30'x8   Assessment   Treatment Assessment Pt tolerated treatment well  Pt demonstrated ability to transfer and ambulate at S level  Session focused on increased ambulation and challenging pt's dynamic balance  Cuing required during walking with horizontal head turns to ensure pt turns head instead of just turning her eyes  Pt was able to incorporate feedback into performance  One episode of LOB noted while walking backwards; pt was able to reach for the wall to correct balance  No other LOB noted during session   Noted increased L foot drag with increased fatigue; pt acknowledged L foot drag while walking  Additionally, L toes will catch on steps however no LOB noted and pt acknowledged L foot catching on steps  Education provided on having people sit/stand on pt's L side to increase L attention, increased frequency of L foot drag with fatigue which contributes to increased fall risk and increased fall risk due to L inattention   was present during sesion; pt and  verbalized understanding of education provided during session  Future sessions should continue to focus on challenging pt's dynamic balance to decrease risk of fall, improve L inattention, and promote progress toward prior level of function  Family/Caregiver Present    PT Family training done with:    Problem List Decreased strength;Decreased endurance; Impaired balance;Decreased mobility; Impaired vision   Barriers to Discharge (balance)   PT Barriers   Physical Impairment Decreased strength;Decreased endurance; Impaired balance;Decreased mobility; Impaired vision   Functional Limitation Car transfers;Stair negotiation;Standing;Transfers; Walking   Plan   Treatment/Interventions Functional transfer training;LE strengthening/ROM; Elevations; Therapeutic exercise; Endurance training;Patient/family training;Equipment eval/education; Bed mobility;Gait training; Compensatory technique education   Progress Progressing toward goals   Recommendation   Recommendation Home with family support; Outpatient PT   Equipment Recommended (LRAD)   PT Therapy Minutes   PT Time In 1000   PT Time Out 1130   PT Total Time (minutes) 90   PT Mode of treatment - Individual (minutes) 30   PT Mode of treatment - Concurrent (minutes) 60   PT Mode of treatment - Group (minutes) 0   PT Mode of treatment - Co-treat (minutes) 0   PT Mode of Teatment - Total time(minutes) 90 minutes   Therapy Time missed   Time missed?  No

## 2018-10-15 NOTE — SOCIAL WORK
CM Evaluation  CM met with patient and  to review rehab routine and CM role  Patient lives with  in multilevel home with 2 YOSELIN , full bath and bedroom on the 1st floor  Per patient,  is in good health, drives and retired  He will be home with her all the time  They have a very supportive daughter that works PT and assists as much as she can  She has a roller walker, SPC, tub bench and raised toilet  No affiliation with [facility]  She uses [facility] but would like to take advantage of [facility] on dc  Informed of weekly team meeting and potential dc needs  Discussed insurance coverage and insurance update due 10/18  CM will follow to assist with dc needs

## 2018-10-15 NOTE — PROGRESS NOTES
10/15/18 0900   Pain Assessment   Pain Assessment No/denies pain   Restrictions/Precautions   Precautions Fall Risk;Visual deficit   Memory Skills   Memory (FIM) 4 - Recognizes/recalls/performs 75-89%   Social Interaction (FIM) 6 - Interacts appropriately with others BUT requires extra  time   Speech/Language/Cognition Assessmetn   Treatment Assessment Pt engaging in a number of visual scanning activities due to L neglect  Pt completing both number and letter cancellation task  When engaging in number cancellation, pt was 12/12 accurate, but when engaging in letter cancellation, pt was 13/16 accurate, needing verbal/tactile cues to locate remaining 3 letters located at the very L of the page  Pt then completing trail making task given both numbers and letters  When pt was completing task initally w/ number, pt was 10/10 accurate, but pt was placing paper in field of view closer to R side  When engaging in remaining trail making  task, where paper was placed on pt's L side to complete, which was noted to be more of a challenge for pt, where pt was 15/20 to locate all numbers  Pt lost track at 13, starting at 13 again, due to numbers located on L side of the page, where pt was required to scan  When engaging in trail making given the letters of the alphabet, pt was 7/7 on inital task, then noted to be 15/20 accurate in locating letters when given A-T, again, loosing track of last place, due to ongoing need to scan to L side of page  Last trail making task was 18/25 accurate, due to the increased complexity of task, where pt was to go from number to letter back to number then back to letter  Increased verbal and actually pt required visual scan of highlighter line to enable scanning to L side of page to locate all numbers/letters  Pt then completing STM recall task given 4 words to recall given an items attribute  Pt was 12/12 accurate w/ task   When engaging in similar task given 4 words but to recall by placement, pt was 6/12 accurate, increased to 11/12 accurate given semantic cues and 12/12 given repetition cues  Lastly, pt engaging in written 4-step sequencing task  Pt was noted to have mild difficulty initially in completing activity due to decreased scanning to L side of page to read the entire sentence  Given pink highlighter cue to complete remainder of task where pt was 32/32 accurate w/ task, given increased time as well as intermittent verbal cues to scan to L side of page  Pt will continue to benefit from SLP services at this time to maximize cognitive linguistic skills at this time  SLP Therapy Minutes   SLP Time In 0900   SLP Time Out 1000   SLP Total Time (minutes) 60   SLP Mode of treatment - Individual (minutes) 60   SLP Mode of treatment - Concurrent (minutes) 0   SLP Mode of treatment - Group (minutes) 0   SLP Mode of treatment - Co-treat (minutes) 0   SLP Mode of Teatment - Total time(minutes) 60 minutes   Therapy Time missed   Time missed? No   Daily FIM Score   Problem solving (FIM) 4 - Solves basic problems 75-89% of time   Comprehension (FIM) 5 - Understands basic directions and conversation   Expression (FIM) 6 - Expresses complex/abstract but requires:  more time        10/15/18 0900   Pain Assessment   Pain Assessment No/denies pain   Restrictions/Precautions   Precautions Fall Risk;Visual deficit   Memory Skills   Memory (FIM) 4 - Recognizes/recalls/performs 75-89%   Social Interaction (FIM) 6 - Interacts appropriately with others BUT requires extra  time   Speech/Language/Cognition Assessmetn   Treatment Assessment Pt engaging in a number of visual scanning activities due to L neglect  Pt completing both number and letter cancellation task  When engaging in number cancellation, pt was 12/12 accurate, but when engaging in letter cancellation, pt was 13/16 accurate, needing verbal/tactile cues to locate remaining 3 letters located at the very L of the page   Pt then completing trail making task given both numbers and letters  When pt was completing task initally w/ number, pt was 10/10 accurate, but pt was placing paper in field of view closer to R side  When engaging in remaining trail making  task, where paper was placed on pt's L side to complete, which was noted to be more of a challenge for pt, where pt was 15/20 to locate all numbers  Pt lost track at 13, starting at 13 again, due to numbers located on L side of the page, where pt was required to scan  When engaging in trail making given the letters of the alphabet, pt was 7/7 on inital task, then noted to be 15/20 accurate in locating letters when given A-T, again, loosing track of last place, due to ongoing need to scan to L side of page  Last trail making task was 18/25 accurate, due to the increased complexity of task, where pt was to go from number to letter back to number then back to letter  Increased verbal and actually pt required visual scan of highlighter line to enable scanning to L side of page to locate all numbers/letters  Pt then completing STM recall task given 4 words to recall given an items attribute  Pt was 12/12 accurate w/ task  When engaging in similar task given 4 words but to recall by placement, pt was 6/12 accurate, increased to 11/12 accurate given semantic cues and 12/12 given repetition cues  Lastly, pt engaging in written 4-step sequencing task  Pt was noted to have mild difficulty initially in completing activity due to decreased scanning to L side of page to read the entire sentence  Given pink highlighter cue to complete remainder of task where pt was 32/32 accurate w/ task, given increased time as well as intermittent verbal cues to scan to L side of page  Pt will continue to benefit from SLP services at this time to maximize cognitive linguistic skills at this time     SLP Therapy Minutes   SLP Time In 0900   SLP Time Out 1000   SLP Total Time (minutes) 60   SLP Mode of treatment - Individual (minutes) 60   SLP Mode of treatment - Concurrent (minutes) 0   SLP Mode of treatment - Group (minutes) 0   SLP Mode of treatment - Co-treat (minutes) 0   SLP Mode of Teatment - Total time(minutes) 60 minutes   Therapy Time missed   Time missed?  No   Daily FIM Score   Problem solving (FIM) 4 - Solves basic problems 75-89% of time   Comprehension (FIM) 5 - Understands basic directions and conversation   Expression (FIM) 6 - Expresses complex/abstract but requires:  more time

## 2018-10-15 NOTE — PROGRESS NOTES
Internal Medicine Progress Note  Patient: Eliane Gomez  Age/sex: 77 y o  female  Medical Record #: [id number]      ASSESSMENT/PLAN:  Eliane Gomez is seen and examined and mangement for following issues:    Rt MCA CVA; s/p MIL angioplasty, stenting and MCA thrombectomy (Neurosurg): Pt was a Plavix non-responder and was loaded with Leita Nevaeh 10/12/18  Checked P2Y12 10/13/18 and was 55 (therapeutic)  ASA 81mg resumed 10/13/18  Continue atorvastatin  Pt will need a repeat CTA in 2 weeks (10/26/18) and may need repeat thrombectomy/PTA and stenting     ABLA: stable; pt was transfused with 1U PRBCs in the hospital  Will continue to monitor      Hypothyroidism: Continue levothyroxine  Subjective: Patient seen and examined  New or overnight issues      ROS:   GI: denies abdominal pain, change bowel habits or reflux symptoms  Neuro: No new neurologic changes  Respiratory: No Cough, SOB  Cardiovascular: No CP, palpitations     Scheduled Meds:    Current Facility-Administered Medications:  aspirin 81 mg Oral Daily Yenny Erwin MD   atorvastatin 40 mg Oral QPM Yenny Erwin MD   bisacodyl 10 mg Rectal Daily PRN Sandra Haro, DO   docusate sodium 100 mg Oral BID Yenny Erwin MD   famotidine 20 mg Oral BID Yenny Erwin MD   heparin (porcine) 5,000 Units Subcutaneous [facility] Yenny Erwin MD   levothyroxine 88 mcg Oral Early Morning Yenny Erwin MD   polyethylene glycol 17 g Oral Daily PRN Sandra Haro, DO   senna 2 tablet Oral Daily Yenny Erwin MD   ticagrelor 90 mg Oral Q12H Yoni Combs MD       Labs:       Results from last 7 days  Lab Units 10/13/18  0708 10/10/18  0616   WBC Thousand/uL 6 58 6 78   HEMOGLOBIN g/dL 9 3* 9 0*   HEMATOCRIT % 28 5* 26 6*   PLATELETS Thousands/uL 368 268       Results from last 7 days  Lab Units 10/13/18  0708 10/10/18  0616   SODIUM mmol/L 138 139   POTASSIUM mmol/L 4 3 3 6   CHLORIDE mmol/L 107 107   CO2 mmol/L 27 27   BUN mg/dL 13 10   CREATININE mg/dL 0 68 0  62   CALCIUM mg/dL 8 7 8 5                  Glucose, i-STAT (mg/dl)   Date Value   10/06/2018 149 (H)       Labs reviewed    Physical Examination:  Vitals:   Vitals:    10/14/18 1344 10/14/18 2115 10/15/18 0500 10/15/18 0823   BP: 150/72 153/72 108/60 144/68   BP Location: Left arm Left arm     Pulse: 89 79 75 96   Resp: 20 18 20    Temp: 98 1 °F (36 7 °C) 98 1 °F (36 7 °C) 98 7 °F (37 1 °C)    TempSrc: Oral Oral Oral    SpO2: 99% 97% 98%    Weight:       Height:         Constitutional:  NAD; pleasant; nontoxic  HEENT:  AT/NC; oropharynx negative for thrush on tongue   Neck: negative for JVD  CV:  +S1, S2;  RRR; no rub/murmur  Pulmonary:  BBS without crackles/wheeze/rhonci; resp are unlabored  Abdominal:  soft, +BS, ND/NT; no mass  Skin:  no rashes  Musculoskeletal:  no edema  :  no sykes  Neurological/Psych:  AAO; LUE/LLE 4-/5 with + drift and abn finger to nose; LLE 4+/5; RUE/RLE 5/5  + left facial droop; speech is clear  [x ] Total time spent: 30 Mins and greater than 50% of this time was spent counseling/coordinating care  ** Please Note: Dragon 360 Dictation voice to text software may have been used in the creation of this document   **

## 2018-10-16 ENCOUNTER — TELEPHONE (OUTPATIENT)
Dept: NEUROSURGERY | Facility: CLINIC | Age: 68
End: 2018-10-16

## 2018-10-16 LAB — HEMOCCULT STL QL: NEGATIVE

## 2018-10-16 PROCEDURE — 82272 OCCULT BLD FECES 1-3 TESTS: CPT | Performed by: INTERNAL MEDICINE

## 2018-10-16 PROCEDURE — 97116 GAIT TRAINING THERAPY: CPT

## 2018-10-16 PROCEDURE — 97112 NEUROMUSCULAR REEDUCATION: CPT

## 2018-10-16 PROCEDURE — G0515 COGNITIVE SKILLS DEVELOPMENT: HCPCS

## 2018-10-16 PROCEDURE — 99232 SBSQ HOSP IP/OBS MODERATE 35: CPT | Performed by: PHYSICAL MEDICINE & REHABILITATION

## 2018-10-16 PROCEDURE — 97535 SELF CARE MNGMENT TRAINING: CPT

## 2018-10-16 PROCEDURE — 92526 ORAL FUNCTION THERAPY: CPT

## 2018-10-16 PROCEDURE — 97150 GROUP THERAPEUTIC PROCEDURES: CPT

## 2018-10-16 RX ADMIN — FAMOTIDINE 20 MG: 40 POWDER, FOR SUSPENSION ORAL at 09:11

## 2018-10-16 RX ADMIN — DOCUSATE SODIUM 100 MG: 100 CAPSULE, LIQUID FILLED ORAL at 09:11

## 2018-10-16 RX ADMIN — HEPARIN SODIUM 5000 UNITS: 5000 INJECTION INTRAVENOUS; SUBCUTANEOUS at 21:18

## 2018-10-16 RX ADMIN — ATORVASTATIN CALCIUM 40 MG: 40 TABLET, FILM COATED ORAL at 17:15

## 2018-10-16 RX ADMIN — TICAGRELOR 90 MG: 90 TABLET ORAL at 21:18

## 2018-10-16 RX ADMIN — ASPIRIN 81 MG: 81 TABLET, COATED ORAL at 09:11

## 2018-10-16 RX ADMIN — LEVOTHYROXINE SODIUM 88 MCG: 88 TABLET ORAL at 05:29

## 2018-10-16 RX ADMIN — HEPARIN SODIUM 5000 UNITS: 5000 INJECTION INTRAVENOUS; SUBCUTANEOUS at 14:53

## 2018-10-16 RX ADMIN — SENNOSIDES 17.2 MG: 8.6 TABLET, FILM COATED ORAL at 09:11

## 2018-10-16 RX ADMIN — FAMOTIDINE 20 MG: 40 POWDER, FOR SUSPENSION ORAL at 17:15

## 2018-10-16 RX ADMIN — DOCUSATE SODIUM 100 MG: 100 CAPSULE, LIQUID FILLED ORAL at 17:15

## 2018-10-16 RX ADMIN — TICAGRELOR 90 MG: 90 TABLET ORAL at 09:11

## 2018-10-16 RX ADMIN — HEPARIN SODIUM 5000 UNITS: 5000 INJECTION INTRAVENOUS; SUBCUTANEOUS at 05:30

## 2018-10-16 NOTE — PCC CARE MANAGEMENT
Pt is participating well with therapy and has supportive family  Pt expects to return home and has been educated on potential dc recommendations for contd therapy  Following to assist w/dc planning needs

## 2018-10-16 NOTE — PCC PHYSICAL THERAPY
10/16: Pt presents s/p CVA  Currently pt is S level assist for bed mobility, transfers, and ambulation; pt has increased fall risk secondary to L foot drag that increases with fatigue and L inattention, both contributing to decreased balance and righting reactions  Pt would continue to benefit from skilled PT to address deficits in strength, balance, righting reactions, and functional mobility to promote progress toward prior level of function and to decrease risk of falls

## 2018-10-16 NOTE — PROGRESS NOTES
10/16/18 0700   Pain Assessment   Pain Assessment No/denies pain   Pain Score No Pain   Restrictions/Precautions   Precautions Fall Risk;Visual deficit   QI: Eating   Assistance Needed Independent   Eating CARE Score 6   Eating Assessment   Eating (FIM) 7 - Patient completely independent   QI: Oral Hygiene   Assistance Needed Supervision   Oral Hygiene CARE Score 4   Grooming   Able To Initiate Tasks; Acquire Items; Wash/Dry Face;Brush/Clean Teeth;Wash/Dry Hands;Comb/Brush Hair   Limitation Noted In Safety  (balance, vision)   Findings Pt completed in stance at sink   Grooming (FIM) 5 - Patient requires supervision/monitoring   QI: Shower/Bathe Self   Assistance Needed Incidental touching   Shower/Bathe Self CARE Score 4   Bathing   Assessed Bath Style Shower   Anticipated D/C Bath Style Shower   Able to [place] Yes   Able to [employer] Temperature Yes   Able to Wash/Rinse/Dry (body part) Left Arm;R Upper Leg;L Lower Leg/Foot;R Lower Leg/Foot;L Upper Leg;Right Arm; Chest;Perineal Area; Abdomen;Buttocks   Limitations Noted in Balance;Vision   Positioning Standing   Adaptive Equipment Shower Bars   Findings  pt completed bathing in stance with use of unilateral UE support to inc stability in stance  OT edu pt on recommendation for shower chair or use of grab bar for stabilization and unilateral UE support when in stance  Pt reports she owns grab bar to install   Pt with slight LOB x 2 req UE support and assist to correct   Bathing (FIM) 4 - Patient requires steadying assist or light touching   Tub/Shower Transfer   Limitations Noted In Balance   Assessed Shower   Shower Transfer (FIM) 4 - Patient requires steadying assist or light touching   QI: Upper Body Dressing   Assistance Needed Supervision   Upper Body Dressing CARE Score 4   QI: Lower Body Dressing   Assistance Needed Incidental touching   Lower Body Dressing CARE Score 4   QI: Putting On/Taking Off Footwear   Assistance Needed Supervision   Putting On/Taking Off Footwear CARE Score 4   Dressing/Undressing Clothing   Able to  Obtain Clothing;Store removed clothing   Remove UB Clothes Button Shirt   Remove LB Clothes Pants; Undergarment;Socks   [address]; Undergarment;Socks   Limitations Noted In Balance;Vision   Findings Pt with slight LOB with LB dressing req UE support to stabilize  CGA from OT  Pt able to complete all other dressing tasks and item retrieval/transport in dresser at S level   UB Dressing (FIM) 5 - Patient requires supervision/monitoring   LB Dressing (FIM) 4 - Patient requires steadying assist or light touching   QI: Roll Left and Right   Assistance Needed Independent   Roll Left and Right CARE Score 6   QI: Sit to [address] to Lying CARE Score 6   QI: Lying to Sitting on Side of Bed   Assistance Needed Independent   Lying to Sitting on Side of Bed CARE Score 6   QI: Sit to Stand   Assistance Needed Supervision   Sit to Stand CARE Score 4   QI: Chair/Bed-to-Chair Transfer   Assistance Needed Supervision   Chair/Bed-to-Chair Transfer CARE Score 4   Transfer Bed/Chair/Wheelchair   Findings CS no AD   Bed, Chair, Wheelchair Transfer (FIM) 5 - Patient requires supervision/monitoring   QI: [place] Score 4   Toileting   Able to [address] down yes, up yes  Able to Manage Clothing Closures Yes   Manage Hygiene Bladder   Toileting (FIM) 5 - Patient requires supervision/monitoring   QI: Toilet Transfer   Assistance Needed Supervision   Toilet Transfer CARE Score 4   Toilet Transfer   Toilet Transfer (FIM) 5 - Patient requires supervision/monitoring   Assessment   Treatment Assessment Pt engaged in skilled OT tx with focus on ADL fxn, item retrieval/transport  AE/DME recommendations, pt education, and d/c planning  see above for further details   Pt dmeo improved xfers, slight LOB with ADL fxn pt able to correct with CGA at times  OT Therapy Minutes   OT Time In 0700   OT Time Out 0800   OT Total Time (minutes) 60   OT Mode of treatment - Individual (minutes) 60   OT Mode of treatment - Concurrent (minutes) 0   OT Mode of treatment - Group (minutes) 0   OT Mode of treatment - Co-treat (minutes) 0   OT Mode of Teatment - Total time(minutes) 60 minutes   Therapy Time missed   Time missed?  No

## 2018-10-16 NOTE — PROGRESS NOTES
Stroke Education Series    Pt participated in skilled Stroke Education Series in a group setting to address the topic of Home Safety post stroke in both verbal and written formats  Education within this session focused on safety strategies within the home including environmental and lifestyle changes that should be made to support a safe discharge to his/her home setting  The goal of this education session was to provide the patient with recommended home set up and changes that could be made to promote increased independence and safety upon discharge  Additionally, to instill confidence in his/her return home within their functional capabilities for fall prevention  Following education, pt's response to education is: verbalizes understanding  Start Time: 1435    End Time: [address]     Stroke Education Series    Patient (and family/caregivers as appropriate) participated in skilled Stroke Education Series in a group setting to address the topic of Caregiver Education post stroke in both verbal and written formats  Education within this session included the roles of family/caregivers within the rehabilitation process and how they will be involved and instrumental in his/her return home  In addition, education on how "home life" may be affected and the impact of caring for loved ones post stroke was provided  The goal of this education session was to provide patients and his/her caregivers with a greater understanding of their roles in the rehabilitation process and how instrumental that relationship could and will be at time of discharge to facilitate both patient and caregiver safety; therefore improving overall quality of life individually, and as a unit  Following education, pt's response to education is: verbalizes understanding        Start Time: 1445     End Time: 9785

## 2018-10-16 NOTE — PROGRESS NOTES
Internal Medicine Progress Note  Patient: Akira Hinkle  Age/sex: 77 y o  female  Medical Record #: [id number]      ASSESSMENT/PLAN:  Akira Hinkle is seen and examined and management for following issues:    Rt MCA CVA; s/p MIL angioplasty, stenting and MCA thrombectomy (Neurosurg): Pt was a Plavix non-responder and was loaded with Juliene Marcos 10/12/18  Checked P2Y12 10/13/18 and was 55 (therapeutic)  ASA 81mg resumed 10/13/18  Continue atorvastatin  Pt will need a repeat CTA in 2 weeks (10/26/18) and may need repeat thrombectomy/PTA and stenting (renal function has been normal)     ABLA: stable; pt was transfused with 1U PRBCs in the hospital  Will continue to monitor      Hypothyroidism: Continue levothyroxine  Subjective: Patient seen and examined   Offers no complaints    ROS:   GI: denies abdominal pain, change bowel habits or reflux symptoms  Neuro: No new neurologic changes  Respiratory: No Cough, SOB  Cardiovascular: No CP, palpitations     Scheduled Meds:    Current Facility-Administered Medications:  aspirin 81 mg Oral Daily Payal Aguilar MD   atorvastatin 40 mg Oral QPM Payal Aguilar MD   bisacodyl 10 mg Rectal Daily PRN Alvino Rubinstein, DO   docusate sodium 100 mg Oral BID Payal Aguilar MD   famotidine 20 mg Oral BID Payal Aguilar MD   heparin (porcine) 5,000 Units Subcutaneous [facility] Payal Aguilar MD   hydrALAZINE 10 mg Oral Q8H PRN Payal Aguilar MD   levothyroxine 88 mcg Oral Early Morning Payal Aguilar MD   polyethylene glycol 17 g Oral Daily PRN Alvino Rubinstein, DO   senna 2 tablet Oral Daily Payal Aguilar MD   ticagrelor 90 mg Oral Q12H [place] Nogueira MD       Labs:       Results from last 7 days  Lab Units 10/13/18  0708 10/10/18  0616   WBC Thousand/uL 6 58 6 78   HEMOGLOBIN g/dL 9 3* 9 0*   HEMATOCRIT % 28 5* 26 6*   PLATELETS Thousands/uL 368 268       Results from last 7 days  Lab Units 10/13/18  0708 10/10/18  0616   SODIUM mmol/L 138 139   POTASSIUM mmol/L 4 3 3 6 CHLORIDE mmol/L 107 107   CO2 mmol/L 27 27   BUN mg/dL 13 10   CREATININE mg/dL 0 68 0 62   CALCIUM mg/dL 8 7 8 5                  Glucose, i-STAT (mg/dl)   Date Value   10/06/2018 149 (H)       Labs reviewed    Physical Examination:  Vitals:   Vitals:    10/15/18 1410 10/15/18 1500 10/15/18 2018 10/16/18 0500   BP: 162/75 154/78 124/65 139/66   BP Location: Right arm Left arm Left arm    Pulse: 100  93 76   Resp: 20  20 20   Temp: 98 °F (36 7 °C)  98 2 °F (36 8 °C) 98 5 °F (36 9 °C)   TempSrc: Oral  Oral Oral   SpO2: 97%  99% 98%   Weight:       Height:         Constitutional:  NAD; pleasant; nontoxic  HEENT:  AT/NC; oropharynx negative for thrush on tongue   CV:  +S1, S2;  RRR; no rub/murmur  Pulmonary:  BBS without crackles/wheeze/rhonci; resp are unlabored  Abdominal:  soft, +BS, ND/NT; no mass  Skin:  no rashes  Musculoskeletal:  no edema  :  no sykes  Neurological/Psych:  AAO; LUE/LLE 4-/5 with + drift and abn finger to nose; LLE 4+/5; RUE/RLE 5/5  + left facial droop; speech is clear  [x ] Total time spent: 30 Mins and greater than 50% of this time was spent counseling/coordinating care  ** Please Note: Dragon 360 Dictation voice to text software may have been used in the creation of this document   **

## 2018-10-16 NOTE — PCC SPEECH THERAPY
Pt completed both dysphagia and cognitive assessments  Pt completed the CLQT+, where overall cognitive linguistic skills when compared to age matched peers between 65-80 yrs, deemed pt to have mild cognitive linguistic deficits at this time  Of note, pt's visuospatial skills to the L impacting ability to complete functional and structured tasks  However, pt is aware of deficits, but does not always carryover strategies during sessions  Increased verbal/visual and at times tactile cues to scan to the L side to complete tasks w/ increased accuracy  As for swallow function, pt was initially on level 3 w/ thin liquids, but able to be trialed w/ solids which pt tolerated w/o increased oral sxs  Tolerance of thin liquids was [place] w/o increased or overt aspiration sxs  Diet was upgraded to regular/thins and pt is demonstrating tolerance of safest least restrictive diet w/o increased orophrayngeal or aspiration sxs  Pt will benefit from SLP services to focus on cognitive linguistic skills at this time

## 2018-10-16 NOTE — PROGRESS NOTES
Physical Medicine and Rehabilitation Progress Note  Bertin Martínez 77 y o  female MRN: [id number]  Unit/Bed#: -01 Encounter: [id number]    HPI: Micah Kenndey a 77 y o  female who [address] [facility]/6 after developing slurred speech and left sided weakness  She has a past medical history significant for hypothyroidism  She was found to have a R MCA M1 occlusion, as wellas right ICa occlusion  tPA was not administered, but she did undergo urgent endovascular inetrvention with balloon angioplasty and stenting of the right ICA origin with approximately 40% residual stenosis remaining through the stented segment  Non-flow limiting thrombus remained just above the stent  She had clear improvement in her speech and intellgibility following her procedure  She was transitioned to DAPT with aspirin and Plavix  Echo showed EF 55% with no RWMA or other embolic source  10/7 MRI Brain showed extensive recent infarcts in the right hemisphere including right centrum semiovale and lentiform nucleus, right temporal lobe/temporal opercular junction, and right occipital cortex  No hemorrhage  On 10/8, her hemoglobin dropped to 7 5, requiring a transfusion with 1 unit of pRBCs  Her hemoglobin remained stable for the remainder of her stay  A repeat CTA of the head and neck showed some filling defect, presumed to be a non-occlusive thrombus in and around the right carotid stent which remains patent  After a P2Y12 was done and was 289, neurosurgery started Brillinta 90mg Q12 and repeat P2Y12 was 55  Repeat CTH on day of discharge was stable  She is going to restart Aspirin 81mg tomorrow  She needs a repeat CTA of head and neck in 2 weeks  If there are no changes, she will need to go repeat thrombectomy, angioplasty, and stenting  Her diet was also upgraded to a dental soft with thin liquids, and is tolerating this diet without complications       Chief Complaint/Subjective: No acute events overnight  We discussed her mood, as she does feel a bit depressed due to her L inattention/visual deficits  However, at this time she is amenable to talking to Dr Ruben Bird  She is not interested in starting an anti-depressant  She denies difficulty sleeping, guilt, decreased energy or significant anxiety  She does have a slightly decreased appetite but is still eating and tolerating a PO diet  She denies any suicidal ideation  She denies any CP, SOB, fevers, chills, N/V, abdominal pain, new bowel/bladder dysfunction  ROS:  A 10 point review of systems was negative except for what is noted in the HPI  Assessment/Plan:      * Cerebrovascular accident (CVA) due to thrombosis of right carotid artery [facility])   Assessment & Plan    Right MCA stroke most likely 2/2 ICA and MCA occlusive disease  Now s/p thrombectomy and stent placement  And on DAPT  - Continue aspirin  - Continue ticagrelor and atorvastatin  - monitor BP, goal systolic BP between 924-127  - Consult neuropsych  PRAFO for LLE  Neurochecks Q4  DVT ppx: heparin Q8     Carotid stenosis, symptomatic, with infarction [facility])   Assessment & Plan    High risk of embolism from residual thrombus  - In 2 weeks repeat CTA (10/26), if filling defect still present will need repeat thrombectomy, angioplasty, and stenting   - Continue brilinta/ASA and atorvastatin  - Neurochecks      Insomnia   Assessment & Plan    Improved  Patient would like to try environmental approach to improve sleep  - If no improvement, can try melatonin  Slow transit constipation   Assessment & Plan    Started on senna 2 tabs daily  Continue colace  Miralax/bisacodyl PRN     Normocytic anemia due to blood loss   Assessment & Plan    FOBT pending  Hgb improved to 9 10/10  Check CBC in AM  Likely post-procedure   S/P 1 unit pRBC     Hypothyroidism   Assessment & Plan    Here placed on Levothyroxine 75mcg, TSH 0 784 on 10/6    - At home per chart was only on 25mcg daily    - Discussed with pharmacy [facility])  She is on 88mcg  Adjusted  Scheduled Meds:    Current Facility-Administered Medications:  aspirin 81 mg Oral Daily Rome Capellan MD   atorvastatin 40 mg Oral QPM [place] Capellan MD   bisacodyl 10 mg Rectal Daily PRN Wol[place] Alert, DO   docusate sodium 100 mg Oral BID Rome Capellan MD   famotidine 20 mg Oral BID Rome Capellan MD   heparin (porcine) 5,000 Units Subcutaneous [facility] Rome Capellan MD   hydrALAZINE 10 mg Oral Q8H PRN [place] Capellan MD   levothyroxine 88 mcg Oral Early Morning Rome Capellan MD   polyethylene glycol 17 g Oral Daily PRN Jackolyn Alert, DO   senna 2 tablet Oral Daily Rome Capellan MD   ticagrelor 90 mg Oral Q12H [facility] Rome Capellan MD      Health Maintenance  #Bowel: Last BM 10/15  Patient reported  Continue docusate/senna  As needed miralax/bisacodyl  #Bladder: No sykes  Continent  #Skin/Pressure Injury Prevention: Turn Q2hr in bed, with weight shifts [id number] in wheelchair  #DVT Prophylaxis:  Heparin Q8 and mechanical SCDs  #Code Status: Full  #FEN:  Reg/thins  #Dispo:  TBD at team 10/17  Objective:    Functional Update:  10/13 PT:  S for bed mobility, Incidental touching S-Luis Armando for transfers, min-modA car transfers, ambulates 100' x4, 150' x6 with CGA-Luis Armando  Light touch/ steadying for full flight of stairs  10/16 OT: Ind with eating, S with oral hygiene, grooming in stance at sink, UB dressing  Incidental touching for shower/bathe, tub/shower transfers, LB dressing  S for toileting hygiene, toileting, and toilet transfers  10/16 SLP: Dysphagia has resolved  Reg/thins  Allergies per EMR    Physical Exam:  Temp:  [98 °F (36 7 °C)-98 5 °F (36 9 °C)] 98 4 °F (36 9 °C)  HR:  [] 93  Resp:  [18-20] 18  BP: (120-162)/(65-78) 120/76  Oxygen Therapy  SpO2: 100 %    Gen: No acute distress, Well-nourished, well-appearing  HEENT: Moist mucus membranes, Atraumatic, normocephalic     Cardiovascular: Regular rate, rhythm, S1/S2  Distal pulses palpable  Heme/Extr: No edema  Pulmonary: Non-labored breathing  Lungs CTAB  : No sykes  GI: Soft, non-tender, non-distended  BS+   MSK: AROM is WFL in all extremities  Observed ambulating around unit with good reciprocal gait, advancement, clearance  Tight left heel cords  Integumentary: Skin is warm, dry  No rashes or ulcers  Neuro: AAOx3, Speech is intact  Appropriate to questioning  Strengths >4/5 throughout  Unsustained clonus on the left  Psych: Normal mood and affect  Diagnostic Studies: reviewed, no new imaging    Laboratory:      Results from last 7 days  Lab Units 10/13/18  0708 10/10/18  0616   HEMOGLOBIN g/dL 9 3* 9 0*   HEMATOCRIT % 28 5* 26 6*   WBC Thousand/uL 6 58 6 78       Results from last 7 days  Lab Units 10/13/18  0708 10/10/18  0616   BUN mg/dL 13 10   SODIUM mmol/L 138 139   POTASSIUM mmol/L 4 3 3 6   CHLORIDE mmol/L 107 107   CREATININE mg/dL 0 68 0 62   AST U/L  --  19   ALT U/L  --  20            Patient Active Problem List   Diagnosis    Cerebrovascular accident (CVA) due to thrombosis of right carotid artery ([address] )    Hypothyroidism    Normocytic anemia due to blood loss    Carotid stenosis, symptomatic, with infarction (HCC)    Slow transit constipation    Insomnia       ** Please Note: [employer] voice to text software may have been used in the creation of this document  **    Total visit time:  At least 25 minutes, with more than 50% spent counseling/coordinating care

## 2018-10-16 NOTE — PROGRESS NOTES
10/16/18 1300   Pain Assessment   Pain Assessment No/denies pain   Pain Score No Pain   Restrictions/Precautions   Precautions Fall Risk;Visual deficit   Weight Bearing Restrictions No   ROM Restrictions No   Cognition   Arousal/Participation Alert; Cooperative   Subjective   Subjective Pt reports she is ready for therapy  QI: Sit to Stand   Assistance Needed Supervision   Sit to Stand CARE Score 4   QI: Chair/Bed-to-Chair Transfer   Assistance Needed Supervision   Chair/Bed-to-Chair Transfer CARE Score 4   Transfer Bed/Chair/Wheelchair   Limitations Noted In Balance;LE Strength   Adaptive Equipment None   Stand Pivot Supervision   Sit to Stand Supervision   Stand to Sit Supervision   Bed, Chair, Wheelchair Transfer (FIM) 5 - Patient requires supervision/monitoring   QI: Car Transfer   Assistance Needed Supervision   Car Transfer CARE Score 4   QI: Walk 10 Feet   Assistance Needed Supervision   Walk 10 Feet CARE Score 4   QI: Walk 50 Feet with Two [address] 50 Feet with Two Turns CARE Score 4   QI: Walk 150 Feet   Assistance Needed Supervision   Walk 150 Feet CARE Score 4   QI: Walking 10 Feet on Uneven Surfaces   Assistance Needed Supervision   Comment walked 1000'+ outside CW/CA enterance, up/down ramp outside   Walking 10 Feet on Uneven Surfaces CARE Score 4   Ambulation   Does the patient walk? 2  Yes   Primary Discharge Mode of Locomotion Walk   Walk Assist Level Supervision   Gait Pattern Decreased foot clearance;L foot drag;Step through; Improper weight shift   Assist Device (none)   Distance Walked (feet) 700 ft   Limitations Noted In Balance;Speed;Strength;Swing   Findings 250' fast pace, 700' vertical head turns, 1000'+ outside, 700' (350' with horizontal head turn, 350' at fast pace), 350' while carrying water   Walking (FIM) 5 - Patient requires supervision/monitoring AND distance 150 feet or more, no rest   Wheelchair mobility   QI: Does the patient use a wheelchair? 0  No   QI: 4 Steps   Assistance Needed Supervision; Adaptive equipment   Comment single rail, non-recipricol descending, recipricol ascending   4 Steps CARE Score 4   QI: 12 Steps   Assistance Needed Supervision; Adaptive equipment   Comment single rail (FF using L handrail and FF using R handrail), non-recipricol descending, recipricol ascending   12 Steps CARE Score 4   Stairs   Type Stairs   # of Steps 24   Weight Bearing Precautions Fall Risk   Assist Devices Single Rail   Findings single rail (FF using L handrail and FF using R handrail), non-recipricol descending, recipricol ascending   Stairs (FIM) 5 - Patient requires supervision/monitoring AND goes up and down full flight (12- 14 stairs)   QI: Picking Up Object   Assistance Needed Supervision   Comment Picked up 3 different sized bolsters from floor   Picking Up Object CARE Score 4   Therapeutic Interventions   Neuromuscular Re-Education walking at increased pace, with horizontal and vertical head turns, and while carrying cup of water  See above for distances  Assessment   Treatment Assessment Session continued to focus on increased ambulation and challenging pt's dynamic balance  Pt demonstrated ability to self correct minor LOB during session without assist  Noted increased unsteadiness while walking with vertical head turns  Additionally, continue to note increased L foot drag as pt fatigues; pt able to improve L foot clearance when she focuses on increased LLE flexion when walking  Future sessions should continue to focus on challenging pt's balance and increased ambulation to promote progress toward prior level of function  Problem List Decreased strength; Impaired balance;Decreased mobility; Impaired vision   Barriers to Discharge (balance)   PT Barriers   Physical Impairment Decreased strength; Impaired balance;Decreased mobility; Impaired vision   Functional Limitation Car transfers;Stair negotiation;Standing;Transfers; Walking   Plan Treatment/Interventions Functional transfer training;LE strengthening/ROM; Elevations; Therapeutic exercise; Endurance training;Patient/family training;Equipment eval/education; Bed mobility;Gait training; Compensatory technique education   Progress Progressing toward goals   Recommendation   Recommendation Home with family support; Outpatient PT   Equipment Recommended (LRAD)   PT Therapy Minutes   PT Time In 1300   PT Time Out 1400   PT Total Time (minutes) 60   PT Mode of treatment - Individual (minutes) 0   PT Mode of treatment - Concurrent (minutes) 60   PT Mode of treatment - Group (minutes) 0   PT Mode of treatment - Co-treat (minutes) 0   PT Mode of Teatment - Total time(minutes) 60 minutes   Therapy Time missed   Time missed?  No

## 2018-10-16 NOTE — PROGRESS NOTES
10/16/18 0800   Pain Assessment   Pain Assessment No/denies pain   Restrictions/Precautions   Precautions Fall Risk;Visual deficit   Swallow Information   Current Risks for Dysphagia & Aspiration General debilitation;New Neuro event;Brain injury;Cognitive deficit   Current Symptoms/Concerns Clear throat;During meals; Difficulty chewing   Current Diet Regular; Thin liquid   Baseline Diet Regular; Thin liquids   Consistencies Assessed and Performance   Materials Admnistered Soft/Level 3;Regular/Solid; Thin liquid   Oral Stage WFL   Phargngeal Stage WFL   Swallow Mechanics WFL   Esophageal Concerns No s/s reported   Recommendations   Diet Solid Recommendation Regular consistency   Diet Liquid Recommendation Thin liquid   Recommended Form of Meds As tolerated   General Precautions Aspiration precautions; Feed only when alert;Minimize distractions;Upright as possible for all oral intake;Remain upright for 45 mins after meals  (OOB for meals)   Compensatory Swallowing Strategies Alternate solids and liquids;Voluntary throat clear/cough to clear penetration   Results Reviewed with PT/Family/Caregiver   QI: Eating   Assistance Needed Independent   Eating CARE Score 6   Swallow Assessment   Swallow Treatment Assessment Pt observed w/ breakfast, where pt remains on regular diet w/ thin liquids  Pt was able to be mod I w/ tray setup and ability to feed self  Consumed 75% of meal and 240cc of thins by cup/straw  Pt's mastication of solf/solids (eggs, rye toast, sausage gracie, fresh fruit) was full/functional w/o oral residual  No anterior spillage noted w/ consistencies  A-p transfer of thin liquids by cup/straw was [facility]  Swallow initiation was prompt and hyolaryngeal elevation was [facility]  No overt signs/sxs of aspiration noted w/ meal  Will continue to recommend regular diet w/ thin liquids   No further SLP services warranted at this time for dysphagia due to ability to tolerate safest least restrictive diet at this time w/o increased aspiration sxs  Swallow Assessment Prognosis   Prognosis Good   Prognosis Considerations Co-morbidities; Medical prognosis   SLP Therapy Minutes   SLP Time In 0800   SLP Time Out 0830   SLP Total Time (minutes) 30   SLP Mode of treatment - Individual (minutes) 30   SLP Mode of treatment - Concurrent (minutes) 0   SLP Mode of treatment - Group (minutes) 0   SLP Mode of treatment - Co-treat (minutes) 0   SLP Mode of Teatment - Total time(minutes) 30 minutes   Therapy Time missed   Time missed?  No   Daily FIM Score   Eating (FIM) 6 - Patient requires increased time or safety concern

## 2018-10-16 NOTE — TELEPHONE ENCOUNTER
10/23/2018-CALLED PT, SCHEDULED CTA AT [facility] ON 10/26/2018 AND CONFIRMED F/U 11/02/2018 APPT  10/19/2018-PT Vanessa 25    10/18/2018-PT aVnessa 25    10/17/2018-PT Vanessa 25      10/16/2018-PT STILL IN HOSPITAL    ----- Message from Orbie Severin sent at 10/15/2018  3:20 PM EDT -----  Regarding: HOSPITAL F/U  PER ИРИНА ONCE PT IS DISCHARGED, NEED TO CALL TO CONFIRM [facility] F/U AND SCHEDULE CTA HEAD & NECK

## 2018-10-16 NOTE — PROGRESS NOTES
10/16/18 0830   Pain Assessment   Pain Assessment No/denies pain   Pain Score No Pain   Restrictions/Precautions   Precautions Fall Risk;Visual deficit   Weight Bearing Restrictions No   ROM Restrictions No   Cognition   Arousal/Participation Cooperative; Alert   Subjective   Subjective Pt reports she is ready for therapy  QI: Sit to Stand   Assistance Needed Supervision   Sit to Stand CARE Score 4   QI: Chair/Bed-to-Chair Transfer   Assistance Needed Supervision   Chair/Bed-to-Chair Transfer CARE Score 4   Transfer Bed/Chair/Wheelchair   Limitations Noted In Balance;LE Strength   Adaptive Equipment None   Stand Pivot Supervision   Sit to Stand Supervision   Stand to Sit Supervision   Bed, Chair, Wheelchair Transfer (FIM) 5 - Patient requires supervision/monitoring   QI: Walk 10 Feet   Assistance Needed Supervision   Walk 10 Feet CARE Score 4   QI: Walk 50 Feet with Two Turns   Assistance Needed Supervision   Walk 50 Feet with Two Turns CARE Score 4   QI: Walk 150 Feet   Assistance Needed Supervision   Walk 150 Feet CARE Score 4   Ambulation   Does the patient walk? 2  Yes   Primary Discharge Mode of Locomotion Walk   Walk Assist Level Supervision   Gait Pattern Decreased foot clearance;L foot drag;Step through; Improper weight shift   Assist Device (none)   Distance Walked (feet) 700 ft   Limitations Noted In Balance;Speed;Strength;Swing   Findings 700' at fast pace, 700' with horizontal head turns, 350' with vertical head turns, 1050' (350' with vertical head turns and 700' at fast pace)   Walking (FIM) 5 - Patient requires supervision/monitoring AND distance 150 feet or more, no rest   Wheelchair mobility   QI: Does the patient use a wheelchair? 0  No   Therapeutic Interventions   Neuromuscular Re-Education walking at increased pace, walking with vertical and horizontal head turns; see above for distances walked  Assessment   Treatment Assessment Pt tolerated PT session well   Session focused on challenging pt's dynamic balance while walking  No report of dizziness or feeling off balance when walking with horizontal head turns which was an improvement compared to first time walking with horizontal head turns during yesterday's session  Pt noted feeling slightly off balance when walking with vertical head turns but reports feeling off balance disapated with continued practice  Minimal verbal cuing required during session for pt to turn her head all the way to the L side as she was only turning head to midline likely due to L inattention  Future sessions should continue to focus on increased ambulation and challenging pt's dynamic balance to promote progress toward prior level of function  Problem List Decreased strength; Impaired balance;Decreased mobility; Impaired vision   Barriers to Discharge (balance)   PT Barriers   Physical Impairment Decreased strength; Impaired balance;Decreased mobility; Impaired vision   Functional Limitation Car transfers;Stair negotiation;Standing;Transfers; Walking   Plan   Treatment/Interventions Functional transfer training;LE strengthening/ROM; Elevations; Therapeutic exercise; Endurance training;Patient/family training;Equipment eval/education; Bed mobility;Gait training; Compensatory technique education   Progress Progressing toward goals   Recommendation   Recommendation Home with family support; Outpatient PT   Equipment Recommended (LRAD)   PT Therapy Minutes   PT Time In 0830   PT Time Out 0900   PT Total Time (minutes) 30   PT Mode of treatment - Individual (minutes) 0   PT Mode of treatment - Concurrent (minutes) 30   PT Mode of treatment - Group (minutes) 0   PT Mode of treatment - Co-treat (minutes) 0   PT Mode of Teatment - Total time(minutes) 30 minutes   Therapy Time missed   Time missed?  No

## 2018-10-16 NOTE — PROGRESS NOTES
10/16/18 1400   Pain Assessment   Pain Assessment No/denies pain   Restrictions/Precautions   Precautions Fall Risk;Visual deficit   Memory Skills   Memory (FIM) 5 - Loses track of time   Social Interaction (FIM) 6 - Interacts appropriately with others BUT requires extra  time   Speech/Language/Cognition Assessmetn   Treatment Assessment Pt engaging in [facility] recall of daily events, such as last meal (lunch), visitors, activities in prior therapies  Overall ability to recall all activities completed in PT today was mod I, but needed mild prompts to recall OT session (completing shower)  All other aspects of recalling daily events was supervision level  Continuing to target executive function task along w/ L inattention/visual field cut, pt completing written 4 and 5 step sequencing tasks  Given 4 step sequencing, pt was 16/16 accurate, needing minimal verbal prompts given temporal sequencing item (ie: today, next week, tomorrow and yesterday)  When engaging in written 5 step sequencing task, pt was 38/40 accurate in completing due to decreased scanning to L side of page to rad entire sentence to accurately sequence  Of note, pt was able to self correct items, but then became tangentile in continuing to focus on task which was completed, needing prompt to complete next sequence  Pt will continue to benefit from SLP services to maximize cognitive linguistic skills  SLP Therapy Minutes   SLP Time In 1400   SLP Time Out 1430   SLP Total Time (minutes) 30   SLP Mode of treatment - Individual (minutes) 30   SLP Mode of treatment - Concurrent (minutes) 0   SLP Mode of treatment - Group (minutes) 0   SLP Mode of treatment - Co-treat (minutes) 0   SLP Mode of Teatment - Total time(minutes) 30 minutes   Therapy Time missed   Time missed?  No   Daily FIM Score   Problem solving (FIM) 4 - Solves basic problems 75-89% of time   Comprehension (FIM) 5 - Understands basic directions and conversation   Expression (FIM) 6 - Has only MILD difficulty with complex/abstract info

## 2018-10-17 LAB — HEMOCCULT STL QL: NEGATIVE

## 2018-10-17 PROCEDURE — 97530 THERAPEUTIC ACTIVITIES: CPT

## 2018-10-17 PROCEDURE — 97116 GAIT TRAINING THERAPY: CPT

## 2018-10-17 PROCEDURE — G0515 COGNITIVE SKILLS DEVELOPMENT: HCPCS

## 2018-10-17 PROCEDURE — 97112 NEUROMUSCULAR REEDUCATION: CPT

## 2018-10-17 PROCEDURE — 99233 SBSQ HOSP IP/OBS HIGH 50: CPT | Performed by: PHYSICAL MEDICINE & REHABILITATION

## 2018-10-17 PROCEDURE — 82272 OCCULT BLD FECES 1-3 TESTS: CPT | Performed by: PHYSICAL MEDICINE & REHABILITATION

## 2018-10-17 PROCEDURE — 97535 SELF CARE MNGMENT TRAINING: CPT

## 2018-10-17 RX ADMIN — HEPARIN SODIUM 5000 UNITS: 5000 INJECTION INTRAVENOUS; SUBCUTANEOUS at 06:29

## 2018-10-17 RX ADMIN — FAMOTIDINE 20 MG: 40 POWDER, FOR SUSPENSION ORAL at 17:21

## 2018-10-17 RX ADMIN — TICAGRELOR 90 MG: 90 TABLET ORAL at 21:12

## 2018-10-17 RX ADMIN — DOCUSATE SODIUM 100 MG: 100 CAPSULE, LIQUID FILLED ORAL at 17:21

## 2018-10-17 RX ADMIN — TICAGRELOR 90 MG: 90 TABLET ORAL at 08:06

## 2018-10-17 RX ADMIN — ATORVASTATIN CALCIUM 40 MG: 40 TABLET, FILM COATED ORAL at 17:21

## 2018-10-17 RX ADMIN — ASPIRIN 81 MG: 81 TABLET, COATED ORAL at 08:05

## 2018-10-17 RX ADMIN — HEPARIN SODIUM 5000 UNITS: 5000 INJECTION INTRAVENOUS; SUBCUTANEOUS at 21:12

## 2018-10-17 RX ADMIN — LEVOTHYROXINE SODIUM 88 MCG: 88 TABLET ORAL at 06:29

## 2018-10-17 RX ADMIN — HEPARIN SODIUM 5000 UNITS: 5000 INJECTION INTRAVENOUS; SUBCUTANEOUS at 13:11

## 2018-10-17 RX ADMIN — FAMOTIDINE 20 MG: 40 POWDER, FOR SUSPENSION ORAL at 08:06

## 2018-10-17 RX ADMIN — SENNOSIDES 17.2 MG: 8.6 TABLET, FILM COATED ORAL at 08:06

## 2018-10-17 RX ADMIN — DOCUSATE SODIUM 100 MG: 100 CAPSULE, LIQUID FILLED ORAL at 08:05

## 2018-10-17 NOTE — SOCIAL WORK
Met w/pt and dtr amrita reviewed team update and dc for Tuesday the 23rd  Both in agreement  Reviewed recommendations for contd outpt pt ot and slp services and they are interested in [address]  Cm to make appmt  Cm explained dc process and time of 10-12p  dtr can be here then  Following to schedule appmt

## 2018-10-17 NOTE — PROGRESS NOTES
10/17/18 0830   Pain Assessment   Pain Assessment No/denies pain   Restrictions/Precautions   Precautions Fall Risk;Visual deficit   Memory Skills   Memory (FIM) 5 - Recalls/performs request 90% of time   Social Interaction (FIM) 6 - Interacts appropriately with others BUT requires extra  time   Speech/Language/Cognition Assessmetn   Treatment Assessment Pt engaged in a number of activities during session  Targeting STM recall and category exclusion activity, pt ws given 4 words to determine the item which does note belong  Ability to recall 4 words was 76/80 accurate and ability to determine the item that does not belong was 19/20 accurate  Pt was able to correct error at end  Of note, she was 19/20 accurate in giving reason why the remaining words were similar  Pt remains oriented x4  Continuing to target L inattention/visual deficits, pt completing written sequencing tasks  Pt was given 5 steps given paper in visual field where pt was 20/20 accurate  When given written 6-step sequencing, pt was 42/48 accurate, due to paper being out of field of site, requiring pt to scan fully to the L  When given review of errors, pt was able to complete task to 48/48 accuracy  Pt then given a menu w/ multiple errors located on the menu  Pt was able to ID 22/31 errors, increasing to 25/31 given increased cueing form SLP  Lastly, pt able to complete auditory recall/sequencing task given 3 and 4 words to recall and place in the order of occurence  Pt was able to recall 3-word groups w/ 30/30 accuracy  When sequencing 3-word set lists, pt was 13/15 accurate  When recalling 4-word lists, pt was 16/20 accurate, but ability to sequence words was 20/20  Pt will continue to benefit from SLP services to maximize skills at this time     SLP Therapy Minutes   SLP Time In 0830   SLP Time Out 0930   SLP Total Time (minutes) 60   SLP Mode of treatment - Individual (minutes) 60   SLP Mode of treatment - Concurrent (minutes) 0   SLP Mode of treatment - Group (minutes) 0   SLP Mode of treatment - Co-treat (minutes) 0   SLP Mode of Teatment - Total time(minutes) 60 minutes   Therapy Time missed   Time missed?  No   Daily FIM Score   Problem solving (FIM) 4 - Solves basic problems 75-89% of time   Comprehension (FIM) 5 - Understands basic directions and conversation   Expression (FIM) 6 - Has only MILD difficulty with complex/abstract info

## 2018-10-17 NOTE — PCC NURSING
Pt admitted with Rt MCA CVA, s/p MIL angioplasty, stenting and MCA thrombectomy, managed with aspirin, Brilinta, takes Levothyroxine for hypothyroidism, continent of B&B  This week we will continue to monitor vital signs, and lab results  Pt will continue to work on increase balance and strength, becoming independent with ADLS, and safety with transfers to prevent falls

## 2018-10-17 NOTE — PROGRESS NOTES
10/17/18 1330   Pain Assessment   Pain Score No Pain   Restrictions/Precautions   Precautions Fall Risk;Visual deficit   Cognition   Arousal/Participation Alert; Cooperative   Attention Within functional limits   Following Commands Follows multistep commands with increased time or repetition   Subjective   Subjective Pt  reported that she is tired today and had a lot of brain exercise this morning  Pt  though still is agreeable to participate in PT    QI: Roll Left and Right   Assistance Needed Supervision   Roll Left and Right CARE Score 4   QI: Sit to Lying   Assistance Needed Supervision   Sit to Lying CARE Score 4   QI: Lying to Sitting on Side of Bed   Assistance Needed Supervision   Lying to Sitting on Side of Bed CARE Score 4   QI: Sit to Stand   Assistance Needed Supervision   Sit to Stand CARE Score 4   QI: Chair/Bed-to-Chair Transfer   Assistance Needed Supervision   Chair/Bed-to-Chair Transfer CARE Score 4   Transfer Bed/Chair/Wheelchair   Adaptive Equipment None   Bed, Chair, Wheelchair Transfer (FIM) 5 - Patient requires supervision/monitoring   QI: Car Transfer   Assistance Needed Supervision   Car Transfer CARE Score 4   QI: [address] 10 Feet CARE Score 4   QI: Walk 50 Feet with Two [address] 50 Feet with Two Turns CARE Score 4   QI: Walk 150 Feet   Assistance Needed Supervision   Walk 150 Feet CARE Score 4   QI: Walking 10 Feet on Uneven Surfaces   Assistance Needed Supervision   Comment CS in the community   Walking 10 Feet on Uneven Surfaces CARE Score 4   Ambulation   Does the patient walk? 2   Yes   Primary Discharge Mode of Locomotion Walk   Walk Assist Level Close Supervision;Supervision   Gait Pattern Decreased foot clearance;L foot drag  (occasional L foot drag )   Assist Device (none )   Distance Walked (feet) 999 ft  (,1050 within the uniT -300 feet in the community)   Limitations Noted In Endurance Findings Pt  fatigued after walking in the unit 1,050 feet and had to dec distance after that with seated rest breaks as needed    Walking (FIM) 5 - Patient requires supervision/monitoring AND distance 150 feet or more, no rest   Wheelchair mobility   QI: Does the patient use a wheelchair? 0  No   QI: 1 Step (Curb)   Assistance Needed Incidental touching   1 Step (Curb) CARE Score 4   QI: 4 Steps   Assistance Needed Incidental touching   4 Steps CARE Score 4   QI: 12 Steps   Assistance Needed Incidental touching   12 Steps CARE Score 4   Stairs   Type Stairs;Curb;Ramp   # of Steps 24  (FF 12 X  2 )   Weight Bearing Precautions Fall Risk   Assist Devices Single Rail   Findings mostly CS but pt  had 2 LOB with L foot getting caught on the step  Pt  able to stop herself from falling by reaching forward on the step  Stairs (FIM) 4 - Patient requires steadying assist or light touching AND patient goes up and down full flight (12- 14 stairs)   Toilet Transfer   Toilet Transfer (FIM) 5 - Patient requires supervision/monitoring   Therapeutic Interventions   Strengthening L ankle DF using red TB    Flexibility B hamstring and gatroc stretching    Balance stepping over different heights of bolsters forward and then sideways, side stepping and retrowalk in the hallway    Neuromuscular Re-Education supine bridging , PNF d1 flex and extended with manual ressistance and then yellow TB  of DF    Equipment Use   NuStep Level 1 X 10 mins    Other Comments   Comments Stroke eduaction on energy conservation technqiue completed  See seperate note for details    Assessment   Treatment Assessment Pt  c/o fatigue but was able to tolerate session with rest breaks in bet and education with activity pacing    Pt  was not pacing herseilf at the beginning of session and stated that " I want to keep moving and I don't want to sit too much!' Pt  ambulated 1050 feet, then did full flight of steps but wanted to do stairs again immediately after that  Pt  noted to have inc L foot drag as she was fatigued and tripped x 2 on the FF needing CGA and pt  reached for the stairs to stop herself from falling  Pt  also noted to have X 2 LOb with stepping over bolster needing min/ mod A to recover balance  Pt  cont to demonstrate progress with PT and cont with POC inorder to achieve goals and cont to work on L sided inc strength and motor control, inc dynamic balance and in independence in functional mobility  Problem List Decreased strength;Decreased endurance; Impaired balance;Decreased coordination;Decreased safety awareness   Barriers to Discharge Inaccessible home environment   Barriers to Discharge Comments but can stay on first floor set up    PT Barriers   Physical Impairment Decreased strength;Decreased endurance; Impaired balance;Decreased coordination;Decreased safety awareness   Functional Limitation Car transfers; Ramp negotiation;Stair negotiation;Standing;Transfers; Walking   Plan   Treatment/Interventions Functional transfer training;LE strengthening/ROM; Elevations; Therapeutic exercise; Endurance training;Patient/family training;Equipment eval/education; Bed mobility;Gait training   Recommendation   Recommendation Outpatient PT; Home with family support   Equipment Recommended (none )   PT Therapy Minutes   PT Time In 1330   PT Time Out 1500   PT Total Time (minutes) 90   PT Mode of treatment - Individual (minutes) 60   PT Mode of treatment - Concurrent (minutes) 30   PT Mode of treatment - Group (minutes) 0   PT Mode of treatment - Co-treat (minutes) 0   PT Mode of Teatment - Total time(minutes) 90 minutes   Therapy Time missed   Time missed?  No

## 2018-10-17 NOTE — PROGRESS NOTES
Internal Medicine Progress Note  Patient: Bala Price  Age/sex: 77 y o  female  Medical Record #: [id number]      ASSESSMENT/PLAN:  Bala Price is seen and examined and management for following issues:    Rt MCA CVA; s/p MIL angioplasty, stenting and MCA thrombectomy (Neurosurg): Pt was a Plavix non-responder and was loaded with Eduardo [place] 10/12/18  Checked P2Y12 10/13/18 and was 55 (therapeutic)  ASA 81mg resumed 10/13/18  Continue atorvastatin  Pt will need a repeat CTA in 2 weeks (10/26/18) and may need repeat thrombectomy/PTA and stenting (renal function has been normal)     ABLA: stable; pt was transfused with 1U PRBCs in the hospital  Stools negative x 3  Will continue to monitor      Hypothyroidism: Continue levothyroxine  Subjective: Patient seen and examined   Offers no complaints    ROS:   GI: denies abdominal pain, change bowel habits or reflux symptoms  Neuro: No new neurologic changes  Respiratory: No Cough, SOB  Cardiovascular: No CP, palpitations     Scheduled Meds:    Current Facility-Administered Medications:  aspirin 81 mg Oral Daily Luis Guerrero MD   atorvastatin 40 mg Oral QPM Luis Guerrero MD   bisacodyl 10 mg Rectal Daily PRN Shawnee Collado, DO   docusate sodium 100 mg Oral BID Luis Guerrero MD   famotidine 20 mg Oral BID Luis Guerrero MD   heparin (porcine) 5,000 Units Subcutaneous [facility] Luis Guerrero MD   hydrALAZINE 10 mg Oral Q8H PRN Luis Guerrero MD   levothyroxine 88 mcg Oral Early Morning Luis Guerrero MD   polyethylene glycol 17 g Oral Daily PRN Shawnee Collado, DO   senna 2 tablet Oral Daily Luis Guerrero MD   ticagrelor 90 mg Oral Q12H Sonal Chopra MD       Labs:       Results from last 7 days  Lab Units 10/13/18  0708   WBC Thousand/uL 6 58   HEMOGLOBIN g/dL 9 3*   HEMATOCRIT % 28 5*   PLATELETS Thousands/uL 368       Results from last 7 days  Lab Units 10/13/18  0708   SODIUM mmol/L 138   POTASSIUM mmol/L 4 3   CHLORIDE mmol/L 107   CO2 mmol/L 27   BUN mg/dL 13   CREATININE mg/dL 0 68   CALCIUM mg/dL 8 7                  Glucose, i-STAT (mg/dl)   Date Value   10/06/2018 149 (H)       Labs reviewed    Physical Examination:  Vitals:   Vitals:    10/16/18 0500 10/16/18 1308 10/16/18 2000 10/17/18 0549   BP: 139/66 120/76 150/70 (!) 132/48   BP Location:  Right arm  Right arm   Pulse: 76 93 86 83   Resp: 20 18 18 18   Temp: 98 5 °F (36 9 °C) 98 4 °F (36 9 °C) 98 5 °F (36 9 °C) 97 9 °F (36 6 °C)   TempSrc: Oral Oral Oral Oral   SpO2: 98% 100% 95% 98%   Weight:    87 6 kg (193 lb 1 6 oz)   Height:         Constitutional:  NAD; pleasant; nontoxic  HEENT:  AT/NC; oropharynx negative for thrush on tongue   CV:  +S1, S2;  RRR; no rub/murmur  Pulmonary:  BBS without crackles/wheeze/rhonci; resp are unlabored  Abdominal:  soft, +BS, ND/NT; no mass  Skin:  no rashes  Musculoskeletal:  no edema  :  no sykes  Neurological/Psych:  AAO; LUE/LLE 4-/5 with + drift and abn finger to nose; LLE 4+/5; RUE/RLE 5/5  + left facial droop; speech is clear  [x ] Total time spent: 30 Mins and greater than 50% of this time was spent counseling/coordinating care  ** Please Note: Dragon 360 Dictation voice to text software may have been used in the creation of this document   **

## 2018-10-17 NOTE — PHYSICAL THERAPY NOTE
Stroke Education Series    Pt participated in skilled Stroke Education Series in a group setting to address the topic of Energy Conservation post stroke in both verbal and written formats  Education within this session included information on stroke recovery and the essential use of energy conservation strategies and the importance of sleep hygiene  Additionally discussed was the Theory of the "Four P's" including planning, prioritizing, pacing, and positioning as energy conservation standards  The goal of this education session was to provide the patient with a more well rounded understanding of how the brain utilizes sleep/rest as a healing and "refeuling" tool and how he/she can incorporate energy conservation strategies in their daily routines  Following education, pt's response to education is: verbalizes understanding        Start Time: 1430    End Time: 1450

## 2018-10-17 NOTE — PROGRESS NOTES
10/17/18 1000   Pain Assessment   Pain Assessment No/denies pain   Pain Score No Pain   Restrictions/Precautions   Precautions Cognitive;Visual deficit   Lifestyle   Autonomy "I never was a speedy Veria Caleb"   Grooming   Able To Wash/Dry Hands   Limitation Noted In Safety   Grooming (FIM) 5 - Patient requires supervision/monitoring   QI: Chair/Bed-to-Chair Transfer   Assistance Needed Independent   Assistance Provided by [place] No physical assistance   Chair/Bed-to-Chair Transfer CARE Score 6   Transfer Bed/Chair/Wheelchair   Bed, Chair, Wheelchair Transfer (FIM) 5 - Patient requires supervision/monitoring   QI: [address] [facility] [place] Provided by [place] No physical assistance   Josu[address] Osor[address] Score 6   Toileting   Able to [address] down yes, up yes  Able to [address] [place] (FIM) 5 - Patient requires supervision/monitoring   QI: Toilet Transfer   Assistance Needed Independent; Adaptive equipment   Assistance Provided by [place] No physical assistance   Toilet Transfer CARE Score 6   Toilet Transfer   Toilet Transfer (FIM) 5 - Patient requires supervision/monitoring   Vision   Vision Comments DTVP-A perforemd due to visual information processign deficits noted with function- see note for details   Other Comments   Assessment With testing, pt reports scanning to left with answer choices however never choosing "A" answers-m reports did scan but none of them were right; with saccadic reading tasks starts in middle of line moving right, then regressing to left to go back over items - may benefit from red anchor for compensation   Assessment   Treatment Assessment Pt seen for skilled OT services this date for visual information processing assessment  Completing DTVP-A scoring below 50% ile in all areas-Copying 11/36; Figure ground:  2/14; Visual motor search able to find 1-8/25 in 3 minutes;  Visual closure:  5/16; Visual motor speed:  15/72; Form Constancy:  6/10; Functional issues noted with reading tasks, not scanning all the way to left, even with head turns, not finding targets on left, starting in middle of line and using random search patterns throughout testing,  Reports difficulty with using phone, calling and texting- may benefit from review next session  Pt requires cues at this time for visual information processing and spontaneous scanning with cognitive loaded tasks  Tolerating session fair- taking extra time to process- 90 min to complete test   Occupational performance remains limited in processing speed, visual information deficits impacting interaction with physical environment  Pt to benefit from continued skilled OT services to progress with same, attention and phone useage to meet established treatment goals with overall decreased burden of care  Plan   Treatment/Interventions ADL retraining;Functional transfer training; Therapeutic exercise;Patient/family training   OT Therapy Minutes   OT Time In 1000   OT Time Out 1130   OT Total Time (minutes) 90   OT Mode of treatment - Individual (minutes) 0   OT Mode of treatment - Concurrent (minutes) 0   OT Mode of treatment - Group (minutes) 0   OT Mode of treatment - Co-treat (minutes) 0   OT Mode of Teatment - Total time(minutes) 0 minutes   Therapy Time missed   Time missed?  No

## 2018-10-17 NOTE — PCC OCCUPATIONAL THERAPY
Pt is a 77 y o  female seen for OT evaluation s/p admit to B on 10/12/2018 w/ Cerebrovascular accident (CVA) due to thrombosis of right carotid artery ([address] )  Prior to admission, pt was fully indep with ADL/IADL and was driving  Pt currently requires CGA/CS for ADL fxn and xfers with no use of AE 2* the following deficits impacting occupational performance: decreased balance and impaired attention and impaired visual perceptual skills  Pt's spouse has completed FT for fxnl mob and ADL fxn  Pt to benefit from continued skilled OT tx while in the hospital to address deficits as defined above and maximize level of functional independence w ADL's and functional mobility

## 2018-10-17 NOTE — TEAM CONFERENCE
Acute RehabilitationTeam Conference Note  Date: 10/17/2018   Time: 11:58 AM       Patient Name:  Mike Read       Medical Record Number: [id number]   YOB: 1950  Sex: Female          Room/Bed:  /-01  Payor Info:  Payor: TALIA AYALA [facility] REP / Plan: TALIA CHANG [facility] REP / Product Type: Medicare HMO /      Admitting Diagnosis: CVA (cerebral vascular accident) ([facility] ) [I63 9]   Admit Date/Time:  10/12/2018  4:27 PM  Admission Comments: No comment available     Primary Diagnosis:  Cerebrovascular accident (CVA) due to thrombosis of right carotid artery ([facility] )  Principal Problem: Cerebrovascular accident (CVA) due to thrombosis of right carotid artery [facility])    Patient Active Problem List    Diagnosis Date Noted    Slow transit constipation 10/12/2018    Insomnia 10/12/2018    Carotid stenosis, symptomatic, with infarction ([facility] ) 10/09/2018    Normocytic anemia due to blood loss 10/08/2018    Cerebrovascular accident (CVA) due to thrombosis of right carotid artery ([facility] ) 10/06/2018    Hypothyroidism 10/06/2018       Physical Therapy:    Weight Bearing Status: Full Weight Bearing  Transfers: Supervision  Bed Mobility: Supervision  Amulation Distance (ft): 700 feet  Ambulation: Supervision  Assistive Device for Ambulation:  (none)  Number of Stairs: 24  Assistive Device for Stairs: Right Hand Rail  Stair Assistance: Supervision  Discharge Recommendations: Home with:  [address] [facility] with[de-identified] Family Support, Outpatient Physical Therapy    10/16: Pt presents s/p CVA  Currently pt is S level assist for bed mobility, transfers, and ambulation; pt has increased fall risk secondary to L foot drag that increases with fatigue and L inattention, both contributing to decreased balance and righting reactions   Pt would continue to benefit from skilled PT to address deficits in strength, balance, righting reactions, and functional mobility to promote progress toward prior level of function and to decrease risk of falls  Occupational Therapy:  Eating: Independent  Grooming: Supervision  Bathing: Contact Guard  Bathing: Contact Guard  Upper Body Dressing: Supervision  Lower Body Dressing: Contact Guard  Toileting: Supervision  Tub/Shower Transfer: Contact Guard  Toilet Transfer: Supervision  Cognition: Exceptions to WNL  Cognition: Decreased Attention  Orientation: Person, Place, Time, Situation  Discharge Recommendations: Home with:  [address] with[de-identified] Outpatient Occupational Therapy, Family Support         Pt is a 77 y o  female seen for OT evaluation s/p admit to [facility] on 10/12/2018 w/ Cerebrovascular accident (CVA) due to thrombosis of right carotid artery ([facility] )  Prior to admission, pt was fully indep with ADL/IADL and was driving  Pt currently requires CGA/CS for ADL fxn and xfers with no use of AE 2* the following deficits impacting occupational performance: decreased balance and impaired attention and impaired visual perceptual skills  Pt's spouse has completed FT for fxnl mob and ADL fxn  Pt to benefit from continued skilled OT tx while in the hospital to address deficits as defined above and maximize level of functional independence w ADL's and functional mobility  Speech Therapy:  Mode of Communication: Verbal  Cognition: Exceptions to WNL  Cognition: Decreased Executive Functions  Orientation: Person, Place, Time, Situation  Swallowing: Within Defined Limits  Diet Recommendations: Regular Diet, Thin  Discharge Recommendations: Home with:  [address] with[de-identified] Family Support, Outpatient Speech Therapy  Pt completed both dysphagia and cognitive assessments  Pt completed the CLQT+, where overall cognitive linguistic skills when compared to age matched peers between 65-80 yrs, deemed pt to have mild cognitive linguistic deficits at this time  Of note, pt's visuospatial skills to the L impacting ability to complete functional and structured tasks   However, pt is aware of deficits, but does not always carryover strategies during sessions  Increased verbal/visual and at times tactile cues to scan to the L side to complete tasks w/ increased accuracy  As for swallow function, pt was initially on level 3 w/ thin liquids, but able to be trialed w/ solids which pt tolerated w/o increased oral sxs  Tolerance of thin liquids was [facility] w/o increased or overt aspiration sxs  Diet was upgraded to regular/thins and pt is demonstrating tolerance of safest least restrictive diet w/o increased orophrayngeal or aspiration sxs  Pt will benefit from SLP services to focus on cognitive linguistic skills at this time  Nursing Notes:  Appetite: Good  Diet Type: Regular/House                      Diet Patient/Family Education Complete: Yes    Type of Wound (LDA): Incision           Incision 10/06/18 Groin Right (Active)   Incision Description VAISHNAVI 10/17/2018 10:57 AM   Kell-wound Assessment VAISHNAVI 10/17/2018 10:57 AM   Closure Open to air 10/17/2018 10:57 AM   Drainage Amount None 10/15/2018  8:58 AM   Dressing Open to air 10/17/2018 10:57 AM   Patient Tolerance Tolerated well 10/13/2018  3:45 PM   Dressing Status Other (Comment) 10/16/2018  9:34 AM              Bladder: 7 - Complete [place]     Bladder Patient/Family Education: Yes  Bowel: 6 - Modified [place]     Bowel Patient/Family Education: Yes        Pain Score: 0                          Pain Patient/Family Education: Yes  Medication Management/Safety  Safe Administration: Yes    Pt admitted with Rt MCA CVA, s/p MIL angioplasty, stenting and MCA thrombectomy, managed with aspirin, Brilinta, takes Levothyroxine for hypothyroidism, continent of B&B  This week we will continue to monitor vital signs, and lab results  Pt will continue to work on increase balance and strength, becoming independent with ADLS, and safety with transfers to prevent falls      Case Management:     Discharge Planning  Living Arrangements: Spouse/significant other  Support Systems: Spouse/significant other  Assistance Needed: PT/OT  Type of Current Residence: Private residence  [address]: No  Pt is participating well with therapy and has supportive family  Pt expects to return home and has been educated on potential dc recommendations for contd therapy  Following to assist w/dc planning needs  Is the patient actively participating in therapies? yes  List any modifications to the treatment plan:     Barriers Interventions   Left inattention, field deficits Cueing to attend to the left   balance Therapy exercises   Medication mgt, problem solving Family education             Is the patient making expected progress toward goals? yes  List any update or changes to goals:     Medical Goals: Patient will be medically stable for discharge to [facility] upon completion of rehab program and Patient will be able to manage medical conditions and comorbid conditions with medications and follow up upon completion of rehab program    Weekly Team Goals:   Rehab Team Goals  ADL Team Goal: Patient will be independent with ADLs with least restrictive device upon completion of rehab program  Bowel/Bladder Team Goal: Patient will be independent with bladder/bowel management with least restrictive device upon completion of rehab program  Transfer Team Goal: Patient will be independent with transfers with least restrictive device upon completion of rehab program  Locomotion Team Goal: Patient will be independent with locomotion with least restrictive device upon completion of rehab program  Cognitive Team Goal: Patient will require supervision for basic and complex tasks upon completion of rehab program    Discussion: in attention to review pts progress is rn pt ot slp cm and physician  Pt presents with balance, left foot drag, fatigue, left inattention and cog deficits  Spouse is supportive and aware of deficits  Pt is overall supervision in all functional mobility   Min to suprvision for cog deficits  Recommendation are for contd pt ot and slp serivces       Anticipated Discharge Date:  10/23/18

## 2018-10-17 NOTE — PROGRESS NOTES
Physical Medicine and Rehabilitation Progress Note  [place] 77 y o  female MRN: [id number]  Unit/Bed#: -01 Encounter: [id number]    HPI: Adrian Grant a 77 y o  female who presented to the [address] 10/6 after developing slurred speech and left sided weakness  She has a past medical history significant for hypothyroidism  She was found to have a R MCA M1 occlusion, as wellas right ICa occlusion  tPA was not administered, but she did undergo urgent endovascular inetrvention with balloon angioplasty and stenting of the right ICA origin with approximately 40% residual stenosis remaining through the stented segment  Non-flow limiting thrombus remained just above the stent  She had clear improvement in her speech and intellgibility following her procedure  She was transitioned to DAPT with aspirin and Plavix  Echo showed EF 55% with no RWMA or other embolic source  10/7 MRI Brain showed extensive recent infarcts in the right hemisphere including right centrum semiovale and lentiform nucleus, right temporal lobe/temporal opercular junction, and right occipital cortex  No hemorrhage  On 10/8, her hemoglobin dropped to 7 5, requiring a transfusion with 1 unit of pRBCs  Her hemoglobin remained stable for the remainder of her stay  A repeat CTA of the head and neck showed some filling defect, presumed to be a non-occlusive thrombus in and around the right carotid stent which remains patent  After a P2Y12 was done and was 289, neurosurgery started Brillinta 90mg Q12 and repeat P2Y12 was 55  Repeat CTH on day of discharge was stable  She is going to restart Aspirin 81mg tomorrow  She needs a repeat CTA of head and neck in 2 weeks  If there are no changes, she will need to go repeat thrombectomy, angioplasty, and stenting  Her diet was also upgraded to a dental soft with thin liquids, and is tolerating this diet without complications       Chief Complaint/Subjective: No acute events overnight  Still with significant L neglect, aware, but does not apply strategies consistently when ambulating or with functional tasks  She denies any CP/SOB, N/V, fevers, chills  ROS:  A 10 point review of systems was negative except for what is noted in the HPI  Assessment/Plan:      * Cerebrovascular accident (CVA) due to thrombosis of right carotid artery [facility])   Assessment & Plan    Right MCA stroke most likely 2/2 ICA and MCA occlusive disease  Now s/p thrombectomy and stent placement  And on DAPT  - Continue aspirin  - Continue ticagrelor and atorvastatin  - monitor BP, goal systolic BP between 542-121  - Consult neuropsych  PRAFO for LLE  Neurochecks Q4  DVT ppx: heparin Q8     Carotid stenosis, symptomatic, with infarction [facility])   Assessment & Plan    High risk of embolism from residual thrombus  - In 2 weeks repeat CTA (10/26), if filling defect still present will need repeat thrombectomy, angioplasty, and stenting   - Continue brilinta/ASA and atorvastatin  - Neurochecks      Insomnia   Assessment & Plan    Improved  Patient would like to try environmental approach to improve sleep  - If no improvement, can try melatonin  Slow transit constipation   Assessment & Plan    Started on senna 2 tabs daily  Continue colace  Miralax/bisacodyl PRN     Normocytic anemia due to blood loss   Assessment & Plan    FOBTx3 negative  Hgb 10/13 9 3 from 9  Check CBC in AM   Likely post-procedure  S/P 1 unit pRBC     Hypothyroidism   Assessment & Plan    Here placed on Levothyroxine 75mcg, TSH 0 784 on 10/6    - At home per chart was only on 25mcg daily    - Discussed with pharmacy [facility])  She is on 88mcg  Adjusted           Scheduled Meds:    Current Facility-Administered Medications:  aspirin 81 mg Oral Daily Vasiliy Rivers MD   atorvastatin 40 mg Oral QPM Vasiliy Rivers MD   bisacodyl 10 mg Rectal Daily PRN Jamila Urias DO   docusate sodium 100 mg Oral BID Vasiliy Rivers MD famotidine 20 mg Oral BID Paulie Cm MD   heparin (porcine) 5,000 Units Subcutaneous [facility] Paulie Cm MD   hydrALAZINE 10 mg Oral Q8H PRN Paulie Cm MD   levothyroxine 88 mcg Oral Early Morning Paulie Cm MD   polyethylene glycol 17 g Oral Daily PRN Alden Door, DO   senna 2 tablet Oral Daily Paulie Cm MD   ticagrelor 90 mg Oral Q12H [address] Paulie Cm MD      Health Maintenance  #Bowel: Last BM 10/16  Continue docusate/senna  As needed miralax/bisacodyl  #Bladder: No sykes  Continent  #Skin/Pressure Injury Prevention: Turn Q2hr in bed, with weight shifts [id number] in wheelchair  #DVT Prophylaxis:  Heparin Q8 and mechanical SCDs  #Code Status: Full  #FEN:  Reg/thins  #Dispo:  Team 10/17  ADD 10/23 home with 24/7 family support and outpatient therapies  Objective:    Functional Update:  10/17 PT:  S transfer, S bed mobility, Ambulates S 700'  24 stairs with S right hand rail  10/17 OT: Ind with eating, S with oral hygiene, grooming in stance at sink, UB dressing, toileting, toilet/transfers  Incidental touching for shower/bathe, tub/shower transfers, LB dressing  10/17 SLP: Dysphagia has resolved  Reg/thins  Focus on cognitive/linguistic skills  Decreased executive function  Allergies per EMR    Physical Exam:  Temp:  [97 9 °F (36 6 °C)-98 5 °F (36 9 °C)] 98 2 °F (36 8 °C)  HR:  [83-96] 96  Resp:  [18-20] 20  BP: (132-151)/(48-78) 151/78  Oxygen Therapy  SpO2: 98 %    Gen: No acute distress, Well-nourished, well-appearing  HEENT: Moist mucus membranes, Normocephalic/Atraumatic  Cardiovascular: Regular rate, rhythm, S1/S2  Distal pulses palpable  Heme/Extr: No edema/clubbing/cyanosis  Pulmonary: Non-labored breathing  Lungs CTAB  : No sykes  GI: Soft, non-tender, non-distended  BS+  MSK: AROM is WFL in all extremities  No effusions or deformities  Bulk is symmetric      Integumentary: Skin is warm, dry  No rashes or ulcers    Neuro: AAOx3, CN 2-12 intact except for very mild central CN7 deficit  Speech is intact  Appropriate to questioning  Tone is normal  Has unsustained clonus on the left  Psych: Normal mood and affect  Diagnostic Studies: reviewed, no new imaging    Laboratory:      Results from last 7 days  Lab Units 10/13/18  0708   HEMOGLOBIN g/dL 9 3*   HEMATOCRIT % 28 5*   WBC Thousand/uL 6 58       Results from last 7 days  Lab Units 10/13/18  0708   BUN mg/dL 13   SODIUM mmol/L 138   POTASSIUM mmol/L 4 3   CHLORIDE mmol/L 107   CREATININE mg/dL 0 68            Patient Active Problem List   Diagnosis    Cerebrovascular accident (CVA) due to thrombosis of right carotid artery ([facility] )    Hypothyroidism    Normocytic anemia due to blood loss    Carotid stenosis, symptomatic, with infarction (HCC)    Slow transit constipation    Insomnia       ** Please Note: Fluency Direct voice to text software may have been used in the creation of this document  **    Total time spent: At least 35 minutes, with more than 50% spent counseling/coordinating care  In addition, this patient was discussed by the interdisciplinary team in weekly case conference today  The care of the patient was extensively discussed with all care providers and an appropriate rehabilitation plan was formulated unique for this patient  Barriers were identified preventing progression of therapy and appropriate interventions were discussed with each discipline  Please see the team note for input from all disciplines regarding barriers, intervention, and discharge planning

## 2018-10-18 LAB
ANION GAP SERPL CALCULATED.3IONS-SCNC: 7 MMOL/L (ref 4–13)
BASOPHILS # BLD AUTO: 0.04 THOUSANDS/ΜL (ref 0–0.1)
BASOPHILS NFR BLD AUTO: 1 % (ref 0–1)
BUN SERPL-MCNC: 10 MG/DL (ref 5–25)
CALCIUM SERPL-MCNC: 9 MG/DL (ref 8.3–10.1)
CHLORIDE SERPL-SCNC: 108 MMOL/L (ref 100–108)
CO2 SERPL-SCNC: 24 MMOL/L (ref 21–32)
CREAT SERPL-MCNC: 0.75 MG/DL (ref 0.6–1.3)
EOSINOPHIL # BLD AUTO: 0.27 THOUSAND/ΜL (ref 0–0.61)
EOSINOPHIL NFR BLD AUTO: 4 % (ref 0–6)
ERYTHROCYTE [DISTWIDTH] IN BLOOD BY AUTOMATED COUNT: 13.7 % (ref 11.6–15.1)
GFR SERPL CREATININE-BSD FRML MDRD: 81 ML/MIN/1.73SQ M
GLUCOSE SERPL-MCNC: 86 MG/DL (ref 65–140)
HCT VFR BLD AUTO: 30.6 % (ref 34.8–46.1)
HGB BLD-MCNC: 9.7 G/DL (ref 11.5–15.4)
IMM GRANULOCYTES # BLD AUTO: 0.08 THOUSAND/UL (ref 0–0.2)
IMM GRANULOCYTES NFR BLD AUTO: 1 % (ref 0–2)
LYMPHOCYTES # BLD AUTO: 1.64 THOUSANDS/ΜL (ref 0.6–4.47)
LYMPHOCYTES NFR BLD AUTO: 24 % (ref 14–44)
MCH RBC QN AUTO: 30.4 PG (ref 26.8–34.3)
MCHC RBC AUTO-ENTMCNC: 31.7 G/DL (ref 31.4–37.4)
MCV RBC AUTO: 96 FL (ref 82–98)
MONOCYTES # BLD AUTO: 0.48 THOUSAND/ΜL (ref 0.17–1.22)
MONOCYTES NFR BLD AUTO: 7 % (ref 4–12)
NEUTROPHILS # BLD AUTO: 4.26 THOUSANDS/ΜL (ref 1.85–7.62)
NEUTS SEG NFR BLD AUTO: 63 % (ref 43–75)
NRBC BLD AUTO-RTO: 0 /100 WBCS
PLATELET # BLD AUTO: 473 THOUSANDS/UL (ref 149–390)
PMV BLD AUTO: 9.2 FL (ref 8.9–12.7)
POTASSIUM SERPL-SCNC: 3.6 MMOL/L (ref 3.5–5.3)
RBC # BLD AUTO: 3.19 MILLION/UL (ref 3.81–5.12)
SODIUM SERPL-SCNC: 139 MMOL/L (ref 136–145)
WBC # BLD AUTO: 6.77 THOUSAND/UL (ref 4.31–10.16)

## 2018-10-18 PROCEDURE — 80048 BASIC METABOLIC PNL TOTAL CA: CPT | Performed by: NURSE PRACTITIONER

## 2018-10-18 PROCEDURE — 97530 THERAPEUTIC ACTIVITIES: CPT

## 2018-10-18 PROCEDURE — G0515 COGNITIVE SKILLS DEVELOPMENT: HCPCS

## 2018-10-18 PROCEDURE — 97116 GAIT TRAINING THERAPY: CPT

## 2018-10-18 PROCEDURE — 99232 SBSQ HOSP IP/OBS MODERATE 35: CPT | Performed by: PHYSICAL MEDICINE & REHABILITATION

## 2018-10-18 PROCEDURE — 85025 COMPLETE CBC W/AUTO DIFF WBC: CPT | Performed by: NURSE PRACTITIONER

## 2018-10-18 RX ADMIN — TICAGRELOR 90 MG: 90 TABLET ORAL at 21:08

## 2018-10-18 RX ADMIN — FAMOTIDINE 20 MG: 40 POWDER, FOR SUSPENSION ORAL at 08:40

## 2018-10-18 RX ADMIN — DOCUSATE SODIUM 100 MG: 100 CAPSULE, LIQUID FILLED ORAL at 17:14

## 2018-10-18 RX ADMIN — ASPIRIN 81 MG: 81 TABLET, COATED ORAL at 08:39

## 2018-10-18 RX ADMIN — ATORVASTATIN CALCIUM 40 MG: 40 TABLET, FILM COATED ORAL at 17:14

## 2018-10-18 RX ADMIN — HEPARIN SODIUM 5000 UNITS: 5000 INJECTION INTRAVENOUS; SUBCUTANEOUS at 15:51

## 2018-10-18 RX ADMIN — HEPARIN SODIUM 5000 UNITS: 5000 INJECTION INTRAVENOUS; SUBCUTANEOUS at 06:21

## 2018-10-18 RX ADMIN — SENNOSIDES 17.2 MG: 8.6 TABLET, FILM COATED ORAL at 08:39

## 2018-10-18 RX ADMIN — DOCUSATE SODIUM 100 MG: 100 CAPSULE, LIQUID FILLED ORAL at 08:40

## 2018-10-18 RX ADMIN — FAMOTIDINE 20 MG: 40 POWDER, FOR SUSPENSION ORAL at 17:15

## 2018-10-18 RX ADMIN — TICAGRELOR 90 MG: 90 TABLET ORAL at 08:40

## 2018-10-18 RX ADMIN — LEVOTHYROXINE SODIUM 88 MCG: 88 TABLET ORAL at 06:21

## 2018-10-18 RX ADMIN — HEPARIN SODIUM 5000 UNITS: 5000 INJECTION INTRAVENOUS; SUBCUTANEOUS at 21:08

## 2018-10-18 NOTE — PROGRESS NOTES
Physical Medicine and Rehabilitation Progress Note  Rebkellyca Flow 77 y o  female MRN: [id number]  Unit/Bed#: -01 Encounter: [id number]    HPI: Stoney Settler a 77 y o  female who presented to the [address] 10/6 after developing slurred speech and left sided weakness  She has a past medical history significant for hypothyroidism  She was found to have a R MCA M1 occlusion, as wellas right ICa occlusion  tPA was not administered, but she did undergo urgent endovascular inetrvention with balloon angioplasty and stenting of the right ICA origin with approximately 40% residual stenosis remaining through the stented segment  Non-flow limiting thrombus remained just above the stent  She had clear improvement in her speech and intellgibility following her procedure  She was transitioned to DAPT with aspirin and Plavix  Echo showed EF 55% with no RWMA or other embolic source  10/7 MRI Brain showed extensive recent infarcts in the right hemisphere including right centrum semiovale and lentiform nucleus, right temporal lobe/temporal opercular junction, and right occipital cortex  No hemorrhage  On 10/8, her hemoglobin dropped to 7 5, requiring a transfusion with 1 unit of pRBCs  Her hemoglobin remained stable for the remainder of her stay  A repeat CTA of the head and neck showed some filling defect, presumed to be a non-occlusive thrombus in and around the right carotid stent which remains patent  After a P2Y12 was done and was 289, neurosurgery started Brillinta 90mg Q12 and repeat P2Y12 was 55  Repeat CTH on day of discharge was stable  She is going to restart Aspirin 81mg tomorrow  She needs a repeat CTA of head and neck in 2 weeks  If there are no changes, she will need to go repeat thrombectomy, angioplasty, and stenting  Her diet was also upgraded to a dental soft with thin liquids, and is tolerating this diet without complications       Chief Complaint/Subjective: No acute events overnight  Good bowel movements, sleeping well  Denies any CP, SOB, N/V, fevers, chills, bleeding/bruising  Mood has improved  No headaches or changes in vision  ROS:  A 10 point review of systems was negative except for what is noted in the HPI  Assessment/Plan:      * Cerebrovascular accident (CVA) due to thrombosis of right carotid artery [facility])   Assessment & Plan    Right MCA stroke most likely 2/2 ICA and MCA occlusive disease  Now s/p thrombectomy and stent placement  And on DAPT  - Continue aspirin  - Continue ticagrelor and atorvastatin  - monitor BP, goal systolic BP between 428-984  - Consult neuropsych  PRAFO for LLE  Neurochecks Q4  DVT ppx: heparin Q8     Carotid stenosis, symptomatic, with infarction [facility])   Assessment & Plan    High risk of embolism from residual thrombus  - In 2 weeks repeat CTA (10/26), if filling defect still present will need repeat thrombectomy, angioplasty, and stenting   - Continue brilinta/ASA and atorvastatin  - Neurochecks      Insomnia   Assessment & Plan    Improved  Patient would like to try environmental approach to improve sleep  - If no improvement, can try melatonin  Slow transit constipation   Assessment & Plan    Started on senna 2 tabs daily  Continue colace  Miralax/bisacodyl PRN     Normocytic anemia due to blood loss   Assessment & Plan    FOBTx3 negative  Hgb 10/18 9 7 from 9 3 Stable  Likely post-procedure  S/P 1 unit pRBC     Hypothyroidism   Assessment & Plan    Here placed on Levothyroxine 75mcg, TSH 0 784 on 10/6    - At home per chart was only on 25mcg daily    - Discussed with pharmacy [facility])  She is on 88mcg  Adjusted           Scheduled Meds:    Current Facility-Administered Medications:  aspirin 81 mg Oral Daily Gael Chacko MD   atorvastatin 40 mg Oral QPM Gael Chacko MD   bisacodyl 10 mg Rectal Daily PRN Jeromy Espinosa DO   docusate sodium 100 mg Oral BID Gael Chacko MD   famotidine 20 mg Oral BID Bobby Ziegler Letty Moore MD   heparin (porcine) 5,000 Units Subcutaneous [facility] Cooper Anderson MD   hydrALAZINE 10 mg Oral Q8H PRN Cooper Anderson MD   levothyroxine 88 mcg Oral Early Morning Cooper Anderson MD   polyethylene glycol 17 g Oral Daily PRN Cheryl Purdy,    senna 2 tablet Oral Daily Cooper Anderson MD   ticagrelor 90 mg Oral Q12H [address] Cooper Anderson MD      Health Maintenance  #Bowel: Last BM 10/18  Continue docusate/senna  As needed miralax/bisacodyl  #Bladder: No sykes  Continent  #Skin/Pressure Injury Prevention: Turn Q2hr in bed, with weight shifts [id number] in wheelchair  #DVT Prophylaxis:  Heparin Q8 and mechanical SCDs  #Code Status: Full  #FEN:  Reg/thins  #Dispo:  Team 10/17  ADD 10/23 home with 24/7 family support and outpatient therapies  Patient and  amenable to this  Discussed need for cognitive/visual evaluation prior to returning to drive  To provide script at discharge  Objective:    Functional Update: No new notes at this time  10/17 PT:  S transfer, S bed mobility, Ambulates S 700'  24 stairs with S right hand rail  10/17 OT: Ind with eating, S with oral hygiene, grooming in stance at sink, UB dressing, toileting, toilet/transfers  Incidental touching for shower/bathe, tub/shower transfers, LB dressing  10/17 SLP: Dysphagia has resolved  Reg/thins  Focus on cognitive/linguistic skills  Decreased executive function  Allergies per EMR    Physical Exam:  Temp:  [97 8 °F (36 6 °C)-98 2 °F (36 8 °C)] 98 °F (36 7 °C)  HR:  [78-96] 87  Resp:  [18-20] 20  BP: (123-151)/(62-78) 140/65  Oxygen Therapy  SpO2: 99 %    Gen: No acute distress, Well-nourished, well-appearing  HEENT: Moist mucus membranes, Normocephalic/Atraumatic  Cardiovascular: Regular rate, rhythm, S1/S2  Distal pulses palpable  Heme/Extr: No edema/clubbing/cyanosis  Pulmonary: Non-labored breathing  Lungs CTAB  : No sykes  GI: Soft, non-tender, non-distended  BS+  MSK: AROM is WFL in all extremities  Renée Blend Integumentary: Skin is warm, dry  No rashes or ulcers  Neuro: AAOx3, CN 2-12 intact except for CN7 mild deficitSpeech is intact  Appropriate to questioning  Tone is normal    Psych: Normal mood and affect  Diagnostic Studies: reviewed, no new imaging    Laboratory:      Results from last 7 days  Lab Units 10/18/18  0608 10/13/18  0708   HEMOGLOBIN g/dL 9 7* 9 3*   HEMATOCRIT % 30 6* 28 5*   WBC Thousand/uL 6 77 6 58       Results from last 7 days  Lab Units 10/18/18  0608 10/13/18  0708   BUN mg/dL 10 13   SODIUM mmol/L 139 138   POTASSIUM mmol/L 3 6 4 3   CHLORIDE mmol/L 108 107   CREATININE mg/dL 0 75 0 68            Patient Active Problem List   Diagnosis    Cerebrovascular accident (CVA) due to thrombosis of right carotid artery ([facility] [address] )    Hypothyroidism    Normocytic anemia due to blood loss    Carotid stenosis, symptomatic, with infarction (HCC)    Slow transit constipation    Insomnia       ** Please Note: Fluency Direct voice to text software may have been used in the creation of this document  **    Total visit time:  At least 25 minutes, with more than 50% spent counseling/coordinating care

## 2018-10-18 NOTE — SOCIAL WORK
Voice mail mssg left at [address] to scheduled pt's appmts  Clinical update faxed to [address] at [facility], [phone], awaiting determination  outpt therapy appmts scheduled at [address], 10/24 at 2pm speech eval, 10/30 OT 2pm, PT 3pm  Placed on dc instructions

## 2018-10-18 NOTE — CONSULTS
Consultation - Neuropsychology    Flor Dwyer 77 y o  female MRN: [id number]  Unit/Bed#: [facility] 974-01 Encounter: [id number]    Assessment/Plan     Assessment:  Pt denied history of major depression, anxiety or other mental health concerns  [facility] treatment team noted that pt has appeared depressed and anxious the past few days  Pt denied current mood disturbance during consult; however, when talking with pt  he reported she is anxious in the evenings and he "feels he has to stay (with her)"  Psychosocial stressors include: CVA and related symptoms/deficits; hospitalization; loss of independence  Overall, pt reported coping with medical issues effectively and is adjusting to rehab needs  Family and social support includes: pt , two children  Pt reported motivation to participate in rehab program and work on rehab goals  Dx: F[age] Adjustment disorder with depressed and anxious mood  Code: [id number]    Plan:   Pt will be afforded rehab psychology services while at [facility] to help pt cope with illness  History of Present Illness   Physician Requesting Consult: Smitha Medina MD  Reason for Consult / Principal Problem: coping, adjustment  Patient is a 77 y o  female   Primary complaints include: anxiety, concern about health problems, feeling depressed and poor concentration  Psychosocial Stressors: health  Inpatient consult to Neuropsychology  Consult performed by: Prince Jiménez ordered by: Ladan Parra          Psychiatric Review Of Systems:  sleep: no  appetite changes: no  weight changes: no  energy/anergy: yes  interest/pleasure/anhedonia: no  somatic symptoms: yes  anxiety/panic: yes  lalito: no  guilty/hopeless: no  self injurious behavior/risky behavior: no    Historical Information   Past Psychiatric History:   None      Substance Abuse History:  Use of Alcohol: occasional, social use how often occasional use and 0 out of 4 on CAGE    Smoking history: none noted       Family Psychiatric History: none noted  Social History  Education: high school diploma/GED  Marital history:   Living arrangement, social support: The patient lives in home with   Occupational History: retired  Functioning Relationships: good support system, good relationship with spouse or significant other and good relationship with children  Other Pertinent History: pt and  have been  for 48 years; pt is originally from [place]; she and her  moved to [place] five years ago  Traumatic History:   Abuse: none noted  Other Traumatic Events: none noted       Past Medical History:   Diagnosis Date    Disease of thyroid gland        Medical Review Of Systems:  Review of Systems    Meds/Allergies   current meds:   Current Facility-Administered Medications   Medication Dose Route Frequency    aspirin (ECOTRIN LOW STRENGTH) EC tablet 81 mg  81 mg Oral Daily    atorvastatin (LIPITOR) tablet 40 mg  40 mg Oral QPM    bisacodyl (DULCOLAX) rectal suppository 10 mg  10 mg Rectal Daily PRN    docusate sodium (COLACE) capsule 100 mg  100 mg Oral BID    famotidine (PEPCID) oral suspension 20 mg  20 mg Oral BID    heparin (porcine) subcutaneous injection 5,000 Units  5,000 Units Subcutaneous Q8H [address]    hydrALAZINE (APRESOLINE) tablet 10 mg  10 mg Oral Q8H PRN    levothyroxine tablet 88 mcg  88 mcg Oral Early Morning    polyethylene glycol (MIRALAX) packet 17 g  17 g Oral Daily PRN    senna (SENOKOT) tablet 17 2 mg  2 tablet Oral Daily    ticagrelor (BRILINTA) tablet 90 mg  90 mg Oral Q12H [address]     No Known Allergies    Objective   Vital signs in last 24 hours:  Temp:  [97 8 °F (36 6 °C)-98 2 °F (36 8 °C)] 98 °F (36 7 °C)  HR:  [78-96] 87  Resp:  [18-20] 20  BP: (123-151)/(62-78) 140/65      Intake/Output Summary (Last 24 hours) at 10/18/18 0952  Last data filed at 10/17/18 2100   Gross per 24 hour   Intake              360 ml   Output                0 ml   Net              360 ml Mental Status Evaluation:  Appearance:  age appropriate   Behavior:  normal   Speech:  normal pitch and normal volume   Mood:  euthymic   Affect:  mood-congruent   Language:  WNL   Thought Process:  goal directed and logical   Thought Content:  normal   Perceptual Disturbances: None   Risk Potential: none   Sensorium:  person, place, time/date and situation   Cognition:  grossly intact   Consciousness:  alert and awake    Attention: attention span and concentration were age appropriate   Intellect: within normal limits   Fund of Knowledge: vocabulary: WNL   Insight:  age appropriate   Judgment: age appropriate   Muscle Strength and Tone: see PT eval   Gait/Station: see PT eval   Motor Activity: no abnormal movements

## 2018-10-18 NOTE — PROGRESS NOTES
10/18/18 1230   Pain Assessment   Pain Assessment No/denies pain   Pain Score No Pain   Restrictions/Precautions   Precautions Fall Risk;Visual deficit   Weight Bearing Restrictions No   ROM Restrictions No   QI: Sit to Stand   Assistance Needed Supervision   Assistance Provided by [place] No physical assistance   Sit to Stand CARE Score 4   QI: Chair/Bed-to-Chair Transfer   Assistance Needed Supervision   Assistance Provided by [place] No physical assistance   Chair/Bed-to-Chair Transfer CARE Score 4   Transfer Bed/Chair/Wheelchair   Limitations Noted In Balance; Endurance;LE Strength   Adaptive Equipment None   Findings No AD  Bed, Chair, Wheelchair Transfer (FIM) 5 - Patient requires supervision/monitoring   Meal Prep   Meal Prep Level (NO AD)   Meal Prep Level of Assistance Minimal verbal cues; Close supervision   Meal Preparation Pt engaged in hot meal prep task of making scrambled eggs  She was noted to start the stove on max heat setting with eggs beginning to burn when placed in pan  She was able to recognize at that point that she should turn the heat down  Pt attempts to terminate cooking task without shutting off the stove  Once prompted pt was able to recall she should turn the stove off  Pt also requires VC's to keep handle turned so that she didn't bump into it and knock it off the stove  Recommending supervision for hot meal prep at this time for safety  Kitchen Mobility   Kitchen-Mobility Level (NO AD )   Kitchen Activity Retrieve items;Transport items   Kitchen Mobility Comments Pt was able to complete item retrieval/transport task  Pt retrieves eggs from fridge, pan from low cabinet, and utensils from drawers without any LOB  She was able to visually scan kitchen to find the proper location for items  Pt is able to transport all items      Light Housekeeping   Light Housekeeping Level (NO AD)   Light Housekeeping Level of Assistance Close supervision   Light Housekeeping Pt is able to wash all dishes by hand at the sink  She was also able to carry dishes down to the ADL suite and load  without any LOB  Functional Standing Tolerance   Time 30 minutes    Activity dyanmic and static standing with no LOB tolerates well  Comments Pt engaged in Wii Bowling activity to promote standing balance and L attention  Pt continues to present with dec attention to items on L visual field but demos G use of compensatory strategies by end of game  CS for standing balance activity  Cognition   Overall Cognitive Status WFL   Arousal/Participation Alert; Cooperative   Attention Within functional limits   Orientation Level Oriented X4   Memory Within functional limits   Following Commands Follows multistep commands with increased time or repetition   Activity Tolerance   Activity Tolerance Patient tolerated treatment well   Assessment   Treatment Assessment Pt participated in skilled OT services with focus on visual scanning, L side attention, homemaking tasks, meal prep, and standing balance  Pt continues to be limited by visual deficits and L side inattention  Pt requires VC's for safe obstacle negotiation in hallway and is noted to bump into obstacles on L  Please refer above for details on meal prep task  Pt will continue to benefit from skilled OT services with focus on visual perceptual tasks, visual scanning, and obstacle negotiation for increased independence and safety with I/ADL tasks  Prognosis Good   Problem List Decreased strength;Decreased endurance; Impaired balance;Decreased coordination;Decreased safety awareness   Plan   Treatment/Interventions ADL retraining;Functional transfer training; Therapeutic exercise; Endurance training;Equipment eval/education; Compensatory technique education   Progress Progressing toward goals   Recommendation   OT Discharge Recommendation Home with family support   OT Therapy Minutes   OT Time In 1230   OT Time Out 1330   OT Total Time (minutes) 60   OT Mode of treatment - Individual (minutes) 60   OT Mode of treatment - Concurrent (minutes) 0   OT Mode of treatment - Group (minutes) 0   OT Mode of treatment - Co-treat (minutes) 0   OT Mode of Teatment - Total time(minutes) 60 minutes   Therapy Time missed   Time missed?  No

## 2018-10-18 NOTE — PROGRESS NOTES
10/18/18 0930   Pain Assessment   Pain Assessment No/denies pain   Restrictions/Precautions   Precautions Fall Risk;Visual deficit   Cognition   Overall Cognitive Status WFL   Subjective   Subjective pt with no complaints this morning, states she feels ok   QI: Roll Left and Right   Assistance Needed Independent   Roll Left and Right CARE Score 6   QI: Sit to Lying   Assistance Needed Supervision   Sit to Lying CARE Score 4   QI: Lying to Sitting on Side of Bed   Assistance Needed Supervision   Lying to Sitting on Side of Bed CARE Score 4   QI: Sit to Stand   Assistance Needed Supervision   Sit to Stand CARE Score 4   QI: Chair/Bed-to-Chair Transfer   Assistance Needed Supervision   Chair/Bed-to-Chair Transfer CARE Score 4   Transfer Bed/Chair/Wheelchair   Limitations Noted In Vision;Balance   Adaptive Equipment None   All Transfer Supervision   Bed, Chair, Wheelchair Transfer (FIM) 5 - Patient requires supervision/monitoring   QI: Walk 10 Feet   Assistance Needed Supervision   Walk 10 Feet CARE Score 4   QI: Walk 50 Feet with Two Turns   Assistance Needed Supervision   Walk 50 Feet with Two Turns CARE Score 4   QI: Walk 150 Feet   Assistance Needed Supervision   Walk 150 Feet CARE Score 4   Ambulation   Does the patient walk? 2  Yes   Primary Discharge Mode of Locomotion Walk   Walk Assist Level Supervision   Gait Pattern Inconsistant Courtney   Distance Walked (feet) 999 ft  (not limited, community amb around hospital)   Limitations Noted In Safety;Speed   Findings pt can safely amb around unit with no decrease balance or safety  pt had one episode of L foot drag downstairs by cafeteria, but able to self correct  Walking (FIM) 5 - Patient requires supervision/monitoring AND distance 150 feet or more, no rest   Wheelchair mobility   QI: Does the patient use a wheelchair? 0  No   QI: 4 Steps   Assistance Needed Supervision; Adaptive equipment   4 Steps CARE Score 4   QI: 12 Steps   Assistance Needed Supervision; Adaptive equipment   12 Steps CARE Score 4   Stairs   Type Stairs   # of Steps 24   Assist Devices Single Rail   Findings CS, cues for safety when performing reciprocal pattern to clear L foot to decrease fall risk   Stairs (FIM) 5 - Patient requires supervision/monitoring AND goes up and down full flight (12- 14 stairs)   QI: Picking Up Object   Assistance Needed Supervision   Picking Up Object CARE Score 4   Therapeutic Interventions   Other obstacle negotiation in hallway, stepping over objects and picking them up   locating objects in hallway to help promote scanning to the L better  Other Comments   Comments co-tx with ST pt used signs to get down to cafeteria and recall 4 items needed to be found in cafeteria  Assessment   Treatment Assessment pt tolerated tx well focusing on safety through functional mobility  pt cont to need some cueing to help scan to the left side, noticable in cafeteria  pt does become easily distracted and needs redirected to task at hand to complete in timely fashion  reveiwed with pt moving furniture in room to get safely to and from [facility] bathroom from recliner and bed to be able to progress if safe  OT notified and will f/u with pt in afternoon session  Problem List Decreased strength;Decreased endurance; Impaired balance;Decreased coordination;Decreased safety awareness   Barriers to Discharge Inaccessible home environment   PT Barriers   Physical Impairment Decreased strength;Decreased endurance; Impaired balance;Decreased coordination;Decreased safety awareness   Functional Limitation Car transfers; Ramp negotiation;Stair negotiation;Standing;Transfers; Walking   Plan   Treatment/Interventions Functional transfer training; Endurance training;Patient/family training;Gait training   Progress Progressing toward goals   Recommendation   Recommendation Outpatient PT; Home with family support   PT Therapy Minutes   PT Time In 0930   PT Time Out 1100   PT Total Time (minutes) 90   PT Mode of treatment - Individual (minutes) 60   PT Mode of treatment - Concurrent (minutes) 0   PT Mode of treatment - Group (minutes) 0   PT Mode of treatment - Co-treat (minutes) 30   PT Mode of Teatment - Total time(minutes) 90 minutes   Therapy Time missed   Time missed?  No

## 2018-10-18 NOTE — PROGRESS NOTES
Stroke Education Series    Pt participated in skilled Stroke Education Series in a group setting to address the topic of Stroke 101: Understanding the Basics of Stroke in both verbal and written formats  Education within this session reviewed the basic structural/functional components of the brain and included information on the causes of stroke, related signs/symptoms, risk factors, and the process of stroke rehabilitation  The goal of this education was to provide the patient with general understanding of how the brain functions and how a stroke can impact his/her performance  In addition, this series aimed to provide the patient with the information that can help reduce the risk of sustaining another stroke  Following education, pt's response to education is: verbalizes understanding and demonstrates understanding  Of note, pt's  also present for session  Pt reported not having her Stroke Education/Information Binder present and was provided with a new binder         Start Time: 1430    End Time: 8020

## 2018-10-18 NOTE — PROGRESS NOTES
Internal Medicine Progress Note  Patient: Ramirez Ospina  Age/sex: 77 y o  female  Medical Record #: [id number]      ASSESSMENT/PLAN:  Ramirez Ospina is seen and examined and management for following issues:    Rt MCA CVA; s/p MIL angioplasty, stenting and MCA thrombectomy (Neurosurg): Pt was a Plavix non-responder and was loaded with Darlean Fredy 10/12/18  Checked P2Y12 10/13/18 and was 55 (therapeutic)  ASA 81mg resumed 10/13/18  Continue atorvastatin  Pt will need a repeat CTA in 2 weeks (10/26/18) and may need repeat thrombectomy/PTA and stenting (renal function has been normal)     ABLA: stable; pt was transfused with 1U PRBCs in the hospital  Stools negative x 3  Will continue to monitor      Hypothyroidism: Continue current dose of levothyroxine  Subjective: Patient seen and examined   Offers no complaints    ROS:   GI: denies abdominal pain, change bowel habits or reflux symptoms  Neuro: No new neurologic changes  Respiratory: No Cough, SOB  Cardiovascular: No CP, palpitations     Scheduled Meds:    Current Facility-Administered Medications:  aspirin 81 mg Oral Daily Karlene Wiggins MD   atorvastatin 40 mg Oral QPM Karlene Wiggins MD   bisacodyl 10 mg Rectal Daily PRN Sharie Pallas, DO   docusate sodium 100 mg Oral BID Karlene Wiggins MD   famotidine 20 mg Oral BID Karlene Wiggins MD   heparin (porcine) 5,000 Units Subcutaneous [facility] Karlene Wiggins MD   hydrALAZINE 10 mg Oral Q8H PRN Karlene Wiggins MD   levothyroxine 88 mcg Oral Early Morning Karlene Wiggins MD   polyethylene glycol 17 g Oral Daily PRN Sharie Pallas, DO   senna 2 tablet Oral Daily Karlene Wiggins MD   ticagrelor 90 mg Oral Q12H Radha Mederos MD       Labs:       Results from last 7 days  Lab Units 10/18/18  0608 10/13/18  0708   WBC Thousand/uL 6 77 6 58   HEMOGLOBIN g/dL 9 7* 9 3*   HEMATOCRIT % 30 6* 28 5*   PLATELETS Thousands/uL 473* 368       Results from last 7 days  Lab Units 10/18/18  0608 10/13/18  0708   SODIUM mmol/L 139 138   POTASSIUM mmol/L 3 6 4 3   CHLORIDE mmol/L 108 107   CO2 mmol/L 24 27   BUN mg/dL 10 13   CREATININE mg/dL 0 75 0 68   CALCIUM mg/dL 9 0 8 7                  Glucose, i-STAT (mg/dl)   Date Value   10/06/2018 149 (H)       Labs reviewed    Physical Examination:  Vitals:   Vitals:    10/17/18 1315 10/17/18 2026 10/18/18 0500 10/18/18 1350   BP: 151/78 123/62 140/65 148/71   BP Location: Left arm Left arm  Left arm   Pulse: 96 78 87 93   Resp: 20 18 20 18   Temp: 98 2 °F (36 8 °C) 97 8 °F (36 6 °C) 98 °F (36 7 °C) 98 3 °F (36 8 °C)   TempSrc: Oral Oral Oral Oral   SpO2: 98% 97% 99% 97%   Weight:       Height:         Constitutional:  NAD; pleasant; nontoxic  HEENT:  AT/NC; oropharynx negative for thrush on tongue   CV:  +S1, S2;  RRR; no rub/murmur  Pulmonary:  BBS without crackles/wheeze/rhonci; resp are unlabored  Abdominal:  soft, +BS, ND/NT; no mass  Skin:  no rashes  Musculoskeletal:  no edema  :  no sykes  Neurological/Psych:  AAO; LUE/LLE 4-/5 with + drift and abn finger to nose; LLE 4+/5; RUE/RLE 5/5  + left facial droop; she describes some left visual deficit but feels improved; speech is clear  [x ] Total time spent: 30 Mins and greater than 50% of this time was spent counseling/coordinating care  ** Please Note: Dragon 360 Dictation voice to text software may have been used in the creation of this document   **

## 2018-10-18 NOTE — PROGRESS NOTES
10/18/18 1030   Pain Assessment   Pain Assessment No/denies pain   Restrictions/Precautions   Precautions Fall Risk;Visual deficit   Memory Skills   Memory (FIM) 6 - Recognizes with extra time   Social Interaction (FIM) 6 - Interacts appropriately with others BUT requires extra  time   Speech/Language/Cognition Assessmetn   Treatment Assessment Initial portion of session engaged pt in co-tx session w/ PT  Refer to PT note for mobility details  Focus of co-tx session for SLP was recalling items, ability to problem solve/sequence and utitlize wasy to navigate through the hospital  Pt given objective at beginning of the session, where pt was told that the navigation to the cafeteria is the goal  Once in the cafeteria, pt was given a list of 4 items/places to locate: coffee, diet pepsi, sushi bar and pudding  Pt's ability to recall all 4 items immediately as well as remotely was 4/4 accurate  Overall navigation to the elevators was min A due to decreased L sided scanning/inattention/neglect  Pt able to determine location of elevators given familiarity of the unit  Once on the elevator, pt not sure of which floor to locate the cafeteria, where both SLP and PT gave cues to look at signs to locate information  Once able to read the sign and determine the floor, due to visual perceptual deficits, pt noted to have difficulty locate #1 button in elevator  Once cued to look L, pt able to locate items more appropriately  Once down on the floor w/ the cafeteria, pt needed minimal verbal cues to scan/locate signs to find cafeteria  Pt did use sound cues to increase accuracy in locating cafeteria  Once in cafeteria, reviewed the 4 items to locate, where pt was 4/4 in recalling  However, pt noted to be distractable once in cafeteria, decreased ability to attend to locating items noted, needing minimal verbal prompts to scan and locate items   Additionally noted during finding items in cafeteria, pt was tangentile in overall conversation, needing prompts to "focus" on tasks  Once pt locating all items, pt navigating back to 9th floor  Pt's ability to follow directions and locate elevators, recall floor, etc was supervision level, due to the fact that items were located on R side  After completing co-tx session, engaged pt in additional visual scanning task to locate errors on a calendar  Pt noted to have decreased attention to L side of calendar to locate a number of errors  Pt did require visual highlight cue, but did not improve independence in completing task  Pt continued to require moderate verbal cues to locate items on the L side of the page  Pt engaging in category matrix task given 4 categories and a given letter to determine the item in that category  Pt was 30/32 accurate w/ task, needing semantic cues for 2 items  Pt then engaging in auditory sequencing/recall task given 3 and 4 words  Overall ability   Swallow Assessment   Swallow Treatment Assessment continued from above  to recall words given task was 47/47 accurate, while ability to sequence words was 13/15 accurate, noting ability to correct given SLP cues  SLP Therapy Minutes   SLP Time In 1030   SLP Time Out 1130   SLP Total Time (minutes) 60   SLP Mode of treatment - Individual (minutes) 60   SLP Mode of treatment - Concurrent (minutes) 0   SLP Mode of treatment - Group (minutes) 0   SLP Mode of treatment - Co-treat (minutes) 0   SLP Mode of Teatment - Total time(minutes) 60 minutes   Therapy Time missed   Time missed?  No   Daily FIM Score   Problem solving (FIM) 4 - Solves basic problems 75-89% of time   Comprehension (FIM) 5 - Understands basic directions and conversation   Expression (FIM) 6 - Expresses complex/abstract but requires device (trach valve, communication boards)

## 2018-10-19 PROBLEM — G47.00 INSOMNIA: Status: RESOLVED | Noted: 2018-10-12 | Resolved: 2018-10-19

## 2018-10-19 PROCEDURE — 97112 NEUROMUSCULAR REEDUCATION: CPT

## 2018-10-19 PROCEDURE — 97535 SELF CARE MNGMENT TRAINING: CPT

## 2018-10-19 PROCEDURE — G0515 COGNITIVE SKILLS DEVELOPMENT: HCPCS

## 2018-10-19 PROCEDURE — 97116 GAIT TRAINING THERAPY: CPT

## 2018-10-19 PROCEDURE — 99232 SBSQ HOSP IP/OBS MODERATE 35: CPT | Performed by: PHYSICAL MEDICINE & REHABILITATION

## 2018-10-19 PROCEDURE — 97150 GROUP THERAPEUTIC PROCEDURES: CPT

## 2018-10-19 RX ADMIN — SENNOSIDES 17.2 MG: 8.6 TABLET, FILM COATED ORAL at 08:50

## 2018-10-19 RX ADMIN — FAMOTIDINE 20 MG: 40 POWDER, FOR SUSPENSION ORAL at 08:50

## 2018-10-19 RX ADMIN — HEPARIN SODIUM 5000 UNITS: 5000 INJECTION INTRAVENOUS; SUBCUTANEOUS at 22:20

## 2018-10-19 RX ADMIN — HEPARIN SODIUM 5000 UNITS: 5000 INJECTION INTRAVENOUS; SUBCUTANEOUS at 06:15

## 2018-10-19 RX ADMIN — ATORVASTATIN CALCIUM 40 MG: 40 TABLET, FILM COATED ORAL at 17:57

## 2018-10-19 RX ADMIN — LEVOTHYROXINE SODIUM 88 MCG: 88 TABLET ORAL at 06:15

## 2018-10-19 RX ADMIN — HEPARIN SODIUM 5000 UNITS: 5000 INJECTION INTRAVENOUS; SUBCUTANEOUS at 13:49

## 2018-10-19 RX ADMIN — FAMOTIDINE 20 MG: 40 POWDER, FOR SUSPENSION ORAL at 17:57

## 2018-10-19 RX ADMIN — TICAGRELOR 90 MG: 90 TABLET ORAL at 08:50

## 2018-10-19 RX ADMIN — ASPIRIN 81 MG: 81 TABLET, COATED ORAL at 08:50

## 2018-10-19 RX ADMIN — DOCUSATE SODIUM 100 MG: 100 CAPSULE, LIQUID FILLED ORAL at 08:50

## 2018-10-19 RX ADMIN — TICAGRELOR 90 MG: 90 TABLET ORAL at 22:20

## 2018-10-19 NOTE — PHYSICAL THERAPY NOTE
Stroke Education Series    Pt participated in skilled Stroke Education Series in a group setting to address the topic of Purpose of Rehab post stroke in both verbal and written formats  Education within this session included a review of the individual roles of the rehab team, functions of the acute rehab center, and neuro rehabilitation treatment strategies  The goal of this education session was to provide the patient with understanding of the overall importance of the therapy process  Additionally, this series aimed to help the patient connect neuro rehabilitation treatment strategies to his or her individual therapy process  Overall, to increase his/her engagement and provide contextual meaning to daily treatment sessions  Following education, pt's response to education is: verbalizes understanding and demonstrates understanding        Start Time: 1520    End Time: 1540

## 2018-10-19 NOTE — PROGRESS NOTES
10/19/18 0900   Pain Assessment   Pain Assessment No/denies pain   Pain Score No Pain   Restrictions/Precautions   Precautions Fall Risk;Visual deficit   Memory Skills   Memory (FIM) 6 - Recognizes with extra time   Social Interaction (FIM) 6 - Interacts appropriately with others BUT requires extra  time   Speech/Language/Cognition Assessmetn   Treatment Assessment Pt seen for skilled ST session w/ focus on cognitive linguistic skills  Began session by reviewing strategies which pt has previously used to increase attention to L side where pt demonstrated ability to recall strategies w/o dificulty and stated rationale for each  When engaged in a category exclusion of basic concepts, pt was presented w/ a visual Fo4 written words (also provided w/ brightly colored line at L side of page to increase attention) and demonstrated ability to ID word which did not belong in 19/20 trials independently, benefiting from verbal cues to complete remaining trial  Pt demonstrated verbal reasoning skills to provide a rationale for eliminating items and stated the category to which items belonged in at mod I level  Demonstrated scanning of all items on both L and R sides  Targeting more abstract reasoning skills, pt named at least two objects when given specific attributes in 6/6 trials, requiring intermittent verbal cues to further explain responses, however, all responses were appropriate  At end of session, reviewed pt's progress and answered any f/u questions presented by pt and SO  At this time, pt is making progress towards goals and will benefit from further skilled ST intervention to maximize functional cognitive linguistic skills for increased independence and safety      SLP Therapy Minutes   SLP Time In 0900   SLP Time Out 0930   SLP Total Time (minutes) 30   SLP Mode of treatment - Individual (minutes) 30   SLP Mode of treatment - Concurrent (minutes) 0   SLP Mode of treatment - Group (minutes) 0   SLP Mode of treatment - Co-treat (minutes) 0   SLP Mode of Teatment - Total time(minutes) 30 minutes   Therapy Time missed   Time missed?  No   Daily FIM Score   Problem solving (FIM) 5 - Solves basic problems 90% of time   Comprehension (FIM) 5 - Understands basic directions and conversation   Expression (FIM) 6 - Expresses complex/abstract but requires:  more time

## 2018-10-19 NOTE — PROGRESS NOTES
Occupational Therapy Note   10/19/18 0700   Pain Assessment   Pain Assessment No/denies pain   Pain Score No Pain   Restrictions/Precautions   Precautions Fall Risk;Visual deficit   Lifestyle   Autonomy "I don't know if I don't see it so I keep checking myself"   QI: [address] Provided by [place] No physical assistance   Eating CARE Score 6   Eating Assessment   Meal Assessed Breakfast   Eating (FIM) 6 - Patient requires assistive device or dentures   QI: Oral Hygiene   Comment declined until after breakfast   Grooming   Able To Comb/Brush Hair;Wash/Dry Face;Wash/Dry Hands   Limitation Noted In Safety; Other  (vision)   Grooming (FIM) 5 - [place] sets up supplies or applies device   QI: Shower/Bathe Self   Assistance Needed Supervision   Assistance Provided by [place] No physical assistance   Shower/Bathe Self CARE Score 4   Bathing   Assessed Bath Style Shower   Able to Gather/Transport Yes   Able to Adjust Water Temperature No   Able to Wash/Rinse/Dry (body part) Left Arm;Right Arm;L Upper Leg;R Upper Leg;L Lower Leg/Foot;R Lower Leg/Foot;Chest;Abdomen;Perineal Area; Buttocks   Limitations Noted in Vision; Safety   Positioning Seated;Standing   Findings  Cues to sit vs stand, bent over to shampoo hair, lateral lean with drying leg in standing to left   Bathing (FIM) 5 - Patient requires supervision/monitoring but completes 10/10 parts   Tub/Shower Transfer   Limitations Noted In Balance; Safety   Adaptive Equipment Seat with Back;Grab Bars   Shower Transfer (FIM) 5 - Patient requires supervision/monitoring   QI: Upper Body Dressing   Assistance Needed Physical assistance   Assistance Provided by [place] Less than 25%   Comment due to damp back, assist to pull down [place] sports bra in back   Upper Body Dressing CARE Score 3   QI: Lower Body Dressing   Assistance Needed Verbal cues   Comment cue to thread left Le first due to weakness   Lower Body Dressing CARE Score 4   QI: Putting On/Taking Off [address] Provided by [place] No physical assistance   Putting On/Taking Off Footwear CARE Score 6   QI: Picking Up Object   Assistance Needed Supervision   Picking Up Object CARE Score 4   Dressing/Undressing Clothing   Able to  Obtain Clothing;Store removed clothing   Remove UB Clothes Pullover Shirt   Remove LB Clothes Pants;Socks   [address]; Undergarment;Socks   Limitations Noted In Balance; Sequencing   Positioning Standing;Supported Sit   UB Dressing (FIM) 4 - Patient completes 75% of all tasks   LB Dressing (FIM) 5 - Patient requires supervision/monitoring   QI: Sit to [address] Provided by [place] No physical assistance   Sit to Stand CARE Score 6   Transfer Bed/Chair/Wheelchair   Bed, Chair, Wheelchair Transfer (FIM) 6 - Patient requires assistive device/extra time/safety concerns but completes independently   QI: [address] [place] Provided by [place] No physical assistance   Josu[address] Joi [address] Score 6   Toileting   Able to [address] down yes, up yes  Able to Manage Clothing Closures Yes   Manage Hygiene Bladder; Bowel   Adaptive Equipment Grab Bar   Toileting (FIM) 6 - Patient requires assistive device/extra time/safety concerns but completes independently   QI: Toilet Transfer   Assistance Needed Independent; Adaptive equipment   Assistance Provided by [place] No physical assistance   Toilet Transfer CARE Score 6   Toilet Transfer   Toilet Transfer (FIM) 6 - Patient requires assistive device/extra time/safety concerns but completes independently   Light Housekeeping   Light Housekeeping Pt able to organize top 2 drawers of dresser, folding clothes and organizing by type in stance x 7 min   Functional Standing Tolerance   Time 7 min; 4 min   Activity dresser organization, shampooing hair   Vision   Vision Comments performing cancellations, using anchor on left, 2M font size with Info[employer] double number cross off- with dual tasking ocnversationa nd task, missing -6, second attemtpt, -2 to correct, continues to start in middle or right and work to left to check work, discussed grocery shopping scanning, visual memory, able to create list with following left border of paper, visual memory in tact for spelling of grocery items   Assessment   Treatment Assessment Pt seen for skilled OT services this date for shower ADL, visual scanning/ pre reading task, phone usage adjusting brightness for increased contrast to find icon, visual motor integration tracing shapes  Continues to show left inattention even with head turns, may benefit from eventual visual field test, missing items in lower left field with tracing/VMI  Pt requires DS at this time for functional mobility, ADL, cues to turn soap container with attempts to open hinged section of top vs open section     Tolerating session well, aware of deficits or potential for deficits 85% of time, tends to cover  Occupational performance remains limited in scanning of environment for safety, visual perceptual tasks, left UE automaticity and speed  Pt to benefit from continued skilled OT services to progress with visual information processing skills, scanning in left environment, environmental scavenger hunt to meet established treatment goals with overall decreased burden of care  Problem List Impaired balance;Decreased safety awareness; Impaired vision;Decreased mobility; Impaired judgement  (decreased left LE strength, sliding off of leg with bathing,)   Plan   Treatment/Interventions ADL retraining;Functional transfer training; Therapeutic exercise;OT   OT Therapy Minutes   OT Time In 0700   OT Time Out 0830   OT Total Time (minutes) 90   OT Mode of treatment - Individual (minutes) 90   OT Mode of treatment - Concurrent (minutes) 0   OT Mode of treatment - Group (minutes) 0 OT Mode of treatment - Co-treat (minutes) 0   OT Mode of Teatment - Total time(minutes) 90 minutes   Therapy Time missed   Time missed?  No

## 2018-10-19 NOTE — PROGRESS NOTES
10/19/18 1000   Pain Assessment   Pain Assessment No/denies pain   Restrictions/Precautions   Precautions Fall Risk   Cognition   Arousal/Participation Cooperative   Subjective   Subjective pt reported feeling okay and ready for therapy  QI: Roll Left and Right   Assistance Needed Supervision   Roll Left and Right CARE Score 4   QI: Sit to Lying   Assistance Needed Supervision   Sit to Lying CARE Score 4   QI: Lying to Sitting on Side of Bed   Assistance Needed Supervision   Lying to Sitting on Side of Bed CARE Score 4   QI: Sit to Stand   Assistance Needed Supervision   Sit to Stand CARE Score 4   QI: Chair/Bed-to-Chair Transfer   Assistance Needed Supervision   Chair/Bed-to-Chair Transfer CARE Score 4   Transfer Bed/Chair/Wheelchair   Limitations Noted In Balance; Endurance;LE Strength   Stand Pivot Supervision   Sit to Stand Supervision   Stand to Sit Supervision   Supine to Sit Supervision   Sit to Supine Supervision   Car Transfer Supervision   Bed, Chair, Wheelchair Transfer (FIM) 5 - Patient requires supervision/monitoring   QI: Car Transfer   Assistance Needed Set-up / clean-up   Car Transfer CARE Score 5   QI: Walk 10 Feet   Assistance Needed Set-up / clean-up   Walk 10 Feet CARE Score 5   QI: Walk 50 Feet with Two [address] / clean-up   Walk 50 Feet with Two Turns CARE Score 5   QI: Walk 150 Feet   Assistance Needed Set-up / clean-up   Walk 150 Feet CARE Score 5   QI: Walking 10 Feet on Uneven Surfaces   Assistance Needed Supervision   Walking 10 Feet on Uneven Surfaces CARE Score 4   Ambulation   Primary Discharge Mode of Locomotion Walk   Walk Assist Level Supervision   Gait Pattern Decreased foot clearance; Improper weight shift   Assist Device (none)   Distance Walked (feet) 400 ft  (800x2, 1000x2)   Limitations Noted In Balance   Walking (FIM) 5 - Patient requires supervision/monitoring AND distance 150 feet or more, no rest   QI: 1 Step (Curb)   Assistance Needed Supervision 1 Step (Curb) CARE Score 4   QI: 4 Steps   Assistance Needed Supervision; Adaptive equipment   4 Steps CARE Score 4   QI: 12 Steps   Assistance Needed Supervision; Adaptive equipment   12 Steps CARE Score 4   Stairs   Type Stairs   # of Steps 20  (x4)   Assist Devices Single Rail   Findings nonreciprocal at first, then reciprocal  practiced on  steps first for safety, step ups on  steps  Stairs (FIM) 5 - Patient requires supervision/monitoring AND goes up and down full flight (12- 14 stairs)   Therapeutic Interventions   Strengthening used 6"  steps for step ups, both legs alernating bewteen stance leg,  with rail and without to challenge balance  Other Comments   Comments walking over obstacles, around them, in gift shop in small spaces, longer walk through hospital in more croweded areas, down ramp  Assessment   Treatment Assessment Pt cont to make functional progress towards d/c home  Pt's L foot cont to have dec foot clearance at times, when she is more fatigued or focusing on something else, however this has improved since eval  Pt demonstrated improvement in dynamic balance and strength with being able to perform FF of stairs with reciprocal pattern up and down  Pt cont to demonstrate safety with stepping over obstacles and walking in the gift shop/in smaller spaces  Cont to review progress and POC with pt and , who demonstrate understanding  Pt will cont to benefit from skilled PT to make further gains in dynamic balance, righting reactions and I   to Progress to [facility] tomorrow  PT Barriers   Physical Impairment Decreased strength;Decreased endurance; Impaired balance;Decreased mobility; Decreased range of motion   Plan   Treatment/Interventions Functional transfer training;LE strengthening/ROM; Elevations; Therapeutic exercise; Endurance training;Patient/family training;Equipment eval/education; Bed mobility;Gait training   Progress Progressing toward goals   Recommendation Recommendation Outpatient PT; Home with family support   PT Therapy Minutes   PT Time In 1000   PT Time Out 1100   PT Total Time (minutes) 60   PT Mode of treatment - Individual (minutes) 60   PT Mode of treatment - Concurrent (minutes) 0   PT Mode of treatment - Group (minutes) 0   PT Mode of treatment - Co-treat (minutes) 0   PT Mode of Teatment - Total time(minutes) 60 minutes   Therapy Time missed   Time missed?  No

## 2018-10-19 NOTE — PROGRESS NOTES
Internal Medicine Progress Note  Patient: Bala Price  Age/sex: 77 y o  female  Medical Record #: [id number]      ASSESSMENT/PLAN:  Bala Price is seen and examined and management for following issues:    Rt MCA CVA; s/p MIL angioplasty, stenting and MCA thrombectomy (Neurosurg): Pt was a Plavix non-responder and was loaded with Eduardo Cruzey 10/12/18  Checked P2Y12 10/13/18 and was 55 (therapeutic)  ASA 81mg resumed 10/13/18  Continue atorvastatin  Pt will need a repeat CTA in 2 weeks (10/26/18) and may need repeat thrombectomy/PTA and stenting (renal function has been normal)     ABLA: stable; pt was transfused with 1U PRBCs in the hospital  Stools negative x 3  Will continue to monitor      Hypothyroidism: Continue current dose of levothyroxine  Subjective: Patient seen and examined   Offers no complaints    ROS:   GI: denies abdominal pain, change bowel habits or reflux symptoms  Neuro: No new neurologic changes  Respiratory: No Cough, SOB  Cardiovascular: No CP, palpitations     Scheduled Meds:    Current Facility-Administered Medications:  aspirin 81 mg Oral Daily Luis Guerrero MD   atorvastatin 40 mg Oral QPM Luis Guerrero MD   bisacodyl 10 mg Rectal Daily PRN Shawnee Collado, DO   docusate sodium 100 mg Oral BID Luis Guerrero MD   famotidine 20 mg Oral BID Luis Guerrero MD   heparin (porcine) 5,000 Units Subcutaneous [facility] Luis Guerrero MD   hydrALAZINE 10 mg Oral Q8H PRN Luis Guerrero MD   levothyroxine 88 mcg Oral Early Morning Luis Guerrero MD   polyethylene glycol 17 g Oral Daily PRN Shawnee Collado, DO   senna 2 tablet Oral Daily Luis Guerrero MD   ticagrelor 90 mg Oral Q12H Sonal Chopra MD       Labs:       Results from last 7 days  Lab Units 10/18/18  0608 10/13/18  0708   WBC Thousand/uL 6 77 6 58   HEMOGLOBIN g/dL 9 7* 9 3*   HEMATOCRIT % 30 6* 28 5*   PLATELETS Thousands/uL 473* 368       Results from last 7 days  Lab Units 10/18/18  0608 10/13/18  0708   SODIUM mmol/L 139 138   POTASSIUM mmol/L 3 6 4 3   CHLORIDE mmol/L 108 107   CO2 mmol/L 24 27   BUN mg/dL 10 13   CREATININE mg/dL 0 75 0 68   CALCIUM mg/dL 9 0 8 7                  Glucose, i-STAT (mg/dl)   Date Value   10/06/2018 149 (H)       Labs reviewed    Physical Examination:  Vitals:   Vitals:    10/18/18 0500 10/18/18 1350 10/18/18 2043 10/19/18 0500   BP: 140/65 148/71 121/57 139/66   BP Location:  Left arm Left arm    Pulse: 87 93 75 69   Resp: 20 18 18 20   Temp: 98 °F (36 7 °C) 98 3 °F (36 8 °C) 98 1 °F (36 7 °C) 98 1 °F (36 7 °C)   TempSrc: Oral Oral Oral Oral   SpO2: 99% 97% 97% 97%   Weight:       Height:         Constitutional:  NAD; pleasant; nontoxic  HEENT:  AT/NC; oropharynx negative for thrush on tongue   CV:  +S1, S2;  RRR; no rub/murmur  Pulmonary:  BBS without crackles/wheeze/rhonci; resp are unlabored  Abdominal:  soft, +BS, ND/NT; no mass  Skin:  no rashes  Musculoskeletal:  no edema  :  no sykes  Neurological/Psych:  AAO; LUE/LLE 4-/5 with + drift and abn finger to nose; LLE 4+/5; RUE/RLE 5/5  + left facial droop; she describes some left visual deficit but feels improved; speech is clear  [x ] Total time spent: 30 Mins and greater than 50% of this time was spent counseling/coordinating care  ** Please Note: Dragon 360 Dictation voice to text software may have been used in the creation of this document   **

## 2018-10-20 PROCEDURE — 97112 NEUROMUSCULAR REEDUCATION: CPT

## 2018-10-20 PROCEDURE — 97530 THERAPEUTIC ACTIVITIES: CPT

## 2018-10-20 PROCEDURE — 97116 GAIT TRAINING THERAPY: CPT

## 2018-10-20 RX ADMIN — ASPIRIN 81 MG: 81 TABLET, COATED ORAL at 08:43

## 2018-10-20 RX ADMIN — FAMOTIDINE 20 MG: 40 POWDER, FOR SUSPENSION ORAL at 08:43

## 2018-10-20 RX ADMIN — HEPARIN SODIUM 5000 UNITS: 5000 INJECTION INTRAVENOUS; SUBCUTANEOUS at 05:23

## 2018-10-20 RX ADMIN — LEVOTHYROXINE SODIUM 88 MCG: 88 TABLET ORAL at 05:23

## 2018-10-20 RX ADMIN — HEPARIN SODIUM 5000 UNITS: 5000 INJECTION INTRAVENOUS; SUBCUTANEOUS at 22:02

## 2018-10-20 RX ADMIN — HEPARIN SODIUM 5000 UNITS: 5000 INJECTION INTRAVENOUS; SUBCUTANEOUS at 13:17

## 2018-10-20 RX ADMIN — TICAGRELOR 90 MG: 90 TABLET ORAL at 08:43

## 2018-10-20 RX ADMIN — TICAGRELOR 90 MG: 90 TABLET ORAL at 22:01

## 2018-10-20 RX ADMIN — FAMOTIDINE 20 MG: 40 POWDER, FOR SUSPENSION ORAL at 17:07

## 2018-10-20 RX ADMIN — ATORVASTATIN CALCIUM 40 MG: 40 TABLET, FILM COATED ORAL at 17:07

## 2018-10-20 NOTE — PROGRESS NOTES
10/20/18 1100   Pain Assessment   Pain Assessment No/denies pain   Cognition   Overall Cognitive Status WFL   Arousal/Participation Cooperative   Attention Within functional limits   Orientation Level Oriented X4   Memory Within functional limits   Following Commands Follows multistep commands with increased time or repetition   Subjective   Subjective Lets take a walk   QI: Sit to [address] Provided by [place] No physical assistance   Sit to Stand CARE Score 4   Transfer Bed/Chair/Wheelchair   Sit to Stand Supervision   Stand to Sit Supervision   Bed, Chair, Wheelchair Transfer (FIM) 5 - Patient requires supervision/monitoring   QI: [address] Provided by [place] No physical assistance   Walk 10 Feet CARE Score 4   QI: Walk 50 Feet with Two [address] Provided by [place] No physical assistance   Walk 50 Feet with Two Turns CARE Score 4   QI: Walk 150 Feet   Assistance Needed Supervision   Assistance Provided by [place] No physical assistance   Walk 150 Feet CARE Score 4   Ambulation   Does the patient walk? 2  Yes   Primary Discharge Mode of Locomotion Walk   Walk Assist Level Distant Supervision   Gait Pattern L foot drag; Inconsistant Courtney; Lateral deviation   Distance Walked (feet) 1000 ft  (x 2)   Limitations Noted In Balance; Heel Strike   Findings Pt ambulates at variable speeds with superimposed physical and cognitive challenges increasing frequency of left foot drop and audible scuff, no overt LOB observed   Walking (FIM) 5 - Patient requires supervision/monitoring AND distance 150 feet or more, no rest   Wheelchair mobility   QI: Does the patient use a wheelchair? 0   No   QI: 12 Steps   Assistance Needed Supervision   Assistance Provided by [place] No physical assistance   12 Steps CARE Score 4   Stairs   Type Stairs   # of Steps 20   Assist Devices Single Rail   Findings Reciprocal pattern down two flights of stairs in stair well, appropriate pacing and safety awareness, appropriate eccentric control of descent   Stairs (FIM) 5 - Patient requires supervision/monitoring AND goes up and down full flight (12- 14 stairs)   Therapeutic Interventions   Flexibility 2' x 2 lunge Left gastroc stretch on step   Balance mCTSIB progression (immediate LOB condition 4 requiring manual righting), semi tandem stance, // tandem ambulation, dynamic balance testing with incorporated head turns, changes in speed, and obstacle negotiation (Dynamic Gait Index administered scoring 20/24)   Assessment   Treatment Assessment Pt participated in skilled PT session focusing on static and dynamic balance, community ambulation simulation, and negotiation of full stair sets  DGI score of 20/24 consistent with increased falls risk with gait deviations with head motions, difficulty changing speed, use of HR on steps  mCTSIB with immediate LOB condition 4 (FT EC on foam) consistent with poor vestibular sensory integration  Pt is able to negotiate obstacles safely, carry on conversation while ambulating with no deterioration, and answer specific questions relative to her past and family life often gesturing dramatically with her hands with no observed deterioration in stability  Stairs negotiated safely with accurate foot placement and appropraite eccentric control of descent  Pt will continue to benefit from PT intervention to maximize safety and awareness with all mobility while progressing towards d c,   Problem List Impaired balance; Impaired vision   PT Barriers   Physical Impairment Decreased strength;Decreased endurance; Impaired balance   Plan   Treatment/Interventions Functional transfer training;LE strengthening/ROM; Elevations; Therapeutic exercise; Endurance training;Gait training   Progress Progressing toward goals   PT Therapy Minutes   PT Time In 1100   PT Time Out 1130   PT Total Time (minutes) 30   PT Mode of treatment - Individual (minutes) 30   PT Mode of treatment - Concurrent (minutes) 0   PT Mode of treatment - Group (minutes) 0   PT Mode of treatment - Co-treat (minutes) 0   PT Mode of Teatment - Total time(minutes) 30 minutes   Therapy Time missed   Time missed?  No

## 2018-10-20 NOTE — PROGRESS NOTES
10/20/18 1430   Pain Assessment   Pain Assessment 0-10   Pain Score No Pain   Restrictions/Precautions   Precautions Fall Risk   Cognition   Overall Cognitive Status WFL   Arousal/Participation Cooperative; Alert   Attention Within functional limits   Orientation Level Oriented X4   Memory Within functional limits   Following Commands Follows multistep commands with increased time or repetition   Subjective   Subjective states she could use a nap   QI: Sit to Stand   Assistance Needed Supervision   Sit to Stand CARE Score 4   QI: Chair/Bed-to-Chair Transfer   Assistance Needed Supervision   Chair/Bed-to-Chair Transfer CARE Score 4   Transfer Bed/Chair/Wheelchair   Limitations Noted In LE Strength   Stand Pivot Supervision   Sit to Stand Supervision   Stand to Sit Supervision   Bed, Chair, Wheelchair Transfer (FIM) 5 - Patient requires supervision/monitoring   QI: Walk 10 Feet   Assistance Needed Supervision   Walk 10 Feet CARE Score 4   QI: Walk 50 Feet with Two Turns   Assistance Needed Supervision   Walk 50 Feet with Two Turns CARE Score 4   QI: Walk 150 Feet   Assistance Needed Supervision   Walk 150 Feet CARE Score 4   Ambulation   Does the patient walk? 2  Yes   Primary Discharge Mode of Locomotion Walk   Walk Assist Level Supervision   Gait Pattern Inconsistant Courtney;Decreased foot clearance; Step through   [employer] (feet) 1000 ft  (x2)   Limitations Noted In Heel Strike;Speed;Strength;Swing   Walking (FIM) 5 - Patient requires supervision/monitoring AND distance 150 feet or more, no rest   Wheelchair mobility   QI: Does the patient use a wheelchair? 0   No   QI: 1 Step (Curb)   Assistance Needed Supervision   1 Step (Curb) CARE Score 4   QI: 4 Steps   Assistance Needed Supervision   4 Steps CARE Score 4   QI: 12 Steps   Assistance Needed Supervision   12 Steps CARE Score 4   Stairs   Type Stairs;Curb;Ramp   # of Steps 12   Weight Bearing Precautions Fall Risk   Stairs (FIM) 5 - Patient requires supervision/monitoring AND goes up and down full flight (12- 14 stairs)   Therapeutic Interventions   Flexibility hamstring and gastroc 60"x2   Balance retro amb 100', SLS 30"x2; hib abduction no UE support 10x1 each LE for moving COG in SLS   Other negotiation of hospital, figure 8's on 2nd floor around pilars with no LOB   Assessment   Treatment Assessment pt has good safety awareness and balance throughout session; decreased LLE clearance noted but this does not cause her to trip or stumble; good balance with walks around the hospital making quick turn and scanning in busy environments; decreased speed with retro amb but she has good balance, righitng reactions are present but SATISH is narrow; pt has good balance to use a vacuum, good safety awareness for not reaching too far and staying close to the vacuum to not shift her COG too far; trialed stairs with no HR up or down, non-reciprocal down with good balance, reciprocal up with good balance; pt will benefit from higher level balance activities and vestibular exercises; continue POC as per PT   Problem List Decreased strength; Impaired balance; Impaired vision   PT Barriers   Functional Limitation Walking;Ramp negotiation;Car transfers   Plan   Treatment/Interventions LE strengthening/ROM; Elevations; Therapeutic exercise; Endurance training;Patient/family training;Gait training   Progress Progressing toward goals   Recommendation   Recommendation Outpatient PT; Home with family support   PT Therapy Minutes   PT Time In 1430   PT Time Out 1530   PT Total Time (minutes) 60   PT Mode of treatment - Individual (minutes) 60   PT Mode of treatment - Concurrent (minutes) 0   PT Mode of treatment - Group (minutes) 0   PT Mode of treatment - Co-treat (minutes) 0   PT Mode of Teatment - Total time(minutes) 60 minutes   Therapy Time missed   Time missed?  No

## 2018-10-20 NOTE — PROGRESS NOTES
10/20/18 0830   Pain Assessment   Pain Assessment No/denies pain   Functional Standing Tolerance   Time 25 mins    Activity Standing table top visual tasks    Transfers   Sit to Stand 5  Supervision   Stand to Sit 5  Supervision   Additional Comments Functional mob w/o AD in lacy for scavenger hunt    Cognition   Overall Cognitive Status WFL   Arousal/Participation Cooperative   Attention Within functional limits   Orientation Level Oriented X4   Memory Within functional limits   Following Commands Follows multistep commands with increased time or repetition   Vision   Vision Comments Visual scanning L to R with dual attention; mod errors and cues for strategies    Perception   Left Attention Scavenger hunt for 10 items; 6/10  25 mins to complete task successfully and 4 rotations around unit needed to locate items on R  Form Constancy 4/6 accuracy; min cues to complete additional  Errors with 9/6 noted, however problem solves with min cues  Visual Closure Peg designs 3/4 trials accurate   Perception Comments Mod difficulty with card matching - 23:19; 5 errors and cues to complete    Activity Tolerance   Activity Tolerance Patient tolerated treatment well   Assessment   Treatment Assessment Pt presents with cont L inattention with functional, visual motor and cognitive tasks  Increased time to complete all tasks and cues rerquirand requires multiple cues needed consistently for strategy use  Pt resistant minimally and reports "I just don't see it " Mild frustration noted with challening task presented this session, however able to complete each successfully  Minimal carryover of strategies with head turning and environmental cues  Recommended cont OT as per POC to max safety and performance in ADLs, IADLs and functional tasks  Prognosis Good   Problem List Impaired balance;Decreased safety awareness; Impaired vision;Decreased mobility; Impaired judgement   Plan   Treatment/Interventions ADL retraining;Functional transfer training; Therapeutic exercise; Endurance training;Cognitive reorientation;Patient/family training; Compensatory technique education; Bed mobility   Progress Progressing toward goals   OT Therapy Minutes   OT Time In 0830   OT Time Out 1000   OT Total Time (minutes) 90   OT Mode of treatment - Individual (minutes) 90   OT Mode of treatment - Concurrent (minutes) 0   OT Mode of treatment - Group (minutes) 0   OT Mode of treatment - Co-treat (minutes) 0   OT Mode of Teatment - Total time(minutes) 90 minutes   Therapy Time missed   Time missed?  No

## 2018-10-20 NOTE — PROGRESS NOTES
Internal Medicine Progress Note  Patient: Rika Chinchilla  Age/sex: 77 y o  female  Medical Record #: [id number]      ASSESSMENT/PLAN:  Rika Chinchilla is seen and examined and management for following issues:    Rt MCA CVA; s/p MIL angioplasty, stenting and MCA thrombectomy (Neurosurg): Pt was a Plavix non-responder and was loaded with Orene Nathaniel 10/12/18  Checked P2Y12 10/13/18 and was 55 (therapeutic)  ASA 81mg resumed 10/13/18  Continue atorvastatin  Pt will need a repeat CTA in 2 weeks (10/26/18) and may need repeat thrombectomy/PTA and stenting (renal function has been normal)     ABLA: stable; pt was transfused with 1U PRBCs in the hospital  Stools negative x 3  Will continue to monitor      Hypothyroidism: Continue current dose of levothyroxine  Subjective: Patient seen and examined   Offers no complaints    ROS:   GI: denies abdominal pain, change bowel habits or reflux symptoms  Neuro: No new neurologic changes  Respiratory: No Cough, SOB  Cardiovascular: No CP, palpitations     Scheduled Meds:    Current Facility-Administered Medications:  aspirin 81 mg Oral Daily Michael Zhang MD   atorvastatin 40 mg Oral QPM Michael Zhang MD   bisacodyl 10 mg Rectal Daily PRN Bunny Vazquez DO   docusate sodium 100 mg Oral BID Michael Zhang MD   famotidine 20 mg Oral BID Michael Zhang MD   heparin (porcine) 5,000 Units Subcutaneous [facility] Michael Zhang MD   hydrALAZINE 10 mg Oral Q8H PRN Michael Zhang MD   levothyroxine 88 mcg Oral Early Morning Michael Zhang MD   polyethylene glycol 17 g Oral Daily PRN Bunny Vazquez DO   senna 2 tablet Oral Daily Michael Zhang MD   ticagrelor 90 mg Oral Q12H [address] Michael Zhang MD       Labs:       Results from last 7 days  Lab Units 10/18/18  0608   WBC Thousand/uL 6 77   HEMOGLOBIN g/dL 9 7*   HEMATOCRIT % 30 6*   PLATELETS Thousands/uL 473*       Results from last 7 days  Lab Units 10/18/18  0608   SODIUM mmol/L 139   POTASSIUM mmol/L 3 6   CHLORIDE mmol/L 108   CO2 mmol/L 24   BUN mg/dL 10   CREATININE mg/dL 0 75   CALCIUM mg/dL 9 0                  Glucose, i-STAT (mg/dl)   Date Value   10/06/2018 149 (H)       Labs reviewed    Physical Examination:  Vitals:   Vitals:    10/20/18 0515 10/20/18 1311 10/20/18 1313 10/20/18 1330   BP: 123/70 150/80 130/70 130/70   BP Location:  Right arm Left arm Right arm   Pulse: 74   89   Resp: 18   20   Temp: 98 1 °F (36 7 °C)   98 2 °F (36 8 °C)   TempSrc: Oral   Oral   SpO2: 98%   99%   Weight:       Height:         Constitutional:  NAD; pleasant; nontoxic  HEENT:  AT/NC; oropharynx negative for thrush on tongue   CV:  +S1, S2;  RRR; no rub/murmur  Pulmonary:  BBS without crackles/wheeze/rhonci; resp are unlabored  Abdominal:  soft, +BS, ND/NT; no mass  Skin:  no rashes  Musculoskeletal:  no edema  :  no sykes  Neurological/Psych:  AAO; LUE/LLE 4-/5; LLE 4+/5; RUE/RLE 5/5  + left facial droop; speech is clear  [x ] Total time spent: 30 Mins and greater than 50% of this time was spent counseling/coordinating care  ** Please Note: Dragon 360 Dictation voice to text software may have been used in the creation of this document   **

## 2018-10-21 PROCEDURE — 97112 NEUROMUSCULAR REEDUCATION: CPT

## 2018-10-21 PROCEDURE — 97530 THERAPEUTIC ACTIVITIES: CPT

## 2018-10-21 RX ADMIN — LEVOTHYROXINE SODIUM 88 MCG: 88 TABLET ORAL at 05:58

## 2018-10-21 RX ADMIN — ATORVASTATIN CALCIUM 40 MG: 40 TABLET, FILM COATED ORAL at 17:06

## 2018-10-21 RX ADMIN — HEPARIN SODIUM 5000 UNITS: 5000 INJECTION INTRAVENOUS; SUBCUTANEOUS at 13:12

## 2018-10-21 RX ADMIN — TICAGRELOR 90 MG: 90 TABLET ORAL at 08:29

## 2018-10-21 RX ADMIN — FAMOTIDINE 20 MG: 40 POWDER, FOR SUSPENSION ORAL at 17:07

## 2018-10-21 RX ADMIN — TICAGRELOR 90 MG: 90 TABLET ORAL at 22:02

## 2018-10-21 RX ADMIN — ASPIRIN 81 MG: 81 TABLET, COATED ORAL at 08:29

## 2018-10-21 RX ADMIN — HEPARIN SODIUM 5000 UNITS: 5000 INJECTION INTRAVENOUS; SUBCUTANEOUS at 22:02

## 2018-10-21 RX ADMIN — HEPARIN SODIUM 5000 UNITS: 5000 INJECTION INTRAVENOUS; SUBCUTANEOUS at 05:58

## 2018-10-21 RX ADMIN — FAMOTIDINE 20 MG: 40 POWDER, FOR SUSPENSION ORAL at 08:30

## 2018-10-21 NOTE — PROGRESS NOTES
Internal Medicine Progress Note  Patient: Bertin Martínez  Age/sex: 77 y o  female  Medical Record #: [id number]      ASSESSMENT/PLAN:  Bertin Martínez is seen and examined and management for following issues:    Rt MCA CVA; s/p MIL angioplasty, stenting and MCA thrombectomy (Neurosurg): Pt was a Plavix non-responder and was loaded with Lonita Artist 10/12/18  Checked P2Y12 10/13/18 and was 55 (therapeutic)  ASA 81mg resumed 10/13/18  Continue atorvastatin  Pt will need a repeat CTA in 2 weeks (10/26/18) and may need repeat thrombectomy/PTA and stenting (renal function has been normal)     ABLA: stable; pt was transfused with 1U PRBCs in the hospital  Stools negative x 3  Will continue to monitor      Hypothyroidism: Continue current dose of levothyroxine  Subjective: Patient seen and examined   Offers no complaints    ROS:   GI: denies abdominal pain, change bowel habits or reflux symptoms  Neuro: No new neurologic changes  Respiratory: No Cough, SOB  Cardiovascular: No CP, palpitations     Scheduled Meds:    Current Facility-Administered Medications:  aspirin 81 mg Oral Daily Vasiliy Rivers MD   atorvastatin 40 mg Oral QPM Vasiliy Rivers MD   bisacodyl 10 mg Rectal Daily PRN Jamila Urias DO   docusate sodium 100 mg Oral BID Vasiliy Rivers MD   famotidine 20 mg Oral BID Vasiliy Rivers MD   heparin (porcine) 5,000 Units Subcutaneous [facility] Vasiliy Rivers MD   hydrALAZINE 10 mg Oral Q8H PRN Vasiliy Rivers MD   levothyroxine 88 mcg Oral Early Morning Vasiliy Rivers MD   polyethylene glycol 17 g Oral Daily PRN Jamila Urias DO   senna 2 tablet Oral Daily Vasiliy Rivers MD   ticagrelor 90 mg Oral Q12H [address] Vasiliy Rivers MD       Labs:       Results from last 7 days  Lab Units 10/18/18  0608   WBC Thousand/uL 6 77   HEMOGLOBIN g/dL 9 7*   HEMATOCRIT % 30 6*   PLATELETS Thousands/uL 473*       Results from last 7 days  Lab Units 10/18/18  0608   SODIUM mmol/L 139   POTASSIUM mmol/L 3 6   CHLORIDE mmol/L 108   CO2 mmol/L 24   BUN mg/dL 10   CREATININE mg/dL 0 75   CALCIUM mg/dL 9 0                  Glucose, i-STAT (mg/dl)   Date Value   10/06/2018 149 (H)       Labs reviewed    Physical Examination:  Vitals:   Vitals:    10/20/18 1313 10/20/18 1330 10/20/18 2048 10/21/18 0627   BP: 130/70 130/70 127/63 120/58   BP Location: Left arm Right arm Left arm Right arm   Pulse:  89 77 84   Resp:  20 19 19   Temp:  98 2 °F (36 8 °C) 98 4 °F (36 9 °C) (!) 97 4 °F (36 3 °C)   TempSrc:  Oral Oral Oral   SpO2:  99% 98% 98%   Weight:       Height:         Constitutional:  NAD; pleasant; nontoxic  HEENT:  AT/NC; oropharynx negative for thrush on tongue   CV:  +S1, S2;  RRR; no rub/murmur  Pulmonary:  BBS without crackles/wheeze/rhonci; resp are unlabored  Abdominal:  soft, +BS, ND/NT; no mass  Skin:  no rashes  Musculoskeletal:  no edema  :  no sykes  Neurological/Psych:  AAO; LUE/LLE 4-/5; LLE 4+/5; RUE/RLE 5/5  + left facial droop; speech is clear  [x ] Total time spent: 30 Mins and greater than 50% of this time was spent counseling/coordinating care  ** Please Note: Dragon 360 Dictation voice to text software may have been used in the creation of this document   **

## 2018-10-21 NOTE — PROGRESS NOTES
10/21/18 0830   Pain Assessment   Pain Assessment 0-10   Pain Score No Pain   Restrictions/Precautions   Precautions Fall Risk   General   Change In Medical/Functional Status progressed to I no device, OT agrees   Cognition   Overall Cognitive Status WFL   Arousal/Participation Cooperative; Alert   Attention Within functional limits   Orientation Level Oriented X4   Memory Within functional limits   Following Commands Follows multistep commands with increased time or repetition   Subjective   Subjective reports she is doing well   QI: Roll Left and Right   Assistance Needed Independent   Roll Left and Right CARE Score 6   QI: Sit to Lying   Assistance Needed Independent   Sit to Lying CARE Score 6   QI: Lying to Sitting on Side of Bed   Assistance Needed Independent   Lying to Sitting on Side of Bed CARE Score 6   QI: Sit to Stand   Assistance Needed Independent   Sit to Stand CARE Score 6   Bed Mobility   Able to Roll Left to Right;Right to Left;Scoot Up;Scoot Down   Findings I   QI: Chair/Bed-to-Chair Transfer   Assistance Needed Independent   Chair/Bed-to-Chair Transfer CARE Score 6   Transfer Bed/Chair/Wheelchair   Limitations Noted In LE Strength   Stand Pivot Independent   Sit to Stand Independent   Stand to Sit Independent   Supine to Sit Independent   Sit to Supine Independent   Bed, Chair, Wheelchair Transfer (FIM) 7 - No helper, safely, timely and completes all tasks independently   QI: Walk 10 Feet   Assistance Needed Independent   Walk 10 Feet CARE Score 6   QI: Walk 50 Feet with Two Turns   Assistance Needed Independent   Walk 50 Feet with Two Turns CARE Score 6   QI: Walk 150 Feet   Assistance Needed Independent   Walk 150 Feet CARE Score 6   Ambulation   Does the patient walk? 2  Yes   Primary Discharge Mode of Locomotion Walk   Walk Assist Level Independent   Gait Pattern Decreased foot clearance; Inconsistant Courtney   Distance Walked (feet) 350 ft  (x2)   Limitations Noted In Heel Strike;Speed;Strength;Swing   Walking (FIM) 7 - No helper, safely, timely and completes all tasks independently AND distance 150 feet or more, no rest   Wheelchair mobility   QI: Does the patient use a wheelchair? 0  No   QI: Toilet Transfer   Assistance Needed Independent   Toilet Transfer CARE Score 6   Toilet Transfer   Toilet Transfer (FIM) 7 - No helper, safely, timely and completes all tasks independently   Therapeutic Interventions   Flexibility hamstring and gastroc 60"x2   Neuromuscular Re-Education floor recovery, tandem amb, rebounder yellow ball on floor and red foam   Assessment   Treatment Assessment pt has good balance and safety awareness throughout session and consistently sequences correctly; able to manage things in room to safely move around; floor recovery with use of mat table S, min A when not using mat table; good static and dynamic balance with good foot clearance for amb; begins to slightly catch as she fatigues; continue POC as per PT   Problem List Decreased strength;Decreased range of motion; Impaired vision   Plan   Treatment/Interventions LE strengthening/ROM; Elevations; Therapeutic exercise; Endurance training   Progress Progressing toward goals   Recommendation   Recommendation Outpatient PT; Home with family support   PT Therapy Minutes   PT Time In 0830   PT Time Out 0900   PT Total Time (minutes) 30   PT Mode of treatment - Individual (minutes) 30   PT Mode of treatment - Concurrent (minutes) 0   PT Mode of treatment - Group (minutes) 0   PT Mode of treatment - Co-treat (minutes) 0   PT Mode of Teatment - Total time(minutes) 30 minutes   Therapy Time missed   Time missed?  No

## 2018-10-21 NOTE — PROGRESS NOTES
10/21/18 1115   Pain Assessment   Pain Assessment No/denies pain   Pain Score No Pain   Restrictions/Precautions   Precautions Fall Risk   QI: Sit to [address] Provided by [place] No physical assistance   Comment no AD   Sit to Stand CARE Score 6   QI: Chair/Bed-to-Chair Transfer   Assistance Needed Independent   Assistance Provided by [place] No physical assistance   Comment no AD   Chair/Bed-to-Chair Transfer CARE Score 6   Transfer Bed/Chair/Wheelchair   Limitations Noted In Balance; Endurance;LE Strength   Adaptive Equipment None   Stand Pivot Modified Independent   Sit to Stand Other  (mod I)   Stand to Sit Other  (mod I)   Findings no AD   Bed, Chair, Wheelchair Transfer (FIM) 7 - No helper, safely, timely and completes all tasks independently   Cognition   Overall Cognitive Status [facility]   Arousal/Participation Alert; Cooperative   Attention Within functional limits   Orientation Level Oriented X4   Memory Within functional limits   Following Commands Follows multistep commands without difficulty   Vision   Vision Comments Pt reports inc difficulty finding information and apps on L side of iphone  Blue electric tape applied to L side of phone as an anchor to cue pt to attend to L side of phone  Pt seated to play preferred game on phone and notes improvement in game score 2* inc attention to L side of screen  Plan to follow up w/ pt next session to note if pt has inc ease of use w/ phone and if pt attends to L side of phone w/ inc frequency  Additional Activities   Additional Activities Comments Pt engaged in theract of bean bag toss w/ focus on high level dynamic balance and visual attention to L side  Pt CGA in stance w/ R foot placed on balance board while reaching out of SATISH to retrieve bean bags from stool placed on pt 's L side  No LOB noted or c/o fatigue  Pt then engaged in activity w/ L foot placed on balance board and tolerated   OT upgrade to pt w/ both feet placed on balance board, pt completed at CGA level, pt noted w/ some fatigue in LLE, however able to tolerate activity without a rest break  Pt noted w/ G balance  Activity Tolerance   Activity Tolerance Patient tolerated treatment well   Assessment   Treatment Assessment Pt participated in skilled OT Tx session w/ focus on high level standing balance, L visual attention, and adaptation applied to pt 's iphone to inc indep use  See above note for activity detail  Pt progressed to BRP per PT, OT agreeable and staff aware  Cont OT POC w/ focus on visual scanning and high level standing balance to maximize pt indep w/ ADL/IADLs and all fxnl transfers for safe D/C home  Prognosis Good   Problem List Decreased strength;Decreased range of motion; Impaired balance;Decreased mobility; Impaired vision   Barriers to Discharge Inaccessible home environment   Plan   Treatment/Interventions ADL retraining;Functional transfer training;LE strengthening/ROM; Therapeutic exercise; Endurance training;Patient/family training;Equipment eval/education; Bed mobility; Compensatory technique education   Progress Progressing toward goals   Recommendation   OT Discharge Recommendation Home with family support   OT Therapy Minutes   OT Time In 1115   OT Time Out 1145   OT Total Time (minutes) 30   OT Mode of treatment - Individual (minutes) 30   OT Mode of treatment - Concurrent (minutes) 0   OT Mode of treatment - Group (minutes) 0   OT Mode of treatment - Co-treat (minutes) 0   OT Mode of Teatment - Total time(minutes) 30 minutes   Therapy Time missed   Time missed?  No

## 2018-10-22 LAB
ANION GAP SERPL CALCULATED.3IONS-SCNC: 7 MMOL/L (ref 4–13)
BASOPHILS # BLD AUTO: 0.04 THOUSANDS/ΜL (ref 0–0.1)
BASOPHILS NFR BLD AUTO: 1 % (ref 0–1)
BUN SERPL-MCNC: 15 MG/DL (ref 5–25)
CALCIUM SERPL-MCNC: 8.7 MG/DL (ref 8.3–10.1)
CHLORIDE SERPL-SCNC: 109 MMOL/L (ref 100–108)
CO2 SERPL-SCNC: 24 MMOL/L (ref 21–32)
CREAT SERPL-MCNC: 0.63 MG/DL (ref 0.6–1.3)
EOSINOPHIL # BLD AUTO: 0.29 THOUSAND/ΜL (ref 0–0.61)
EOSINOPHIL NFR BLD AUTO: 6 % (ref 0–6)
ERYTHROCYTE [DISTWIDTH] IN BLOOD BY AUTOMATED COUNT: 13.7 % (ref 11.6–15.1)
GFR SERPL CREATININE-BSD FRML MDRD: 91 ML/MIN/1.73SQ M
GLUCOSE SERPL-MCNC: 85 MG/DL (ref 65–140)
HCT VFR BLD AUTO: 28.9 % (ref 34.8–46.1)
HGB BLD-MCNC: 9.1 G/DL (ref 11.5–15.4)
IMM GRANULOCYTES # BLD AUTO: 0.04 THOUSAND/UL (ref 0–0.2)
IMM GRANULOCYTES NFR BLD AUTO: 1 % (ref 0–2)
LYMPHOCYTES # BLD AUTO: 1.3 THOUSANDS/ΜL (ref 0.6–4.47)
LYMPHOCYTES NFR BLD AUTO: 26 % (ref 14–44)
MCH RBC QN AUTO: 30.1 PG (ref 26.8–34.3)
MCHC RBC AUTO-ENTMCNC: 31.5 G/DL (ref 31.4–37.4)
MCV RBC AUTO: 96 FL (ref 82–98)
MONOCYTES # BLD AUTO: 0.33 THOUSAND/ΜL (ref 0.17–1.22)
MONOCYTES NFR BLD AUTO: 7 % (ref 4–12)
NEUTROPHILS # BLD AUTO: 3.09 THOUSANDS/ΜL (ref 1.85–7.62)
NEUTS SEG NFR BLD AUTO: 59 % (ref 43–75)
NRBC BLD AUTO-RTO: 0 /100 WBCS
PLATELET # BLD AUTO: 419 THOUSANDS/UL (ref 149–390)
PMV BLD AUTO: 9.3 FL (ref 8.9–12.7)
POTASSIUM SERPL-SCNC: 3.7 MMOL/L (ref 3.5–5.3)
RBC # BLD AUTO: 3.02 MILLION/UL (ref 3.81–5.12)
SODIUM SERPL-SCNC: 140 MMOL/L (ref 136–145)
WBC # BLD AUTO: 5.09 THOUSAND/UL (ref 4.31–10.16)

## 2018-10-22 PROCEDURE — 97530 THERAPEUTIC ACTIVITIES: CPT

## 2018-10-22 PROCEDURE — 85025 COMPLETE CBC W/AUTO DIFF WBC: CPT | Performed by: NURSE PRACTITIONER

## 2018-10-22 PROCEDURE — G0515 COGNITIVE SKILLS DEVELOPMENT: HCPCS

## 2018-10-22 PROCEDURE — 97116 GAIT TRAINING THERAPY: CPT

## 2018-10-22 PROCEDURE — 99232 SBSQ HOSP IP/OBS MODERATE 35: CPT | Performed by: PHYSICAL MEDICINE & REHABILITATION

## 2018-10-22 PROCEDURE — 80048 BASIC METABOLIC PNL TOTAL CA: CPT | Performed by: NURSE PRACTITIONER

## 2018-10-22 RX ADMIN — HEPARIN SODIUM 5000 UNITS: 5000 INJECTION INTRAVENOUS; SUBCUTANEOUS at 21:20

## 2018-10-22 RX ADMIN — ATORVASTATIN CALCIUM 40 MG: 40 TABLET, FILM COATED ORAL at 17:14

## 2018-10-22 RX ADMIN — FAMOTIDINE 20 MG: 40 POWDER, FOR SUSPENSION ORAL at 17:14

## 2018-10-22 RX ADMIN — HEPARIN SODIUM 5000 UNITS: 5000 INJECTION INTRAVENOUS; SUBCUTANEOUS at 14:37

## 2018-10-22 RX ADMIN — TICAGRELOR 90 MG: 90 TABLET ORAL at 08:08

## 2018-10-22 RX ADMIN — HEPARIN SODIUM 5000 UNITS: 5000 INJECTION INTRAVENOUS; SUBCUTANEOUS at 05:06

## 2018-10-22 RX ADMIN — ASPIRIN 81 MG: 81 TABLET, COATED ORAL at 08:07

## 2018-10-22 RX ADMIN — FAMOTIDINE 20 MG: 40 POWDER, FOR SUSPENSION ORAL at 08:08

## 2018-10-22 RX ADMIN — TICAGRELOR 90 MG: 90 TABLET ORAL at 21:20

## 2018-10-22 RX ADMIN — LEVOTHYROXINE SODIUM 88 MCG: 88 TABLET ORAL at 05:06

## 2018-10-22 NOTE — PROGRESS NOTES
10/22/18 0830   Pain Assessment   Pain Assessment No/denies pain   Restrictions/Precautions   Precautions Fall Risk;Visual deficit   Memory Skills   Memory (FIM) 5 - Needs cueing reminders <10%   Social Interaction (FIM) 6 - Interacts appropriately with others BUT requires extra  time   Speech/Language/Cognition Assessmetn   Treatment Assessment Session began in generalized conversational speehc in regards to events from overt the weekend, which overall recall was supervision level  Pt was able to engage in category matrix task given 4 categories, all to begin w/ a specific letter  Pt was 27/32 accurate in completing task, needing only single semantic cues for remaining items to improve accuracy to 32/32 accurate  Of note, pt was able to elicit more than one response on a number of items  Pt engaging in review of current medications, which pt reports only being on 2 items prior to admission  Pt currently on 5 medications, which some are once a day and some 2x/day  when reviewing the new medications, ability to recall/carryover was min A initially, but continued to improve upon continued review and education  Engaged pt in using pill box to sort/organize 5 medications  Pt was 4/5 accurate in sorting medications at this time  SLP giveing verbal cues to "check" labels again to ensure reading correct information, due to L visual deficits  Pt planning for d/c tomorrow and at this time, will continue to recommend OP SLP services at this time  SLP Therapy Minutes   SLP Time In 0830   SLP Time Out 0930   SLP Total Time (minutes) 60   SLP Mode of treatment - Individual (minutes) 60   SLP Mode of treatment - Concurrent (minutes) 0   SLP Mode of treatment - Group (minutes) 0   SLP Mode of treatment - Co-treat (minutes) 0   SLP Mode of Teatment - Total time(minutes) 60 minutes   Therapy Time missed   Time missed?  No   Daily FIM Score   Problem solving (FIM) 5 - Solves basic problems 90% of time   Comprehension (FIM) 6 - Has only MILD difficulty with complex/abstract info   Expression (FIM) 6 - Has only MILD difficulty with complex/abstract info

## 2018-10-22 NOTE — PROGRESS NOTES
Physical Medicine and Rehabilitation Progress Note  Clotilde Prior 77 y o  female MRN: [id number]  Unit/Bed#: -01 Encounter: [id number]    HPI: Marylene Scot a 77 y o  female who presented to the [address] 10/6 after developing slurred speech and left sided weakness  She has a past medical history significant for hypothyroidism  She was found to have a R MCA M1 occlusion, as wellas right ICa occlusion  tPA was not administered, but she did undergo urgent endovascular inetrvention with balloon angioplasty and stenting of the right ICA origin with approximately 40% residual stenosis remaining through the stented segment  Non-flow limiting thrombus remained just above the stent  She had clear improvement in her speech and intellgibility following her procedure  She was transitioned to DAPT with aspirin and Plavix  Echo showed EF 55% with no RWMA or other embolic source  10/7 MRI Brain showed extensive recent infarcts in the right hemisphere including right centrum semiovale and lentiform nucleus, right temporal lobe/temporal opercular junction, and right occipital cortex  No hemorrhage  On 10/8, her hemoglobin dropped to 7 5, requiring a transfusion with 1 unit of pRBCs  Her hemoglobin remained stable for the remainder of her stay  A repeat CTA of the head and neck showed some filling defect, presumed to be a non-occlusive thrombus in and around the right carotid stent which remains patent  After a P2Y12 was done and was 289, neurosurgery started Brillinta 90mg Q12 and repeat P2Y12 was 55  Repeat CTH on day of discharge was stable  She is going to restart Aspirin 81mg tomorrow  She needs a repeat CTA of head and neck in 2 weeks  If there are no changes, she will need to go repeat thrombectomy, angioplasty, and stenting  Her diet was also upgraded to a dental soft with thin liquids, and is tolerating this diet without complications       Chief Complaint/Subjective: No acute events overnight  No new headaches, CP, SOB, fevers, chills, N/V, new weakness/numbness  Would like to see neuro-optometry at discharge  Family and friends came to visit  ROS:  A 10 point review of systems was negative except for what is noted in the HPI  Assessment/Plan:      * Cerebrovascular accident (CVA) due to thrombosis of right carotid artery [facility])   Assessment & Plan    Right MCA stroke most likely 2/2 ICA and MCA occlusive disease  Now s/p thrombectomy and stent placement  And on DAPT  - Continue aspirin  - Continue ticagrelor and atorvastatin  - monitor BP, goal systolic BP between 982-138  - Consult neuropsych  PRAFO for LLE  DVT ppx: heparin Q8     Carotid stenosis, symptomatic, with infarction [facility])   Assessment & Plan    High risk of embolism from residual thrombus  - In 2 weeks repeat CTA (10/26), if filling defect still present will need repeat thrombectomy, angioplasty, and stenting   - Continue brilinta/ASA and atorvastatin  - Neurochecks      Slow transit constipation   Assessment & Plan    Continue senna/colace  Miralax/bisacodyl PRN     Normocytic anemia due to blood loss   Assessment & Plan    FOBTx3 negative  Hgb 10/21 9 1  No signs of active bleeding, hemodynamic instability  Likely post-procedure  S/P 1 unit pRBC     Hypothyroidism   Assessment & Plan    Here placed on Levothyroxine 75mcg, TSH 0 784 on 10/6    - At home per chart was only on 25mcg daily    - Discussed with pharmacy [facility])  She is on 88mcg  Adjusted  Insomniaresolved as of 10/19/2018   Assessment & Plan    Improved  Patient would like to try environmental approach to improve sleep  - If no improvement, can try melatonin          Scheduled Meds:    Current Facility-Administered Medications:  aspirin 81 mg Oral Daily Vikki Monique MD   atorvastatin 40 mg Oral QPM Vikki Monique MD   bisacodyl 10 mg Rectal Daily PRN Usman Hardy DO   docusate sodium 100 mg Oral BID Vikki Monique MD famotidine 20 mg Oral BID Oxana Tian MD   heparin (porcine) 5,000 Units Subcutaneous [facility] Oxana Tian MD   hydrALAZINE 10 mg Oral Q8H PRN Oxana Tian MD   levothyroxine 88 mcg Oral Early Morning Oxana Tian MD   polyethylene glycol 17 g Oral Daily PRN Sae Sophie, DO   senna 2 tablet Oral Daily Oxana Tian MD   ticagrelor 90 mg Oral Q12H [address] Oxana Tian MD      Health Maintenance  #Bowel: Last BM 10/18  Continue docusate/senna  As needed miralax/bisacodyl  If no BM today, give miralax  #Bladder: No sykes  Continent  #Skin/Pressure Injury Prevention: Turn Q2hr in bed, with weight shifts [id number] in wheelchair  #DVT Prophylaxis:  Heparin Q8 and mechanical SCDs  #Code Status: Full  #FEN:  Reg/thins  #Dispo:  Team 10/17  ADD 10/23 home with 24/7 family support and outpatient therapies  Patient and  amenable to this  Discussed need for cognitive/visual evaluation prior to returning to drive  To provide script at discharge  Objective:    Functional Update:   10/21 PT: Ind bed mobility, transfers, ambulation 350' x2    10/22 OT: Ind eating, oral hygiene, don/doff footwear, picking up objects, functional transfers, toileting, toilet transfers  10/22 SLP: S for recall, requires some verbal cues for activities  Allergies per EMR    Physical Exam:  Temp:  [97 8 °F (36 6 °C)-98 1 °F (36 7 °C)] 97 8 °F (36 6 °C)  HR:  [97] 97  Resp:  [17-18] 18  BP: (137-155)/(69-74) 137/69  Oxygen Therapy  SpO2: 96 %    Gen: No acute distress, Well-nourished, well-appearing  HEENT: Moist mucus membranes, Normocephalic/Atraumatic  Cardiovascular: Regular rate, rhythm, S1/S2  Distal pulses palpable  Heme/Extr: No edema/clubbing/cyanosis  Pulmonary: Non-labored breathing  Lungs CTAB  : No sykes  GI: Soft, non-tender, non-distended  BS+  MSK: AROM is WFL in all extremities  No effusions or deformities  Bulk is symmetric  Observed ambulating independently in hallway without an assistive device  Good clearance and advancement  Integumentary: Skin is warm, dry  No rashes or ulcers  Neuro: AAOx3, mild central CN7 deficit  Speech is intact and appropriate  Psych: Normal mood and affect  Diagnostic Studies: reviewed, no new imaging    Laboratory:      Results from last 7 days  Lab Units 10/22/18  0459 10/18/18  0608   HEMOGLOBIN g/dL 9 1* 9 7*   HEMATOCRIT % 28 9* 30 6*   WBC Thousand/uL 5 09 6 77       Results from last 7 days  Lab Units 10/22/18  0459 10/18/18  0608   BUN mg/dL 15 10   SODIUM mmol/L 140 139   POTASSIUM mmol/L 3 7 3 6   CHLORIDE mmol/L 109* 108   CREATININE mg/dL 0 63 0 75            Patient Active Problem List   Diagnosis    Cerebrovascular accident (CVA) due to thrombosis of right carotid artery ([facility] [address] )    Hypothyroidism    Normocytic anemia due to blood loss    Carotid stenosis, symptomatic, with infarction (HCC)    Slow transit constipation       ** Please Note: Fluency Direct voice to text software may have been used in the creation of this document  **    Total visit time:  At least 25 minutes, with more than 50% spent counseling/coordinating care

## 2018-10-22 NOTE — PROGRESS NOTES
10/22/18 0700   Pain Assessment   Pain Assessment No/denies pain   Pain Score No Pain   Restrictions/Precautions   Precautions Visual deficit   Weight Bearing Restrictions No   ROM Restrictions No   QI: [address] Provided by [place] No physical assistance   Eating CARE Score 6   Eating Assessment   Dentures Upper; Lower   Eating (FIM) 6 - Patient requires assistive device or dentures   QI: [address] Provided by [place] No physical assistance   Oral Hygiene CARE Score 6   QI: Putting On/Taking Off [address] Provided by [place] No physical assistance   Putting On/Taking Off Footwear CARE Score 6   QI: [facility] Provided by [place] No physical assistance   Picking Up Object CARE Score 6   QI: Roll Left and Right   Assistance Needed Independent   Assistance Provided by [place] No physical assistance   Roll Left and Right CARE Score 6   QI: Sit to [address] Provided by [place] No physical assistance   Sit to Lying CARE Score 6   QI: Lying to Sitting on Side of Bed   Assistance Needed Independent   Assistance Provided by [place] No physical assistance   Lying to Sitting on Side of Bed CARE Score 6   QI: Sit to [address] Provided by [place] No physical assistance   Sit to Stand CARE Score 6   QI: Chair/Bed-to-Chair Transfer   Assistance Needed Independent   Assistance Provided by [place] No physical assistance   Comment no AD   Chair/Bed-to-Chair Transfer CARE Score 6   Transfer Bed/Chair/Wheelchair   Adaptive Equipment None   Findings no AD   Bed, Chair, Wheelchair Transfer (FIM) 7 - No helper, safely, timely and completes all tasks independently   QI: [address] [place] Provided by [place] No physical assistance   Toileting Hygiene CARE Score 6   Toileting   Able to Pull Clothing down yes, up yes  Able to Manage Clothing Closures Yes   Manage Hygiene Bladder   Toileting (FIM) 7 - No helper, safely, timely and completes all tasks independently   QI: [address] Provided by [place] No physical assistance   Toilet Transfer CARE Score 6   Toilet Transfer   Findings no AD   Toilet Transfer (FIM) 7 - No helper, safely, timely and completes all tasks independently   Functional Standing Tolerance   Time 10min   Activity standing at kitchen cabinet for scanning activity   Comments Pt engaged in standing balance and L attention task while finding items in upper cabinet  Pt req to scan cabinets to find items for each letter of the alphabet in order  Pt req v/c to scan L in cabinets for items but demo G carryover  Cognition   Overall Cognitive Status WFL   Arousal/Participation Alert; Cooperative   Attention Within functional limits   Orientation Level Oriented X4   Memory Within functional limits   Following Commands Follows multistep commands without difficulty   Vision   Vision Comments Pt given handouts on techniques to increase L side awareness during ADL tasks  Pt also educated and given handouts in "lighthouse technique" to promote L scanning for functional tasks  Pt demo and verbalizes understanding of information  Activity Tolerance   Activity Tolerance Patient tolerated treatment well   Assessment   Treatment Assessment Pt participated in skilled OT session focusing on L awareness and scanning for functional performance upon discharge  Pt demo G safety, balance, and awareness of obstacles in environment while ambulating around room to pack clothing and other personal belongings for discharge tomorrow  Pt engaged in task on computer locating resources for L side awareness in OT Toolkit  Pt used finger to shira location while reading through information on screen   Pt given handouts that she had located with techniques to promote L awareness and scanning  When reading handouts, pt held papers slightly to R of center to be able to read  Refer above for details of handouts  Pt completed parquetry puzzle task req her to analyze image on L side and recreate on table top on R side to promote L scanning for details  Pt noted difficulty with proper orientation of shapes but was able to complete when given ample time for problem solving  Pt engaged in kitchen task standing at the upper cabinet scanning for items that start with A-Z  Pt req initial v/c to scan to L but was able to find all items  Pt reported using white paper on L cabinet door as visual cue to scan far enough  Pt reports having no concerns about functional performance prior to discharge tomorrow  Pt would benefit from continued therapy after discharge to promote L awareness and overall increased independence with functional performance  Prognosis Good   Problem List Decreased endurance; Impaired vision   Plan   Treatment/Interventions Therapeutic exercise; Endurance training;ADL retraining   Progress Progressing toward goals   Recommendation   OT Discharge Recommendation Home with family support   OT - OK to Discharge Yes   OT Therapy Minutes   OT Time In 0700   OT Time Out 0800   OT Total Time (minutes) 60   OT Mode of treatment - Individual (minutes) 60   OT Mode of treatment - Concurrent (minutes) 0   OT Mode of treatment - Group (minutes) 0   OT Mode of treatment - Co-treat (minutes) 0   OT Mode of Teatment - Total time(minutes) 60 minutes   Therapy Time missed   Time missed?  No

## 2018-10-22 NOTE — PROGRESS NOTES
Internal Medicine Progress Note  Patient: Lisa Bustillo  Age/sex: 77 y o  female  Medical Record #: [id number]      ASSESSMENT/PLAN:  Lisa Bustillo is seen and examined and management for following issues:    Rt MCA CVA; s/p MIL angioplasty, stenting and MCA thrombectomy (Neurosurg): Pt was a Plavix non-responder and was loaded with Nicole Altes 10/12/18  Checked P2Y12 10/13/18 and was 55 (therapeutic)  ASA 81mg resumed 10/13/18  Continue atorvastatin  Pt will need a repeat CTA in 2 weeks (10/26/18) and may need repeat thrombectomy/PTA and stenting (renal function has been normal)     ABLA: stable; pt was transfused with 1U PRBCs in the hospital  Stools negative x 3  Will continue to monitor      Hypothyroidism: Continue current dose of levothyroxine  Subjective: Patient seen and examined   Offers no complaints    ROS:   GI: denies abdominal pain, change bowel habits or reflux symptoms  Neuro: No new neurologic changes  Respiratory: No Cough, SOB  Cardiovascular: No CP, palpitations     Scheduled Meds:    Current Facility-Administered Medications:  aspirin 81 mg Oral Daily Cyndi Knapp MD   atorvastatin 40 mg Oral QPM Cyndi Knapp MD   bisacodyl 10 mg Rectal Daily PRN Nguyen Bergman DO   docusate sodium 100 mg Oral BID Cyndi Knapp MD   famotidine 20 mg Oral BID Cyndi Knapp MD   heparin (porcine) 5,000 Units Subcutaneous [facility] Cyndi Knapp MD   hydrALAZINE 10 mg Oral Q8H PRN Cyndi Knapp MD   levothyroxine 88 mcg Oral Early Morning Cyndi Knapp MD   polyethylene glycol 17 g Oral Daily PRN Nguyen Bergman DO   senna 2 tablet Oral Daily Cyndi Knapp MD   ticagrelor 90 mg Oral Q12H Leila Chopra MD       Labs:       Results from last 7 days  Lab Units 10/22/18  0459 10/18/18  0608   WBC Thousand/uL 5 09 6 77   HEMOGLOBIN g/dL 9 1* 9 7*   HEMATOCRIT % 28 9* 30 6*   PLATELETS Thousands/uL 419* 473*       Results from last 7 days  Lab Units 10/22/18  0459 10/18/18  0608   SODIUM mmol/L 140 139   POTASSIUM mmol/L 3 7 3 6   CHLORIDE mmol/L 109* 108   CO2 mmol/L 24 24   BUN mg/dL 15 10   CREATININE mg/dL 0 63 0 75   CALCIUM mg/dL 8 7 9 0                  Glucose, i-STAT (mg/dl)   Date Value   10/06/2018 149 (H)       Labs reviewed    Physical Examination:  Vitals:   Vitals:    10/21/18 1307 10/21/18 2100 10/22/18 0453 10/22/18 0459   BP: 136/90 155/74 137/69    BP Location: Left arm Left arm Left arm    Pulse: 89 97 97    Resp: 18 17 18    Temp: 98 3 °F (36 8 °C) 98 1 °F (36 7 °C)  97 8 °F (36 6 °C)   TempSrc: Oral Oral Oral    SpO2: 99% 99% 96%    Weight:       Height:         Constitutional:  NAD; pleasant; nontoxic  HEENT:  AT/NC; oropharynx negative for thrush on tongue   CV:  +S1, S2;  RRR; no rub/murmur  Pulmonary:  BBS without crackles/wheeze/rhonci; resp are unlabored  Abdominal:  soft, +BS, ND/NT; no mass  Skin:  no rashes  Musculoskeletal:  no edema  :  no sykes  Neurological/Psych:  AAO; LUE/LLE 4-/5; LLE 4+/5; RUE/RLE 5/5  + left facial droop; left visual deficits; speech is clear  [x ] Total time spent: 30 Mins and greater than 50% of this time was spent counseling/coordinating care  ** Please Note: Dragon 360 Dictation voice to text software may have been used in the creation of this document   **

## 2018-10-22 NOTE — PROGRESS NOTES
10/22/18 1245   Pain Assessment   Pain Assessment No/denies pain   Restrictions/Precautions   Precautions Visual deficit   General   Change In Medical/Functional Status pt Ind no AD   Cognition   Overall Cognitive Status WFL   Subjective   Subjective no complaints t/o session   QI: Roll Left and Right   Assistance Needed Independent   Roll Left and Right CARE Score 6   QI: Sit to Lying   Assistance Needed Independent   Sit to Lying CARE Score 6   QI: Sit to Stand   Assistance Needed Independent   Sit to Stand CARE Score 6   QI: Chair/Bed-to-Chair Transfer   Assistance Needed Independent   Chair/Bed-to-Chair Transfer CARE Score 6   Transfer Bed/Chair/Wheelchair   Adaptive Equipment None   Bed, Chair, Wheelchair Transfer (FIM) 7 - No helper, safely, timely and completes all tasks independently   QI: Car Transfer   Assistance Needed Independent   Car Transfer CARE Score 6   QI: Walk 10 Feet   Assistance Needed Independent   Walk 10 Feet CARE Score 6   QI: Walk 50 Feet with Two Turns   Assistance Needed Independent   Walk 50 Feet with Two Turns CARE Score 6   QI: Walk 150 Feet   Assistance Needed Independent   Walk 150 Feet CARE Score 6   QI: Walking 10 Feet on Uneven Surfaces   Assistance Needed Supervision   Walking 10 Feet on Uneven Surfaces CARE Score 4   Ambulation   Does the patient walk? 2  Yes   Primary Discharge Mode of Locomotion Walk   Walk Assist Level Independent   Gait Pattern Decreased foot clearance   Limitations Noted In Safety   Findings pt's L foot drag occasionally, more so with fatigue   Walking (FIM) 7 - No helper, safely, timely and completes all tasks independently AND distance 150 feet or more, no rest   Wheelchair mobility   QI: Does the patient use a wheelchair? 0   No   QI: 1 Step (Curb)   Assistance Needed Supervision   1 Step (Curb) CARE Score 4   QI: 4 Steps   Assistance Needed Supervision   4 Steps CARE Score 4   QI: 12 Steps   Assistance Needed Supervision   12 Steps CARE Score 4 Stairs   Type Stairs;Curb   # of Steps 12   Weight Bearing Precautions Fall Risk   Assist Devices Single Rail   Findings pt performed curb outside, CS for safety  nonreciprocal on FF   Stairs (FIM) 5 - Patient requires supervision/monitoring AND goes up and down full flight (12- 14 stairs)   QI: Toilet Transfer   Assistance Needed Independent   Toilet Transfer CARE Score 6   Toilet Transfer   Toilet Transfer (FIM) 7 - No helper, safely, timely and completes all tasks independently   Other Comments   Comments amb outside crossing the street and amb on varying inclines  pt needs CS if amb outside due to poor head turns when crossing the street  performed Tinetti, TUG and 10m gait speed  Tinetti 27/28, TUG 10 53, TUGmanual 10 22, TUGcog 14 72   gait speed 2 94m per sec   Assessment   Treatment Assessment pt overall is a low fall risk  Sup recommended at times for safety, meron when out in the community due to L side inattention  pt with occasional L foot drag but able to self correct with no LOB noted  pt to cont focus on balance, and safety to progress with functional mobility and Ind  Problem List Decreased endurance; Impaired vision   Barriers to Discharge Inaccessible home environment   PT Barriers   Physical Impairment Decreased strength;Decreased endurance; Impaired balance   Functional Limitation Walking;Ramp negotiation;Car transfers   Plan   Treatment/Interventions Gait training;Patient/family training   Progress Progressing toward goals   Recommendation   Recommendation Outpatient PT; Home with family support   PT Therapy Minutes   PT Time In 12   PT Time Out 1400   PT Total Time (minutes) 75   PT Mode of treatment - Individual (minutes) 75   PT Mode of treatment - Concurrent (minutes) 0   PT Mode of treatment - Group (minutes) 0   PT Mode of treatment - Co-treat (minutes) 0   PT Mode of Teatment - Total time(minutes) 75 minutes   Therapy Time missed   Time missed?  No

## 2018-10-23 VITALS
OXYGEN SATURATION: 98 % | DIASTOLIC BLOOD PRESSURE: 71 MMHG | SYSTOLIC BLOOD PRESSURE: 143 MMHG | HEIGHT: 62 IN | TEMPERATURE: 97.8 F | WEIGHT: 193.1 LBS | BODY MASS INDEX: 35.53 KG/M2 | HEART RATE: 83 BPM | RESPIRATION RATE: 18 BRPM

## 2018-10-23 DIAGNOSIS — H53.40 VISUAL FIELD DEFECT: ICD-10-CM

## 2018-10-23 DIAGNOSIS — H54.7 VISUAL ACUITY REDUCED: Primary | ICD-10-CM

## 2018-10-23 PROCEDURE — 97530 THERAPEUTIC ACTIVITIES: CPT

## 2018-10-23 PROCEDURE — 97112 NEUROMUSCULAR REEDUCATION: CPT

## 2018-10-23 PROCEDURE — 99239 HOSP IP/OBS DSCHRG MGMT >30: CPT | Performed by: PHYSICAL MEDICINE & REHABILITATION

## 2018-10-23 RX ORDER — SENNOSIDES 8.6 MG
2 TABLET ORAL DAILY
Qty: 30 EACH | Refills: 0 | Status: SHIPPED | OUTPATIENT
Start: 2018-10-23 | End: 2019-01-04

## 2018-10-23 RX ORDER — LEVOTHYROXINE SODIUM 88 UG/1
88 TABLET ORAL DAILY
Qty: 30 TABLET | Refills: 0
Start: 2018-10-23 | End: 2020-07-14 | Stop reason: SDUPTHER

## 2018-10-23 RX ORDER — DOCUSATE SODIUM 100 MG/1
100 CAPSULE, LIQUID FILLED ORAL 2 TIMES DAILY
Qty: 10 CAPSULE | Refills: 0 | Status: SHIPPED | OUTPATIENT
Start: 2018-10-23 | End: 2019-01-04

## 2018-10-23 RX ORDER — ASPIRIN 81 MG/1
81 TABLET ORAL DAILY
Qty: 30 TABLET | Refills: 0 | Status: SHIPPED | OUTPATIENT
Start: 2018-10-23 | End: 2019-01-04

## 2018-10-23 RX ORDER — ATORVASTATIN CALCIUM 40 MG/1
40 TABLET, FILM COATED ORAL EVERY EVENING
Qty: 30 TABLET | Refills: 0 | Status: SHIPPED | OUTPATIENT
Start: 2018-10-23 | End: 2020-05-08 | Stop reason: SDUPTHER

## 2018-10-23 RX ORDER — FAMOTIDINE 40 MG/5ML
20 POWDER, FOR SUSPENSION ORAL 2 TIMES DAILY
Qty: 50 ML | Refills: 0 | Status: SHIPPED | OUTPATIENT
Start: 2018-10-23 | End: 2020-11-02 | Stop reason: SDUPTHER

## 2018-10-23 RX ADMIN — LEVOTHYROXINE SODIUM 88 MCG: 88 TABLET ORAL at 05:33

## 2018-10-23 RX ADMIN — FAMOTIDINE 20 MG: 40 POWDER, FOR SUSPENSION ORAL at 07:44

## 2018-10-23 RX ADMIN — HEPARIN SODIUM 5000 UNITS: 5000 INJECTION INTRAVENOUS; SUBCUTANEOUS at 05:33

## 2018-10-23 RX ADMIN — TICAGRELOR 90 MG: 90 TABLET ORAL at 07:44

## 2018-10-23 RX ADMIN — ASPIRIN 81 MG: 81 TABLET, COATED ORAL at 07:44

## 2018-10-23 NOTE — PROGRESS NOTES
10/23/18 0830   Pain Assessment   Pain Assessment 0-10   Pain Score No Pain   Restrictions/Precautions   Precautions Visual deficit   Cognition   Overall Cognitive Status WFL   Arousal/Participation Alert; Cooperative   Attention Within functional limits   Orientation Level Oriented X4   Memory Within functional limits   Following Commands Follows multistep commands without difficulty   Subjective   Subjective reports she can not wait to get home   QI: Roll Left and Right   Assistance Needed Independent   Roll Left and Right CARE Score 6   QI: Sit to [address]   Sit to Lying CARE Score 6   QI: Lying to Sitting on Side of Bed   Assistance Needed Independent   Lying to Sitting on Side of Bed CARE Score 6   QI: Sit to Stand   Assistance Needed Independent   Sit to Stand CARE Score 6   QI: Chair/Bed-to-Chair Transfer   Assistance Needed Independent   Chair/Bed-to-Chair Transfer CARE Score 6   Transfer Bed/Chair/Wheelchair   Limitations Noted In LE Strength   Stand Pivot Independent   Sit to Stand Independent   Stand to Sit Independent   Supine to Sit Independent   Sit to Supine Independent   Car Transfer Independent   Bed, Chair, Wheelchair Transfer (FIM) 7 - No helper, safely, timely and completes all tasks independently   QI: Car Transfer   Assistance Needed Independent   Car Transfer CARE Score 6   QI: Walk 10 Feet   Assistance Needed Independent   Walk 10 Feet CARE Score 6   QI: Walk 50 Feet with Two Turns   Assistance Needed Independent   Walk 50 Feet with Two Turns CARE Score 6   QI: Walk 150 Feet   Assistance Needed Independent   Walk 150 Feet CARE Score 6   QI: Walking 10 Feet on Uneven Surfaces   Assistance Needed Supervision   Walking 10 Feet on Uneven Surfaces CARE Score 4   Ambulation   Does the patient walk? 2   Yes   Primary Discharge Mode of Locomotion Walk   Walk Assist Level Independent   Gait Pattern Decreased foot clearance   Distance Walked (feet) 1000 ft  (x2)   Findings L foot drags slightly but does not cause her to lose her balance   Walking (FIM) 7 - No helper, safely, timely and completes all tasks independently AND distance 150 feet or more, no rest   Wheelchair mobility   QI: Does the patient use a wheelchair? 0  No   QI: 1 Step (Curb)   Assistance Needed Supervision   1 Step (Curb) CARE Score 4   QI: 4 Steps   Assistance Needed Supervision   4 Steps CARE Score 4   QI: 12 Steps   Assistance Needed Supervision   12 Steps CARE Score 4   Stairs   Type Stairs;Curb;Ramp   # of Steps 12   Weight Bearing Precautions Fall Risk   Assist Devices Single Rail   Stairs (FIM) 5 - Patient requires supervision/monitoring AND goes up and down full flight (12- 14 stairs)   QI: Toilet Transfer   Assistance Needed Independent   Toilet Transfer CARE Score 6   Toilet Transfer   Surface Assessed Standard Toilet   Toilet Transfer (FIM) 7 - No helper, safely, timely and completes all tasks independently   Therapeutic Interventions   Flexibility hamstrign and gastroc 60"x2   Neuromuscular Re-Education retro amb 150, carry weighted ball with trunk rotations, carry laundry and look for signs, read a map of the floor, community navigation   Equipment Use   PrimÃ¢â‚¬â„¢Vision 10 mins level 3   Assessment   Treatment Assessment continues to have decreased scanning to her L, possible field cut; walks close to things on her L but does not bump them; better ability to find signs in the lacy on her L and R; able to carry objects like a basket and weighted ball with good balance; amb with weighted ball with trunk rotations and maintained balance; pt has good safety awareness and sequences xfers well; continue POC as per PT   Problem List Impaired vision   PT Barriers   Functional Limitation Walking   Plan   Treatment/Interventions Gait training   Progress Progressing toward goals   Recommendation   Recommendation Outpatient PT; Home with family support   PT Therapy Minutes   PT Time In 0830   PT Time Out 1000   PT Total Time (minutes) 90   PT Mode of treatment - Individual (minutes) 90   PT Mode of treatment - Concurrent (minutes) 0   PT Mode of treatment - Group (minutes) 0   PT Mode of treatment - Co-treat (minutes) 0   PT Mode of Teatment - Total time(minutes) 90 minutes   Therapy Time missed   Time missed?  No

## 2018-10-23 NOTE — SPEECH THERAPY NOTE
SLP Discharge Summary    Pt was discharged home w/ family support/supervision on 10/23/18  At time of d/c, pt was able to achieve 5/5 LTG's  However, pt does continue to demonstrate L visual neglect/inattention  Pt has been able to compensate for visual deficit, but when challenged, continues to impact overall ability to complete functional and structured tasks  Pt did complete cognitive assessment (CLQT+), where pt's overall score was 2 8 out of 4 0 when compared to age matched peers between 65-80 yrs  Pt noting to have increased deficits in visuospatial skills and executive functions, which was noted in f/u sessions  Of note, pt's insight to deficits was noted ot fluctuate during tasks, but did well given verbal prompts and cues as needed  Pt will continue to benefit from OP SLP services at this time to continue to maximize overall cognitive linguistic skills and further improve ability to carryover strategies/techniques to compensate for L visual field cut/inattention

## 2018-10-23 NOTE — PROGRESS NOTES
Internal Medicine Progress Note  Patient: Rika Chinchilla  Age/sex: 77 y o  female  Medical Record #: [id number]      ASSESSMENT/PLAN:  Rika Chinchilla is seen and examined and management for following issues:    Rt MCA CVA; s/p MIL angioplasty, stenting and MCA thrombectomy (Neurosurg): Pt was a Plavix non-responder and was loaded with Orene Nathaniel 10/12/18  Checked P2Y12 10/13/18 and was 55 (therapeutic)  ASA 81mg resumed 10/13/18  Continue atorvastatin  Pt will need a repeat CTA in 2 weeks (10/26/18) and may need repeat thrombectomy/PTA and stenting (renal function has been normal)     ABLA: stable; pt was transfused with 1U PRBCs in the hospital  Stools negative x 3  Will continue to monitor      Hypothyroidism: Continue current dose of levothyroxine  HTN: mild at times = see PCP 1 week      Subjective: Patient seen and examined   Offers no complaints    ROS:   GI: denies abdominal pain, change bowel habits or reflux symptoms  Neuro: No new neurologic changes  Respiratory: No Cough, SOB  Cardiovascular: No CP, palpitations     Scheduled Meds:    Current Facility-Administered Medications:  aspirin 81 mg Oral Daily Michael Zhang MD   atorvastatin 40 mg Oral QPM Michael Zhang MD   bisacodyl 10 mg Rectal Daily PRN Bunny Vazquez DO   docusate sodium 100 mg Oral BID Michael Zhang MD   famotidine 20 mg Oral BID Michael Zhang MD   heparin (porcine) 5,000 Units Subcutaneous [facility] Michael Zhang MD   hydrALAZINE 10 mg Oral Q8H PRN Michael Zhang MD   levothyroxine 88 mcg Oral Early Morning Michael Zhang MD   polyethylene glycol 17 g Oral Daily PRN Bunny Vazquez DO   senna 2 tablet Oral Daily Michael Zhang MD   ticagrelor 90 mg Oral Q12H Rishabh Emery MD       Labs:       Results from last 7 days  Lab Units 10/22/18  0459 10/18/18  0608   WBC Thousand/uL 5 09 6 77   HEMOGLOBIN g/dL 9 1* 9 7*   HEMATOCRIT % 28 9* 30 6*   PLATELETS Thousands/uL 419* 473*       Results from last 7 days  Lab Units 10/22/18  0459 10/18/18  0608   SODIUM mmol/L 140 139   POTASSIUM mmol/L 3 7 3 6   CHLORIDE mmol/L 109* 108   CO2 mmol/L 24 24   BUN mg/dL 15 10   CREATININE mg/dL 0 63 0 75   CALCIUM mg/dL 8 7 9 0                  Glucose, i-STAT (mg/dl)   Date Value   10/06/2018 149 (H)       Labs reviewed    Physical Examination:  Vitals:   Vitals:    10/22/18 1347 10/22/18 2008 10/22/18 2016 10/23/18 0538   BP: (!) 189/79 (!) 171/77 158/68 143/71   BP Location: Left arm Left arm Left arm Left arm   Pulse: (!) 116 82  83   Resp: 22 20 18   Temp: 97 6 °F (36 4 °C) 98 2 °F (36 8 °C)  97 8 °F (36 6 °C)   TempSrc: Oral Oral  Oral   SpO2: 100% 97%  98%   Weight:       Height:         Constitutional:  NAD; pleasant; nontoxic  HEENT:  AT/NC; oropharynx negative for thrush on tongue   CV:  +S1, S2;  RRR; no rub/murmur  Pulmonary:  BBS without crackles/wheeze/rhonci; resp are unlabored  Abdominal:  soft, +BS, ND/NT; no mass  Skin:  no rashes  Musculoskeletal:  no edema  :  no sykes  Neurological/Psych:  AAO; LUE/LLE 4-/5; LLE 4+/5; RUE/RLE 5/5  + left facial droop; left visual deficits; speech is clear  [x ] Total time spent: 30 Mins and greater than 50% of this time was spent counseling/coordinating care  ** Please Note: Dragon 360 Dictation voice to text software may have been used in the creation of this document   **

## 2018-10-23 NOTE — NURSING NOTE
Clinically Significant Medication Issue  Time of Stay: DISCHARGE    Did a complete drug regimen review identify potential clinically significant medication issues?     no

## 2018-10-23 NOTE — DISCHARGE SUMMARY
Discharge Summary - PMR   Nuris Drummond 77 y o  female MRN: [id number]  Unit/Bed#: -01 Encounter: [id number]    Admission Date: 10/12/2018     Discharge Date: 10/23/2018    Etiologic/Rehabilitation Diagnosis: Impairment of mobility, safety and Activities of Daily Living (ADLs) due to Stroke:  01 1  Left Body Involvement (Right Brain)    HPI: Ed Yunior a 77 y o  female who presented to the [facility] [address] 10/6 after developing slurred speech and left sided weakness  She has a past medical history significant for hypothyroidism  She was found to have a R MCA M1 occlusion, as wellas right ICa occlusion  tPA was not administered, but she did undergo urgent endovascular inetrvention with balloon angioplasty and stenting of the right ICA origin with approximately 40% residual stenosis remaining through the stented segment  Non-flow limiting thrombus remained just above the stent  She had clear improvement in her speech and intellgibility following her procedure  She was transitioned to DAPT with aspirin and Plavix  Echo showed EF 55% with no RWMA or other embolic source  10/7 MRI Brain showed extensive recent infarcts in the right hemisphere including right centrum semiovale and lentiform nucleus, right temporal lobe/temporal opercular junction, and right occipital cortex  No hemorrhage  On 10/8, her hemoglobin dropped to 7 5, requiring a transfusion with 1 unit of pRBCs  Her hemoglobin remained stable for the remainder of her stay  A repeat CTA of the head and neck showed some filling defect, presumed to be a non-occlusive thrombus in and around the right carotid stent which remains patent  After a P2Y12 was done and was 289, neurosurgery started Brillinta 90mg Q12 and repeat P2Y12 was 55  Repeat CTH on day of discharge was stable  She is going to restart Aspirin 81mg tomorrow  She needs a repeat CTA of head and neck in 2 weeks   If there are no changes, she will need to go repeat thrombectomy, angioplasty, and stenting  Her diet was also upgraded to a dental soft with thin liquids, and is tolerating this diet without complications  Procedures Performed During ARC Admission: None    Acute Rehabilitation Center Course:   Patient participated in a comprehensive interdisciplinary inpatient rehabilitation program which included involvment of MD, therapies (PT, OT, and/or SLP), RN, CM, SW, dietary, and psychology services  She was able to be advanced to a modified independent level of assist and considered safe for discharge home  Please see below for patient's day to day management of medical needs  * Cerebrovascular accident (CVA) due to thrombosis of right carotid artery [facility])   Assessment & Plan    Right MCA stroke most likely 2/2 ICA and MCA occlusive disease  Now s/p thrombectomy and stent placement and on DAPT  Aspirin was started after her admission to the unit  She should continue her aspirin, ticagrelor, and atorvastatin  Her blood pressures have been within goal  She was also on DVT prophylaxis with heparin, which was discontinued at discharge  She has a significant L inattention and possibly visual field deficit  She was given a referral to neuro-optometry with Dr Johanna Nelson at [facility] for further evaluation as an outpatient  Carotid stenosis, symptomatic, with infarction [facility])   Assessment & Plan    She is at a high risk of embolism from residual thrombus  - She needs to get a  repeat CTA (10/26), and if a filling defect is still present will need repeat thrombectomy, angioplasty, and stenting  She should continue her DAPT and statin  She will follow-up with Dr Donnell Gates on 11/2 after her CTA Head/Neck is done  The order was placed through Epic  Her blood pressure was typically <140 during her stay  Once or twice, she went as high as 190  She is not currently on blood pressure medication   She was told to keep a log of her blood pressures and follow-up with her primary care physician within 1 week  Slow transit constipation   Assessment & Plan    She was severely constipated when you first arrived  On a regimen of senna/colace her bowel movements improved  She was instructed to take these medications as needed at home, and if she does not have a bowel movement with them for 2-3 days to take some miralax  Normocytic anemia due to blood loss   Assessment & Plan    FOBTx3 were negative She did not develop any signs of active bleeding or hemodynamic instability  Her most recent hemoglobin at discharge was 9 1  No signs of active bleeding, hemodynamic instability  She did receive 1 unit pRBC during her acute inpatient hospitalization  Hypothyroidism   Assessment & Plan    Here placed on Levothyroxine 75mcg, TSH 0 784 on 10/6  After contacting her pharmacy, she was changed to her home dose of 88mcg daily  Insomniaresolved as of 10/19/2018   Assessment & Plan    Improved  Patient would like to try environmental approach to improve sleep  - If no improvement, can try melatonin  Discharge Physical Examination:  /71 (BP Location: Left arm)   Pulse 83   Temp 97 8 °F (36 6 °C) (Oral)   Resp 18   Ht 5' 1 5" (1 562 m)   Wt 87 6 kg (193 lb 1 6 oz)   SpO2 98%   BMI 35 90 kg/m²       Gen: No acute distress, Well-nourished, well-appearing  HEENT: Moist mucus membranes, Normocephalic/Atraumatic  Cardiovascular: Regular rate, rhythm, S1/S2  Distal pulses palpable  Heme/Extr: No edema/clubbing/cyanosis  Pulmonary: Non-labored breathing  Lungs CTAB  : No sykes  GI: Soft, non-tender, non-distended  BS+  MSK: ROM is WFL in all extremities  No effusions or deformities  Bulk is symmetric  See below for MMT scores  Integumentary: Skin is warm, dry  No rashes or ulcers  Neuro: AAOx3, CN 2-12 mild CN7 deficit improving  L visual field deficit  Sensation intact to light touch throughout  Speech is intact  Appropriate to questioning   Tone is normal  DTRs: 3+ with overflow in biceps, triceps brachioradialis, 2+ poatella and achilles bilaterally  Adam's is  present bilaterally  No clonus  Coord: Mild ataxia with FTN on the left  Left fix and drift  MMT:   Strength:   Right  Left  Site  Right  Left  Site    5 5  S Ab: Shoulder Abductors  5  4+  HF: Hip Flexors    5 5  EF: Elbow Flexors  5  5 KF: Knee Flexors    5  5  EE: Elbow Extensors  5  5  KE: Knee Extensors    5  5  WE: Wrist Extensors  5  5  DR: Dorsi Flexors    5  4+  FF: Finger Flexors  5  5  PF: Plantar Flexors    5  4+  HI: Hand Intrinsics  5  5  EHL: Extensor Hallucis Longus   Psych: Normal mood and affect  Significant Findings, Care, Treatment and Services Provided: Acute comprehensive interdisciplinary inpatient rehabilitation including PT, OT, SLP, RN, CM, SW, dietary, psychology, etc     Complications: None    Functional Status Upon Admission to ARC:  Mobility: Luis Armando 250' with a RW  Transfers: S-CGA  ADLs: S-Luis Armando    Functional Status Upon Discharge from ARC:   SLP: Pt was discharged home w/ family support/supervision on 10/23/18  At time of d/c, pt was able to achieve 5/5 LTG's  However, pt does continue to demonstrate L visual neglect/inattention  Pt has been able to compensate for visual deficit, but when challenged, continues to impact overall ability to complete functional and structured tasks  Pt did complete cognitive assessment (CLQT+), where pt's overall score was 2 8 out of 4 0 when compared to age matched peers between 65-80 yrs  Pt noting to have increased deficits in visuospatial skills and executive functions, which was noted in f/u sessions  Of note, pt's insight to deficits was noted ot fluctuate during tasks, but did well given verbal prompts and cues as needed   Pt will continue to benefit from OP SLP services at this time to continue to maximize overall cognitive linguistic skills and further improve ability to carryover strategies/techniques to compensate for L visual field cut/inattention  PT: Ind bed mobility, transfers, ambulation 350' x2    OT: Ind eating, oral hygiene, don/doff footwear, picking up objects, functional transfers, toileting, toilet transfers    Discharge Diagnosis: Impairment of mobility, safety and Activities of Daily Living (ADLs) due to Stroke:  01 1  Left Body Involvement (Right Brain)    Discharge Medications:   See after visit summary for reconciled discharge medications provided to patient and family  Condition at Discharge: good     Discharge instructions/Information to patient and family:   See after visit summary for information provided to patient and family  Provisions for Follow-Up Care:  See after visit summary for information related to follow-up care and any pertinent home health orders  Disposition: Home    Planned Readmission: No    Discharge Statement   I spent 45 minutes discharging the patient  This time was spent on the day of discharge  I had direct contact with the patient on the day of discharge  Greater than 50% of the total time was spent examining patient, answering all patient questions, arranging and discussing plan of care with patient as well as directly providing post-discharge instructions  Additional time then spent on discharge activities  Discharge Medications:  See after visit summary for reconciled discharge medications provided to patient and family

## 2018-10-23 NOTE — PROGRESS NOTES
Pt d/c via walking with family at bedside  AVS printed and given to pt   and daughter also available for instructions  Scripts sent to [employer]  Pt eager for dc

## 2018-10-24 ENCOUNTER — EVALUATION (OUTPATIENT)
Dept: SPEECH THERAPY | Facility: CLINIC | Age: 68
End: 2018-10-24
Payer: COMMERCIAL

## 2018-10-24 DIAGNOSIS — R48.8 OTHER SYMBOLIC DYSFUNCTIONS: Primary | ICD-10-CM

## 2018-10-24 DIAGNOSIS — I63.031 CEREBROVASCULAR ACCIDENT (CVA) DUE TO THROMBOSIS OF RIGHT CAROTID ARTERY (HCC): ICD-10-CM

## 2018-10-24 PROCEDURE — G9168 MEMORY CURRENT STATUS: HCPCS

## 2018-10-24 PROCEDURE — G9170 MEMORY D/C STATUS: HCPCS

## 2018-10-24 PROCEDURE — 96125 COGNITIVE TEST BY HC PRO: CPT

## 2018-10-24 PROCEDURE — G9169 MEMORY GOAL STATUS: HCPCS

## 2018-10-24 NOTE — OCCUPATIONAL THERAPY NOTE
Occupational Therapy Discharge Summary      Pt d/c home at overall mod I/indep level without use of AD  Pt d/c home with her spouse with all necessary training completed prior to d/c  Pt at time of discharge continued to present with deficits in L attention and L visual deficits including impaired visual perceptual skills  OT recommending pt continue with OP OT services with additional script recommended for OP drivers evaluation

## 2018-10-24 NOTE — PROGRESS NOTES
Speech-Language Pathology Initial Evaluation    Today's date: 10/24/2018  Patients name: Jay Johnson  : 1950  MRN: [id number]  Safety measures: CVA  Referring provider: Ehsan Sumner MD    Subjective comments: "I think I'm doing well"    Patient's goal(s): "I will do whatever I need to do" "I don't think i'm having any problems"    Reason for referral: Change in cognitive status  Prior functional status: Communication effective and appropriate in all situations  Clinically complex situations: Discharge from SNF or Hospital in the last 30 days    History: Jay Johnson is a 77 y o  female who [facility] on 10/6 after developing slurred speech and left sided weakness  She has a past medical history significant for hypothyroidism  She was found to have a R MCA M1 occlusion, as well as right ICa occlusion  tPA was not administered, but she did undergo urgent endovascular intervention with balloon angioplasty and stenting of the right ICA  She was subsequently transferred to the [facility] for acute rehab  During her inpatient and acute rehab stays she was seen by an SLP and advanced to a regular diet  She was also seen for cognitive deficits: see discharge note below:  Pt was discharged home w/ family support/supervision on 10/23/18  At time of d/c, pt was able to achieve 5/5 LTG's  However, pt does continue to demonstrate L visual neglect/inattention  Pt has been able to compensate for visual deficit, but when challenged, continues to impact overall ability to complete functional and structured tasks  Pt did complete cognitive assessment (CLQT+), where pt's overall score was 2 8 out of 4 0 when compared to age matched peers between 65-80 yrs  Pt noting to have increased deficits in visuospatial skills and executive functions, which was noted in f/u sessions   Of note, pt's insight to deficits was noted ot fluctuate during tasks, but did well given verbal prompts and cues as needed  Pt will continue to benefit from OP SLP services at this time to continue to maximize overall cognitive linguistic skills and further improve ability to carryover strategies/techniques to compensate for L visual field cut/inattention  Today patient reports persistent difficulty with vision but was not yet able to schedule an appointment with an ophthalmologist as the one referred to her stated they were not able to see her  She denies any difficulty with cognitive-linguistic skills  She reports her  does medication and finance management  She reports she is not driving at this time but plans to when she is cleared  She reports she is sharing the cooking and cleaning responsibilities with her   Hearing: WFL  Vision: Left visual neglect- wears reader glasses- will be seeing opthamologist    Home environment/lifestyle: Lives home with   Highest level of education: [address] [employer] [facility] [employer]- no college  Vocational status: Worked in catering for [employer]    Mental status: Alert  Behavior status: Cooperative  Communication modalities: Verbal  Rehabilitation prognosis: Excellent rehab potential to reach and maintain prior level of function    Assessments    The Repeatable Battery for the Assessment of Neuropsychological Status (RBANS) is a brief, individually-administered assessment which measures attention, language, visuospatial/constructional abilities, and immediate & delayed memory  The RBANS is intended for use with adolescents to adults, ages 15 to 80 years  The following results were obtained during the administration of the assessment  Form: a    Cognitive Domain/Subtest: Index Score: Percentile Rank: Classification:   IMMEDIATE MEMORY 100 50%ile Average        1  List Learning (24/40) AVERAGE        2  Story Memory (19/24) AVERAGE       VISUOSPATIAL/  CONSTRUCTIONAL 72 3%ile Borderline        3  Figure Copy (14/20) BORDERLINE        4   Line Orientation (14/20) LOW AVERAGE       LANGUAGE 105 63%ile Average        5  Picture Naming (10/10) AVERAGE        6  Semantic Fluency (21/40) AVERAGE       ATTENTION 75 5%ile Borderline        7  Digit Span (10/16) AVERAGE        8  Coding (11/89) EXTREMELY LOW       DELAYED MEMORY 107 64%ile Average        9  List Recall (7/10) HIGH AVERAGE        10  List Recognition (20/20)  AVERAGE        11  Story Recall (10/12) HIGH AVERAGE        12  Figure Recall (12/20) AVERAGE         Sum of Index Scores:  459   Total Scale:  88   Percentile: 21%ile   Classification: Low Average     It should be noted that although the patient scored below in the areas of Visuosptial/ Construcitonal and Attention- these scores are suspected to be secondary to visual neglect rather then a true cognitive dysfunction  In the digit span subtest of attention ( a subtest that does not require vision she scored average)  *Patient named 21 concrete category members (animals) in 60 sec (norm=15+)  -- AVERAGE    *Patient named 6 abstract category members (words beginning with letter 'm') in 60 sec (norm=10+)  -- AVERAGE    Functional Limitations Reporting (G-codes):   Flowsheet Rows      Most Recent Value   SLP G-Codes   Assessment Type  Evaluation & Discharge same visit   Functional Limitations  Memory   Memory Current Status ()  [facility]   Memory Goal Status ()  [facility]   Memory Discharge Status ()  [facility]            Impressions/Recommendations    Impressions: Patients cognitive-linguistic skills appear generally WFL- memory and language are at or above average  She is scoring below average in the areas of attention and visuospatial constructional skills (executive functions) however these are suspected to be 2' her visual impairment rather then a true cognitive deficits  OT will be evaluating this patient next week and will continue with assessment and treatment of theses areas   No further ST is recommended at this time     -Intervention comments: Test Administration completed from 11:55am- 12:45pm: Scoring and Interpretation completed between 12:45- 1:30p    Visit Tracking:  -Referring provider: Epic  -Billing guidelines: CMS  -Visit #1  -[facility]      ELINA Harper , [address] [place] [address] Language Pathologist   [id number]

## 2018-10-24 NOTE — CASE MANAGEMENT
Team dc summary - pt made great progress and returned home w/family support and contd outpt pt ot and slp services at [address]  No dme needs identified at the time of dc  Family present for dc instructions and aware of pts functional ability

## 2018-10-25 ENCOUNTER — TELEPHONE (OUTPATIENT)
Dept: NEUROLOGY | Facility: CLINIC | Age: 68
End: 2018-10-25

## 2018-10-25 NOTE — TELEPHONE ENCOUNTER
7 Day Post Discharge Stroke Follow-Up Call Questionnaire    The purpose of this phone call is to assess patient's general wellbeing or for any assistance needed with follow-up care  Patient states she had some difficulty picking up medications  Was able to obtain all medications yesterday around 5pm  Patient discharged from [facility] 10/23  Patient missed all doses of medications yesterday due to delay in pharmacy  Patient compliment with medications today   manages administration of medications  I reviewed all meds with patient according to [facility] with no discrepancies  Patient referred to out patient PT/OT 10/30, ST yesterday, as well as Neurosurgery 11/2, neuro-optometry 11/6, PCP 11/6, and PM&R one month post discharge (will call and schedule)  Patient scheduled for follow up head CTA tomorrow 10/26  Patient received stroke binder while inpatient  She verbalizes understanding of stroke risk factors, treatment, and symptoms  Since discharge patient experienced one episode of sharp pain that shot down her left arm to her two middle fingers  Patient could not rate pain on 0-10 scale  Patient denied any other symptoms  She states the episode occurred yesterday afternoon while carrying a bowl of cereal and instantly resolved when she put the bowl down  After that while she was washing dishes, her left hand became very sensitive to hot water, which is also new  The sensitivity to heat resolved immediately after she turned off the water  Patient said she will inform Dr Ld Hernandez office  Dr Kassy Navarro, would you have any recommendations for care regarding symptoms or should the patient continue with follow ups as directed? Thank you!

## 2018-10-26 ENCOUNTER — HOSPITAL ENCOUNTER (OUTPATIENT)
Dept: CT IMAGING | Facility: HOSPITAL | Age: 68
Discharge: HOME | End: 2018-10-26
Payer: COMMERCIAL

## 2018-10-26 DIAGNOSIS — I65.29 INTERNAL CAROTID ARTERY OCCLUSION, UNSPECIFIED LATERALITY: ICD-10-CM

## 2018-10-26 PROCEDURE — 70496 CT ANGIOGRAPHY HEAD: CPT

## 2018-10-26 PROCEDURE — 70498 CT ANGIOGRAPHY NECK: CPT

## 2018-10-26 RX ADMIN — IOHEXOL 85 ML: 350 INJECTION, SOLUTION INTRAVENOUS at 13:49

## 2018-10-26 NOTE — TELEPHONE ENCOUNTER
Called patient in regards to Dr Norris Galvez message  Patient verbalized understanding of speaking with Dr Eliseo Sandhu office  Patient saw optometrist yesterday  She was not provided with any written information from optometry office yesterday and she is unclear what to bring and where to schedule appointment  I read her AVS that was printed from [facility] along with her  Patient is going to call neuro-optometry office to see what forms they require of her and make her appointment  I told her that after she has that information, to contact the optometry office she was seen by yesterday to request any paperwork (if any) that is necessary to bring to appointment  If patient needs further assistance, she will contact me

## 2018-10-26 NOTE — PHYSICAL THERAPY NOTE
PT D/C SUMMARY    Pt progressed well to I level for functional mobility, however recommend S level for ambulation outside due to L visual deficit  Pt's  was present and they both demonstrated understanding on safety precautions and the role of therapy in progressing pt's function post stroke  Pt will cont to benefit from skilled therapy on an outpt basis to make continued gains in safety and I  Pt did not need DME ordered for mobility

## 2018-10-30 ENCOUNTER — APPOINTMENT (OUTPATIENT)
Dept: OCCUPATIONAL THERAPY | Facility: CLINIC | Age: 68
End: 2018-10-30
Payer: COMMERCIAL

## 2018-10-30 ENCOUNTER — APPOINTMENT (OUTPATIENT)
Dept: PHYSICAL THERAPY | Facility: CLINIC | Age: 68
End: 2018-10-30
Payer: COMMERCIAL

## 2018-11-02 ENCOUNTER — OFFICE VISIT (OUTPATIENT)
Dept: NEUROSURGERY | Facility: CLINIC | Age: 68
End: 2018-11-02
Payer: COMMERCIAL

## 2018-11-02 VITALS
RESPIRATION RATE: 16 BRPM | WEIGHT: 177 LBS | BODY MASS INDEX: 33.42 KG/M2 | SYSTOLIC BLOOD PRESSURE: 160 MMHG | HEIGHT: 61 IN | DIASTOLIC BLOOD PRESSURE: 82 MMHG | HEART RATE: 96 BPM

## 2018-11-02 DIAGNOSIS — I63.239 CAROTID STENOSIS, SYMPTOMATIC, WITH INFARCTION (HCC): ICD-10-CM

## 2018-11-02 DIAGNOSIS — I63.031 CEREBROVASCULAR ACCIDENT (CVA) DUE TO THROMBOSIS OF RIGHT CAROTID ARTERY (HCC): Primary | ICD-10-CM

## 2018-11-02 PROCEDURE — 99214 OFFICE O/P EST MOD 30 MIN: CPT | Performed by: RADIOLOGY

## 2018-11-02 RX ORDER — LISINOPRIL 10 MG/1
10 TABLET ORAL DAILY
COMMUNITY
End: 2020-07-14 | Stop reason: SDUPTHER

## 2018-11-02 NOTE — PROGRESS NOTES
Assessment/Plan:     Diagnoses and all orders for this visit:    Cerebrovascular accident (CVA) due to thrombosis of right carotid artery ([address] )  -     VAS carotid complete study; Future    Carotid stenosis, symptomatic, with infarction [facility])              Discussion Summary:     Ms Aimee Arteaga is doing very well in her stroke recovery  Her in-stent and brenda-stent residual thrombus has resolved  She will return to clinic after a 3 month carotid US  We will likely stop one of her antiplatelet agents at that time  Chief Complaint: Follow-up (2 week hospital follow up)      Patient ID: Jeffrey Massey is a 77 y o  female    HPI     Ms Aimee Arteaga is here for follow-up after her right ICA and MCA occlsuion treated by angioplasty, stenting and thrombectomy  She has completed ARC therapy and continues as outpatient rehab at [facility]  She feels she is much better than when hospitalized  Speech is improved  Vision loss in the left field is still bothersome but improved  She sees a neuro-opthamologist for this and pending a driving test   Has left arm paresthesia/hyperesthesia in the left arm/hand with hot items  Does not need a cane or walker for ambulation  No falls  No bleeding events or easy bruising  Review of Systems   Constitutional: Positive for fatigue  Negative for activity change, appetite change, chills, diaphoresis, fever and unexpected weight change  HENT: Negative  Eyes: Negative  Respiratory: Negative  Cardiovascular: Negative  Gastrointestinal: Negative  Endocrine: Negative  Genitourinary: Negative  Musculoskeletal: Negative  Skin: Negative  Allergic/Immunologic: Negative  Neurological: Positive for weakness (left side is slower than right)  Negative for dizziness, tremors, seizures, syncope, facial asymmetry, speech difficulty, light-headedness, numbness and headaches  Hematological: Negative for adenopathy  Bruises/bleeds easily (medication)     Psychiatric/Behavioral: Negative  The following portions of the patient's history were reviewed and updated as appropriate: allergies, current medications, past family history, past medical history, past social history, past surgical history and problem list       Active Ambulatory Problems     Diagnosis Date Noted    Cerebrovascular accident (CVA) due to thrombosis of right carotid artery ([facility] [address] ) 10/06/2018    Hypothyroidism 10/06/2018    Normocytic anemia due to blood loss 10/08/2018    Carotid stenosis, symptomatic, with infarction ([facility] [facility] ) 10/09/2018    Slow transit constipation 10/12/2018     Resolved Ambulatory Problems     Diagnosis Date Noted    Insomnia 10/12/2018     Past Medical History:   Diagnosis Date    Disease of thyroid gland     Hypertension        Past Surgical History:   Procedure Laterality Date    CAROTID STENT      IR STROKE ALERT  10/6/2018         Vitals:    11/02/18 0751   BP: 160/82   Pulse: 96   Resp: 16         Objective:    Physical Exam   Constitutional: She is oriented to person, place, and time  She appears well-developed and well-nourished  HENT:   Head: Normocephalic and atraumatic  Eyes: Pupils are equal, round, and reactive to light  Conjunctivae and EOM are normal    Neck: Normal range of motion  Neck supple  Cardiovascular: Normal rate, regular rhythm, normal heart sounds and intact distal pulses  Musculoskeletal: Normal range of motion  She exhibits no edema or deformity  Neurological: She is alert and oriented to person, place, and time  A cranial nerve deficit is present  Coordination normal    Minimal left nasolabial fold flattening  Mild left superolateral visual field loss  Trace left arm drift and subtle left hip flexor weakness although overall 5/5  Skin: Skin is warm and dry  Psychiatric: She has a normal mood and affect  Her behavior is normal  Judgment and thought content normal      Neurologic Exam     Mental Status   Oriented to person, place, and time  Cranial Nerves     CN III, IV, VI   Pupils are equal, round, and reactive to light  Extraocular motions are normal        Results/Data:  I have reviewed the results and images from the CTA in detail with the patient

## 2018-11-06 ENCOUNTER — EVALUATION (OUTPATIENT)
Dept: PHYSICAL THERAPY | Facility: CLINIC | Age: 68
End: 2018-11-06
Payer: COMMERCIAL

## 2018-11-06 ENCOUNTER — APPOINTMENT (OUTPATIENT)
Dept: OCCUPATIONAL THERAPY | Facility: CLINIC | Age: 68
End: 2018-11-06
Payer: COMMERCIAL

## 2018-11-06 DIAGNOSIS — I63.031 CEREBROVASCULAR ACCIDENT (CVA) DUE TO THROMBOSIS OF RIGHT CAROTID ARTERY (HCC): Primary | ICD-10-CM

## 2018-11-06 PROCEDURE — G8979 MOBILITY GOAL STATUS: HCPCS | Performed by: PHYSICAL THERAPIST

## 2018-11-06 PROCEDURE — G8978 MOBILITY CURRENT STATUS: HCPCS | Performed by: PHYSICAL THERAPIST

## 2018-11-06 PROCEDURE — 97162 PT EVAL MOD COMPLEX 30 MIN: CPT | Performed by: PHYSICAL THERAPIST

## 2018-11-06 NOTE — PROGRESS NOTES
PT Evaluation     Today's date: 2018  Patient name: Hammad Ragsdale  : 1950  MRN: [id number]  Referring provider: Ángel Soto MD  Dx: No diagnosis found  Assessment    Assessment details: Pt presents to physical therapy s/p CVA  At this time patient has decreased LE strength, coordination, with increased muscle tone per testing, decreased balance, endurance and gati efficiency and decreased ability to participate in previous activities  Pt will benefit from skilled therapy intervention 2x/week fr 6-8 weeks in order to improve strength, balance, endurance and gait efficiency     Goals  STG:  Pt will complete 6 MWT in 1400 ft within 4 weeks  Pt will score 56/56 on brown balance test within 4 weeks  Pt will be independent with HEP within 4 weeks    LTG:  Pt will report full participation in all previous activities within 6-8 weeks  Pt will report no difficulty with IADLS wtihin 6-8 weeks  Plan  Patient would benefit from: skilled physical therapy  Planned therapy interventions: balance, therapeutic exercise, neuromuscular re-education, home exercise program and gait training  Frequency: 2x week  Duration in weeks: 6  Plan of Care beginning date: 2018  Plan of Care expiration date: 2019  Treatment plan discussed with: patient        Subjective Evaluation    History of Present Illness  Mechanism of injury: Pt had CVA in 2018  She reports having continued slowness in LE compared to previous function  Reports no difficulty with ADLs but does need increased time  Pt has some help for IADLs  She reports fatiguing quickly  She reports having a left neglect  She is not using an assistive device for ambulation at this time       Pt would like to return to doing outdoor yardwork with minimal difficulty, working with  building/refinishing furniture  Pain  No pain reported    Social Support  Stairs in house: yes (uses railings with stairs)   Lives in: multiple-level home  Lives with: spouse    Employment status: not working  Hand dominance: right  Exercise history: pt was going for walks daily    Patient Goals  Patient goal: moving faster        Objective    Flowsheet Rows      Most Recent Value   PT/OT G-Codes   Current Score  69   Projected Score  76          Balance Test    6 Minute Walk Test (ft): 1250ft            5x Sit To Stand (s): 9 28 sec   TU 19 sec         MCTSIB Number of Seconds   Feet Together, Eyes Open 30   Feet Together, Eyes Closed 30   Feet Together, Eyes Open Foam 30   Feet Together, Eyes Closed Foam 14           Sensation Left Right   Kinesthesia wnl wnl   Light Touch wnl wnl       Muscle Tone Left Right   Modified Sue Scale     Hamstring     Gastroc  1   Quad         Manual Muscle Testing - Hip Left Right   Flexion 5 4   Extension 5 4   Abduction 5 3+   External Rotation 5 4     Manual Muscle Testing - Knee Left Right   Flexion 5 4   Extension 5 4     Manual Muscle Testing - Ankle Left Right   Doriflexion 5 44   Plantarflexion 5 4        Transfers    Sit To Stand Independent   W/C To Bed Independent   Sit To Supine Independent   Roll Independent         Gait Assessment:Pt ambulates with no AD with decreased knee flexion through swing phase of afected LE      Precautions: fall risk    Daily Treatment Diary     Manual                                                                                   Exercise Diary                                                                                                                                                                                                                                                                                      Modalities

## 2018-11-12 ENCOUNTER — OFFICE VISIT (OUTPATIENT)
Dept: NEUROLOGY | Facility: CLINIC | Age: 68
End: 2018-11-12
Payer: COMMERCIAL

## 2018-11-12 VITALS
SYSTOLIC BLOOD PRESSURE: 148 MMHG | BODY MASS INDEX: 33.79 KG/M2 | WEIGHT: 179 LBS | HEART RATE: 91 BPM | DIASTOLIC BLOOD PRESSURE: 68 MMHG | HEIGHT: 61 IN

## 2018-11-12 DIAGNOSIS — I63.031 CEREBROVASCULAR ACCIDENT (CVA) DUE TO THROMBOSIS OF RIGHT CAROTID ARTERY (HCC): Primary | ICD-10-CM

## 2018-11-12 PROCEDURE — 99215 OFFICE O/P EST HI 40 MIN: CPT | Performed by: PHYSICIAN ASSISTANT

## 2018-11-12 RX ORDER — SERTRALINE HYDROCHLORIDE 25 MG/1
TABLET, FILM COATED ORAL
Qty: 60 TABLET | Refills: 2 | Status: SHIPPED | OUTPATIENT
Start: 2018-11-12 | End: 2019-01-04

## 2018-11-12 NOTE — PROGRESS NOTES
Patient ID: Nate Patterson is a 77 y o  female  Assessment/Plan:    No problem-specific Assessment & Plan notes found for this encounter  Diagnoses and all orders for this visit:    Cerebrovascular accident (CVA) due to thrombosis of right carotid artery ([address] )  -     Ambulatory referral to Occupational Therapy; Future  -     sertraline (ZOLOFT) 25 mg tablet; 1 tab qAM with food x 1 week, then 2 tabs qam with food         For stroke affecting the right MCA/ occipital region (extensive infarct per brain MRI report/ image), I asked her to be re-evaluated by an ophthalmologist for a detailed visual field tests and dilated eye exam   I asked her to hold off on driving for now  She was agreeable to this  For secondary stroke prevention she will continue 81 mg aspirin and 40 mg atorvastatin both daily  She currently denies side effects to these  She added Brilinta 90 mg every 12 hours for carotid stenosis as a recent finding  She denies any side effects to this medication  Follow up with nsx surgery as scheduled  She wanted to proceed with therapies here at [address] [place] [address]  PT is scheduled for left weakness which is MUCH improved since she was d/c from the hospital and very minimal on exam today  OT will be ordered for visual disturbance (right HH) and if this is not helpful for her then we will move on to do OT/ vision therapy at [facility]  No findings on echo and afib less likely cause  She is asking for a medication for anxiety as since this stroke she has been extremely anxious  We discussed zoloft and reviewed common s/e  If she notes worsening depression or anxiety she should call me immediately  She and her  were agreeable with the plan  Subjective:    HPI    Nate Patterson is an extremely pleasant 78 yo female who is here for neurological f/u after a hospital admission for acute right hemispheric stroke, right ICA occlusion/ recannulization with stent placed, s/p M1 thrombectomy      She is here with her  who drove  She has improved nicely after the procedure  She reports residual mild deficits in terms of mild weakness in the left arm and states sometimes it is slower, however she does admit much improved overall  Noted by neurology, "She does report being told past that she had an irregular heartbeat  It is possible this is resultant AFib, verses existing carotid stenosis with subsequent occlusion " it was then noted, "will discontinue loop recorder, as all stroke burden was in the right MCA territory unlikely attributed to ipsilateral carotid stenosis/occlusion "    CTA head and neck demonstrates distal focal filling defect within the stent of the right carotid artery - per neurosurgery appears stable  Per Nsx, likely will transition to A/C   -- repeat CTA h/n shows patent R ICA  "A small amount of residual mural thrombus remains, significantly diminished from the prior study " No hemorrhagic conversion of the ischemic stroke  -- VAS carotid study planned per Nsx/ scheduled in 3 months  -- following Dr Farheen Mayer nsx surgery    She continues to have some problems with her left visual field, mild  States she does not notice this but abnormal on testing   -- planning to see neuro-ophthalmologist  -- planning vision therapy but  cannot take her insurance at this time so we are considering PT here at [address] for vision  Started Brilinta, statin, denies s/e  Continues asa for now  No excessive bruising/ bleeding  Non smoker  The following portions of the patient's history were reviewed and updated as appropriate:   She  has a past medical history of Disease of thyroid gland and Hypertension    She   Patient Active Problem List    Diagnosis Date Noted    Slow transit constipation 10/12/2018    Normocytic anemia due to blood loss 10/08/2018    Cerebrovascular accident (CVA) due to thrombosis of right carotid artery ([facility] ) 10/06/2018    Hypothyroidism 10/06/2018     She  has a past surgical history that includes IR stroke alert (10/6/2018) and Carotid stent  Her Family history is unknown by patient  She  reports that she has never smoked  She has never used smokeless tobacco  She reports that she drinks alcohol  She reports that she does not use drugs  Current Outpatient Prescriptions   Medication Sig Dispense Refill    aspirin (ECOTRIN LOW STRENGTH) 81 mg EC tablet Take 1 tablet (81 mg total) by mouth daily 30 tablet 0    atorvastatin (LIPITOR) 40 mg tablet Take 1 tablet (40 mg total) by mouth every evening 30 tablet 0    famotidine (PEPCID) 40 mg/5 mL suspension Take 2 5 mL (20 mg total) by mouth 2 (two) times a day 50 mL 0    levothyroxine 88 mcg tablet Take 1 tablet (88 mcg total) by mouth daily 30 tablet 0    lisinopril (ZESTRIL) 10 mg tablet Take 10 mg by mouth daily      ticagrelor (BRILINTA) 90 MG Take 1 tablet (90 mg total) by mouth every 12 (twelve) hours 60 tablet 0    docusate sodium (COLACE) 100 mg capsule Take 1 capsule (100 mg total) by mouth 2 (two) times a day (Patient not taking: Reported on 11/2/2018 ) 10 capsule 0    senna (SENOKOT) 8 6 mg Take 2 tablets (17 2 mg total) by mouth daily (Patient not taking: Reported on 10/24/2018 ) 30 each 0    sertraline (ZOLOFT) 25 mg tablet 1 tab qAM with food x 1 week, then 2 tabs qam with food 60 tablet 2     No current facility-administered medications for this visit  She has No Known Allergies            Objective:    Blood pressure 148/68, pulse 91, height 5' 1" (1 549 m), weight 81 2 kg (179 lb)  Physical Exam    Neurological Exam  Vital signs reviewed  Well developed, well nourished  Head: Normocephalic, atraumatic  Neck: Neck flexors 5/5, No TTP  CN 2-12: intact and symmetric, including EOMs which are normal b/l and PERRL; except for mild diminished left upper quadrant on VF testing, no dysarthria, no word aphasia  MSK: 5/5 t/o, except for very mild 5-/5 left shoulder and hip flexors  ROM normal x all 4 extr   No pronator drift   Sensation: Inact to LT and temp x4 extr  Reflexes: 2+ and symmetric in all 4 extr  , except more brisk in the left>R biceps  Toes DG b/l  Coordination: Nml x4 extr  Gait: Steady normal gait  ROS:    Review of Systems   HENT: Negative  Eyes: Positive for visual disturbance (Left eye vision loss)  Respiratory: Negative  Cardiovascular: Negative  Gastrointestinal: Negative  Endocrine: Negative  Genitourinary: Negative  Musculoskeletal: Negative  Skin: Negative  Allergic/Immunologic: Negative  Neurological: Positive for weakness (Left sided weakness)  Tingling in left hand   Hematological: Negative  Psychiatric/Behavioral: Negative  The following portions of the patient's history were reviewed and updated as appropriate: allergies, current medications/ medication history, past family history, past medical history, past social history, past surgical history and problem list     Review of systems was reviewed and otherwise unremarkable from a neurological perspective

## 2018-11-14 ENCOUNTER — OFFICE VISIT (OUTPATIENT)
Dept: PHYSICAL THERAPY | Facility: CLINIC | Age: 68
End: 2018-11-14
Payer: COMMERCIAL

## 2018-11-14 DIAGNOSIS — I63.031 CEREBROVASCULAR ACCIDENT (CVA) DUE TO THROMBOSIS OF RIGHT CAROTID ARTERY (HCC): Primary | ICD-10-CM

## 2018-11-14 PROCEDURE — 97112 NEUROMUSCULAR REEDUCATION: CPT | Performed by: PHYSICAL THERAPIST

## 2018-11-14 PROCEDURE — 97110 THERAPEUTIC EXERCISES: CPT | Performed by: PHYSICAL THERAPIST

## 2018-11-15 NOTE — PROGRESS NOTES
Occupational Therapy Stroke Evaluation:    Today's Date: 11/15/2018  Patient Name: Wilfrido Velasco  : 1950  MRN: [id number]  Referring Provider: Vanda Thomas MD  Dx: Cerebrovascular accident (CVA) due to thrombosis of right carotid artery [facility]) [I63 031]    Active Problem List:   Patient Active Problem List   Diagnosis    Cerebrovascular accident (CVA) due to thrombosis of right carotid artery ([address] )    Hypothyroidism    Normocytic anemia due to blood loss    Slow transit constipation     Past Medical Hx:   Past Medical History:   Diagnosis Date    Disease of thyroid gland     Hypertension      Past Surgical Hx:   Past Surgical History:   Procedure Laterality Date    CAROTID STENT      IR STROKE ALERT  10/6/2018        Pain Levels:   Restin    With Activity:  0    Subjective/Patient Goal: "To get the vision better and get back to driving eventually"    History of Present Illness:  Pt is a pleasant 77 y o  female seen for OT eval s/p referred to [address] [place] [address] s/p presented to [facility] and then transferred to [facility] on 10/6/2018    Per patient's wife patient awoke at 4:00 am with slurred speech and right sided weakness   EMS was called and patient was brought to the ED at [facility] where CTA showed right carotid occlusion and right MCA occlusion   She was emergently transferred to [facility] and  brought to [facility]   IR stroke alert showed right proximal ICA occlusion with extensive cervical segment clot burden, right MCA M1 TICI O segment occlusion   She underwent an emergent successful mechanical thrombectomy with revascularization of the right MCA territory, successful cervical right ICA mechanical thrombectomy, successful proximal ICA angioplasty ans stenting with 40% residual stenosis remaining and focal non flow limiting thrombus immediately distal to the stent with Dr Olive Leggett of neurosurgery   Post IR CT of the head showed subtle loss of insular ribbon sign and loss of gray-white differention in right frontotemporal junction suggesting recent infarct  TPA was not warranted prior to procedure  She was started on Integrilin drip   Left facial droop and left hemineglect present on initial exam  Repeat CT of the head showed interval development of hypodensity within the right temporal lobe, right caudate nucleus, and junction of right temporal occipital lobes consistent with evolution of previously suspected right MCA infarct with mild edema and regional mass effect without midline shift   MRI of the brain showed extensive recent infarcts in the right hemisphere including right centrum semiovale and lentiform nucleus, right temporal lobe/ temporal opercular junction, and right occipital cortex     Neurology was consulted and patient was loaded with ASA and plavix with orders to continue with plavix 75mg daily and  mg daily  She was also started on a high dose statin  Francisco Freeze was completed that showed EF of 50% with no regional wall motion abnormalities and per neurology was non contributory with no cardioembolic source so no ASIM was pursued   Pt was discharged to [facility] on 10/12/18 - 10/23/18  Dx'd w/ R MCA CVA , comorbidities as listed above  Lifestyle Performance Model:  Autonomy: Pt was I with ADL's and IADL's, reports having endurance issues, not currently driving since the stroke due to doctor's direction  Reciprocal Relationships: Supportive  who is able to help, and 2 children , 1 in [place], 1 in [employer] [place], 2 grandkids (11, 13)      Service to Others: Pt is retired, worked for [employer] as , retired about 6 or 7 years ago  Intrinsic Gratification: Enjoys refinishing and building furniture w/   Home Setup: Pt lives in [facility] w/ 1 [address], bed and bath upstairs w/ handrail    Objective  Impairments Section:    Range of Motion:  AROM/PROM: B/L WNL  MMT: RUE 5/5, LUE 4+/5  Sensation: decreased hot/cold sensation on LUE      Visual Perceptual and Functional Cognition:  1  Convergence Insufficiency Symptom Survey (CISS): 10/60 PASS    2  Cognitive Checklist:  *Patient indicated that she is experiencing the following symptoms:    · Memory: None reported     · Attention: None reported    · Processing: None reported    · Executive Functions: Monitoring your own ideas, behaviors, and/or emotions    · Communication: Managing tone and volume of voice    · Visual: Change in handwriting    · Emotional: None reported     · Increased Sensitivities to: None reported         3  Darren Cognitive Assessment Version 8 1 (MoCA V8 1)  Visuospatial/executive functionin/5  Naming:  3/3  Memory: 1st trial:  , 2nd trial:    Attention/concentration:   List of letters:   Serial Seven Subtraction:  1/3 w/  errors  Language/sentence repetition:    Language Fluency:    Abstract/Correlational Thinkin/2  Delayed Recall:    Orientation:     Memory Index Score: 15/15  MoCA V1 8 1 Raw Score:  28/30, MIS:  15/15, indicative of normal neurocognition  4  Vision Screening Recording Form:   vision screen: no glasses  near acuity: R 20/30, L 20/30  binocularity far: orthophoria  binocularity near: orthophoria  red green fusion: no fusion  near point of convergence: B/L nystagmus, R exophoric strabismus, L esophoric  em string: misalignment  Pursuits: jerky in all planes  Saccades: inaccurate in all planes (undershooting and overshooting)  ocular ROM: Full ROM  visual perceptual midline shift: @ midline, @ horizon    5  Anxiety/Depression:  Pt engaged in the Neuro QOL Anxiety Short Form and Neuro QOL Depression Short Form in order to screen for anxiety and depressive s/s  Pt reported the following:  Anxiety- Short Form:   --used to measure anxious s/s  For scoring purposes, scores of 25+ indicate the threshold for treatment per neurology/SLIM  Score:8/40  Pt most often chose the response "never" when asked about feeling anxious s/s    Depression- Short Form:   --used to measure depressive s/s  For scoring purposes, scores of 25+ indicate the threshold for treatment per neurology/SLIM  Score:8/40  Pt most often chose the response "never" when asked about feeling depressive s/s  Assessment/Plan  Occupational Therapy Skilled Analysis Assessment and Plan of Care:  Pt requires overall mod I for ADLs/self care and mod I for fx'l mobility w/ out DME  Pt is currently demonstrating the following occupational deficits: limited 2* decreased endurance/activity tolerance, decreased executive functioning, L visual field inattention, decreased B/L teaming of eyes, B/L nystagmus, R exophoric strabismus, L esophoria, misalignment, jerky pursuits in all planes, inaccurate saccades in all planes w/ under/overshooting  The following Occupational Performance Areas to address include: socialization, health maintenance, functional mobility, community mobility, household maintenance, job performance/volunteering and social participation  Based on the aforementioned OT evaluation, functional performance deficits, and assessments, pt has been identified as a moderate complexity evaluation  Pt to continue to benefit from outpatient skilled OT services to address the following goals 2x/week to  w/in 4 weeks with special focus on self-care management, pt education,  and VM training as well as motor training to improve above defiicits and enhance overall QOL/function      Goals:  Short Term Goals  - Pt will increase screen tolerance to 2 hours with min increase in HA by 1-2 levels for improved leisure pursuits and role performance 4 weeks as applicable  - Pt will increase oculomotor control for improved saccades, con/divergent tasks for improved reading, board to table tasks with minimal increase in symptoms 4 weeks  - Pt will tolerate multi-modal envt x 15 min with 80% accuracy of cog load and min increase of symptoms of 2 levels in HA/dizziness/nausea 4 weeks  - Pt will demonstrate 100% awareness of visual stimuli and environmental hazards from midline to L side with cuing  Long Term Goals  - Pt will increase oculomotor control for improved dynamic activities with head turns, board/screen to table tasks symptom free, improved VMI and return to baseline handwriting  - Pt will increase oculomotor control for WNL saccades, con/divergent tasks symptom free  - Pt will increase screen tolerance to 3 hours with min increase in HA by 1-2 levels for improved leisure pursuits and occupational/role performance as applicable  - Pt will demonstrate 100% awareness of visual stimuli and environmental hazards from midline to L side without cues  INTERVENTION COMMENTS:  Diagnosis: R MCA CVA  Precautions: Difficulty regulating emotions, decreased hot/cold sensation on LUE  FOTO: 73 with 27% limitation  Insurance: [facility]  REP [[id number]]  1 of ___ visits, PN due 12/16/18    Patient treated by KENNY Horton, under direct supervision of OLI Whitlock/CATHIE  Thank you for the consult!   Please call if you have any questions: [phone]  MARIA D Whitlock, OTR/L, C-JAIME, CSRS  Director of Outpatient Neuro Occupational Therapy

## 2018-11-16 ENCOUNTER — EVALUATION (OUTPATIENT)
Dept: OCCUPATIONAL THERAPY | Facility: CLINIC | Age: 68
End: 2018-11-16
Payer: COMMERCIAL

## 2018-11-16 ENCOUNTER — OFFICE VISIT (OUTPATIENT)
Dept: PHYSICAL THERAPY | Facility: CLINIC | Age: 68
End: 2018-11-16
Payer: COMMERCIAL

## 2018-11-16 DIAGNOSIS — I63.031 CEREBROVASCULAR ACCIDENT (CVA) DUE TO THROMBOSIS OF RIGHT CAROTID ARTERY (HCC): Primary | ICD-10-CM

## 2018-11-16 PROCEDURE — G8990 OTHER PT/OT CURRENT STATUS: HCPCS

## 2018-11-16 PROCEDURE — 97110 THERAPEUTIC EXERCISES: CPT | Performed by: PHYSICAL THERAPIST

## 2018-11-16 PROCEDURE — 97166 OT EVAL MOD COMPLEX 45 MIN: CPT

## 2018-11-16 PROCEDURE — G8991 OTHER PT/OT GOAL STATUS: HCPCS

## 2018-11-16 PROCEDURE — 97112 NEUROMUSCULAR REEDUCATION: CPT | Performed by: PHYSICAL THERAPIST

## 2018-11-16 NOTE — PROGRESS NOTES
Daily Note     Today's date: 2018  Patient name: Shahana Marino  : 1950  MRN: [id number]  Referring provider: Bishop Suha MD  Dx: No diagnosis found  Subjective: Patient reports going for walks daily and has been doing more exercises  Objective: See treatment diary below      Assessment: Exercises issued for HEP this session  Pt voiced and demonstrated understanding  Plan to advance as able at next session  Plan: Continue per plan of care       Precautions: na    Daily Treatment Diary     Manual                                                                                   Exercise Diary              Step ups fwd/lat 15 x ea 8"            treadmill 10 min 2 5 mph            Standing hip abd 30x ea            Standing hip flex 30x ea            Heel raise  30x            Toe raise 30x            Tandem wlaking 3 laps            SLS 30s ec  2            lunges 3 laps            STS 3x10                                                                                                                                                  Modalities

## 2018-11-19 NOTE — PROGRESS NOTES
Daily Note     Today's date: 2018  Patient name: Tanner Ortega  : 1950  MRN: [id number]  Referring provider: Esthela Baez MD  Dx:   Encounter Diagnosis     ICD-10-CM    1  Cerebrovascular accident (CVA) due to thrombosis of right carotid artery ([facility] ) I63 031                   Subjective: Patient reports doing exercies this AM with minimal difficulty  Objective: See treatment diary below      Assessment: Pt fatigues easily on L LE and demonstrates poor coordination  She nirmal continue to benefit from strengthening and coordination/balance exercises in order to improve ambulation and balance  Plan: Continue per plan of care       Precautions: na    Daily Treatment Diary     Manual                                                                                   Exercise Diary             Step ups fwd/lat 15 x ea 8" 2x10 ea 8"           treadmill 10 min 2 5 mph 10 min 2# 2 3 mph           Standing hip abd 30x ea            Standing hip flex 30x ea            Heel raise  30x            Toe raise 30x            Tandem wlaking 3 laps 3 laps           SLS 30s ec  2 30sec x 2 ea           lunges 3 laps 3 laps           STS 3x10 3x10           Side stepping  3 laps           HK marches  3 laps no ue           Step taps cone  30 x foam                                                                                                          Modalities

## 2018-11-20 ENCOUNTER — OFFICE VISIT (OUTPATIENT)
Dept: PHYSICAL THERAPY | Facility: CLINIC | Age: 68
End: 2018-11-20
Payer: COMMERCIAL

## 2018-11-20 DIAGNOSIS — I63.031 CEREBROVASCULAR ACCIDENT (CVA) DUE TO THROMBOSIS OF RIGHT CAROTID ARTERY (HCC): Primary | ICD-10-CM

## 2018-11-20 PROCEDURE — 97112 NEUROMUSCULAR REEDUCATION: CPT

## 2018-11-20 PROCEDURE — 97110 THERAPEUTIC EXERCISES: CPT

## 2018-11-20 NOTE — PROGRESS NOTES
Daily Note     Today's date: 2018  Patient name: Felipa Lozano  : 1950  MRN: [id number]  Referring provider: Mary Grace Driscoll MD  Dx:   Encounter Diagnosis     ICD-10-CM    1  Cerebrovascular accident (CVA) due to thrombosis of right carotid artery ([address] ) I63 031                   Subjective: Patient noted no new complaints  Objective: See treatment diary below      Assessment:Patient stated that treadmill speed is 1 3 the last  PT treatment  Trailed hurdles this treatment forward and lateral  Patient was more challenged with forward and  lateral hurdles brining L foot up over hrdle  L foot drag noticeable with forward hurdles  Patient presented with some  LOB self correct at end of performing lateral jackelyn exercise and going to sit in her chair  Patient demonstrated fatigue post exercises  Patient would benefit from continued PT      Plan: Continue per plan of care       Precautions: na     Daily Treatment Diary      Manual                                                                                                                                                      Exercise Diary                   Step ups fwd/lat 15 x ea 8" 2x10 ea 8"  3x10 ea 8"                 treadmill 10 min 2 5 mph 10 min 2# 2 3 mph 10 min 2# LLE 1 5 mph                 Standing hip abd 30x ea                     Standing hip flex 30x ea                     Heel raise  30x                     Toe raise 30x                     Tandem wlaking 3 laps 3 laps  3 laps                  SLS 30s ec  2 30sec x 2 ea  30sec x 2 ea                 lunges 3 laps 3 laps  3 laps                  STS 3x10 3x10  3 x10                 Side stepping   3 laps  3 laps Peach TB +2# LLE                 HK marches   3 laps no ue  3 laps no UE                  Step taps cone   30 x foam  30x2 foam                  hurdles fwd/lat      3 laps                                                                                                                                                                     Modalities

## 2018-11-21 ENCOUNTER — OFFICE VISIT (OUTPATIENT)
Dept: PHYSICAL THERAPY | Facility: CLINIC | Age: 68
End: 2018-11-21
Payer: COMMERCIAL

## 2018-11-21 ENCOUNTER — OFFICE VISIT (OUTPATIENT)
Dept: OCCUPATIONAL THERAPY | Facility: CLINIC | Age: 68
End: 2018-11-21
Payer: COMMERCIAL

## 2018-11-21 DIAGNOSIS — I63.031 CEREBROVASCULAR ACCIDENT (CVA) DUE TO THROMBOSIS OF RIGHT CAROTID ARTERY (HCC): Primary | ICD-10-CM

## 2018-11-21 PROCEDURE — 97110 THERAPEUTIC EXERCISES: CPT | Performed by: PHYSICAL THERAPY ASSISTANT

## 2018-11-21 PROCEDURE — 97112 NEUROMUSCULAR REEDUCATION: CPT | Performed by: PHYSICAL THERAPY ASSISTANT

## 2018-11-21 PROCEDURE — 97535 SELF CARE MNGMENT TRAINING: CPT

## 2018-11-21 NOTE — PROGRESS NOTES
Daily Note     Today's date: 2018  Patient name: Eliane Gomez  : 1950  MRN: [id number]  Referring provider: Cierra Maria MD  Dx:   Encounter Diagnosis     ICD-10-CM    1  Cerebrovascular accident (CVA) due to thrombosis of right carotid artery ([address] ) I63 031                   Subjective: Patient noted no new complaints  Objective: See treatment diary below      Assessment:Good tolerance to TE as noted with no LOB during balance activities  Patient would benefit from continued PT      Plan: Continue per plan of care       Precautions: na     Daily Treatment Diary      Manual                                                                                                                                                 *2# LLE for all activities during therapy     Exercise Diary  *               Step ups fwd/lat 15 x ea 8" 2x10 ea 8"  3x10 ea 8"  3x10  Ea 8"               treadmill 10 min 2 5 mph 10 min 2# 2 3 mph 10 min 2# LLE 1 5 mph  10 min  2# LLE  1 5mph               Standing hip abd 30x ea      30x ea               Standing hip flex 30x ea      30x ea               Heel raise  30x      30x               Toe raise 30x      30x               Tandem wlaking 3 laps 3 laps  3 laps   3 laps               SLS 30s ec  2 30sec x 2 ea  30sec x 2 ea                 lunges 3 laps 3 laps  3 laps   3 laps               STS 3x10 3x10  3 x10                 Side stepping   3 laps  3 laps Peach TB +2# LLE  3 laps  Yellow                 HK marches   3 laps no ue  3 laps no UE   3 laps   No UE               Step taps cone   30 x foam  30x2 foam                  hurdles fwd/lat      3 laps   3 laps each                                                                                                                                                                    Modalities

## 2018-11-21 NOTE — PROGRESS NOTES
Daily Note     Today's date: 2018  Patient name: Clotilde Russell  : 1950  MRN: [id number]  Referring provider: Meri Elizalde MD  Dx:   Encounter Diagnosis   Name Primary?  Cerebrovascular accident (CVA) due to thrombosis of right carotid artery ([facility] ) Yes                  Subjective: "My eyes are getting blurry  It's more of a focus thing  I'm having difficulty focusing "      Objective: See treatment below  HA 0/10 upon arrival  Multi-matrix while seated with completion of regular, visual closure, and visual clutter cards with cards placed to pt's L side for improved awareness of visual stimuli to L, with focus on saccades and visual tracking/scanning with visual clutter  Spot It cards for horizontal saccades  Mick Cone letter cross-outs for visual tracking/scanning  HA 0/10 post treatment  Assessment: Tolerated treatment well  Noted with slow pace during Multi-Matrix, c/o min visual blurriness, and 20% errors with pt able to identify and self-correct  100% accuracy identifying items during Spot It  Plan: Continue skilled OT per POC        INTERVENTION COMMENTS:  Diagnosis: Cerebrovascular accident (CVA) due to thrombosis of right carotid artery ([facility] ) [I63 031]  Precautions: decreased hot/cold sensation on LUE  FOTO: 73 with 27% limitation  Insurance: [address] [facility] REP [[id number]]  2 of ___ visits, PN due 18

## 2018-11-27 ENCOUNTER — OFFICE VISIT (OUTPATIENT)
Dept: PHYSICAL THERAPY | Facility: CLINIC | Age: 68
End: 2018-11-27
Payer: COMMERCIAL

## 2018-11-27 DIAGNOSIS — I63.031 CEREBROVASCULAR ACCIDENT (CVA) DUE TO THROMBOSIS OF RIGHT CAROTID ARTERY (HCC): Primary | ICD-10-CM

## 2018-11-27 PROCEDURE — 97112 NEUROMUSCULAR REEDUCATION: CPT | Performed by: PHYSICAL THERAPIST

## 2018-11-27 PROCEDURE — 97110 THERAPEUTIC EXERCISES: CPT | Performed by: PHYSICAL THERAPIST

## 2018-11-27 NOTE — PROGRESS NOTES
Daily Note     Today's date: 2018  Patient name: Hailey Torrez  : 1950  MRN: [id number]  Referring provider: Kaden Seals MD  Dx:   Encounter Diagnosis     ICD-10-CM    1  Cerebrovascular accident (CVA) due to thrombosis of right carotid artery ([facility] [address] ) I63 031                   Subjective: Patient noted no new complaints  Objective: See treatment diary below      Assessment: Patient continues to fatigue quickly with exercise  Her balance has noted improvements  Therapist discussed purpose of exercises to improve gait deviations through strengthening and functional exercise  Plan: Continue per plan of care       Precautions: na     Daily Treatment Diary      Manual                                                                                                                                                 *2# LLE for all activities during therapy     Exercise Diary  *               Step ups fwd/lat 15 x ea 8" 2x10 ea 8"  3x10 ea 8"  3x10  Ea 8"  15x fwd/lat             treadmill 10 min 2 5 mph 10 min 2# 2 3 mph 10 min 2# LLE 1 5 mph  10 min  2# LLE  1 5mph  10 min 3% 1 5 mph 2# ankle             Standing hip abd 30x ea      30x ea               Standing hip flex 30x ea      30x ea               Heel raise  30x      30x               Toe raise 30x      30x               Tandem wlaking 3 laps 3 laps  3 laps   3 laps  3 laps ht/hn             SLS 30s ec  2 30sec x 2 ea  30sec x 2 ea                 lunges 3 laps 3 laps  3 laps   3 laps  3 laps             STS 3x10 3x10  3 x10    3x10             Side stepping   3 laps  3 laps Peach TB +2# LLE  3 laps  Yellow    3 laps otb             HK marches   3 laps no ue  3 laps no UE   3 laps   No UE  3 laps no ue             Step taps cone   30 x foam  30x2 foam     foam 30x             hurdles fwd/lat      3 laps   3 laps each                                                                                                                                                                    Modalities

## 2018-11-28 ENCOUNTER — OFFICE VISIT (OUTPATIENT)
Dept: PHYSICAL THERAPY | Facility: CLINIC | Age: 68
End: 2018-11-28
Payer: COMMERCIAL

## 2018-11-28 DIAGNOSIS — I63.031 CEREBROVASCULAR ACCIDENT (CVA) DUE TO THROMBOSIS OF RIGHT CAROTID ARTERY (HCC): Primary | ICD-10-CM

## 2018-11-28 PROCEDURE — 97110 THERAPEUTIC EXERCISES: CPT

## 2018-11-28 PROCEDURE — 97112 NEUROMUSCULAR REEDUCATION: CPT

## 2018-11-28 NOTE — PROGRESS NOTES
Daily Note     Today's date: 2018  Patient name: Felipa Lozano  : 1950  MRN: [id number]  Referring provider: Mary Grace Driscoll MD  Dx:   Encounter Diagnosis     ICD-10-CM    1  Cerebrovascular accident (CVA) due to thrombosis of right carotid artery ([address] ) I63 031          Subjective: Patient noted that she noticed her LLE dragging more today then yesterday  Objective: See treatment diary below      Assessment: Tolerated treatment fair  Patient was able to increase laps for hurdles and marching ambulation in small //  Patient demonstrated fatigue post treatment and would benefit from continued PT      Plan: Continue per plan of care       Precautions: na     Daily Treatment Diary      Manual                                                                                                                                                 *2# LLE for all activities during therapy     Exercise Diary  *             Step ups fwd/lat 15 x ea 8" 2x10 ea 8"  3x10 ea 8"  3x10  Ea 8"  15x fwd/lat  15xea fwd/lat           treadmill 10 min 2 5 mph 10 min 2# 2 3 mph 10 min 2# LLE 1 5 mph  10 min  2# LLE  1 5mph  10 min 3% 1 5 mph 2# ankle  10 min 3% 1 5 mph 2# ankle           Standing hip abd 30x ea      30x ea               Standing hip flex 30x ea      30x ea               Heel raise  30x      30x               Toe raise 30x      30x               Tandem wlaking 3 laps 3 laps  3 laps   3 laps  3 laps ht/hn   3 laps ht/hn           SLS 30s ec  2 30sec x 2 ea  30sec x 2 ea      [id number]           lunges 3 laps 3 laps  3 laps   3 laps  3 laps  3 laps           STS 3x10 3x10  3 x10    3x10  3x10            Side stepping   3 laps  3 laps Peach TB +2# LLE  3 laps  Yellow     3 laps otb  3 laps otb            HK marches   3 laps no ue  3 laps no UE   3 laps   No UE  3 laps no ue  4 laps no ue           Step taps cone   30 x foam  30x2 foam     foam 30x  foam 30x           hurdles fwd/lat      3 laps   3 laps each    4 laps each            FT EC foam            30"x2                                                                                                                                         Modalities

## 2018-11-29 ENCOUNTER — OFFICE VISIT (OUTPATIENT)
Dept: OCCUPATIONAL THERAPY | Facility: CLINIC | Age: 68
End: 2018-11-29
Payer: COMMERCIAL

## 2018-11-29 DIAGNOSIS — I63.031 CEREBROVASCULAR ACCIDENT (CVA) DUE TO THROMBOSIS OF RIGHT CAROTID ARTERY (HCC): Primary | ICD-10-CM

## 2018-11-29 PROCEDURE — 97535 SELF CARE MNGMENT TRAINING: CPT

## 2018-11-29 PROCEDURE — 97112 NEUROMUSCULAR REEDUCATION: CPT

## 2018-12-05 ENCOUNTER — OFFICE VISIT (OUTPATIENT)
Dept: OCCUPATIONAL THERAPY | Facility: CLINIC | Age: 68
End: 2018-12-05
Payer: COMMERCIAL

## 2018-12-05 ENCOUNTER — OFFICE VISIT (OUTPATIENT)
Dept: PHYSICAL THERAPY | Facility: CLINIC | Age: 68
End: 2018-12-05
Payer: COMMERCIAL

## 2018-12-05 DIAGNOSIS — I63.031 CEREBROVASCULAR ACCIDENT (CVA) DUE TO THROMBOSIS OF RIGHT CAROTID ARTERY (HCC): Primary | ICD-10-CM

## 2018-12-05 PROCEDURE — G8979 MOBILITY GOAL STATUS: HCPCS | Performed by: PHYSICAL THERAPIST

## 2018-12-05 PROCEDURE — G8978 MOBILITY CURRENT STATUS: HCPCS | Performed by: PHYSICAL THERAPIST

## 2018-12-05 PROCEDURE — 97110 THERAPEUTIC EXERCISES: CPT | Performed by: PHYSICAL THERAPIST

## 2018-12-05 PROCEDURE — 97112 NEUROMUSCULAR REEDUCATION: CPT | Performed by: PHYSICAL THERAPIST

## 2018-12-05 PROCEDURE — 97535 SELF CARE MNGMENT TRAINING: CPT

## 2018-12-05 NOTE — PROGRESS NOTES
Progress Note     Today's date: 2018  Patient name: Kimberlee Mcleod  : 1950  MRN: [id number]  Referring provider: Escobar Hummel MD  Dx:   No diagnosis found  Subjective: Patient noted that she noticed her LLE dragging more today then yesterday  Objective: See treatment diary below      Assessment: Since initial evaluation patient has progressed with LE strength, gait efficiency and endurance per 6 MWT, and balance per brown balance test, and overall reports improved mobility [facility]  Patient will continue to benefit from skilled PT 2x/week for 2 more weeks per original POC to address continued weakness, decreased coordination and endurance/gait deficits   Plan: Continue per plan of care       Precautions: na     Daily Treatment Diary      Manual                                                                                                                                                 *3# LLE for all activities during therapy     Exercise Diary  *    12/         Step ups fwd/lat 15 x ea 8" 2x10 ea 8"  3x10 ea 8"  3x10  Ea 8"  15x fwd/lat  15xea fwd/lat  15x ea fwd/lat         treadmill 10 min 2 5 mph 10 min 2# 2 3 mph 10 min 2# LLE 1 5 mph  10 min  2# LLE  1 5mph  10 min 3% 1 5 mph 2# ankle  10 min 3% 1 5 mph 2# ankle  10 min 3% 1 5 mph 3# ankle         Standing hip abd 30x ea      30x ea               Standing hip flex 30x ea      30x ea               Heel raise  30x      30x               Toe raise 30x      30x               Tandem wlaking 3 laps 3 laps  3 laps   3 laps  3 laps ht/hn   3 laps ht/hn  3 laps ht;hn no ue         SLS 30s ec  2 30sec x 2 ea  30sec x 2 ea      [id number]           lunges 3 laps 3 laps  3 laps   3 laps  3 laps  3 laps           STS 3x10 3x10  3 x10    3x10  3x10            Side stepping   3 laps  3 laps Peach TB +2# LLE  3 laps  Yellow     3 laps otb  3 laps otb            HK marches   3 laps no ue  3 laps no UE  3 laps   No UE  3 laps no ue  4 laps no ue           Step taps cone   30 x foam  30x2 foam     foam 30x  foam 30x           hurdles fwd/lat      3 laps   3 laps each    4 laps each            FT EC foam            30"x2                                                                                                                                         Modalities                                                                                                      STG:  Pt will complete 6 MWT in 1400 ft within 4 weeks - met  Pt will score 56/56 on brown balance test within 4 weeks - met  Pt will be independent with HEP within 4 weeks - met    LTG:  Pt will report full participation in all previous activities within 6-8 weeks  Pt will report no difficulty with IADLS wtihin 6-8 weeks      Balance Test     6 Minute Walk Test (ft): 1250ft ; RE  1425 ft               5x Sit To Stand (s): 9 28 sec   TU 19 sec

## 2018-12-05 NOTE — PROGRESS NOTES
Daily Note     Today's date: 2018  Patient name: Wilda Kidd  : 1950  MRN: [id number]  Referring provider: Marnie Liu MD  Dx:   Encounter Diagnosis   Name Primary?  Cerebrovascular accident (CVA) due to thrombosis of right carotid artery (HCC) Yes                  Subjective: "My  always tells me I need to look aal the way to my left"  "I look in the mirror and I cant tell that my eyes are moving, are they?"      Objective: See treatment diary below  Pt particpiated in skilled OT utilizing BITS focusing on VS/, scanning, tracking in all planes, attending to left viuual field w/hand to target demands  Assessment: Tolerated treatment well  Pt w/G abilities to scan/track about 2/3 of screen into L peripheral w/o head turns focusing on visual ROM w/F hand to target  In beginning of session pt required frequent verbal cues to sustain head in neutral utilizing visual ROM to locate objects in L/R peripheral, good improvement noted with mass practice  Additional time for task completion  Educated pt on various activities to complete at home for carryover to assit with increase in visual ROM and attending to L side  Pt presented with increased insight into visual deficits this session participating in full session    Patient would benefit from continued OT      Plan: Continued skilled OT per POC    INTERVENTION COMMENTS:  Diagnosis: Cerebrovascular accident (CVA) due to thrombosis of right carotid artery ([facility] [address] ) [I63 031]  Precautions: decreased hot/cold sensation on LUE  FOTO: 73 with 27% limitation  Insurance: [facility] [place] [address] REP [[id number]]  4 of ___ visits, PN due 18

## 2018-12-06 ENCOUNTER — TELEPHONE (OUTPATIENT)
Dept: NEUROLOGY | Facility: CLINIC | Age: 68
End: 2018-12-06

## 2018-12-06 NOTE — TELEPHONE ENCOUNTER
[address] Discharge Follow-Up Phone Call Leonila Mitchell 1721: Alissa Leavitt   Hospitalization: 10/12-10/23    Called patient with no answer, phone not taking calls at this time  Left a message on voicemail for call back

## 2018-12-07 ENCOUNTER — OFFICE VISIT (OUTPATIENT)
Dept: OCCUPATIONAL THERAPY | Facility: CLINIC | Age: 68
End: 2018-12-07
Payer: COMMERCIAL

## 2018-12-07 ENCOUNTER — OFFICE VISIT (OUTPATIENT)
Dept: PHYSICAL THERAPY | Facility: CLINIC | Age: 68
End: 2018-12-07
Payer: COMMERCIAL

## 2018-12-07 DIAGNOSIS — I63.031 CEREBROVASCULAR ACCIDENT (CVA) DUE TO THROMBOSIS OF RIGHT CAROTID ARTERY (HCC): Primary | ICD-10-CM

## 2018-12-07 PROCEDURE — 97112 NEUROMUSCULAR REEDUCATION: CPT

## 2018-12-07 PROCEDURE — 97110 THERAPEUTIC EXERCISES: CPT

## 2018-12-07 PROCEDURE — 97535 SELF CARE MNGMENT TRAINING: CPT

## 2018-12-07 NOTE — PROGRESS NOTES
Daily Note     Today's date: 2018  Patient name: Tanner Ortega  : 1950  MRN: [id number]  Referring provider: Esthela Baez MD  Dx:   Encounter Diagnosis     ICD-10-CM    1  Cerebrovascular accident (CVA) due to thrombosis of right carotid artery ([facility] [address] ) I63 031          Subjective: Patient noted that she is feeling pretty good today  Objective: See treatment diary below      Assessment: Tolerated treatment fair  Patient performed exercises in small // today  Patient still continues to drag L foot more  noticeable with fatigue present in LLE  Patient exhibited good technique with therapeutic exercises and would benefit from continued PT         Plan: Continue per plan of care       Precautions: na     Daily Treatment Diary      Manual                                                                                                                                                 *3# LLE for all activities during therapy     Exercise Diary  *         Step ups fwd/lat 15 x ea 8" 2x10 ea 8"  3x10 ea 8"  3x10  Ea 8"  15x fwd/lat  15xea fwd/lat  15x ea fwd/lat  15x lat   20xfwd       treadmill 10 min 2 5 mph 10 min 2# 2 3 mph 10 min 2# LLE 1 5 mph  10 min  2# LLE  1 5mph  10 min 3% 1 5 mph 2# ankle  10 min 3% 1 5 mph 2# ankle  10 min 3% 1 5 mph 3# ankle  10 min 3% 1 5 mph 3# ankle       Standing hip abd 30x ea      30x ea               Standing hip flex 30x ea      30x ea               Heel raise  30x      30x               Toe raise 30x      30x               Tandem wlaking 3 laps 3 laps  3 laps   3 laps  3 laps ht/hn   3 laps ht/hn  3 laps ht;hn no ue  8 laps small //        SLS 30s ec  2 30sec x 2 ea  30sec x 2 ea      [id number]           lunges 3 laps 3 laps  3 laps   3 laps  3 laps  3 laps           STS 3x10 3x10  3 x10    3x10  3x10     3x10       Side stepping   3 laps  3 laps Peach TB +2# LLE  3 laps  Yellow     3 laps otb  3 laps otb            HK marches   3 laps no ue  3 laps no UE   3 laps   No UE  3 laps no ue  4 laps no ue    12 laps small //       Step taps cone   30 x foam  30x2 foam     foam 30x  foam 30x           hurdles fwd/lat      3 laps   3 laps each    4 laps each            FT EC foam            30"x2    30"x3                                                                                                                                     Modalities

## 2018-12-07 NOTE — PROGRESS NOTES
Daily Note     Today's date: 2018  Patient name: Paras Almanza  : 1950  MRN: [id number]  Referring provider: Valentin Garcia MD  Dx:   Encounter Diagnosis   Name Primary?  Cerebrovascular accident (CVA) due to thrombosis of right carotid artery (HCC) Yes                  Subjective: "I can see better now than I could before"  Objective: See treatment diary below  Pt participated in skilled OT focusing on L peripheral field and visual ROM  Pt engaged in [address] focusing on L peripheral field with tracking to L stabilizing head in midline and moving eyes only  Tracking L<->R for bell cancellation  MW embedded word task scanning into L peripheral to locate words sustaining head in midline  Assessment: Tolerated treatment well  Impro slightlyvement noted in increase in field of vision w/ tracking to left to locate letters in L peripheral tracking slightly past 2/3rd of end range as noted last session   Pt w/about 85% accuracy to locate bells in bell cancellation task with first trial, pt located remainder of bells 2nd trial   Pt w/G attention to embedded word task scanning into L peripheral to locate words on L side of mirror board     Patient would benefit from continued OT      Plan: Continued skilled OT per POC focusing on completion of embedded word task(attached to chart)    INTERVENTION COMMENTS:  Diagnosis: Cerebrovascular accident (CVA) due to thrombosis of right carotid artery ([address] ) [I63 031]  Precautions: decreased hot/cold sensation on LUE  FOTO: 73 with 27% limitation  Insurance: [facility] [place] [address]  [address] ___ visits, PN due 18

## 2018-12-10 ENCOUNTER — OFFICE VISIT (OUTPATIENT)
Dept: OCCUPATIONAL THERAPY | Facility: CLINIC | Age: 68
End: 2018-12-10
Payer: COMMERCIAL

## 2018-12-10 ENCOUNTER — OFFICE VISIT (OUTPATIENT)
Dept: PHYSICAL THERAPY | Facility: CLINIC | Age: 68
End: 2018-12-10
Payer: COMMERCIAL

## 2018-12-10 DIAGNOSIS — I63.031 CEREBROVASCULAR ACCIDENT (CVA) DUE TO THROMBOSIS OF RIGHT CAROTID ARTERY (HCC): Primary | ICD-10-CM

## 2018-12-10 PROCEDURE — 97112 NEUROMUSCULAR REEDUCATION: CPT

## 2018-12-10 PROCEDURE — 97535 SELF CARE MNGMENT TRAINING: CPT

## 2018-12-10 PROCEDURE — 97110 THERAPEUTIC EXERCISES: CPT

## 2018-12-10 NOTE — PROGRESS NOTES
Daily Note     Today's date: 12/10/2018  Patient name: Nate Mcclendon  : 1950  MRN: [id number]  Referring provider: Hanny Crane MD  Dx:   Encounter Diagnosis     ICD-10-CM    1  Cerebrovascular accident (CVA) due to thrombosis of right carotid artery ([facility] [address] ) I63 031          Subjective: Patient noted no new complaints  Patient noted that some days she is able to go up the stairs faster then other days  Objective: See treatment diary below      Assessment: Patient was able to perform 20x for step ups and lateral step ups  Increased to GTB for side stepping  Patient  would benefit from continued PT      Plan: Continue per plan of care       Precautions: na     Daily Treatment Diary      Manual                                                                                                                                                 *3# LLE for all activities during therapy     Exercise Diary  *  11/27  11/28  12/5  12/7  12/10     Step ups fwd/lat 15 x ea 8" 2x10 ea 8"  3x10 ea 8"  3x10  Ea 8"  15x fwd/lat  15xea fwd/lat  15x ea fwd/lat  15x lat   20xfwd 20xlat  20xfwd     treadmill 10 min 2 5 mph 10 min 2# 2 3 mph 10 min 2# LLE 1 5 mph  10 min  2# LLE  1 5mph  10 min 3% 1 5 mph 2# ankle  10 min 3% 1 5 mph 2# ankle  10 min 3% 1 5 mph 3# ankle  10 min 3% 1 5 mph 3# ankle  10 min 3% 3# ankle      Standing hip abd 30x ea      30x ea               Standing hip flex 30x ea      30x ea               Heel raise  30x      30x               Toe raise 30x      30x               Tandem wlaking 3 laps 3 laps  3 laps   3 laps  3 laps ht/hn   3 laps ht/hn  3 laps ht;hn no ue  8 laps small //   3 laps ht/hn     SLS 30s ec  2 30sec x 2 ea  30sec x 2 ea      [id number]      30 secx 3      lunges 3 laps 3 laps  3 laps   3 laps  3 laps  3 laps           STS 3x10 3x10  3 x10    3x10  3x10     3x10  3x10      Side stepping   3 laps  3 laps Peach TB +2# LLE  3 laps  Yellow     3 laps otb  3 laps otb      2 laps GTB     HK marches   3 laps no ue  3 laps no UE   3 laps   No UE  3 laps no ue  4 laps no ue    12 laps small // 4  laps      Step taps cone   30 x foam  30x2 foam     foam 30x  foam 30x      30x from foam      hurdles fwd/lat      3 laps   3 laps each    4 laps each      3 laps       FT EC foam            30"x2    30"x3  30"x3                                                                                                                                   Modalities

## 2018-12-10 NOTE — PROGRESS NOTES
Daily Note     Today's date: 12/10/2018  Patient name: Nate Patterson  : 1950  MRN: [id number]  Referring provider: Megha Toledo MD  Dx:   Encounter Diagnosis   Name Primary?  Cerebrovascular accident (CVA) due to thrombosis of right carotid artery (HCC) Yes                  Subjective: "I'm not that fast "      Objective: See treatment below  No HA or symptoms upon arrival to session  Engaged patient in peripheral visual task with use of tracking tube-placed letter into left and right peripheral fields and clutter cards placed on floor to left side  Engaged patient in BB task holding BB in peripheral field and keeping focus on a central fixation at midline-required patient to catch BB independently in hands while keeping eyes on central focus  Small bounce ball catch utilizing mirror only for  retraining  BITS for peripheral scanning  Assessment: Tolerated treatment well  Required verbal education for each activity as the activities related to pre-driving skills  Slowed reaction timing noted and was unable to pass the first level of reaction training on BITS  Plan: Continued skilled OT per POC      INTERVENTION COMMENTS:  Diagnosis: Cerebrovascular accident (CVA) due to thrombosis of right carotid artery ([facility] [address] ) [I63 031]  Precautions: decreased hot/cold sensation on LUE  FOTO:  6 of 12 visits, PN due

## 2018-12-10 NOTE — TELEPHONE ENCOUNTER
Called patient, she is currently out eating at a resturant with her  but states she would like to talk now  Since discharge, she has not experienced any new or worsening stroke symptoms  She remains independent with ADLs and ambulation  Patient still experiencing weakness of the left hand and leg, claims it has improved  Worsening left leg weakness when more tired, but again states this has improved  Patient's left peripheral vision field is still compromised with minimal improvement  She continues to attend outpatient PT and OT twice a week  Followed with PCP 11/6, next appointment next month, not sure of date at this time  Also scheduled for carotid study 1/2/19, Dr Ankush Forde 1/4/2019, and Dr Violette Torrez 2/1519 after seeing Angel Saxena 11/12  I reviewed medications with her, she is unsure of what she takes, her  helps manages medications and read off the list he keeps with him at all times  Only medication change is the start of zoloft 11/12 by Angel Saxena, but the patient states she no longer takes this medication as it made her too fatigued  Her PCP is aware  She does not wish to change/start any other medications at this time in place of the zoloft  She states she does not take prescribed colace and senna  All other medications being taken as prescribed with no reported missed doses  She states she does not think her pepcid is as effective as it used to be, she said she will inform PCP  She does not have any questions regarding medications  Patient states she exercises outside of her PT OT appointments and has a good appetite  She does not monitor her blood pressures but states last recorded 2 weeks ago was 142/80  Patient dose not have any further questions or concerns at this time

## 2018-12-12 ENCOUNTER — OFFICE VISIT (OUTPATIENT)
Dept: PHYSICAL THERAPY | Facility: CLINIC | Age: 68
End: 2018-12-12
Payer: COMMERCIAL

## 2018-12-12 ENCOUNTER — OFFICE VISIT (OUTPATIENT)
Dept: OCCUPATIONAL THERAPY | Facility: CLINIC | Age: 68
End: 2018-12-12
Payer: COMMERCIAL

## 2018-12-12 DIAGNOSIS — I63.031 CEREBROVASCULAR ACCIDENT (CVA) DUE TO THROMBOSIS OF RIGHT CAROTID ARTERY (HCC): Primary | ICD-10-CM

## 2018-12-12 PROCEDURE — 97110 THERAPEUTIC EXERCISES: CPT

## 2018-12-12 PROCEDURE — 97112 NEUROMUSCULAR REEDUCATION: CPT

## 2018-12-12 PROCEDURE — 97535 SELF CARE MNGMENT TRAINING: CPT

## 2018-12-12 NOTE — PROGRESS NOTES
Daily Note     Today's date: 2018  Patient name: Valerie Epley  : 1950  MRN: [id number]  Referring provider: Piero Michel MD  Dx:   Encounter Diagnosis     ICD-10-CM    1  Cerebrovascular accident (CVA) due to thrombosis of right carotid artery ([facility] [address] ) I63 031          Subjective: Patient noted that she is not limited in ADLs  Objective: See treatment diary below      Assessment: Tolerated treatment well  Patient still presents with dragging her L LE when performing hurdles when she becomes fatigued in LLE  Added lunges back into treatment patient was able to perform with no complaints  Patient would benefit from continued PT for next week as per POC then d/c next week  Plan: Continue per plan of care       Precautions: na     Daily Treatment Diary      Manual                                                                                                                                                 *3# LLE for all activities during therapy     Exercise Diary  *  11/27  11/28  12/5  12/7  12/10  12/12   Step ups fwd/lat 15 x ea 8" 2x10 ea 8"  3x10 ea 8"  3x10  Ea 8"  15x fwd/lat  15xea fwd/lat  15x ea fwd/lat  15x lat   20xfwd 20xlat  20xfwd  20xlat  20xfwd   treadmill 10 min 2 5 mph 10 min 2# 2 3 mph 10 min 2# LLE 1 5 mph  10 min  2# LLE  1 5mph  10 min 3% 1 5 mph 2# ankle  10 min 3% 1 5 mph 2# ankle  10 min 3% 1 5 mph 3# ankle  10 min 3% 1 5 mph 3# ankle  10 min 3% 3# ankle   10 min 3% 3# ankle 1 8   Standing hip abd 30x ea      30x ea               Standing hip flex 30x ea      30x ea               Heel raise  30x      30x               Toe raise 30x      30x               Tandem wlaking 3 laps 3 laps  3 laps   3 laps  3 laps ht/hn   3 laps ht/hn  3 laps ht;hn no ue  8 laps small //   3 laps ht/hn  3 laps ht/ hn   SLS 30s ec  2 30sec x 2 ea  30sec x 2 ea      [id number]      30 secx 3   30sec x3   lunges 3 laps 3 laps  3 laps   3 laps  3 laps  3 laps         STS 3x10 3x10  3 x10    3x10  3x10     3x10  3x10   3x10   Side stepping   3 laps  3 laps Peach TB +2# LLE  3 laps  Yellow     3 laps otb  3 laps otb      2 laps GTB  3 laps GTB   [place] marches   3 laps no ue  3 laps no UE   3 laps   No UE  3 laps no ue  4 laps no ue    12 laps small // 4  laps   4 laps large bars   Step taps cone   30 x foam  30x2 foam     foam 30x  foam 30x      30x from foam   30x   hurdles fwd/lat      3 laps   3 laps each    4 laps each      3 laps   3 laps     FT EC foam            30"x2    30"x3  30"x3  30"x3                                                                                                                                 Modalities

## 2018-12-12 NOTE — PROGRESS NOTES
Daily Note     Today's date: 2018  Patient name: Mike Read  : 1950  MRN: [id number]  Referring provider: Becca Kunz MD  Dx:   Encounter Diagnosis   Name Primary?  Cerebrovascular accident (CVA) due to thrombosis of right carotid artery (HCC) Yes                  Subjective: "I have brain strain "      Objective: See treatment below  Engaged patient in large pegboard utilizing mirror for visual perceptual retraining  Required patient to peripherally scan for locating wooden peg with black hand gripper  Completed ball toss with ocular tracking only in vertical plane and then turning head to left and right to locate 2-column saccade letter sheet  Provided foam barrel to tuck between chest and chin to prevent head movements while tracking ball  Assessment: Tolerated treatment well  Required 45 minutes to engage in mirror task, but unable to complete full board  Difficulty tracking ball with just ocular movements and required use of tactile foam object  Plan: Continued skilled OT per POC      INTERVENTION COMMENTS:  Diagnosis: Cerebrovascular accident (CVA) due to thrombosis of right carotid artery ([address] ) [I63 031]  Precautions: decreased hot/cold sensation on LUE  FOTO:  7 of 12 visits, PN due

## 2018-12-17 ENCOUNTER — OFFICE VISIT (OUTPATIENT)
Dept: OCCUPATIONAL THERAPY | Facility: CLINIC | Age: 68
End: 2018-12-17
Payer: COMMERCIAL

## 2018-12-17 ENCOUNTER — OFFICE VISIT (OUTPATIENT)
Dept: PHYSICAL THERAPY | Facility: CLINIC | Age: 68
End: 2018-12-17
Payer: COMMERCIAL

## 2018-12-17 DIAGNOSIS — I63.031 CEREBROVASCULAR ACCIDENT (CVA) DUE TO THROMBOSIS OF RIGHT CAROTID ARTERY (HCC): Primary | ICD-10-CM

## 2018-12-17 PROCEDURE — 97112 NEUROMUSCULAR REEDUCATION: CPT

## 2018-12-17 PROCEDURE — 97530 THERAPEUTIC ACTIVITIES: CPT

## 2018-12-17 PROCEDURE — 97110 THERAPEUTIC EXERCISES: CPT

## 2018-12-17 NOTE — PROGRESS NOTES
Daily Note     Today's date: 2018  Patient name: Hammad Ragsdale  : 1950  MRN: [id number]  Referring provider: Ángel Soto MD  Dx:   Encounter Diagnosis     ICD-10-CM    1  Cerebrovascular accident (CVA) due to thrombosis of right carotid artery ([facility] [address] ) I63 031          Subjective: Patient noted no new complaints  Objective: See treatment diary below      Assessment: Tolerated treatment well  Patient was able to perform increased laps for ambulation exercises today and added foam under patients feet while performing sit to stands  Patient exhibited good technique with therapeutic exercises and would benefit from continued PT      Plan: Continue per plan of care     Precautions: na     Daily Treatment Diary      Manual                                                                                                                                                 *3# LLE for all activities during therapy     Exercise Diary  *  11/27  11/28  12/5  12/7  12/10  12/12   Step ups fwd/lat 20xlat  20xfwd 2x10 ea 8"  3x10 ea 8"  3x10  Ea 8"  15x fwd/lat  15xea fwd/lat  15x ea fwd/lat  15x lat   20xfwd 20xlat  20xfwd  20xlat  20xfwd   treadmill 10 min 2# 2 3 mph 10 min 2# 2 3 mph 10 min 2# LLE 1 5 mph  10 min  2# LLE  1 5mph  10 min 3% 1 5 mph 2# ankle  10 min 3% 1 5 mph 2# ankle  10 min 3% 1 5 mph 3# ankle  10 min 3% 1 5 mph 3# ankle  10 min 3% 3# ankle   10 min 3% 3# ankle 1 8   Standing hip abd       30x ea               Standing hip flex       30x ea               Heel raise        30x               Toe raise       30x               Tandem wlaking 4 laps ht/ hn 3 laps  3 laps   3 laps  3 laps ht/hn   3 laps ht/hn  3 laps ht;hn no ue  8 laps small //   3 laps ht/hn  3 laps ht/ hn   SLS 30sec x3 30sec x 2 ea  30sec x 2 ea      [id number]      30 secx 3   30sec x3   lunges 4 laps 3 laps  3 laps   3 laps  3 laps  3 laps           STS 3x10 with foam 3x10  3 x10    3x10  3x10     3x10  3x10   3x10   Side stepping 3 laps GTB 3 laps  3 laps Peach TB +2# LLE  3 laps  Yellow     3 laps otb  3 laps otb      2 laps GTB  3 laps GTB   HK marches 4 laps large bars 3 laps no ue  3 laps no UE   3 laps   No UE  3 laps no ue  4 laps no ue    12 laps small // 4  laps   4 laps large bars   Step taps cone 30xx2 foam  30 x foam  30x2 foam     foam 30x  foam 30x      30x from foam   30x   hurdles fwd/lat 3 laps each    3 laps   3 laps each    4 laps each      3 laps   3 laps     FT EC foam            30"x2    30"x3  30"x3  30"x3                                                                                                                                 Modalities

## 2018-12-17 NOTE — PROGRESS NOTES
Daily Note     Today's date: 2018  Patient name: Kathy Freire  : 1950  MRN: [id number]  Referring provider: Kash Huynh MD  Dx:   Encounter Diagnosis   Name Primary?  Cerebrovascular accident (CVA) due to thrombosis of right carotid artery (HCC) Yes                  Subjective: "Its so hard for me not to turn my head"  Objective: See treatment diary below  Pt participated in skilled OT focusing on scanning/tracking into L peripheral field with head sustained midline  Pt engaged in card match with board placed midline and cards placed in peripheral field mostly to left  Pt retrtieved cards and placed on board sustaining head in midline and tracking cards in L peripheral     BB toss/catch in 1/2 [place] around pt while tracking BB for pt to catch focusing on peripheral field with and w/o head turns  Assessment: Tolerated treatment well  Improvement noted in visual ROM w/mass practice  Frequently pt chin tucks resulting in below horizon gaze,  therapist encouraged pt to sustain head in neutral at midline to improve gaze to accommodate peripheral field above/blelow gaze  Pt reported increase abilities with visual ROM in peripheral field   Patient would benefit from continued OT      Plan: Continued skilled OT per POC     INTERVENTION COMMENTS:  Diagnosis: Cerebrovascular accident (CVA) due to thrombosis of right carotid artery ([address] ) [I63 031]  Precautions: decreased hot/cold sensation on LUE  FOTO:  8 of 12 visits, PN due

## 2018-12-19 ENCOUNTER — OFFICE VISIT (OUTPATIENT)
Dept: PHYSICAL THERAPY | Facility: CLINIC | Age: 68
End: 2018-12-19
Payer: COMMERCIAL

## 2018-12-19 ENCOUNTER — OFFICE VISIT (OUTPATIENT)
Dept: OCCUPATIONAL THERAPY | Facility: CLINIC | Age: 68
End: 2018-12-19
Payer: COMMERCIAL

## 2018-12-19 DIAGNOSIS — I63.031 CEREBROVASCULAR ACCIDENT (CVA) DUE TO THROMBOSIS OF RIGHT CAROTID ARTERY (HCC): Primary | ICD-10-CM

## 2018-12-19 PROCEDURE — 97112 NEUROMUSCULAR REEDUCATION: CPT | Performed by: PHYSICAL THERAPIST

## 2018-12-19 PROCEDURE — 97530 THERAPEUTIC ACTIVITIES: CPT

## 2018-12-19 PROCEDURE — 97112 NEUROMUSCULAR REEDUCATION: CPT

## 2018-12-19 NOTE — PROGRESS NOTES
Discharge Note     Today's date: 2018  Patient name: Billie Yuen  : 1950  MRN: [id number]  Referring provider: Sylvia Kinney MD  Dx:   No diagnosis found  Subjective: Feeling very close to being 100%  Objective: See treatment diary below      Assessment: Pt has made significant improvements in gait, balance, endurance, and is independent with HEP  She will be d/c to Hep at this time with goals all met  Plan: Continue per plan of care       Precautions: na     Daily Treatment Diary      Manual                                                                                                                                                 *3# LLE for all activities during therapy     Exercise Diary  *    12/         Step ups fwd/lat 15 x ea 8" 2x10 ea 8"  3x10 ea 8"  3x10  Ea 8"  15x fwd/lat  15xea fwd/lat  15x ea fwd/lat         treadmill 10 min 2 5 mph 10 min 2# 2 3 mph 10 min 2# LLE 1 5 mph  10 min  2# LLE  1 5mph  10 min 3% 1 5 mph 2# ankle  10 min 3% 1 5 mph 2# ankle  10 min 3% 1 5 mph 3# ankle         Standing hip abd 30x ea      30x ea               Standing hip flex 30x ea      30x ea               Heel raise  30x      30x               Toe raise 30x      30x               Tandem wlaking 3 laps 3 laps  3 laps   3 laps  3 laps ht/hn   3 laps ht/hn  3 laps ht;hn no ue         SLS 30s ec  2 30sec x 2 ea  30sec x 2 ea      [id number]           lunges 3 laps 3 laps  3 laps   3 laps  3 laps  3 laps           STS 3x10 3x10  3 x10    3x10  3x10            Side stepping   3 laps  3 laps Peach TB +2# LLE  3 laps  Yellow     3 laps otb  3 laps otb            HK marches   3 laps no ue  3 laps no UE   3 laps   No UE  3 laps no ue  4 laps no ue           Step taps cone   30 x foam  30x2 foam     foam 30x  foam 30x           hurdles fwd/lat      3 laps   3 laps each    4 laps each            FT EC foam            30"x2                                                                                                                                         Modalities                                                                                                      STG:  Pt will complete 6 MWT in 1400 ft within 4 weeks - met  Pt will score 56/56 on brown balance test within 4 weeks - met  Pt will be independent with HEP within 4 weeks - met    LTG:  Pt will report full participation in all previous activities within 6-8 weeks - met  Pt will report no difficulty with IADLS wtihin 6-8 weeks   - met    Balance Test     6 Minute Walk Test (ft): 1250ft ; RE  1425 ft;  Ft 1625                5x Sit To Stand (s): 9 28 sec   TU 19 sec

## 2018-12-19 NOTE — PROGRESS NOTES
Daily Note     Today's date: 2018  Patient name: Bala Price  : 1950  MRN: [id number]  Referring provider: Srinivas Aguiar MD  Dx:   Encounter Diagnosis   Name Primary?  Cerebrovascular accident (CVA) due to thrombosis of right carotid artery (HCC) Yes                  Subjective: "I forget about my left side"  Objective: See treatment diary below  Pt participated in skilled OT focusing on visual ROM to L peripheral field and attention to left  Pt engaged in [place] with and w/o central fixation as cue to sustain head in midline and increase visual ROM to locate items in peripheral field  Pt completed crossword puzzles with words placed to left facilitating head movements  Educated pt on importance of utilizing compensatory strategies to improve attending to L     Assessment: Tolerated treatment well  Improvement noted with tracking/scanning to left peripheral with ROM  Pt demo- G compensatory techniques with head turns to locate phrases to left of pt    Patient would benefit from continued OT      Plan: Continued skilled OT per POC     INTERVENTION COMMENTS:  Diagnosis: Cerebrovascular accident (CVA) due to thrombosis of right carotid artery ([facility] [address] ) [I63 031]  Precautions: decreased hot/cold sensation on LUE  FOTO:  9 of 12 visits, PN due

## 2018-12-26 ENCOUNTER — OFFICE VISIT (OUTPATIENT)
Dept: OCCUPATIONAL THERAPY | Facility: CLINIC | Age: 68
End: 2018-12-26
Payer: COMMERCIAL

## 2018-12-26 ENCOUNTER — APPOINTMENT (OUTPATIENT)
Dept: PHYSICAL THERAPY | Facility: CLINIC | Age: 68
End: 2018-12-26
Payer: COMMERCIAL

## 2018-12-26 DIAGNOSIS — I63.031 CEREBROVASCULAR ACCIDENT (CVA) DUE TO THROMBOSIS OF RIGHT CAROTID ARTERY (HCC): Primary | ICD-10-CM

## 2018-12-26 PROCEDURE — G8991 OTHER PT/OT GOAL STATUS: HCPCS

## 2018-12-26 PROCEDURE — 97535 SELF CARE MNGMENT TRAINING: CPT

## 2018-12-26 PROCEDURE — 97112 NEUROMUSCULAR REEDUCATION: CPT

## 2018-12-26 PROCEDURE — G8990 OTHER PT/OT CURRENT STATUS: HCPCS

## 2018-12-26 NOTE — PROGRESS NOTES
Daily Note     Today's date: 2018  Patient name: Valeria Segovia  : 1950  MRN: [id number]  Referring provider: Patrick Gonzalez MD  Dx:   Encounter Diagnosis   Name Primary?  Cerebrovascular accident (CVA) due to thrombosis of right carotid artery (HCC) Yes                  Subjective: "The [place] was easier for me "      Objective: See treatment below  Engaged patient in post-in note hallway with central fixation and peripheral ocular scanning to increase attention to left side  Engaged patient in [address] [place] [address], while standing on foam, for visual scanning utilizing moving charts and copying letters onto grid paper  Completed gap sequence for processing and visual memory  Assessment: Tolerated treatment well  Difficulty with gap sequence recalling last number/letter selected  2 errors in grid paper copy with left to right moving charting  Plan: Continued skilled OT per POC      INTERVENTION COMMENTS:  Diagnosis: Cerebrovascular accident (CVA) due to thrombosis of right carotid artery ([facility] [address] ) [I63 031]  Precautions: decreased hot/cold sensation on LUE  FOTO:  10 of 12 visits, PN due

## 2018-12-27 NOTE — PROGRESS NOTES
Occupational Therapy Progress Note/Status Update: Today's Date: 2018  Patient Name: Paula Schwarz  : 1950  MRN: [id number]  Referring Provider: Shelby Tobar MD  Dx: Cerebrovascular accident (CVA) due to thrombosis of right carotid artery [facility]) [I63 031]    Active Problem List:   Patient Active Problem List   Diagnosis    Cerebrovascular accident (CVA) due to thrombosis of right carotid artery ([address] )    Hypothyroidism    Normocytic anemia due to blood loss    Slow transit constipation     Past Medical Hx:   Past Medical History:   Diagnosis Date    Disease of thyroid gland     Hypertension      Past Surgical Hx:   Past Surgical History:   Procedure Laterality Date    CAROTID STENT      IR STROKE ALERT  10/6/2018        Pain Levels:   Restin    With Activity:  0    Subjective/Patient Goal: "To get back to driving "    History of Present Illness:  Pt is a pleasant 77 y o  female seen for OT eval s/p referred to [address] [place] [address] s/p presented to [facility] and then transferred to [facility] on 10/6/2018    Per patient's wife patient awoke at 4:00 am with slurred speech and right sided weakness   EMS was called and patient was brought to the ED at [facility] where CTA showed right carotid occlusion and right MCA occlusion   She was emergently transferred to [facility] and  brought to [facility]   IR stroke alert showed right proximal ICA occlusion with extensive cervical segment clot burden, right MCA M1 TICI O segment occlusion   She underwent an emergent successful mechanical thrombectomy with revascularization of the right MCA territory, successful cervical right ICA mechanical thrombectomy, successful proximal ICA angioplasty ans stenting with 40% residual stenosis remaining and focal non flow limiting thrombus immediately distal to the stent with Dr Donna Akhtar of neurosurgery   Post IR CT of the head showed subtle loss of insular ribbon sign and loss of gray-white differention in right frontotemporal junction suggesting recent infarct  TPA was not warranted prior to procedure  She was started on Integrilin drip   Left facial droop and left hemineglect present on initial exam  Repeat CT of the head showed interval development of hypodensity within the right temporal lobe, right caudate nucleus, and junction of right temporal occipital lobes consistent with evolution of previously suspected right MCA infarct with mild edema and regional mass effect without midline shift   MRI of the brain showed extensive recent infarcts in the right hemisphere including right centrum semiovale and lentiform nucleus, right temporal lobe/ temporal opercular junction, and right occipital cortex     Neurology was consulted and patient was loaded with ASA and plavix with orders to continue with plavix 75mg daily and  mg daily  She was also started on a high dose statin  [facility] that showed EF of 50% with no regional wall motion abnormalities and per neurology was non contributory with no cardioembolic source so no ASIM was pursued   Pt was [facility] to [facility] on 10/12/18 - 10/23/18  Dx'd w/ R MCA CVA , comorbidities as listed above      Lifestyle Performance Model:  Autonomy: Pt was I with ADL's and IADL's, reports having endurance issues, not currently driving since the stroke due to doctor's direction  Reciprocal Relationships: Supportive  who is able to help, and 2 children , 1 in [employer], [address] in [address], 2 grandkids (11, 13)  Service to Others: Pt is retired, worked for [employer] as , retired about 6 or 7 years ago  [address]: Enjoys refinishing and building furniture w/   Home Setup: Pt lives in [facility] w/ 1 [address], bed and bath upstairs w/ handrail    Objective  Impairments Section:    1   Vision Screening Recording Form:   vision screen: w/ reading glasses for near acuity  near acuity: R 20/30, L 20/25  binocularity far: orthotropia, low exophoria  binocularity near: orthotropia, low exophoria  red green fusion: no fusion  near point of convergence: no diplopia, decreased B/L teaming  em string: misalignment  Pursuits: jerky in all planes  Saccades: accurate in all planes  ocular ROM: Full ROM  visual perceptual midline shift: @ midline, @ horizon  2-person confrontation: all peripheral fields intact    Assessment/Plan  Occupational Therapy Skilled Analysis Assessment and Plan of Care:  Pt requires overall mod I for ADLs/self care and mod I for fx'l mobility w/ out DME  Pt is currently demonstrating the following occupational deficits: limited 2* decreased endurance/activity tolerance, decreased executive functioning, L visual field inattention, decreased B/L teaming of eyes, B/L nystagmus, R exophoric strabismus, L esophoria, misalignment, jerky pursuits in all planes, inaccurate saccades in all planes w/ under/overshooting  The following Occupational Performance Areas to address include: socialization, health maintenance, functional mobility, community mobility, household maintenance, job performance/volunteering and social participation  Based on the aforementioned OT evaluation, functional performance deficits, and assessments, pt has been identified as a moderate complexity evaluation  Pt to continue to benefit from outpatient skilled OT services to address the following goals 2x/week to  w/in 4 weeks with special focus on self-care management, pt education,  and VM training as well as motor training to improve above defiicits and enhance overall QOL/function  Pt seen for OT re-evaluation/progress note/status update today 18  Pt is still currently demonstrating B/L low exophoria, no fusion/diplopia, decreased B/L teaming of eyes, jerky pursuits in all planes, mild L inattention, and reported inattention to detail  Recommended that pt follow up with optometrist to evaluate for prescription lenses   Pt would continue to benefit from OT services 2x/week for 4 more weeks to address the following goals and enhance overall QOL/function      Goals:  Short Term Goals  · Pt will increase screen tolerance to 2 hours with min increase in HA by 1-2 levels for improved leisure pursuits and role performance 4 weeks as applicable - MET  · Pt will increase oculomotor control for improved saccades, con/divergent tasks for improved reading, board to table tasks with minimal increase in symptoms 4 weeks - PARTIALLY MET  · Pt will tolerate multi-modal envt x 15 min with 80% accuracy of cog load and min increase of symptoms of 2 levels in HA/dizziness/nausea 4 weeks - MET  · Pt will demonstrate 100% awareness of visual stimuli and environmental hazards from midline to L side with cuing  - PARTIALLY MET  Long Term Goals  · Pt will increase oculomotor control for improved dynamic activities with head turns, board/screen to table tasks symptom free, improved VMI and return to baseline handwriting - PARTIALLY MET  · Pt will increase oculomotor control for WNL saccades, con/divergent tasks symptom free - PARTIALLY MET  · Pt will increase screen tolerance to 3 hours with min increase in HA by 1-2 levels for improved leisure pursuits and occupational/role performance as applicable - MET  · Pt will demonstrate 100% awareness of visual stimuli and environmental hazards from midline to L side without cues  - PARTIALLY MET       INTERVENTION COMMENTS:  Diagnosis: R MCA CVA  Precautions: Difficulty regulating emotions, decreased hot/cold sensation on LUE  FOTO: 73 with 27% limitation  Insurance: [address]  REP [[id number]]  11 of 12 visits, PN due 1/28/18    Patient treated by KENNY Horton, under direct supervision of OLI Whitlock/CATHIE  Thank you for the consult!   Please call if you have any questions: [phone]  MARIA D Whitlock, OTR/L, C-JAIME, CSRS  Director of Outpatient Neuro Occupational Therapy

## 2018-12-28 ENCOUNTER — EVALUATION (OUTPATIENT)
Dept: OCCUPATIONAL THERAPY | Facility: CLINIC | Age: 68
End: 2018-12-28
Payer: COMMERCIAL

## 2018-12-28 ENCOUNTER — APPOINTMENT (OUTPATIENT)
Dept: PHYSICAL THERAPY | Facility: CLINIC | Age: 68
End: 2018-12-28
Payer: COMMERCIAL

## 2018-12-28 DIAGNOSIS — I63.031 CEREBROVASCULAR ACCIDENT (CVA) DUE TO THROMBOSIS OF RIGHT CAROTID ARTERY (HCC): Primary | ICD-10-CM

## 2018-12-28 PROCEDURE — G8990 OTHER PT/OT CURRENT STATUS: HCPCS

## 2018-12-28 PROCEDURE — G8991 OTHER PT/OT GOAL STATUS: HCPCS

## 2018-12-28 PROCEDURE — 97530 THERAPEUTIC ACTIVITIES: CPT

## 2018-12-31 ENCOUNTER — HOSPITAL ENCOUNTER (OUTPATIENT)
Dept: RADIOLOGY | Facility: MEDICAL CENTER | Age: 68
Discharge: HOME | End: 2018-12-31
Payer: COMMERCIAL

## 2018-12-31 DIAGNOSIS — I63.031 CEREBROVASCULAR ACCIDENT (CVA) DUE TO THROMBOSIS OF RIGHT CAROTID ARTERY (HCC): ICD-10-CM

## 2018-12-31 PROCEDURE — 93880 EXTRACRANIAL BILAT STUDY: CPT

## 2018-12-31 PROCEDURE — 93880 EXTRACRANIAL BILAT STUDY: CPT | Performed by: SURGERY

## 2019-01-03 ENCOUNTER — OFFICE VISIT (OUTPATIENT)
Dept: OCCUPATIONAL THERAPY | Facility: CLINIC | Age: 69
End: 2019-01-03
Payer: COMMERCIAL

## 2019-01-03 DIAGNOSIS — I63.031 CEREBROVASCULAR ACCIDENT (CVA) DUE TO THROMBOSIS OF RIGHT CAROTID ARTERY (HCC): Primary | ICD-10-CM

## 2019-01-03 PROCEDURE — 97535 SELF CARE MNGMENT TRAINING: CPT

## 2019-01-03 NOTE — PROGRESS NOTES
Daily Note     Today's date: 1/3/2019  Patient name: Wilfrido Velsaco  : 1950  MRN: [id number]  Referring provider: Vanda Thomas MD  Dx:   Encounter Diagnosis   Name Primary?  Cerebrovascular accident (CVA) due to thrombosis of right carotid artery (HCC) Yes                  Subjective: "I can see the whole TV now not just half of it"  Objective: See treatment diary below  Pt particiated in skilled OT focusing on VS/, tracking, scanning and attending to left utilizing compensatory strategies  Pt engaged in peg pattern copy w/pattern card placed to left to facilitate attention to left, peg board placed tabletop w/visual focus in mirror to assemble peg activity  Pt located 4 word sequences in clutter scanning in all planes  Assessment: Tolerated treatment well  Pt completed peg pattern task attending to left utilizing compensatory techniques  Additional time to completed 4 word sequences scanning in all planes    Patient would benefit from continued OT      Plan: Continued skilled OT per POC    INTERVENTION COMMENTS:  Diagnosis: R MCA CVA  Precautions: Difficulty regulating emotions, decreased hot/cold sensation on LUE  FOTO: 73 with 27% limitation  Insurance: [facility] [employer] [facility] REP [[id number]]  12 of 12 visits, PN due 18

## 2019-01-04 ENCOUNTER — OFFICE VISIT (OUTPATIENT)
Dept: NEUROSURGERY | Facility: CLINIC | Age: 69
End: 2019-01-04
Payer: COMMERCIAL

## 2019-01-04 VITALS
TEMPERATURE: 98.4 F | RESPIRATION RATE: 16 BRPM | WEIGHT: 171.2 LBS | DIASTOLIC BLOOD PRESSURE: 77 MMHG | BODY MASS INDEX: 32.32 KG/M2 | HEIGHT: 61 IN | SYSTOLIC BLOOD PRESSURE: 153 MMHG | HEART RATE: 90 BPM

## 2019-01-04 DIAGNOSIS — I63.031 CEREBROVASCULAR ACCIDENT (CVA) DUE TO THROMBOSIS OF RIGHT CAROTID ARTERY (HCC): Primary | ICD-10-CM

## 2019-01-04 DIAGNOSIS — I63.239 CAROTID STENOSIS, SYMPTOMATIC, WITH INFARCTION (HCC): ICD-10-CM

## 2019-01-04 PROCEDURE — 99213 OFFICE O/P EST LOW 20 MIN: CPT | Performed by: RADIOLOGY

## 2019-01-04 RX ORDER — ASPIRIN 325 MG
325 TABLET, DELAYED RELEASE (ENTERIC COATED) ORAL DAILY
Qty: 90 TABLET | Refills: 3 | Status: SHIPPED | OUTPATIENT
Start: 2019-01-04 | End: 2021-02-05 | Stop reason: DRUGHIGH

## 2019-01-04 NOTE — PROGRESS NOTES
Assessment/Plan:     Diagnoses and all orders for this visit:    Cerebrovascular accident (CVA) due to thrombosis of right carotid artery (HCC)  -     aspirin (ECOTRIN) 325 mg EC tablet; Take 1 tablet (325 mg total) by mouth daily for 360 days  -     VAS carotid complete study; Future    Carotid stenosis, symptomatic, with infarction (HCC)  -     aspirin (ECOTRIN) 325 mg EC tablet; Take 1 tablet (325 mg total) by mouth daily for 360 days  -     VAS carotid complete study; Future        Discussion Summary:   Ms Eric Marsh is doing fairly well  Some minor deficits remain but overall has recovered quite well  She may transition off of Brilinta and onto aspirin monotherapy  She is to take Aspirin 325 mg daily starting tomorrow and take her last dose of Brilinta on Sunday  She will return to clinic in one year with a repeat carotid US  Chief Complaint: Follow-up      Patient ID: Wilda Kidd is a 77 y o  female    HPI     Ms Eric Marsh is here for follow-up after her CVA  She is approximately 3 months status post right ICA stenting and mechanical thrombectomy  She continues to make small improvements but is left with persistent left sided visual loss  She is being followed by a neuroophthalmologist  She reports easy fatigability  No new stroke-like symptoms  Her recent carotid US reveals a widely patent stent  She has not had any bleeding problems  Review of Systems   Constitutional: Positive for fatigue  Negative for chills and fever  HENT: Positive for postnasal drip (currently has a cold, allergies)  Eyes: Positive for visual disturbance (improving)  Negative for pain  Respiratory: Negative for cough, shortness of breath and wheezing  Cardiovascular: Negative for chest pain and palpitations  Gastrointestinal: Negative for abdominal pain and nausea  Genitourinary: Negative for difficulty urinating  Musculoskeletal: Positive for gait problem (a little unsteady; improving)   Negative for arthralgias, back pain, neck pain and neck stiffness  Neurological: Positive for weakness (left UE/ LE; improving with OT) and numbness  Negative for dizziness, speech difficulty and headaches  The following portions of the patient's history were reviewed and updated as appropriate: allergies, current medications, past family history, past medical history, past social history, past surgical history and problem list       Active Ambulatory Problems     Diagnosis Date Noted    Cerebrovascular accident (CVA) due to thrombosis of right carotid artery ([facility] [address] ) 10/06/2018    Hypothyroidism 10/06/2018    Normocytic anemia due to blood loss 10/08/2018    Slow transit constipation 10/12/2018     Resolved Ambulatory Problems     Diagnosis Date Noted    [facility] 10[facility]09/[facility]    Insomnia 10/12/2018     Past Medical History:   Diagnosis Date    Disease of thyroid gland     Hypertension        Past Surgical History:   Procedure Laterality Date    CAROTID STENT      IR STROKE ALERT  10/6/2018         Vitals:    01/04/19 0959   BP: 153/77   Pulse: 90   Resp: 16   Temp: 98 4 °F (36 9 °C)         Objective:    Physical Exam   Constitutional: She is oriented to person, place, and time  She appears well-developed and well-nourished  HENT:   Head: Normocephalic and atraumatic  Eyes: Pupils are equal, round, and reactive to light  Conjunctivae and EOM are normal    Musculoskeletal: Normal range of motion  Neurological: She is alert and oriented to person, place, and time  She has normal strength  A cranial nerve deficit is present  No sensory deficit  Coordination and gait normal    Very minor left visual field loss, mid to superior  Left nasal labial flattening at rest but able to give symmetric smile  Skin: Skin is warm and dry  Psychiatric: She has a normal mood and affect   Her behavior is normal  Judgment and thought content normal      Neurologic Exam     Mental Status   Oriented to person, place, and time  Cranial Nerves     CN III, IV, VI   Pupils are equal, round, and reactive to light  Extraocular motions are normal      Motor Exam     Strength   Strength 5/5 throughout  Results/Data:  I have reviewed the results and images from the [address] [place] [address] in detail with the patient

## 2019-01-07 NOTE — PROGRESS NOTES
Occupational Therapy Daily Note:    Today's date: 2019  Patient name: Felipa Lozano  : 1950  MRN: [id number]  Referring provider: Mary Grace Driscoll MD  Dx:   Encounter Diagnosis   Name Primary?  Cerebrovascular accident (CVA) due to thrombosis of right carotid artery (HCC) Yes     Subjective: "I really want to get back to driving"  Objective: Pt seen for OT treatment session focusing on midline to far L VF attention/concentration w/ 8 letter word circles placed in far L VF w/ subsequent instructions to spell out words in multi-colored letter blocks w/ mild difficulty  Pt observed to compensate w/ placement of colored blocks on R peripheral field, encouraged to move blocks to midline to far L peripheral field for improved accommodation/compensation  Pt reports is awaiting appt w/ neuro optometry Dr Luna Lynn at [facility] beginning of February  Assessment: Tolerated treatment well  Patient would benefit from continued skilled OT  Plan: Continued skilled OT per POC with focus on L /VM re-training, awareness/tracking/scanning of midline to far L peripheral field, tracking/scanning  INTERVENTION COMMENTS:  Diagnosis: R MCA CVA  Precautions: Difficulty regulating emotions, decreased hot/cold sensation on LUE  FOTO: 73 with 27% limitation  Insurance: [facility] REP [[id number]]  12 of 12 visits, PN due 18    Thank you for the consult!   Please call if you have any questions: [phone]  MARIA D Bojorquez, OTR/L, C-GCM, CSRS  Director of Outpatient Neuro Occupational Therapy

## 2019-01-08 ENCOUNTER — OFFICE VISIT (OUTPATIENT)
Dept: OCCUPATIONAL THERAPY | Facility: CLINIC | Age: 69
End: 2019-01-08
Payer: COMMERCIAL

## 2019-01-08 DIAGNOSIS — I63.031 CEREBROVASCULAR ACCIDENT (CVA) DUE TO THROMBOSIS OF RIGHT CAROTID ARTERY (HCC): Primary | ICD-10-CM

## 2019-01-08 PROCEDURE — 97530 THERAPEUTIC ACTIVITIES: CPT

## 2019-01-11 ENCOUNTER — OFFICE VISIT (OUTPATIENT)
Dept: OCCUPATIONAL THERAPY | Facility: CLINIC | Age: 69
End: 2019-01-11
Payer: COMMERCIAL

## 2019-01-11 DIAGNOSIS — I63.031 CEREBROVASCULAR ACCIDENT (CVA) DUE TO THROMBOSIS OF RIGHT CAROTID ARTERY (HCC): Primary | ICD-10-CM

## 2019-01-11 PROCEDURE — 97535 SELF CARE MNGMENT TRAINING: CPT

## 2019-01-11 PROCEDURE — 97530 THERAPEUTIC ACTIVITIES: CPT

## 2019-01-11 NOTE — PROGRESS NOTES
Daily Note     Today's date: 2019  Patient name: Nate Patterson  : 1950  MRN: [id number]  Referring provider: Megha Toledo MD  Dx:   Encounter Diagnosis   Name Primary?  Cerebrovascular accident (CVA) due to thrombosis of right carotid artery (HCC) Yes                  Subjective: "Sorry I'm being a bad girl today  I just don't care about this  I don't think this activity is what I need "      Objective: See treatment below  Pt arrived with c/o floaters in L eye  5-letter word unscrambles requiring auditory processing and mental manipulation to sequence letters followed by finding corresponding letter clutter tiles from pt's L side, for improved attention to L peripheral visual field and visual scanning with visual clutter  Pt refused to complete task using mental manipulation and demanded using letter tiles only to manually sequence letters  [place] #1 followed by I Spy for visual scanning with clutter and sustained near-point convergence  Abbreviations word search at slantboard for board to table copy, with word bank placed on L side for L attention  Assessment: Tolerated treatment fair  Pt arrived verbally combative and resistant to engaging in tasks as instructed  Required frequent redirection and encouragement to engage pt, improving over course of treatment session  PALMER educated pt as to how each activity applied to pt's goals  Min vc's provided for sequencing during word unscrambles  Pt continues to demo L inattention, requiring vc's to utilize tiles from pt's L side  100% accuracy finding items in [place], requiring repeated verbal directions x3  Trialed I Spy unsuccessfully due to items being too small to visually discriminate  Plan: Continue skilled OT per POC        INTERVENTION COMMENTS:  Diagnosis: Cerebrovascular accident (CVA) due to thrombosis of right carotid artery ([facility] [address] ) [I63 031]  Precautions: Difficulty regulating emotions, decreased hot/cold sensation on LUE  FOTO: 73 with 27% limitation  Insurance: [place] [address] [facility] REP [[id number]]  2 of 8 visits, PN due 1/28/18

## 2019-01-16 ENCOUNTER — OFFICE VISIT (OUTPATIENT)
Dept: OCCUPATIONAL THERAPY | Facility: CLINIC | Age: 69
End: 2019-01-16
Payer: COMMERCIAL

## 2019-01-16 DIAGNOSIS — I63.031 CEREBROVASCULAR ACCIDENT (CVA) DUE TO THROMBOSIS OF RIGHT CAROTID ARTERY (HCC): Primary | ICD-10-CM

## 2019-01-16 PROCEDURE — 97535 SELF CARE MNGMENT TRAINING: CPT

## 2019-01-16 NOTE — PROGRESS NOTES
Daily Note     Today's date: 2019  Patient name: Hailey Torrez  : 1950  MRN: [id number]  Referring provider: Kaden Seals MD  Dx:   Encounter Diagnosis   Name Primary?  Cerebrovascular accident (CVA) due to thrombosis of right carotid artery ([facility] ) Yes                  Subjective: "The doctor said there was improvement"  Objective: See treatment diary below    Pt engaged in skilled OT focusing on compensatory techniques to L visual field and ROM  Pt completed task scanning and tracking to left increasing peripheral field to locate and match shapes utilizing matrix  Bits for reaction time, tracking in all planes with and without head turns, sustain central focus with increase in visual ROM in all planes  Assessment: Tolerated treatment fair  Pt presented with difficulty sustaining central focus to increase visual ROM during tasks, G attention demo-w/head turns to left to locate objects  Improvement noted in reaction time and accuracy with hand to target demands with mass practice      Patient would benefit from continued OT      Plan: Continued skilled OT per POC     INTERVENTION COMMENTS:  Diagnosis: Cerebrovascular accident (CVA) due to thrombosis of right carotid artery ([facility] ) [I63 031]  Precautions: Difficulty regulating emotions, decreased hot/cold sensation on LUE  FOTO: 73 with 27% limitation  Insurance: [employer] [address] REP [[id number]]  3 of 8 visits, PN due 18

## 2019-01-18 ENCOUNTER — OFFICE VISIT (OUTPATIENT)
Dept: OCCUPATIONAL THERAPY | Facility: CLINIC | Age: 69
End: 2019-01-18
Payer: COMMERCIAL

## 2019-01-18 DIAGNOSIS — I63.031 CEREBROVASCULAR ACCIDENT (CVA) DUE TO THROMBOSIS OF RIGHT CAROTID ARTERY (HCC): Primary | ICD-10-CM

## 2019-01-18 PROCEDURE — 97535 SELF CARE MNGMENT TRAINING: CPT

## 2019-01-18 NOTE — PROGRESS NOTES
Daily Note     Today's date: 2019  Patient name: Jeffrey Massey  : 1950  MRN: [id number]  Referring provider: Chance Jimenez MD  Dx:   Encounter Diagnosis   Name Primary?  Cerebrovascular accident (CVA) due to thrombosis of right carotid artery (HCC) Yes                  Subjective: "Its my right eye that is the problem, I need to go to the eye Dr"  Objective: See treatment diary below    Pt engaged in tracking tube w/ 2 column saccades positioned to right/left side of pt to increase visual field focsing on visual ROM  Word circles placed on vertical surface to left of pt facilitating attention to left and tracking clockwise and counter clockwise to locate word  Word formation using foam blocks  Assessment: Tolerated treatment well  G stabilization w/central gaze utilizing b/l eye ROM  Pt consistantly utilized head turns when scanning word circles to locate words with 100& accuracy  Pt continues w/frustration over current medical condition but required less cues to sustain head in neutral during visual ROM    Patient would benefit from continued OT      Plan: Continued skilled OT per POC     INTERVENTION COMMENTS:  Diagnosis: Cerebrovascular accident (CVA) due to thrombosis of right carotid artery ([facility] [address] ) [I63 031]  Precautions: Difficulty regulating emotions, decreased hot/cold sensation on LUE  FOTO: 73 with 27% limitation  Insurance: [place] [address] REP [[id number]]  4 of 8 visits, PN due 18

## 2019-01-23 ENCOUNTER — OFFICE VISIT (OUTPATIENT)
Dept: OCCUPATIONAL THERAPY | Facility: CLINIC | Age: 69
End: 2019-01-23
Payer: COMMERCIAL

## 2019-01-23 DIAGNOSIS — I63.031 CEREBROVASCULAR ACCIDENT (CVA) DUE TO THROMBOSIS OF RIGHT CAROTID ARTERY (HCC): Primary | ICD-10-CM

## 2019-01-23 PROCEDURE — 97535 SELF CARE MNGMENT TRAINING: CPT

## 2019-01-23 NOTE — PROGRESS NOTES
Daily Note     Today's date: 2019  Patient name: Valeria Segovia  : 1950  MRN: [id number]  Referring provider: Patrick Gonzalez MD  Dx:   Encounter Diagnosis   Name Primary?  Cerebrovascular accident (CVA) due to thrombosis of right carotid artery (HCC) Yes                  Subjective: "If I zip up my collar it helps me keep my head straight, like a brace "      Objective: See treatment below  Engaged pt in [address] letter chart tasks while in stance on blue foam block, including 2-column saccades and multiple letter charts, alternating naming items beginning with a letter from each column or chart, with charts moving in horizontal place across screen, for improved screen tolerance, saccades, and L-side visual awareness/visual ROM  Embedded letter and number searches in stance on blue foam block at window for increased b/w contrast, requiring head turns to L to locate corresponding GABBY number cards, for visual scanning with visual clutter and near/far convergence  Assessment: Tolerated treatment well  Pt demo G ability to sustain head in neutral during peripheral field scanning, requiring min vc's with G follow-through  Min errors noted in L visual field during letter/number searches, requiring vc's to identify and correct  Plan: Continue skilled OT per POC        INTERVENTION COMMENTS:  Diagnosis: Cerebrovascular accident (CVA) due to thrombosis of right carotid artery ([address] ) [I63 031]  Precautions: Difficulty regulating emotions, decreased hot/cold sensation on LUE  FOTO: 73 with 27% limitation  Insurance: [place] [address]  REP [[id number]]  5 of 8 visits, Re-eval scheduled

## 2019-01-25 ENCOUNTER — OFFICE VISIT (OUTPATIENT)
Dept: OCCUPATIONAL THERAPY | Facility: CLINIC | Age: 69
End: 2019-01-25
Payer: COMMERCIAL

## 2019-01-25 DIAGNOSIS — I63.031 CEREBROVASCULAR ACCIDENT (CVA) DUE TO THROMBOSIS OF RIGHT CAROTID ARTERY (HCC): Primary | ICD-10-CM

## 2019-01-25 PROCEDURE — 97530 THERAPEUTIC ACTIVITIES: CPT

## 2019-01-25 PROCEDURE — 97535 SELF CARE MNGMENT TRAINING: CPT

## 2019-01-25 NOTE — PROGRESS NOTES
Daily Note     Today's date: 2019  Patient name: Flor Dwyer  : 1950  MRN: [id number]  Referring provider: Elsy Velez MD  Dx:   Encounter Diagnosis   Name Primary?  Cerebrovascular accident (CVA) due to thrombosis of right carotid artery (HCC) Yes                  Subjective: "I do puzzles all the time so this is fine for me to do "      Objective: See treatment below  Engaged pt in Fill-In-The-Vowel task in stance at window for increased b/w contrast, requiring head turns to L side to locate corresponding letter clutter tiles, for improved attention to L visual field and near/far convergence  Symbol copy in stance at corner windows requiring frequent head turns to L side with focus on saccades  Assessment: Tolerated treatment well  Pt demo improved frustration tolerance with task, requiring fewer vc's and less redirection/encouragement to engage in tasks  Pt demo G visual scanning skills to L side without cues  Min errors during symbol copy task with pt able to self-correct  Plan: Continue skilled OT per POC        INTERVENTION COMMENTS:  Diagnosis: Cerebrovascular accident (CVA) due to thrombosis of right carotid artery ([facility] [address] ) [I63 031]  Precautions: Difficulty regulating emotions, decreased hot/cold sensation on LUE  FOTO: 73 with 27% limitation  Insurance: [employer]  REP [[id number]]  6 of 8 visits, Re-eval scheduled

## 2019-01-28 ENCOUNTER — EVALUATION (OUTPATIENT)
Dept: OCCUPATIONAL THERAPY | Facility: CLINIC | Age: 69
End: 2019-01-28
Payer: COMMERCIAL

## 2019-01-28 DIAGNOSIS — I63.321 CEREBROVASCULAR ACCIDENT (CVA) DUE TO THROMBOSIS OF RIGHT ANTERIOR CEREBRAL ARTERY (HCC): Primary | ICD-10-CM

## 2019-01-28 PROCEDURE — 97530 THERAPEUTIC ACTIVITIES: CPT | Performed by: OCCUPATIONAL THERAPIST

## 2019-01-28 NOTE — PROGRESS NOTES
Occupational Therapy D/C Summary:     Today's Date: 2019  Patient Name: Allyssa Devlin  : 1950  MRN: [id number]  Referring Provider: Tereza Cheatham MD  Dx: No primary diagnosis found  Active Problem List:   Patient Active Problem List   Diagnosis    Cerebrovascular accident (CVA) due to thrombosis of right carotid artery ([address] )    Hypothyroidism    Normocytic anemia due to blood loss    Slow transit constipation     Past Medical Hx:   Past Medical History:   Diagnosis Date    Disease of thyroid gland     Hypertension      Past Surgical Hx:   Past Surgical History:   Procedure Laterality Date    CAROTID STENT      IR STROKE ALERT  10/6/2018        Pain Levels:   Restin    With Activity:  0    Subjective/Patient Goal: "To get back to driving "    History of Present Illness:  Pt is a pleasant 77 y o  female seen for OT eval s/p referred to [address] [place] [address] s/p presented to [facility] and then transferred to [facility] on 10/6/2018    Per patient's wife patient awoke at 4:00 am with slurred speech and right sided weakness   EMS was called and patient was brought to the ED at [facility] where CTA showed right carotid occlusion and right MCA occlusion   She was emergently transferred to [facility] and  brought to [address] [place] [address],[address] stroke alert showed right proximal ICA occlusion with extensive cervical segment clot burden, right MCA M1 TICI O segment occlusion   She underwent an emergent successful mechanical thrombectomy with revascularization of the right MCA territory, successful cervical right ICA mechanical thrombectomy, successful proximal ICA angioplasty ans stenting with 40% residual stenosis remaining and focal non flow limiting thrombus immediately distal to the stent with Dr Sadiq Herrera of neurosurgery   Post IR CT of the head showed subtle loss of insular ribbon sign and loss of gray-white differention in right frontotemporal junction suggesting recent infarct  TPA was not warranted prior to procedure  She was started on Integrilin drip   Left facial droop and left hemineglect present on initial exam  Repeat CT of the head showed interval development of hypodensity within the right temporal lobe, right caudate nucleus, and junction of right temporal occipital lobes consistent with evolution of previously suspected right MCA infarct with mild edema and regional mass effect without midline shift   MRI of the brain showed extensive recent infarcts in the right hemisphere including right centrum semiovale and lentiform nucleus, right temporal lobe/ temporal opercular junction, and right occipital cortex     Neurology was consulted and patient was loaded with ASA and plavix with orders to continue with plavix 75mg daily and  mg daily  She was also started on a high dose statin  Steph Mayo was completed that showed EF of 50% with no regional wall motion abnormalities and per neurology was non contributory with no cardioembolic source so no ASIM was pursued   Pt was [facility] on 10/12/18 - 10/23/18  Dx'd w/ R MCA CVA , comorbidities as listed above      Lifestyle Performance Model:  Autonomy: Pt was I with ADL's and IADL's, reports having endurance issues, not currently driving since the stroke due to doctor's direction  Reciprocal Relationships: Supportive  who is able to help, and 2 children , 1 in [place], 1 in [place], 2 grandkids (11, 13)  Service to Others: Pt is retired, worked for [employer] as , retired about 6 or 7 years ago  [address]: Enjoys refinishing and building furniture w/   Home Setup: Pt lives in [address] [place] [address] w/ 1 YOSELIN, bed and bath upstairs w/ handrail    Objective  Impairments Section:    1  Vision Screening Recording Form:   vision screen: w/ reading glasses for near acuity  near acuity: R 20/30, L 20/25     R 20/20 with one error, L  20/20 with one error  binocularity far: exotropia/exophoria  binocularity near: exotropia/exophoria   red green fusion: no fusion    remains  near point of convergence:  Decreased teaming in B/L eyes, no convergence past midline  em string: misalignment    remains  Pursuits: jerky in all planes    Slightly jerky in all planes, +1 loss of focus  Saccades: inaccurate in vertical plane, +dysmetria (undershooting)  ocular ROM: Full ROM  visual perceptual midline shift: intact  2-person confrontation: all peripheral fields intact      Pt demo with fair- functional progression towards goals in POC  Pt continues to present with poor insight into presenting deficits  OTR will be placing Pt on a 30-day 2* Maximal level reached  OTR with recommendations to attend optometry appt for visual field cut test to [facility] assess areas that are affected  OTR will be in contact with neurologist to discuss possible FTD evaluation to assess cognitive, motor, and visual abilities for safe driving in community environments  Assessment/Plan  Occupational Therapy Skilled Analysis Assessment and Plan of Care:  Pt requires overall mod I for ADLs/self care and mod I for fx'l mobility w/ out DME  Pt is currently demonstrating the following occupational deficits: limited 2* decreased endurance/activity tolerance, decreased executive functioning, L visual field inattention, decreased B/L teaming of eyes, B/L nystagmus, R exophoric strabismus, L esophoria, misalignment, jerky pursuits in all planes, inaccurate saccades in all planes w/ under/overshooting  The following Occupational Performance Areas to address include: socialization, health maintenance, functional mobility, community mobility, household maintenance, job performance/volunteering and social participation  Based on the aforementioned OT evaluation, functional performance deficits, and assessments, pt has been identified as a moderate complexity evaluation   Pt to continue to benefit from outpatient skilled OT services to address the following goals 2x/week to  w/in 4 weeks with special focus on self-care management, pt education,  and VM training as well as motor training to improve above defiicits and enhance overall QOL/function          Goals:  Short Term Goals  · Pt will increase screen tolerance to 2 hours with min increase in HA by 1-2 levels for improved leisure pursuits and role performance 4 weeks as applicable - MET  · Pt will increase oculomotor control for improved saccades, con/divergent tasks for improved reading, board to table tasks with minimal increase in symptoms 4 weeks -MET  · Pt will tolerate multi-modal envt x 15 min with 80% accuracy of cog load and min increase of symptoms of 2 levels in HA/dizziness/nausea 4 weeks - MET  · Pt will demonstrate 100% awareness of visual stimuli and environmental hazards from midline to L side with cuing  - MET      Long Term Goals  · Pt will increase oculomotor control for improved dynamic activities with head turns, board/screen to table tasks symptom free, improved VMI and return to baseline handwriting - PARTIALLY MET  · Pt will increase oculomotor control for WNL saccades, con/divergent tasks symptom free - PARTIALLY MET  · Pt will increase screen tolerance to 3 hours with min increase in HA by 1-2 levels for improved leisure pursuits and occupational/role performance as applicable - MET  · Pt will demonstrate 100% awareness of visual stimuli and environmental hazards from midline to L side without cues  - PARTIALLY MET       INTERVENTION COMMENTS:  Diagnosis: R MCA CVA  Precautions: Difficulty regulating emotions, decreased hot/cold sensation on LUE  FOTO: 73 with 27% limitation     80 with 20% limitations  Insurance: [facility] REP [[id number]]  7 of 12 visits

## 2019-02-01 ENCOUNTER — APPOINTMENT (OUTPATIENT)
Dept: OCCUPATIONAL THERAPY | Facility: CLINIC | Age: 69
End: 2019-02-01
Payer: COMMERCIAL

## 2019-02-18 DIAGNOSIS — I63.031 CEREBROVASCULAR ACCIDENT (CVA) DUE TO THROMBOSIS OF RIGHT CAROTID ARTERY (HCC): Primary | ICD-10-CM

## 2019-02-18 NOTE — PROGRESS NOTES
Occupational Therapy Fit to Drive Evaluation:  Today's Date: 2019  Patient Name: Lisa Bustillo  : 1950  MRN: [id number]  Referring Provider: Reed García MD  Dx: No primary diagnosis found  Active Problem List:   Patient Active Problem List   Diagnosis    Cerebrovascular accident (CVA) due to thrombosis of right carotid artery ([address] )    Hypothyroidism    Normocytic anemia due to blood loss    Slow transit constipation     Past Medical Hx:   Past Medical History:   Diagnosis Date    Disease of thyroid gland     Hypertension      Past Surgical Hx:   Past Surgical History:   Procedure Laterality Date    CAROTID STENT      IR STROKE ALERT  10/6/2018        Pain Levels:   Resting:  {gen number [id number]}    With Activity:  {gen number [id number]}    OT low complexity eval: ***  OT DCAT, Reaction Times Trials, OPTEC Vision Screen, and LACLS: ***    Subjective/Patient Goal: "***"    History of Present Illness:  Pt is a pleasant 77 y o  female seen for OT eval s/p referred to [address] [place] [address] s/p presented to [facility] and then transferred to [facility] on 10/6/2018    Per patient's wife patient awoke at 4:00 am with slurred speech and right sided weakness   EMS was called and patient was brought to the ED at [facility] where CTA showed right carotid occlusion and right MCA occlusion   She was emergently transferred to [facility] and  brought to [employer]   IR stroke alert showed right proximal ICA occlusion with extensive cervical segment clot burden, right MCA M1 TICI O segment occlusion   She underwent an emergent successful mechanical thrombectomy with revascularization of the right MCA territory, successful cervical right ICA mechanical thrombectomy, successful proximal ICA angioplasty ans stenting with 40% residual stenosis remaining and focal non flow limiting thrombus immediately distal to the stent with Dr Quinton Mccray of neurosurgery   Post IR CT of the head showed subtle loss of insular ribbon sign and loss of gray-white differention in right frontotemporal junction suggesting recent infarct  TPA was not warranted prior to procedure  She was started on Integrilin drip   Left facial droop and left hemineglect present on initial exam  Repeat CT of the head showed interval development of hypodensity within the right temporal lobe, right caudate nucleus, and junction of right temporal occipital lobes consistent with evolution of previously suspected right MCA infarct with mild edema and regional mass effect without midline shift   MRI of the brain showed extensive recent infarcts in the right hemisphere including right centrum semiovale and lentiform nucleus, right temporal lobe/ temporal opercular junction, and right occipital cortex     Neurology was consulted and patient was loaded with ASA and plavix with orders to continue with plavix 75mg daily and  mg daily  She was also started on a high dose statin  [employer] [employer] was completed that showed EF of 50% with no regional wall motion abnormalities and per neurology was non contributory with no cardioembolic source so no ASIM was pursued   Pt was discharged to [facility] [facility] on 10/12/18 - 10/23/18  Dx'd w/ R MCA CVA , comorbidities as listed above  Pt now inquiring as to safe ability to clear/re-engage in driving since graduating from OT, now referred for OT FTD evaluation      Lifestyle Performance Model:  Autonomy: Pt was I with ADL's and IADL's, reports having endurance issues, not currently driving since the stroke due to doctor's direction  Reciprocal Relationships: Supportive  who is able to help, and 2 children , 1 in [place], 1 in [address], 2 grandkids (11, 13)      Service to Others: Pt is retired, worked for [employer] as , retired about 6 or 7 years ago  [address]: Enjoys refinishing and building furniture w/   Home Setup: Pt lives in [place] w/ 1 [address], bed and bath upstairs w/ handrail    Driving History:  Vehicle Type:   ***Car,     ***Truck,     ***Motorcycle  Visual History:   ***Glasses,     ***Contacts   ***Cataracts,     ***Glaucoma,     ***[place],     ***[address]  Appointment ***  Communication Status:   ***English,     ***Norwegian  Driving History:   ***GPS use,     ***History of getting lost,    ***Tickets,     ***DUI  License Status:   ***Active,     ***Inactive  Last Drove:   ***  Last Accident:   ***  Initial License Date:   ***  Driving Goals:   ***Local     ***Highway  Car Transfer:   ***    Objective  Functional/Cognitive Impairments:  1  DCAT: (see attached report for further details) Pt scoring an ***% likelihood on failing on road    *** Fit,     ***Unfit  Recommend On Road Assessment:   ***Yes,     ***No  Recommend to [employer] for [facility]   ***Yes,     ***No,     Due to: ***  2   OPTEC vision screen: (see attached)   Contrast sensitivity: ***   Far acuity: 20/***    Near acuity: 20/***   Color perception: ***   Lateral phoria: ***   Depth perception far: ***   Sign recognition: able to ID ***/12 road signs correctly   Color recognition: ***  3   Reaction time trials: see attached  trial 1) ***, trial 2) ***,  trial 3) ***, average of 3 trials: ***, Falling within the 25th %ile for reaction time  4   Rapid Pace Walk Test:  *** seconds: Those with greater than 9 seconds had a 3 fold increase risk of being in an at fault auto accident  5   Physical findings:  cervical rotation R ***, L ***; UE and LE AROM full with *** strength   6  Probability of creating a hazardous situation on specialized on-road test: ***%; see attached report for further details    7   Pt engaged in [place] Cognitive Level Screening (LACLS) and scored the following:  ***  8  Recommendations:  ***    Assessment/Plan  Occupational Therapy Skilled Analysis Assessment and Plan of Care:  Pt is a 77 y o  female referred to Occupational Therapy for Fitness to [address] [place] [address]  Evaluation to assess pts cognitive, visual, and motor abilities to drive safely in community environment  Pt requires overall mod I for ADLs/self care and mod I for fx'l mobility w/ *** DME  Pt is currently demonstrating the following occupational deficits: limited 2* ***   ***  Based on the aforementioned OT evaluation, functional performance deficits, and assessments, pt has been identified as a low complexity evaluation  No further skillable OT needs indicated as pt referred for Fitness to Drive Evaluation only per consult script, D/C from OT caseload, D/C OT  MD to discuss results w/ pt  INTERVENTION COMMENTS:  Diagnosis: No primary diagnosis found  Precautions: ***  FOTO: N/A for FTD Evaluations  Insurance: ***    Thank you for the consult!   Please call if you have any questions: [phone]  MARIA D Kim, OTR/L, C-JAIME, CSRS  Director of Outpatient Neuro Occupational Therapy

## 2019-02-19 ENCOUNTER — APPOINTMENT (OUTPATIENT)
Dept: OCCUPATIONAL THERAPY | Facility: CLINIC | Age: 69
End: 2019-02-19
Payer: COMMERCIAL

## 2019-02-27 NOTE — PROGRESS NOTES
Occupational Therapy Fit to Drive Evaluation:  Today's Date: 2019  Patient Name: Rika Chinchilla  : 1950  MRN: [id number]  Referring Provider: Eugenia Cardozo MD  Dx: Cerebrovascular accident (CVA) due to thrombosis of right carotid artery [facility]) [I63 [id number]]    Active Problem List:   Patient Active Problem List   Diagnosis    Cerebrovascular accident (CVA) due to thrombosis of right carotid artery ([address] )    Hypothyroidism    Normocytic anemia due to blood loss    Slow transit constipation     Past Medical Hx:   Past Medical History:   Diagnosis Date    Disease of thyroid gland     Hypertension      Past Surgical Hx:   Past Surgical History:   Procedure Laterality Date    CAROTID STENT      IR STROKE ALERT  10/6/2018      Pain Levels:   Restin    With Activity:  0    OT low complexity eval: 5865-3618  OT DCAT, Reaction Times Trials, OPTEC Vision Screen, and LACLS: 9701-2260    Subjective/Patient Goal: "This is ridiculous I don't even know why I'm here"    History of Present Illness:  Pt is a pleasant 68 y o  female seen for OT eval s/p referred to [address] [place] [address] s/p presented to [facility] and then transferred to [facility] on 10/6/2018  Per patient's wife patient awoke at 4:00 am with slurred speech and right sided weakness   EMS was called and patient was brought to the ED at [facility] where CTA showed right carotid occlusion and right MCA occlusion   She was emergently transferred to [facility] and  brought to [address] [place] [address],[address] stroke alert showed right proximal ICA occlusion with extensive cervical segment clot burden, right MCA M1 TICI O segment occlusion   She underwent an emergent successful mechanical thrombectomy with revascularization of the right MCA territory, successful cervical right ICA mechanical thrombectomy, successful proximal ICA angioplasty ans stenting with 40% residual stenosis remaining and focal non flow limiting thrombus immediately distal to the stent with Dr Phuong Simpson of neurosurgery   Post IR CT of the head showed subtle loss of insular ribbon sign and loss of gray-white differention in right frontotemporal junction suggesting recent infarct  TPA was not warranted prior to procedure  She was started on Integrilin drip   Left facial droop and left hemineglect present on initial exam  Repeat CT of the head showed interval development of hypodensity within the right temporal lobe, right caudate nucleus, and junction of right temporal occipital lobes consistent with evolution of previously suspected right MCA infarct with mild edema and regional mass effect without midline shift   MRI of the brain showed extensive recent infarcts in the right hemisphere including right centrum semiovale and lentiform nucleus, right temporal lobe/ temporal opercular junction, and right occipital cortex     Neurology was consulted and patient was loaded with ASA and plavix with orders to continue with plavix 75mg daily and  mg daily  She was also started on a high dose statin  [employer] was completed that showed EF of 50% with no regional wall motion abnormalities and per neurology was non contributory with no cardioembolic source so no ASIM was pursued   Pt was discharged to [facility] on 10/12/18 - 10/23/18  Dx'd w/ R MCA CVA , comorbidities as listed above  Pt expressed interest in returning to driving, script obtained from MD, now scheduled for FTD since D/C from outpt OT on 1/28/2019      Lifestyle Performance Model:  Autonomy: Pt was I with ADL's and IADL's, reports having endurance issues, not currently driving since the stroke due to doctor's direction  Reciprocal Relationships: Supportive  who is able to help, and 2 children , 1 in [place], 1 in [place], 2 grandkids (11, 13)      Service to Others: Pt is retired, worked for [employer] as , retired about 6 or 7 years ago  Intrinsic Gratification: Enjoys refinishing and building furniture w/   Home Setup: Pt lives in [facility] w/ [address], bed and bath upstairs w/ handrail    Driving History:  Vehicle Type:   Car,     X Truck,     Motorcycle  Visual History:   X Glasses,     Contacts   No Cataracts,     No Glaucoma,     No [place],     No Gaila Pyo   Appointment: last Monday  Communication Status:   X English,     Urdu  Driving History:   X GPS use,     No History of getting lost,    No Tickets,     No DUI  License Status:   X Active,     Inactive  Last Drove:   October 2018  Last Accident:   N/A  Initial License Date:   19  Driving Goals:   X Local     X Highway  Car Transfer:   Independent    Objective  Functional/Cognitive Impairments:  1  DCAT: (see attached report for further details) Pt scoring an 77% likelihood on failing on road     Fit,     X Unfit  Recommend On Road Assessment:   Yes,     X No  Recommend to [employer] [facility]   Yes,     X No,     Due to: N/A  2   OPTEC vision screen: (see attached)   Contrast sensitivity: intact   Far acuity: 20/20    Near acuity: 20/20   Color perception: intact   Lateral phoria: exophoria @ diopter 12   Depth perception far: intact   Sign recognition: able to ID 9/12 road signs correctly   Color recognition: intact  3  Reaction time trials: see attached  trial 1) 0 742, trial 2) 0 750,  trial 3) 0 716, average of 3 trials: 0 736, Falling within the 25th %ile for reaction time  4   Rapid Pace Walk Test:  6 3 seconds: Those with greater than 9 seconds had a 3 fold increase risk of being in an at fault auto accident  5   Physical findings:  cervical rotation R 70, L 75; UE and LE AROM full with WFL/WNL 4/5 MMT strength, R handed  6  Probability of creating a hazardous situation on specialized on-road test: 54%; see attached report for further details  7   Pt engaged in [place] Cognitive Level Screening (LACLS) and scored the following: deferred    8  Recommendations:  Until there is a significant change in medical condition or a change in medication use resulting in an improvement in cognitive function, driving suspension or cessation should be seriously considered  Should there be a significant positive change in medical condition, reassessment at  that time would be recommended  MD to discuss with Pt  Pt scheduled for visual field testing, pt scheduled for same on Tues  Assessment/Plan  Occupational Therapy Skilled Analysis Assessment and Plan of Care:  Pt is a 77 y o  female referred to Occupational Therapy for Fitness to Drive Evaluation to assess pts cognitive, visual, and motor abilities to drive safely in community environment  Pt requires overall mod I for ADLs/self care and mod I for fx'l mobility w/o DME  Pt is currently demonstrating the following occupational deficits: limited 2* low frustration tolerance, agitated, irritable, decreased insight judgement and safety awareness, difficulty w/ working memory, STM/immediate delayed recall, L visual field inattention, decreased EF skills  Pt scoring cognitively at an 77% predicted probability of failing  a specialized on-road test   This indicates  a cognitive competence for driving is outside the range of normal with a higher probability of performing hazardous or extremely dangerous maneuvers  Pt scoring cognitively at a 54% probability of creating a hazardous situation on a specialized road test  Below average scoring was noted in the following areas:initialization and reactions, ability to encode, retrieve and/or respond to stimuli, judgement of spatial relations, ability to quickly respond to complex information, fine motor control, impulse control  Until there is a significant change in medical condition or a change in medication use resulting in an improvement in cognitive function, driving suspension or cessation should be seriously considered  Should there be a significant positive change in medical condition, reassessment at  that time would be recommended    MD to discuss with Pt  Based on the aforementioned OT evaluation, functional performance deficits, and assessments, pt has been identified as a low complexity evaluation  No further skillable OT needs indicated as pt referred for Fitness to Drive Evaluation only per consult script, D/C from OT caseload, D/C OT  MD to discuss results w/ pt  INTERVENTION COMMENTS:  Diagnosis: Cerebrovascular accident (CVA) due to thrombosis of right carotid artery ([address] ) [I63 [id number]]  Precautions: L visual field inattention  FOTO: N/A for FTD Evaluations  Insurance: Kathy Obrien [facility] REP [25*    Thank you for the consult!   Please call if you have any questions: [phone]  Nathalie Lerma, OTVIGREN, OTR/L, C-GCM, CSRS  Director of Outpatient Neuro Occupational Therapy

## 2019-02-28 ENCOUNTER — OFFICE VISIT (OUTPATIENT)
Dept: OCCUPATIONAL THERAPY | Facility: CLINIC | Age: 69
End: 2019-02-28
Payer: COMMERCIAL

## 2019-02-28 DIAGNOSIS — I63.031 CEREBROVASCULAR ACCIDENT (CVA) DUE TO THROMBOSIS OF RIGHT CAROTID ARTERY (HCC): Primary | ICD-10-CM

## 2019-02-28 PROCEDURE — 97165 OT EVAL LOW COMPLEX 30 MIN: CPT

## 2019-02-28 PROCEDURE — 96125 COGNITIVE TEST BY HC PRO: CPT

## 2019-03-01 ENCOUNTER — OFFICE VISIT (OUTPATIENT)
Dept: NEUROLOGY | Facility: CLINIC | Age: 69
End: 2019-03-01
Payer: COMMERCIAL

## 2019-03-01 VITALS
BODY MASS INDEX: 32.36 KG/M2 | WEIGHT: 171.4 LBS | SYSTOLIC BLOOD PRESSURE: 140 MMHG | HEIGHT: 61 IN | HEART RATE: 82 BPM | DIASTOLIC BLOOD PRESSURE: 66 MMHG

## 2019-03-01 DIAGNOSIS — H53.40 VISUAL FIELD DEFECT: ICD-10-CM

## 2019-03-01 DIAGNOSIS — I63.031 CEREBROVASCULAR ACCIDENT (CVA) DUE TO THROMBOSIS OF RIGHT CAROTID ARTERY (HCC): Primary | ICD-10-CM

## 2019-03-01 PROCEDURE — 99214 OFFICE O/P EST MOD 30 MIN: CPT | Performed by: PSYCHIATRY & NEUROLOGY

## 2019-03-01 NOTE — PROGRESS NOTES
Patient ID: Tl Knox is a 77 y o  female  Assessment/Plan:    No problem-specific Assessment & Plan notes found for this encounter  Diagnoses and all orders for this visit:    Cerebrovascular accident (CVA) due to thrombosis of right carotid artery ([address] )- C/w ASA 81 and statin  Has a follow up CUS pending  Hemoglobin A1c normal   Lipid panel normal   Carotid ultrasound with no significant stenosis internal carotid artery b/l  Discussed lifestyle changes including healthy eating her regular exercise staying hydrated to help reduce stroke risk along with medication compliance  Discussed with patient of sudden-onset of acute speech changes facial droop motor or vision deficits to call 911 or go to the ER immediately  MRI brain reviewed in PACS personally and with patient    Visual field defect- left hemianopsia with confrontation testing on exam today, no neglect  States she does not note this with "further away" objects when looking in the room or as a passenger thus okay to drive per her  We discussed holding off driving until FTD results and VF testing with ophthalmology  She is agreeable to the above treatment plan  Subjective:    HPI    Tl Knox is an extremely pleasant 78 yo female who is here for neurological f/u after a hospital admission for acute right hemispheric stroke, right ICA occlusion/ recannulization with stent placed, s/p M1 thrombectomy- now four months ago     Seen by Dr Madden and cleared to transition off of Brillinta and onto Asa monotherapy  Is on ASA 81 and atorvastatin 40 and doing well  Left hemianopsia- has completed visual field testing and awaiting this before being cleared to drive  States she does not note any significant vision issues when she is the passenger in a car    Tried zoloft for anxiety with horrible side effects    She tells me her vision is getting better with her new glasses but still notes the little bit of vision deficit in the left upper corner of her visual field  States she brought a new treadmill and is working out more  States healthy weight loss  No falls no balance issues no new weakness and no significant memory problems  States she still gets more fatigued and tired then prior but this is improving also  Denies other speech, word finding issues  No tobacco or alcohol use history    The following portions of the patient's history were reviewed and updated as appropriate: allergies, current medications, past family history, past medical history, past social history, past surgical history and problem list and ROS         Objective:    Blood pressure 140/66, pulse 82, height 5' 1" (1 549 m), weight 77 7 kg (171 lb 6 4 oz)  Physical Exam   Constitutional: She is oriented to person, place, and time  She appears well-developed and well-nourished  HENT:   Head: Normocephalic and atraumatic  Eyes: Pupils are equal, round, and reactive to light  EOM are normal    Neck: Normal range of motion  Cardiovascular: Normal rate and regular rhythm  Pulmonary/Chest: Effort normal    Abdominal: Soft  Musculoskeletal: Normal range of motion  She exhibits no tenderness  Neurological: She is alert and oriented to person, place, and time  She has normal strength and normal reflexes  Nursing note and vitals reviewed  Neurological Exam  Mental Status  Alert  Oriented to person, place, time and situation  Recent and remote memory are intact  no dysarthria present  Language is fluent with no aphasia  Attention and concentration are normal     Cranial Nerves  CN II: Left homonymous hemianopsia  CN III, IV, VI: Extraocular movements intact bilaterally  Pupils equal round and reactive to light bilaterally  CN V: Facial sensation is normal   CN VII: Full and symmetric facial movement  CN VIII: Hearing is normal   CN IX, X: Palate elevates symmetrically  Normal gag reflex    CN XI: Shoulder shrug strength is normal   CN XII: Tongue midline without atrophy or fasciculations  Motor   Normal muscle tone  Strength is 5/5 throughout all four extremities  Sensory  Sensation is intact to light touch, pinprick, vibration and proprioception in all four extremities  Light touch is normal in upper and lower extremities  Temperature is normal in upper and lower extremities  Vibration is normal in upper and lower extremities  No right-sided hemispatial neglect  No left-sided hemispatial neglect  Reflexes  Deep tendon reflexes are 2+ and symmetric in all four extremities with downgoing toes bilaterally  ROS:    Review of Systems   Constitutional: Negative  Negative for appetite change and fever  HENT: Negative  Negative for hearing loss, tinnitus, trouble swallowing and voice change  Eyes: Negative  Negative for photophobia and pain  Respiratory: Negative  Negative for shortness of breath  Cardiovascular: Negative  Negative for palpitations  Gastrointestinal: Negative  Negative for nausea and vomiting  Endocrine: Negative  Negative for cold intolerance and heat intolerance  Genitourinary: Negative  Negative for dysuria, frequency and urgency  Musculoskeletal: Negative  Negative for myalgias and neck pain  Skin: Negative  Negative for rash  Neurological: Negative  Negative for dizziness, tremors, seizures, syncope, facial asymmetry, speech difficulty, weakness, light-headedness, numbness and headaches  Hematological: Negative  Does not bruise/bleed easily  Psychiatric/Behavioral: Negative  Negative for confusion, hallucinations and sleep disturbance

## 2019-03-01 NOTE — LETTER
March 4, 2019     Luzma Guzman MD  [address] [facility] Male [address]    Patient: Wilda Kidd   YOB: 1950   Date of Visit: 3/1/2019       Dear Dr Stacy Sessions:    Thank you for referring Wilda Kidd to me for evaluation  Below are my notes for this consultation  If you have questions, please do not hesitate to call me  I look forward to following your patient along with you  Sincerely,        Renita Santos DO        CC: No Recipients  Renita Santos DO  3/4/2019  1:16 PM  Sign at close encounter  Patient ID: Wilda Kidd is a 77 y o  female  Assessment/Plan:    No problem-specific Assessment & Plan notes found for this encounter  Diagnoses and all orders for this visit:    Cerebrovascular accident (CVA) due to thrombosis of right carotid artery ([address] )- C/w ASA 81 and statin  Has a follow up CUS pending  Hemoglobin A1c normal   Lipid panel normal   Carotid ultrasound with no significant stenosis internal carotid artery b/l  Discussed lifestyle changes including healthy eating her regular exercise staying hydrated to help reduce stroke risk along with medication compliance  Discussed with patient of sudden-onset of acute speech changes facial droop motor or vision deficits to call 911 or go to the ER immediately  MRI brain reviewed in PACS personally and with patient    Visual field defect- left hemianopsia with confrontation testing on exam today, no neglect  States she does not note this with "further away" objects when looking in the room or as a passenger thus okay to drive per her  We discussed holding off driving until FTD results and VF testing with ophthalmology  She is agreeable to the above treatment plan             Subjective:    HPI    Wilda Kidd is an extremely pleasant 76 yo female who is here for neurological f/u after a hospital admission for acute right hemispheric stroke, right ICA occlusion/ recannulization with stent placed, s/p M1 thrombectomy- now four months ago     Seen by Dr Madden and cleared to transition off of Brillinta and onto Asa monotherapy  Is on ASA 81 and atorvastatin 40 and doing well  Left hemianopsia- has completed visual field testing and awaiting this before being cleared to drive  States she does not note any significant vision issues when she is the passenger in a car  Tried zoloft for anxiety with horrible side effects    She tells me her vision is getting better with her new glasses but still notes the little bit of vision deficit in the left upper corner of her visual field  States she brought a new treadmill and is working out more  States healthy weight loss  No falls no balance issues no new weakness and no significant memory problems  States she still gets more fatigued and tired then prior but this is improving also  Denies other speech, word finding issues  No tobacco or alcohol use history    The following portions of the patient's history were reviewed and updated as appropriate: allergies, current medications, past family history, past medical history, past social history, past surgical history and problem list and ROS         Objective:    Blood pressure 140/66, pulse 82, height 5' 1" (1 549 m), weight 77 7 kg (171 lb 6 4 oz)  Physical Exam   Constitutional: She is oriented to person, place, and time  She appears well-developed and well-nourished  HENT:   Head: Normocephalic and atraumatic  Eyes: Pupils are equal, round, and reactive to light  EOM are normal    Neck: Normal range of motion  Cardiovascular: Normal rate and regular rhythm  Pulmonary/Chest: Effort normal    Abdominal: Soft  Musculoskeletal: Normal range of motion  She exhibits no tenderness  Neurological: She is alert and oriented to person, place, and time  She has normal strength and normal reflexes  Nursing note and vitals reviewed  Neurological Exam  Mental Status  Alert  Oriented to person, place, time and situation   Recent and remote memory are intact  no dysarthria present  Language is fluent with no aphasia  Attention and concentration are normal     Cranial Nerves  CN II: Left homonymous hemianopsia  CN III, IV, VI: Extraocular movements intact bilaterally  Pupils equal round and reactive to light bilaterally  CN V: Facial sensation is normal   CN VII: Full and symmetric facial movement  CN VIII: Hearing is normal   CN IX, X: Palate elevates symmetrically  Normal gag reflex  CN XI: Shoulder shrug strength is normal   CN XII: Tongue midline without atrophy or fasciculations  Motor   Normal muscle tone  Strength is 5/5 throughout all four extremities  Sensory  Sensation is intact to light touch, pinprick, vibration and proprioception in all four extremities  Light touch is normal in upper and lower extremities  Temperature is normal in upper and lower extremities  Vibration is normal in upper and lower extremities  No right-sided hemispatial neglect  No left-sided hemispatial neglect  Reflexes  Deep tendon reflexes are 2+ and symmetric in all four extremities with downgoing toes bilaterally  ROS:    Review of Systems   Constitutional: Negative  Negative for appetite change and fever  HENT: Negative  Negative for hearing loss, tinnitus, trouble swallowing and voice change  Eyes: Negative  Negative for photophobia and pain  Respiratory: Negative  Negative for shortness of breath  Cardiovascular: Negative  Negative for palpitations  Gastrointestinal: Negative  Negative for nausea and vomiting  Endocrine: Negative  Negative for cold intolerance and heat intolerance  Genitourinary: Negative  Negative for dysuria, frequency and urgency  Musculoskeletal: Negative  Negative for myalgias and neck pain  Skin: Negative  Negative for rash  Neurological: Negative    Negative for dizziness, tremors, seizures, syncope, facial asymmetry, speech difficulty, weakness, light-headedness, numbness and headaches  Hematological: Negative  Does not bruise/bleed easily  Psychiatric/Behavioral: Negative  Negative for confusion, hallucinations and sleep disturbance

## 2019-03-01 NOTE — PATIENT INSTRUCTIONS
Check your blood pressure at rest multiple times a day  Try over the counter melatonin 2-6 mg okay to try for more restful sleep, half to one hour before bed time  Stay hydrated

## 2019-11-16 NOTE — PROGRESS NOTES
Infectious Disease Physical Medicine and Rehabilitation Progress Note  Nuris Drummond 77 y o  female MRN: [id number]  Unit/Bed#: -01 Encounter: [id number]    HPI: Ed Mojica a 77 y o  female who [facility] [address] 10/6 after developing slurred speech and left sided weakness  She has a past medical history significant for hypothyroidism  She was found to have a R MCA M1 occlusion, as wellas right ICa occlusion  tPA was not administered, but she did undergo urgent endovascular inetrvention with balloon angioplasty and stenting of the right ICA origin with approximately 40% residual stenosis remaining through the stented segment  Non-flow limiting thrombus remained just above the stent  She had clear improvement in her speech and intellgibility following her procedure  She was transitioned to DAPT with aspirin and Plavix  Echo showed EF 55% with no RWMA or other embolic source  10/7 MRI Brain showed extensive recent infarcts in the right hemisphere including right centrum semiovale and lentiform nucleus, right temporal lobe/temporal opercular junction, and right occipital cortex  No hemorrhage  On 10/8, her hemoglobin dropped to 7 5, requiring a transfusion with 1 unit of pRBCs  Her hemoglobin remained stable for the remainder of her stay  A repeat CTA of the head and neck showed some filling defect, presumed to be a non-occlusive thrombus in and around the right carotid stent which remains patent  After a P2Y12 was done and was 289, neurosurgery started Brillinta 90mg Q12 and repeat P2Y12 was 55  Repeat CTH on day of discharge was stable  She is going to restart Aspirin 81mg tomorrow  She needs a repeat CTA of head and neck in 2 weeks  If there are no changes, she will need to go repeat thrombectomy, angioplasty, and stenting  Her diet was also upgraded to a dental soft with thin liquids, and is tolerating this diet without complications       Chief Complaint/Subjective: No acute events overnight  Denies any CP, SOB, fevers, chills, N/V  Watching the news increases her blood pressure, and she is anxious to vote in the next election  Otherwise feeling well  No new bowel/bladder dysfunction, weakness, or numbness  ROS:  A 10 point review of systems was negative except for what is noted in the HPI  Assessment/Plan:      * Cerebrovascular accident (CVA) due to thrombosis of right carotid artery [facility])   Assessment & Plan    Right MCA stroke most likely 2/2 ICA and MCA occlusive disease  Now s/p thrombectomy and stent placement  And on DAPT  - Continue aspirin  - Continue ticagrelor and atorvastatin  - monitor BP, goal systolic BP between 170-400  - Consult neuropsych  PRAFO for LLE  Neurochecks Q4  DVT ppx: heparin Q8     Carotid stenosis, symptomatic, with infarction [facility])   Assessment & Plan    High risk of embolism from residual thrombus  - In 2 weeks repeat CTA (10/26), if filling defect still present will need repeat thrombectomy, angioplasty, and stenting   - Continue brilinta/ASA and atorvastatin  - Neurochecks      Slow transit constipation   Assessment & Plan    Continue senna/colace  Miralax/bisacodyl PRN     Normocytic anemia due to blood loss   Assessment & Plan    FOBTx3 negative  Hgb 10/18 9 7 from 9 3 Stable  Likely post-procedure  S/P 1 unit pRBC     Hypothyroidism   Assessment & Plan    Here placed on Levothyroxine 75mcg, TSH 0 784 on 10/6    - At home per chart was only on 25mcg daily    - Discussed with pharmacy [facility])  She is on 88mcg  Adjusted  Insomniaresolved as of 10/19/2018   Assessment & Plan    Improved  Patient would like to try environmental approach to improve sleep  - If no improvement, can try melatonin          Scheduled Meds:    Current Facility-Administered Medications:  aspirin 81 mg Oral Daily Xenia Schmid MD   atorvastatin 40 mg Oral QPM Xenia Schmid MD   bisacodyl 10 mg Rectal Daily PRN hSi López DO   docusate sodium 100 mg Oral BID Clayton Thompson MD   famotidine 20 mg Oral BID Clayton Thompson MD   heparin (porcine) 5,000 Units Subcutaneous [facility] Clayton Thompson MD   hydrALAZINE 10 mg Oral Q8H PRN Clayton Thompson MD   levothyroxine 88 mcg Oral Early Morning Clayton Thompson MD   polyethylene glycol 17 g Oral Daily PRN Jayda Yan DO   senna 2 tablet Oral Daily Clayton Thompson MD   ticagrelor 90 mg Oral Q12H [facility] Clayton Thompson MD      Health Maintenance  #Bowel: Last BM 10/18  Continue docusate/senna  As needed miralax/bisacodyl  #Bladder: No sykes  Continent  #Skin/Pressure Injury Prevention: Turn Q2hr in bed, with weight shifts [id number] in wheelchair  #DVT Prophylaxis:  Heparin Q8 and mechanical SCDs  #Code Status: Full  #FEN:  Reg/thins  #Dispo:  Team 10/17  ADD 10/23 home with 24/7 family support and outpatient therapies  Patient and  amenable to this  Discussed need for cognitive/visual evaluation prior to returning to drive  To provide script at discharge  Objective:    Functional Update: No new notes at this time  10/18 PT:  S transfer, S bed mobility, Ambulates S 1000' 24 stairs with S right hand rail  10/19 OT: Ind with eating with device/dentures, Set-up for grooming, s for shower/bathe, tub transfer  Luis Armando for UB dressing while standing  I with toilet transfer, toileting, toileting hygiene  10/17 SLP: Dysphagia has resolved  Reg/thins  Focus on cognitive/linguistic skills  Decreased executive function  Allergies per EMR    Physical Exam:  Temp:  [98 1 °F (36 7 °C)-98 3 °F (36 8 °C)] 98 1 °F (36 7 °C)  HR:  [69-93] 69  Resp:  [18-20] 20  BP: (121-148)/(57-71) 139/66  Oxygen Therapy  SpO2: 97 %      Gen: No acute distress, Well-nourished, well-appearing  HEENT: Moist mucus membranes, Normocephalic/Atraumatic  Cardiovascular: Regular rate, rhythm, S1/S2  Distal pulses palpable  Heme/Extr: No edema/clubbing/cyanosis  Pulmonary: Non-labored breathing   Lungs CTAB  : No sykes  GI: Soft, non-tender, non-distended  BS+  MSK: AROM is WFL in all extremities  No effusions or deformities  Bulk is symmetric  Integumentary: Skin is warm, dry  No rashes or ulcers  Neuro: AAOx3, mild CN7 deficit  Speech is fluent and appropriate  L inattention  Psych: Normal mood and affect  Diagnostic Studies: reviewed, no new imaging    Laboratory:      Results from last 7 days  Lab Units 10/18/18  0608 10/13/18  0708   HEMOGLOBIN g/dL 9 7* 9 3*   HEMATOCRIT % 30 6* 28 5*   WBC Thousand/uL 6 77 6 58       Results from last 7 days  Lab Units 10/18/18  0608 10/13/18  0708   BUN mg/dL 10 13   SODIUM mmol/L 139 138   POTASSIUM mmol/L 3 6 4 3   CHLORIDE mmol/L 108 107   CREATININE mg/dL 0 75 0 68            Patient Active Problem List   Diagnosis    Cerebrovascular accident ([address]A) due to thrombosis of right [address] [address] [facility] [address]    Hypothyroidism    Normocytic anemia due to blood loss    Carotid stenosis, symptomatic, with infarction (HCC)    Slow transit constipation    Insomnia       ** Please Note: Fluency Direct voice to text software may have been used in the creation of this document  **    Total visit time:  At least 25 minutes, with more than 50% spent counseling/coordinating care

## 2020-01-10 ENCOUNTER — OFFICE VISIT (OUTPATIENT)
Dept: NEUROSURGERY | Facility: CLINIC | Age: 70
End: 2020-01-10
Payer: COMMERCIAL

## 2020-01-10 VITALS
SYSTOLIC BLOOD PRESSURE: 142 MMHG | BODY MASS INDEX: 30.78 KG/M2 | DIASTOLIC BLOOD PRESSURE: 70 MMHG | RESPIRATION RATE: 16 BRPM | TEMPERATURE: 98.1 F | HEIGHT: 61 IN | WEIGHT: 163 LBS | HEART RATE: 82 BPM

## 2020-01-10 DIAGNOSIS — I63.031 CEREBROVASCULAR ACCIDENT (CVA) DUE TO THROMBOSIS OF RIGHT CAROTID ARTERY (HCC): Primary | ICD-10-CM

## 2020-01-10 PROCEDURE — 99213 OFFICE O/P EST LOW 20 MIN: CPT | Performed by: RADIOLOGY

## 2020-01-10 NOTE — PROGRESS NOTES
Assessment/Plan:     Diagnoses and all orders for this visit:    Cerebrovascular accident (CVA) due to thrombosis of right carotid artery ([address] )  -     VAS carotid complete study; Future        Discussion Summary: Ms Jose Alejandro Remy is doing well  No evidence of carotid stent stenosis  She should continue on aspirin 325 mg  She will return to clinic in one year with a follow-up US  Chief Complaint: Follow-up (1 year follow up )      Patient ID: Kolby Cadet is a 68 y o  female    HPI  Ms Jose Alejandro Remy returns for follow-up of her right carotid stenosis  She reports she is doing well  No new stroke-like symptoms  She continues to have left-sided visual field deficits however it is improved  She still not driving  She has a follow-up ophthalmology appointment soon for repeat visual field testing  No reported bleeding or bruising problems  Review of Systems   Constitutional: Positive for fatigue (age)  Negative for chills and fever  HENT: Negative  Eyes: Negative  Respiratory: Negative  Cardiovascular: Negative  Gastrointestinal: Negative  Endocrine: Negative  Genitourinary: Negative  Musculoskeletal: Negative  Skin: Negative  Allergic/Immunologic: Negative  Neurological: Negative  Hematological: Bruises/bleeds easily (medication)  Psychiatric/Behavioral: Negative  I have personally reviewed the MA's review of systems and made changes as necessary      The following portions of the patient's history were reviewed and updated as appropriate: allergies, current medications, past family history, past medical history, past social history, past surgical history and problem list       Active Ambulatory Problems     Diagnosis Date Noted    Cerebrovascular accident (CVA) due to thrombosis of right carotid artery ([address] ) 10/06/2018    Hypothyroidism 10/06/2018    Normocytic anemia due to blood loss 10/08/2018    Slow transit constipation 10/12/2018     Resolved Ambulatory Problems Diagnosis Date Noted    Carotid stenosis, symptomatic, with infarction ([facility] ) 10/09/2018    Insomnia 10/12/2018     Past Medical History:   Diagnosis Date    Disease of thyroid gland     Hypertension        Past Surgical History:   Procedure Laterality Date    CAROTID STENT      IR STROKE ALERT  10/6/2018         Vitals:    01/10/20 0950   BP: 142/70   Pulse: 82   Resp: 16   Temp: 98 1 °F (36 7 °C)         Objective:    Physical Exam   Constitutional: She is oriented to person, place, and time  She appears well-developed and well-nourished  HENT:   Head: Normocephalic and atraumatic  Eyes: Pupils are equal, round, and reactive to light  EOM are normal    Cardiovascular: Normal rate  Musculoskeletal: Normal range of motion  She exhibits no edema, tenderness or deformity  Neurological: She is alert and oriented to person, place, and time  A cranial nerve deficit is present  No sensory deficit  She exhibits normal muscle tone  Coordination normal    Severely diminshed visual acuity in the left VF however able to identify objects  Skin: Skin is warm and dry  Psychiatric: She has a normal mood and affect  Her behavior is normal  Judgment and thought content normal      Neurologic Exam     Mental Status   Oriented to person, place, and time  Cranial Nerves     CN III, IV, VI   Pupils are equal, round, and reactive to light  Extraocular motions are normal        Results/Data:  I have reviewed the results and images from the carotid US in detail with the patient

## 2020-03-03 ENCOUNTER — TELEPHONE (OUTPATIENT)
Dept: NEUROLOGY | Facility: CLINIC | Age: 70
End: 2020-03-03

## 2020-03-03 ENCOUNTER — OFFICE VISIT (OUTPATIENT)
Dept: NEUROLOGY | Facility: CLINIC | Age: 70
End: 2020-03-03
Payer: COMMERCIAL

## 2020-03-03 VITALS
HEART RATE: 86 BPM | WEIGHT: 164 LBS | HEIGHT: 61 IN | BODY MASS INDEX: 30.96 KG/M2 | DIASTOLIC BLOOD PRESSURE: 70 MMHG | SYSTOLIC BLOOD PRESSURE: 140 MMHG

## 2020-03-03 DIAGNOSIS — I63.031 CEREBROVASCULAR ACCIDENT (CVA) DUE TO THROMBOSIS OF RIGHT CAROTID ARTERY (HCC): Primary | ICD-10-CM

## 2020-03-03 DIAGNOSIS — H53.462 QUADRANT HEMIANOPSIA, LEFT: ICD-10-CM

## 2020-03-03 PROCEDURE — 99214 OFFICE O/P EST MOD 30 MIN: CPT | Performed by: PSYCHIATRY & NEUROLOGY

## 2020-03-03 RX ORDER — ATORVASTATIN CALCIUM 40 MG/1
TABLET, FILM COATED ORAL
COMMUNITY
Start: 2020-02-10 | End: 2020-05-07

## 2020-03-03 RX ORDER — ASPIRIN 325 MG
325 TABLET ORAL DAILY
COMMUNITY
End: 2020-11-02 | Stop reason: SDUPTHER

## 2020-03-03 NOTE — TELEPHONE ENCOUNTER
----- Message from Linette Lynn DO sent at 3/3/2020 12:08 PM EST -----  Regarding: blood work  Hey     Pt typically gets blood work at [facility] and [employer] office should have this from three months ago   Can I please have a copy of this   Thanks

## 2020-03-03 NOTE — TELEPHONE ENCOUNTER
Vishnu Alvarado labs from [facility] under media tab  Spoke to representative from [facility] practice and per your note you were also looking for a Hemoglobin A1c which wasn't order from PCP  Last done in January of 2019

## 2020-03-03 NOTE — PATIENT INSTRUCTIONS
Stay on aspirin and statin    If sudden onset of one sided weakness, sensory loss, speech changes, vision changes, vertigo, call 911 and/or go to the nearest ED  Do not drive

## 2020-03-03 NOTE — PROGRESS NOTES
Patient ID: Nadiya Jimenez is a 68 y o  female  Assessment/Plan:    No problem-specific Assessment & Plan notes found for this encounter  Diagnoses and all orders for this visit:    Cerebrovascular accident (CVA) due to thrombosis of right carotid artery ([address] )  - CUS with no significant stenosis  - saw Neurosurgery and stable from the standpoint okay to continue aspirin statin for secondary stroke prevention  She has a follow-up carotid ultrasound coming up in 1 year's time  Discussed the importance of eating healthy, regular exercise at least 5 days a week 30 minutes today, good hypertension control to further help reduce stroke risk  She is compliant with her medication  She does not drive and discussed with the patient that I do not recommend that she drives going forward  Quadrant hemianopsia, left    Other orders  -     aspirin 325 mg tablet; Take 325 mg by mouth daily  -     atorvastatin (LIPITOR) 40 mg tablet      will request blood work from her family doctor including lipid panel and hemoglobin A1c that she states she got done about 3 months ago  She is stable from a neurologic standpoint  She can follow up with us on an as-needed basis  D/w patient If sudden onset of one sided weakness, sensory loss, speech changes, vision changes, vertigo, call 911 and/or go to the nearest ED    total face-to-face time with the patient at least 25 minutes and that greater than 50% of that time was spent counseling or coordinating care as detailed above including reviewing fitness to drive evaluation test    Subjective:    HPI   Ms  Nadiya Jimenez is a pleasant 66-year-old female seen in follow-up for right hemispheric stroke 2018 status post right ICA stent placement and M1 thrombectomy  She states she is doing well since last seen, she denies any new deficits  She continues to notice vision issues to some degree    States saw ophthalmology and was noted to have left hemianopsia  States does not drive any longer as recommended- 77% probability of failing on road test  States walks daily  No falls   No chest pain  No heart palpitations  No sleep disturbance no apneic episodes noted per , no excess snoring or waking up gasping or choking or significant daytime fatigue  States no depression or anxiety  States HTN well controlled  The following portions of the patient's history were reviewed and updated as appropriate: allergies, current medications, past family history, past medical history, past social history, past surgical history and problem list and ROS         Objective:    Blood pressure 140/70, pulse 86, height 5' 1" (1 549 m), weight 74 4 kg (164 lb)  Physical Exam   Constitutional: She is oriented to person, place, and time  She appears well-developed and well-nourished  HENT:   Head: Normocephalic and atraumatic  Eyes: Pupils are equal, round, and reactive to light  EOM are normal    Neck: Normal range of motion  Cardiovascular: Normal rate and regular rhythm  Pulmonary/Chest: Effort normal    Abdominal: Soft  Musculoskeletal: Normal range of motion  She exhibits no tenderness  Neurological: She is alert and oriented to person, place, and time  She has normal strength  Reflex Scores:       Patellar reflexes are 3+ on the left side  Achilles reflexes are 2+ on the left side  Nursing note and vitals reviewed  Neurological Exam  Mental Status  Alert  Oriented to person, place, time and situation  Recent and remote memory are intact  no dysarthria present  Language is fluent with no aphasia  Unable to repeat  Attention and concentration are normal     Cranial Nerves  CN II: Left superior quadrantanopsia  CN III, IV, VI: Extraocular movements intact bilaterally  Extraocular movements intact bilaterally  Pupils equal round and reactive to light bilaterally  CN V: Facial sensation is normal   CN VII:  Left: There is central facial weakness    CN VIII: Hearing is normal   CN IX, X: Palate elevates symmetrically  Normal gag reflex  CN XI: Shoulder shrug strength is normal   CN XII: Tongue midline without atrophy or fasciculations  Motor   Normal muscle tone  Strength is 5/5 throughout all four extremities  Sensory  Light touch is normal in upper and lower extremities  Temperature abnormality: Vibration is normal in upper and lower extremities  No right-sided hemispatial neglect  No left-sided hemispatial neglect  Mildly reduced sensation left  Reflexes  Deep tendon reflexes are 2+ and symmetric except as noted  Right                      Left  Hamstring                                                   3+  Patellar                                                       3+  Achilles                                                       2+    Coordination  Right: Finger-to-nose normal   Left: Finger-to-nose normal     Gait  Casual gait is normal including stance, stride, and arm swing  Romberg is present  ROS:    Review of Systems   Constitutional: Negative  Negative for appetite change and fever  HENT: Negative  Negative for hearing loss, tinnitus, trouble swallowing and voice change  Eyes: Positive for visual disturbance (left eye sight can be poor at times)  Negative for photophobia and pain  Respiratory: Negative  Negative for shortness of breath  Cardiovascular: Negative  Negative for palpitations  Gastrointestinal: Negative  Negative for nausea and vomiting  Endocrine: Negative  Negative for cold intolerance and heat intolerance  Genitourinary: Negative  Negative for dysuria, frequency and urgency  Musculoskeletal: Negative  Negative for myalgias and neck pain  Skin: Negative  Negative for rash  Neurological: Negative  Negative for dizziness, tremors, seizures, syncope, facial asymmetry, speech difficulty, weakness, light-headedness, numbness and headaches          From the stroke vision issues   Hematological: Negative  Does not bruise/bleed easily  Psychiatric/Behavioral: Negative  Negative for confusion, hallucinations and sleep disturbance

## 2020-03-04 DIAGNOSIS — I63.031 CEREBROVASCULAR ACCIDENT (CVA) DUE TO THROMBOSIS OF RIGHT CAROTID ARTERY (HCC): Primary | ICD-10-CM

## 2020-03-04 NOTE — TELEPHONE ENCOUNTER
Spoke to Erick Azar and informed her that Dale Levar is requesting a HgbA1c  Last done in 2019 per PCP office   Mail script per patient request     Cinthya Conroy you will need to call [facility] to obtain results

## 2020-03-04 NOTE — TELEPHONE ENCOUNTER
Will order [id number]  Please call and let patient know to have this blood work done and have the lab send us the copy of the result      Thanks

## 2020-03-11 NOTE — TELEPHONE ENCOUNTER
Spoke with Conchis Hui from [address] [place] [address] she states the most recent lab results were done 1/31 will call patient regarding the labs

## 2020-05-08 ENCOUNTER — TELEMEDICINE (OUTPATIENT)
Dept: FAMILY MEDICINE CLINIC | Facility: CLINIC | Age: 70
End: 2020-05-08
Payer: COMMERCIAL

## 2020-05-08 DIAGNOSIS — E03.9 HYPOTHYROIDISM, UNSPECIFIED TYPE: Primary | ICD-10-CM

## 2020-05-08 DIAGNOSIS — D50.0 NORMOCYTIC ANEMIA DUE TO BLOOD LOSS: ICD-10-CM

## 2020-05-08 DIAGNOSIS — K21.9 GASTROESOPHAGEAL REFLUX DISEASE WITHOUT ESOPHAGITIS: ICD-10-CM

## 2020-05-08 DIAGNOSIS — E78.5 HYPERLIPIDEMIA, UNSPECIFIED HYPERLIPIDEMIA TYPE: ICD-10-CM

## 2020-05-08 DIAGNOSIS — I63.031 CEREBROVASCULAR ACCIDENT (CVA) DUE TO THROMBOSIS OF RIGHT CAROTID ARTERY (HCC): ICD-10-CM

## 2020-05-08 PROCEDURE — 1160F RVW MEDS BY RX/DR IN RCRD: CPT | Performed by: FAMILY MEDICINE

## 2020-05-08 PROCEDURE — 99214 OFFICE O/P EST MOD 30 MIN: CPT | Performed by: FAMILY MEDICINE

## 2020-05-08 RX ORDER — ATORVASTATIN CALCIUM 40 MG/1
40 TABLET, FILM COATED ORAL EVERY EVENING
Qty: 90 TABLET | Refills: 1 | Status: SHIPPED | OUTPATIENT
Start: 2020-05-08 | End: 2021-02-03 | Stop reason: SDUPTHER

## 2020-07-14 DIAGNOSIS — I10 ESSENTIAL HYPERTENSION: Primary | ICD-10-CM

## 2020-07-14 DIAGNOSIS — E03.9 HYPOTHYROIDISM, UNSPECIFIED TYPE: ICD-10-CM

## 2020-07-14 RX ORDER — LISINOPRIL 10 MG/1
10 TABLET ORAL DAILY
Qty: 90 TABLET | Refills: 0 | Status: SHIPPED | OUTPATIENT
Start: 2020-07-14 | End: 2020-10-08

## 2020-07-14 RX ORDER — LEVOTHYROXINE SODIUM 88 UG/1
88 TABLET ORAL DAILY
Qty: 90 TABLET | Refills: 0 | Status: SHIPPED | OUTPATIENT
Start: 2020-07-14 | End: 2020-10-08

## 2020-07-14 NOTE — TELEPHONE ENCOUNTER
Refill requested for levothyroxine 88 mcg tablet and lisinopril (ZESTRIL) 10 mg tablet to be sent to [facility]

## 2020-07-25 LAB
CHOLEST SERPL-MCNC: 132 MG/DL
CHOLEST/HDLC SERPL: 2.3 (CALC)
HDLC SERPL-MCNC: 57 MG/DL
LDLC SERPL CALC-MCNC: 64 MG/DL (CALC)
NONHDLC SERPL-MCNC: 75 MG/DL (CALC)
T4 FREE SERPL-MCNC: 1.3 NG/DL (ref 0.8–1.8)
TRIGL SERPL-MCNC: 39 MG/DL
TSH SERPL-ACNC: 0.27 MIU/L (ref 0.4–4.5)

## 2020-07-30 ENCOUNTER — OFFICE VISIT (OUTPATIENT)
Dept: FAMILY MEDICINE CLINIC | Facility: CLINIC | Age: 70
End: 2020-07-30
Payer: COMMERCIAL

## 2020-07-30 VITALS
HEIGHT: 61 IN | HEART RATE: 78 BPM | OXYGEN SATURATION: 98 % | RESPIRATION RATE: 14 BRPM | BODY MASS INDEX: 30.96 KG/M2 | TEMPERATURE: 98.2 F | DIASTOLIC BLOOD PRESSURE: 72 MMHG | WEIGHT: 164 LBS | SYSTOLIC BLOOD PRESSURE: 132 MMHG

## 2020-07-30 DIAGNOSIS — I63.031 CEREBROVASCULAR ACCIDENT (CVA) DUE TO THROMBOSIS OF RIGHT CAROTID ARTERY (HCC): ICD-10-CM

## 2020-07-30 DIAGNOSIS — E78.5 HYPERLIPIDEMIA, UNSPECIFIED HYPERLIPIDEMIA TYPE: ICD-10-CM

## 2020-07-30 DIAGNOSIS — G47.00 INSOMNIA, UNSPECIFIED TYPE: Primary | ICD-10-CM

## 2020-07-30 DIAGNOSIS — K21.9 GASTROESOPHAGEAL REFLUX DISEASE WITHOUT ESOPHAGITIS: ICD-10-CM

## 2020-07-30 DIAGNOSIS — E03.9 HYPOTHYROIDISM, UNSPECIFIED TYPE: ICD-10-CM

## 2020-07-30 PROCEDURE — 3075F SYST BP GE 130 - 139MM HG: CPT | Performed by: FAMILY MEDICINE

## 2020-07-30 PROCEDURE — 4040F PNEUMOC VAC/ADMIN/RCVD: CPT | Performed by: FAMILY MEDICINE

## 2020-07-30 PROCEDURE — 3078F DIAST BP <80 MM HG: CPT | Performed by: FAMILY MEDICINE

## 2020-07-30 PROCEDURE — 3288F FALL RISK ASSESSMENT DOCD: CPT | Performed by: FAMILY MEDICINE

## 2020-07-30 PROCEDURE — 1036F TOBACCO NON-USER: CPT | Performed by: FAMILY MEDICINE

## 2020-07-30 PROCEDURE — 1160F RVW MEDS BY RX/DR IN RCRD: CPT | Performed by: FAMILY MEDICINE

## 2020-07-30 PROCEDURE — 3008F BODY MASS INDEX DOCD: CPT | Performed by: FAMILY MEDICINE

## 2020-07-30 PROCEDURE — 99214 OFFICE O/P EST MOD 30 MIN: CPT | Performed by: FAMILY MEDICINE

## 2020-07-30 PROCEDURE — 1101F PT FALLS ASSESS-DOCD LE1/YR: CPT | Performed by: FAMILY MEDICINE

## 2020-07-30 NOTE — PROGRESS NOTES
BMI Counseling: Body mass index is 30 99 kg/m²  The BMI is above normal  Nutrition recommendations include decreasing portion sizes, encouraging healthy choices of fruits and vegetables, limiting drinks that contain sugar, moderation in carbohydrate intake, increasing intake of lean protein, reducing intake of saturated and trans fat and reducing intake of cholesterol  Exercise recommendations include exercising 3-5 times per week  No pharmacotherapy was ordered  Patient referred to PCP due to patient being overweight  Assessment/Plan:         Problem List Items Addressed This Visit     None            Subjective:      Patient ID: Valeria Segovia is a 65 y o  female  Pt here for  Checkup on CVA, HTN, lipids and GERD  Pt is doing well with her meds and  Labs were doen and reviewed  Pt  Is doing OK with the pandemic  The following portions of the patient's history were reviewed and updated as appropriate:   She has a past medical history of Cerebrovascular accident (CVA) ([address] ), Disease of thyroid gland, Hypertension, Hypothyroidism, and Mixed hyperlipidemia ,  does not have any pertinent problems on file  ,   has a past surgical history that includes IR stroke alert (10/6/2018) and Carotid stent  ,  Family history is unknown by patient  ,   reports that she has never smoked  She has never used smokeless tobacco  She reports that she drinks alcohol  She reports that she does not use drugs  ,  has No Known Allergies     Current Outpatient Medications   Medication Sig Dispense Refill    aspirin 325 mg tablet Take 325 mg by mouth daily      atorvastatin (LIPITOR) 40 mg tablet Take 1 tablet (40 mg total) by mouth every evening 90 tablet 1    levothyroxine 88 mcg tablet Take 1 tablet (88 mcg total) by mouth daily 90 tablet 0    lisinopril (ZESTRIL) 10 mg tablet Take 1 tablet (10 mg total) by mouth daily 90 tablet 0    aspirin (ECOTRIN) 325 mg EC tablet Take 1 tablet (325 mg total) by mouth daily for 360 days 90 tablet 3    famotidine (PEPCID) 40 mg/5 mL suspension Take 2 5 mL (20 mg total) by mouth 2 (two) times a day (Patient not taking: Reported on 3/3/2020) 50 mL 0     No current facility-administered medications for this visit  Review of Systems   Constitutional: Negative for activity change, appetite change, fatigue and fever  HENT: Negative for congestion, ear pain, postnasal drip, rhinorrhea, sinus pressure, sinus pain, sneezing and sore throat  Eyes: Negative for pain and redness  Respiratory: Negative for apnea, cough, chest tightness, shortness of breath and wheezing  Cardiovascular: Negative for chest pain, palpitations and leg swelling  Gastrointestinal: Negative for abdominal pain, constipation, diarrhea, nausea and vomiting  Endocrine: Negative for cold intolerance and heat intolerance  Genitourinary: Negative for difficulty urinating, dysuria, frequency, hematuria and urgency  Musculoskeletal: Negative for arthralgias, back pain, gait problem and myalgias  Skin: Negative for rash  Neurological: Negative for dizziness, speech difficulty, weakness, numbness and headaches  Hematological: Does not bruise/bleed easily  Psychiatric/Behavioral: Negative for agitation, confusion and hallucinations  Objective:  Vitals:    07/30/20 1337   BP: 132/72   BP Location: Left arm   Patient Position: Sitting   Cuff Size: Large   Pulse: 78   Resp: 14   Temp: 98 2 °F (36 8 °C)   TempSrc: Tympanic   SpO2: 98%   Weight: 74 4 kg (164 lb)   Height: 5' 1" (1 549 m)     Body mass index is 30 99 kg/m²  Physical Exam   Constitutional: She is oriented to person, place, and time  She appears well-developed and well-nourished  HENT:   Head: Normocephalic and atraumatic  Nose: Nose normal    Eyes: Pupils are equal, round, and reactive to light  Conjunctivae and EOM are normal  No scleral icterus  Neck: Normal range of motion  Neck supple  No thyromegaly present     Cardiovascular: Normal rate, regular rhythm, normal heart sounds and intact distal pulses  Pulmonary/Chest: Effort normal and breath sounds normal  She has no wheezes  Abdominal: Soft  Bowel sounds are normal  She exhibits no distension  There is no tenderness  There is no rebound and no guarding  Musculoskeletal: Normal range of motion  She exhibits no edema, tenderness or deformity  Neurological: She is alert and oriented to person, place, and time  No sensory deficit  Skin: Skin is warm and dry  No rash noted  No erythema  Psychiatric: She has a normal mood and affect  Her behavior is normal  Judgment and thought content normal    Nursing note and vitals reviewed

## 2020-10-08 DIAGNOSIS — I10 ESSENTIAL HYPERTENSION: ICD-10-CM

## 2020-10-08 DIAGNOSIS — E03.9 HYPOTHYROIDISM, UNSPECIFIED TYPE: ICD-10-CM

## 2020-10-08 RX ORDER — LEVOTHYROXINE SODIUM 88 UG/1
TABLET ORAL
Qty: 90 TABLET | Refills: 0 | Status: SHIPPED | OUTPATIENT
Start: 2020-10-08 | End: 2020-10-15 | Stop reason: SDUPTHER

## 2020-10-08 RX ORDER — LISINOPRIL 10 MG/1
TABLET ORAL
Qty: 90 TABLET | Refills: 0 | Status: SHIPPED | OUTPATIENT
Start: 2020-10-08 | End: 2020-10-15 | Stop reason: SDUPTHER

## 2020-10-15 ENCOUNTER — TELEPHONE (OUTPATIENT)
Dept: FAMILY MEDICINE CLINIC | Facility: CLINIC | Age: 70
End: 2020-10-15

## 2020-10-15 DIAGNOSIS — E03.9 HYPOTHYROIDISM, UNSPECIFIED TYPE: ICD-10-CM

## 2020-10-15 DIAGNOSIS — I10 ESSENTIAL HYPERTENSION: ICD-10-CM

## 2020-10-15 RX ORDER — LISINOPRIL 10 MG/1
10 TABLET ORAL DAILY
Qty: 90 TABLET | Refills: 1 | Status: SHIPPED | OUTPATIENT
Start: 2020-10-15 | End: 2020-12-04 | Stop reason: SDUPTHER

## 2020-10-15 RX ORDER — LEVOTHYROXINE SODIUM 88 UG/1
88 TABLET ORAL DAILY
Qty: 90 TABLET | Refills: 1 | Status: SHIPPED | OUTPATIENT
Start: 2020-10-15 | End: 2021-01-11 | Stop reason: SDUPTHER

## 2020-11-02 ENCOUNTER — OFFICE VISIT (OUTPATIENT)
Dept: FAMILY MEDICINE CLINIC | Facility: CLINIC | Age: 70
End: 2020-11-02
Payer: COMMERCIAL

## 2020-11-02 VITALS
DIASTOLIC BLOOD PRESSURE: 60 MMHG | HEIGHT: 61 IN | WEIGHT: 163 LBS | TEMPERATURE: 97.9 F | SYSTOLIC BLOOD PRESSURE: 150 MMHG | RESPIRATION RATE: 16 BRPM | HEART RATE: 79 BPM | OXYGEN SATURATION: 98 % | BODY MASS INDEX: 30.78 KG/M2

## 2020-11-02 DIAGNOSIS — Z23 ENCOUNTER FOR IMMUNIZATION: Primary | ICD-10-CM

## 2020-11-02 DIAGNOSIS — K59.01 SLOW TRANSIT CONSTIPATION: ICD-10-CM

## 2020-11-02 DIAGNOSIS — K21.9 GASTROESOPHAGEAL REFLUX DISEASE WITHOUT ESOPHAGITIS: ICD-10-CM

## 2020-11-02 DIAGNOSIS — E03.9 HYPOTHYROIDISM, UNSPECIFIED TYPE: ICD-10-CM

## 2020-11-02 DIAGNOSIS — D50.0 NORMOCYTIC ANEMIA DUE TO BLOOD LOSS: ICD-10-CM

## 2020-11-02 DIAGNOSIS — I63.031 CEREBROVASCULAR ACCIDENT (CVA) DUE TO THROMBOSIS OF RIGHT CAROTID ARTERY (HCC): ICD-10-CM

## 2020-11-02 DIAGNOSIS — Z00.00 WELL ADULT EXAM: ICD-10-CM

## 2020-11-02 DIAGNOSIS — Z72.51 HIGH RISK HETEROSEXUAL BEHAVIOR: ICD-10-CM

## 2020-11-02 DIAGNOSIS — Z12.31 SCREENING MAMMOGRAM FOR HIGH-RISK PATIENT: ICD-10-CM

## 2020-11-02 DIAGNOSIS — E78.5 HYPERLIPIDEMIA, UNSPECIFIED HYPERLIPIDEMIA TYPE: ICD-10-CM

## 2020-11-02 PROCEDURE — G0008 ADMIN INFLUENZA VIRUS VAC: HCPCS | Performed by: FAMILY MEDICINE

## 2020-11-02 PROCEDURE — 1036F TOBACCO NON-USER: CPT | Performed by: FAMILY MEDICINE

## 2020-11-02 PROCEDURE — G0439 PPPS, SUBSEQ VISIT: HCPCS | Performed by: FAMILY MEDICINE

## 2020-11-02 PROCEDURE — 3078F DIAST BP <80 MM HG: CPT | Performed by: FAMILY MEDICINE

## 2020-11-02 PROCEDURE — 3008F BODY MASS INDEX DOCD: CPT | Performed by: FAMILY MEDICINE

## 2020-11-02 PROCEDURE — 1170F FXNL STATUS ASSESSED: CPT | Performed by: FAMILY MEDICINE

## 2020-11-02 PROCEDURE — 3077F SYST BP >= 140 MM HG: CPT | Performed by: FAMILY MEDICINE

## 2020-11-02 PROCEDURE — 99214 OFFICE O/P EST MOD 30 MIN: CPT | Performed by: FAMILY MEDICINE

## 2020-11-02 PROCEDURE — 1125F AMNT PAIN NOTED PAIN PRSNT: CPT | Performed by: FAMILY MEDICINE

## 2020-11-02 PROCEDURE — 1160F RVW MEDS BY RX/DR IN RCRD: CPT | Performed by: FAMILY MEDICINE

## 2020-11-02 PROCEDURE — 90662 IIV NO PRSV INCREASED AG IM: CPT | Performed by: FAMILY MEDICINE

## 2020-11-02 RX ORDER — FAMOTIDINE 40 MG/5ML
20 POWDER, FOR SUSPENSION ORAL 2 TIMES DAILY
Qty: 50 ML | Refills: 0 | Status: SHIPPED | OUTPATIENT
Start: 2020-11-02 | End: 2021-05-05 | Stop reason: ALTCHOICE

## 2020-11-27 LAB
ALBUMIN SERPL-MCNC: 3.8 G/DL (ref 3.6–5.1)
ALBUMIN/GLOB SERPL: 1.4 (CALC) (ref 1–2.5)
ALP SERPL-CCNC: 68 U/L (ref 37–153)
ALT SERPL-CCNC: 11 U/L (ref 6–29)
APPEARANCE UR: CLEAR
AST SERPL-CCNC: 15 U/L (ref 10–35)
BACTERIA UR QL AUTO: ABNORMAL /HPF
BASOPHILS # BLD AUTO: 52 CELLS/UL (ref 0–200)
BASOPHILS NFR BLD AUTO: 1.3 %
BILIRUB SERPL-MCNC: 0.4 MG/DL (ref 0.2–1.2)
BILIRUB UR QL STRIP: NEGATIVE
BUN SERPL-MCNC: 17 MG/DL (ref 7–25)
BUN/CREAT SERPL: NORMAL (CALC) (ref 6–22)
CALCIUM SERPL-MCNC: 8.9 MG/DL (ref 8.6–10.4)
CHLORIDE SERPL-SCNC: 107 MMOL/L (ref 98–110)
CHOLEST SERPL-MCNC: 131 MG/DL
CHOLEST/HDLC SERPL: 2.2 (CALC)
CO2 SERPL-SCNC: 26 MMOL/L (ref 20–32)
COLOR UR: YELLOW
CREAT SERPL-MCNC: 0.73 MG/DL (ref 0.6–0.93)
EOSINOPHIL # BLD AUTO: 192 CELLS/UL (ref 15–500)
EOSINOPHIL NFR BLD AUTO: 4.8 %
ERYTHROCYTE [DISTWIDTH] IN BLOOD BY AUTOMATED COUNT: 12.4 % (ref 11–15)
GLOBULIN SER CALC-MCNC: 2.8 G/DL (CALC) (ref 1.9–3.7)
GLUCOSE SERPL-MCNC: 85 MG/DL (ref 65–99)
GLUCOSE UR QL STRIP: NEGATIVE
HCT VFR BLD AUTO: 36.1 % (ref 35–45)
HCV AB S/CO SERPL IA: 0.02
HCV AB SERPL QL IA: NORMAL
HDLC SERPL-MCNC: 60 MG/DL
HGB BLD-MCNC: 11.8 G/DL (ref 11.7–15.5)
HGB UR QL STRIP: NEGATIVE
HYALINE CASTS #/AREA URNS LPF: ABNORMAL /LPF
KETONES UR QL STRIP: NEGATIVE
LDLC SERPL CALC-MCNC: 57 MG/DL (CALC)
LEUKOCYTE ESTERASE UR QL STRIP: ABNORMAL
LYMPHOCYTES # BLD AUTO: 1120 CELLS/UL (ref 850–3900)
LYMPHOCYTES NFR BLD AUTO: 28 %
MAGNESIUM SERPL-MCNC: 2.1 MG/DL (ref 1.5–2.5)
MCH RBC QN AUTO: 30.5 PG (ref 27–33)
MCHC RBC AUTO-ENTMCNC: 32.7 G/DL (ref 32–36)
MCV RBC AUTO: 93.3 FL (ref 80–100)
MONOCYTES # BLD AUTO: 352 CELLS/UL (ref 200–950)
MONOCYTES NFR BLD AUTO: 8.8 %
NEUTROPHILS # BLD AUTO: 2284 CELLS/UL (ref 1500–7800)
NEUTROPHILS NFR BLD AUTO: 57.1 %
NITRITE UR QL STRIP: NEGATIVE
NONHDLC SERPL-MCNC: 71 MG/DL (CALC)
PH UR STRIP: 5.5 [PH] (ref 5–8)
PLATELET # BLD AUTO: 275 THOUSAND/UL (ref 140–400)
PMV BLD REES-ECKER: 10.6 FL (ref 7.5–12.5)
POTASSIUM SERPL-SCNC: 4.1 MMOL/L (ref 3.5–5.3)
PROT SERPL-MCNC: 6.6 G/DL (ref 6.1–8.1)
PROT UR QL STRIP: NEGATIVE
RBC # BLD AUTO: 3.87 MILLION/UL (ref 3.8–5.1)
RBC #/AREA URNS HPF: ABNORMAL /HPF
SL AMB EGFR AFRICAN AMERICAN: 95 ML/MIN/1.73M2
SL AMB EGFR NON AFRICAN AMERICAN: 82 ML/MIN/1.73M2
SODIUM SERPL-SCNC: 141 MMOL/L (ref 135–146)
SP GR UR STRIP: 1.02 (ref 1–1.03)
SQUAMOUS #/AREA URNS HPF: ABNORMAL /HPF
T4 FREE SERPL-MCNC: 1.4 NG/DL (ref 0.8–1.8)
TRIGL SERPL-MCNC: 48 MG/DL
TSH SERPL-ACNC: 0.24 MIU/L (ref 0.4–4.5)
URATE SERPL-MCNC: 5 MG/DL (ref 2.5–7)
WBC # BLD AUTO: 4 THOUSAND/UL (ref 3.8–10.8)
WBC #/AREA URNS HPF: ABNORMAL /HPF

## 2020-12-04 ENCOUNTER — OFFICE VISIT (OUTPATIENT)
Dept: FAMILY MEDICINE CLINIC | Facility: CLINIC | Age: 70
End: 2020-12-04
Payer: COMMERCIAL

## 2020-12-04 VITALS
OXYGEN SATURATION: 98 % | HEART RATE: 87 BPM | SYSTOLIC BLOOD PRESSURE: 140 MMHG | RESPIRATION RATE: 12 BRPM | HEIGHT: 61 IN | BODY MASS INDEX: 31.15 KG/M2 | WEIGHT: 165 LBS | DIASTOLIC BLOOD PRESSURE: 70 MMHG | TEMPERATURE: 97.4 F

## 2020-12-04 DIAGNOSIS — K21.9 GASTROESOPHAGEAL REFLUX DISEASE WITHOUT ESOPHAGITIS: Primary | ICD-10-CM

## 2020-12-04 DIAGNOSIS — I63.031 CEREBROVASCULAR ACCIDENT (CVA) DUE TO THROMBOSIS OF RIGHT CAROTID ARTERY (HCC): ICD-10-CM

## 2020-12-04 DIAGNOSIS — E78.5 HYPERLIPIDEMIA, UNSPECIFIED HYPERLIPIDEMIA TYPE: ICD-10-CM

## 2020-12-04 DIAGNOSIS — I10 ESSENTIAL HYPERTENSION: ICD-10-CM

## 2020-12-04 DIAGNOSIS — E03.9 HYPOTHYROIDISM, UNSPECIFIED TYPE: ICD-10-CM

## 2020-12-04 PROCEDURE — 1160F RVW MEDS BY RX/DR IN RCRD: CPT | Performed by: FAMILY MEDICINE

## 2020-12-04 PROCEDURE — 99214 OFFICE O/P EST MOD 30 MIN: CPT | Performed by: FAMILY MEDICINE

## 2020-12-04 PROCEDURE — 3077F SYST BP >= 140 MM HG: CPT | Performed by: FAMILY MEDICINE

## 2020-12-04 PROCEDURE — 3078F DIAST BP <80 MM HG: CPT | Performed by: FAMILY MEDICINE

## 2020-12-04 PROCEDURE — 3008F BODY MASS INDEX DOCD: CPT | Performed by: FAMILY MEDICINE

## 2020-12-04 PROCEDURE — 1036F TOBACCO NON-USER: CPT | Performed by: FAMILY MEDICINE

## 2020-12-04 RX ORDER — LISINOPRIL 20 MG/1
20 TABLET ORAL DAILY
Qty: 90 TABLET | Refills: 1 | Status: SHIPPED | OUTPATIENT
Start: 2020-12-04 | End: 2021-05-26

## 2021-01-08 ENCOUNTER — HOSPITAL ENCOUNTER (OUTPATIENT)
Dept: RADIOLOGY | Facility: MEDICAL CENTER | Age: 71
Discharge: HOME | End: 2021-01-08
Payer: COMMERCIAL

## 2021-01-08 DIAGNOSIS — I63.031 CEREBROVASCULAR ACCIDENT (CVA) DUE TO THROMBOSIS OF RIGHT CAROTID ARTERY (HCC): ICD-10-CM

## 2021-01-08 PROCEDURE — 93880 EXTRACRANIAL BILAT STUDY: CPT | Performed by: SURGERY

## 2021-01-08 PROCEDURE — 93880 EXTRACRANIAL BILAT STUDY: CPT

## 2021-01-11 DIAGNOSIS — E03.9 HYPOTHYROIDISM, UNSPECIFIED TYPE: ICD-10-CM

## 2021-01-12 RX ORDER — LEVOTHYROXINE SODIUM 88 UG/1
88 TABLET ORAL DAILY
Qty: 90 TABLET | Refills: 1 | Status: SHIPPED | OUTPATIENT
Start: 2021-01-12 | End: 2021-07-07 | Stop reason: SDUPTHER

## 2021-01-15 ENCOUNTER — OFFICE VISIT (OUTPATIENT)
Dept: NEUROSURGERY | Facility: CLINIC | Age: 71
End: 2021-01-15
Payer: COMMERCIAL

## 2021-01-15 VITALS
RESPIRATION RATE: 16 BRPM | HEART RATE: 88 BPM | WEIGHT: 165 LBS | DIASTOLIC BLOOD PRESSURE: 76 MMHG | BODY MASS INDEX: 31.15 KG/M2 | TEMPERATURE: 97.9 F | SYSTOLIC BLOOD PRESSURE: 130 MMHG | HEIGHT: 61 IN

## 2021-01-15 DIAGNOSIS — I63.031 CEREBROVASCULAR ACCIDENT (CVA) DUE TO THROMBOSIS OF RIGHT CAROTID ARTERY (HCC): Primary | ICD-10-CM

## 2021-01-15 PROCEDURE — 3078F DIAST BP <80 MM HG: CPT | Performed by: NURSE PRACTITIONER

## 2021-01-15 PROCEDURE — 3008F BODY MASS INDEX DOCD: CPT | Performed by: NURSE PRACTITIONER

## 2021-01-15 PROCEDURE — 1036F TOBACCO NON-USER: CPT | Performed by: NURSE PRACTITIONER

## 2021-01-15 PROCEDURE — 99214 OFFICE O/P EST MOD 30 MIN: CPT | Performed by: NURSE PRACTITIONER

## 2021-01-15 PROCEDURE — 1160F RVW MEDS BY RX/DR IN RCRD: CPT | Performed by: NURSE PRACTITIONER

## 2021-01-15 PROCEDURE — 3075F SYST BP GE 130 - 139MM HG: CPT | Performed by: NURSE PRACTITIONER

## 2021-01-15 RX ORDER — PHENOL 1.4 %
10 AEROSOL, SPRAY (ML) MUCOUS MEMBRANE
COMMUNITY

## 2021-01-15 NOTE — PROGRESS NOTES
Neurosurgery Office Note  Cordell Lawson 65 y o  female MRN: [id number]      Assessment/Plan     Cerebrovascular accident (CVA) due to thrombosis of right carotid artery [facility])  Patient seen in outpatient office for 1 year follow-up status post right ICA occlusion with stent placement and status post R MCA M1 thrombectomy on 10/6/18  Imaging:    VAS carotid complete study 01/08/2021:RIGHT:There is 70-99% stenosis noted in the internal carotid artery and stent  Plaque is homogenous and smooth  There is a severe stenosis in the external carotid artery  Vertebral artery flow is antegrade  There is no significant subclavian artery disease  LEFT:There is no evidence of arterial disease throughout the extracranial carotid  system  Vertebral artery flow is antegrade  There is no significant subclavian artery disease  Compared to previous study on 12/31/2018, there is a significant increase in the disease process on the right  Plan:  · Reviewed imaging with patient and   · Continue aspirin 325 mg   · Patient will follow-up in 2-3 weeks with CTA head and neck w/wo or sooner if she develops worsening symptoms  · Patient aware if she develops any stroke-like symptoms to proceed to the ED for further evaluation  · Recommendations reviewed with patient  Patient showed understanding and is agreeable to recommendations  · Patient made aware to contact Neurosurgery with any further questions or concerns         Diagnoses and all orders for this visit:    Cerebrovascular accident (CVA) due to thrombosis of right carotid artery ([address] )  -     CTA head and neck w wo contrast; Future    Other orders  -     Melatonin 10 MG TABS; Take 10 mg by mouth daily at bedtime            CHIEF COMPLAINT    Chief Complaint   Patient presents with    Follow-up     1 year follow up with VAS carotid study       HISTORY    History of Present Illness     65y o  year old female with past medical history significant for hypothyroidism, hyperlipidemia, and CVA due to thrombus of right carotid artery  Patient seen in outpatient office today for 1 year follow-up status post right ICA occlusion with stent placement and status post right MCA M1 thrombectomy on 10/06/2018  Per chart review on 10/06/2018 patient woke up approximately at 5:30 a m  with inability to move her left side and difficulty talking  Patient was brought in to [facility] where CTA revealed a right carotid occlusion and right MCA occlusion  Patient was transferred to [facility] for further evaluation and possible thrombectomy  Patient underwent a stenting for right ICA occlusion and right MCA M1 thrombectomy  Patient was discharged on Brilinta and aspirin  Per chart review patient's speech improved as well as her left visual deficit  Patient was seen in follow-up 11/02/2018 and was discontinued on Brilinta and just continued on aspirin  No new stroke-like symptoms  Patient has no complaints  She denies any recent falls or traumas  She denies any difficulty with her balance  She continues to endorse left visual deficits but states they are the same they have not worsened  She reports she has to see her eye doctor  She denies any stroke-like symptoms or troubles with bleeding  Patient continues taking aspirin 325 mg daily  She denies any headaches, dizziness, blurry vision, chest pain, shortness of breath, abdominal pain, nausea, vomiting, diarrhea, no problems with bowel or bladder, no new weakness or numbness/tingling  Carotid ultrasound reviewed with patient and   Right carotid artery demonstrate 70-99% stenosis in the internal carotid artery and stent  Plan to continue aspirin 325 mg daily and follow-up in 2-3 weeks after CTA head and neck completed or sooner if she develops worsening symptoms  HPI    See Discussion    REVIEW OF SYSTEMS    Review of Systems   Constitutional: Positive for fatigue  Negative for chills and fever     HENT: Positive for congestion (seasonal)  Eyes: Negative  Respiratory: Negative  Cardiovascular: Negative  Gastrointestinal: Negative  Endocrine: Negative  Genitourinary: Negative  Musculoskeletal: Negative  Skin: Negative  Allergic/Immunologic: Positive for environmental allergies  Neurological: Negative  Negative for headaches  Hematological: Bruises/bleeds easily  Psychiatric/Behavioral: The patient is nervous/anxious (a little)  ROS reviewed with patient and agree and changes were made as needed  Meds/Allergies     Current Outpatient Medications   Medication Sig Dispense Refill    aspirin (ECOTRIN) 325 mg EC tablet Take 1 tablet (325 mg total) by mouth daily for 360 days 90 tablet 3    atorvastatin (LIPITOR) 40 mg tablet Take 1 tablet (40 mg total) by mouth every evening 90 tablet 1    levothyroxine 88 mcg tablet Take 1 tablet (88 mcg total) by mouth daily 90 tablet 1    lisinopril (ZESTRIL) 20 mg tablet Take 1 tablet (20 mg total) by mouth daily 90 tablet 1    Melatonin 10 MG TABS Take 10 mg by mouth daily at bedtime      famotidine (PEPCID) 20 mg/2 5 mL oral suspension Take 2 5 mL (20 mg total) by mouth 2 (two) times a day (Patient not taking: Reported on 1/15/2021) 50 mL 0     No current facility-administered medications for this visit  No Known Allergies    PAST HISTORY    Past Medical History:   Diagnosis Date    [facility]bro[facility] ([address]A) [address] [address] [address]     Disease of thyroid gland     Hypertension     Hypothyroidism     Mixed hyperlipidemia        Past Surgical History:   Procedure Laterality Date    CAROTID STENT      IR STROKE ALERT  10/6/2018       Social History     Tobacco Use    Smoking status: Never Smoker    Smokeless tobacco: Never Used   Substance Use Topics    Alcohol use: Yes     Comment: Social drinker    Drug use: No       Family History   Problem Relation Age of Onset    Heart disease Family          Above history personally reviewed  EXAM    Vitals:Blood pressure 130/76, pulse 88, temperature 97 9 °F (36 6 °C), temperature source Tympanic, resp  rate 16, height 5' 1" (1 549 m), weight 74 8 kg (165 lb)  ,Body mass index is 31 18 kg/m²  Physical Exam  Vitals signs reviewed  Exam conducted with a chaperone present (Patient accompanied by her )  Constitutional:       General: She is awake  She is not in acute distress  Appearance: Normal appearance  She is not ill-appearing  HENT:      Head: Normocephalic and atraumatic  Eyes:      Extraocular Movements: Extraocular movements intact and EOM normal       Conjunctiva/sclera: Conjunctivae normal       Pupils: Pupils are equal, round, and reactive to light  Neck:      Musculoskeletal: Normal range of motion and neck supple  Cardiovascular:      Rate and Rhythm: Normal rate  Pulmonary:      Effort: Pulmonary effort is normal  No respiratory distress  Chest:      Chest wall: No tenderness  Abdominal:      General: There is no distension  Palpations: Abdomen is soft  Tenderness: There is no abdominal tenderness  Musculoskeletal: Normal range of motion  Skin:     General: Skin is warm and dry  Neurological:      Mental Status: She is alert and oriented to person, place, and time  Coordination: Finger-Nose-Finger Test normal       Gait: Gait is intact  Deep Tendon Reflexes: Strength normal       Reflex Scores:       Tricep reflexes are 2+ on the right side and 2+ on the left side  Bicep reflexes are 2+ on the right side and 2+ on the left side  Brachioradialis reflexes are 2+ on the right side and 2+ on the left side  Patellar reflexes are 2+ on the right side and 2+ on the left side  Achilles reflexes are 2+ on the right side and 2+ on the left side    Psychiatric:         Attention and Perception: Attention and perception normal          Mood and Affect: Mood and affect normal          Speech: Speech normal  Behavior: Behavior normal  Behavior is cooperative  Thought Content: Thought content normal          Cognition and Memory: Cognition and memory normal          Judgment: Judgment normal          Neurologic Exam     Mental Status   Oriented to person, place, and time  Follows 2 step commands  Attention: normal  Concentration: normal    Speech: speech is normal   Level of consciousness: alert  Knowledge: good  Able to perform simple calculations  Able to name object  Able to repeat  Normal comprehension  Cranial Nerves     CN III, IV, VI   Pupils are equal, round, and reactive to light  Extraocular motions are normal    CN III: no CN III palsy  CN VI: no CN VI palsy  Nystagmus: none   Diplopia: none  Conjugate gaze: present    CN V   Facial sensation intact  CN VII   Facial expression full, symmetric  CN VIII   CN VIII normal    Hearing: intact    CN IX, X   CN IX normal      CN XI   CN XI normal      CN XII   CN XII normal      Motor Exam   Muscle bulk: normal  Overall muscle tone: normal  Right arm pronator drift: absent  Left arm pronator drift: absent    Strength   Strength 5/5 throughout       Sensory Exam   Light touch normal    Proprioception normal    JPS and DST intact     Gait, Coordination, and Reflexes     Gait  Gait: normal    Coordination   Finger to nose coordination: normal    Tremor   Resting tremor: absent  Intention tremor: absent  Action tremor: absent    Reflexes   Right brachioradialis: 2+  Left brachioradialis: 2+  Right biceps: 2+  Left biceps: 2+  Right triceps: 2+  Left triceps: 2+  Right patellar: 2+  Left patellar: 2+  Right achilles: 2+  Left achilles: 2+  Right Bello: absent  Left Bello: absent  Right ankle clonus: absent  Left ankle clonus: absent        MEDICAL DECISION MAKING    Imaging Studies:     Vas Carotid Complete Study    Result Date: 1/8/2021  Narrative:  [facility] REPORT CLINICAL: Indications: Yearly surveillance of carotid artery disease  Patient is asymptomatic at this time  Risk Factors The patient has history of HTN and HLD  She has no history of Diabetes  Clinical Right Pressure:  140/70 mm Hg, Left Pressure:  142/70 mm Hg  FINDINGS:  Right              Impression  PSV  EDV (cm/s)  Direction of Flow  Ratio  Dist  ICA                       66          23                      1 19  Mid  ICA                       122          41                      2 18  Prox  ICA - Stent  70 - 99%    368         107                      6 61  Dist CCA - Stent                68          17                            Mid CCA                         56          10                      0 56  Prox CCA                       100          18                            Ext Carotid                    387          51                      6 95  Prox Vert                       77          11  Antegrade                 Subclavian                     137          11                             Left               Impression  PSV  EDV (cm/s)  Direction of Flow  Ratio  Dist  ICA                       78          33                      0 92  Mid  ICA                       127          36                      1 49  Prox  ICA          Normal       88          22                      1 02  Dist CCA                        75          23                            Mid CCA                         86          22                      0 68  Prox CCA                       125          23                            Ext Carotid                    158          22                      1 85  Prox Vert                       79          17  Antegrade                 Subclavian                     192          15                               CONCLUSION: Impression RIGHT: There is 70-99% stenosis noted in the internal carotid artery and stent  Plaque is homogenous and smooth  There is a severe stenosis in the external carotid artery  Vertebral artery flow is antegrade   There is no significant subclavian artery disease  LEFT: There is no evidence of arterial disease throughout the extracranial carotid system  Vertebral artery flow is antegrade  There is no significant subclavian artery disease  Compared to previous study on 12/31/2018, there is a significant increase in the disease process on the right  Recommend repeat testing in 6month as per protocol unless otherwise indicated  Internal carotid artery stenosis determination by consensus criteria from: Errol Kearns , et al  Carotid Artery Stenosis: Gray-Scale and Doppler US Diagnosis - Society of Radiologists in [address] [place] [address], Radiology 2003; [phone]  SIGNATURE: Electronically Signed by: Darryle Cornish, MD, [address] on 2021-01-08 02:40:14 PM      I have personally reviewed pertinent reports     and I have personally reviewed pertinent films in PACS

## 2021-01-15 NOTE — ASSESSMENT & PLAN NOTE
Patient seen in outpatient office for 1 year follow-up status post right ICA occlusion with stent placement and status post R MCA M1 thrombectomy on 10/6/18  Imaging:    VAS carotid complete study 01/08/2021:RIGHT:There is 70-99% stenosis noted in the internal carotid artery and stent  Plaque is homogenous and smooth  There is a severe stenosis in the external carotid artery  Vertebral artery flow is antegrade  There is no significant subclavian artery disease  LEFT:There is no evidence of arterial disease throughout the extracranial carotid  system  Vertebral artery flow is antegrade  There is no significant subclavian artery disease  Compared to previous study on 12/31/2018, there is a significant increase in the disease process on the right  Plan:  · Reviewed imaging with patient and   · Continue aspirin 325 mg   · Patient will follow-up in 2-3 weeks with CTA head and neck w/wo or sooner if she develops worsening symptoms  · Patient aware if she develops any stroke-like symptoms to proceed to the ED for further evaluation  · Recommendations reviewed with patient  Patient showed understanding and is agreeable to recommendations  · Patient made aware to contact Neurosurgery with any further questions or concerns

## 2021-01-15 NOTE — PATIENT INSTRUCTIONS
Patient will follow-up in 2-3 weeks after CTA head and neck completed or sooner if she develops worsening symptoms

## 2021-01-29 ENCOUNTER — HOSPITAL ENCOUNTER (OUTPATIENT)
Dept: RADIOLOGY | Facility: MEDICAL CENTER | Age: 71
Discharge: HOME | End: 2021-01-29
Payer: COMMERCIAL

## 2021-01-29 DIAGNOSIS — I63.031 CEREBROVASCULAR ACCIDENT (CVA) DUE TO THROMBOSIS OF RIGHT CAROTID ARTERY (HCC): ICD-10-CM

## 2021-01-29 PROCEDURE — 70496 CT ANGIOGRAPHY HEAD: CPT

## 2021-01-29 PROCEDURE — 70498 CT ANGIOGRAPHY NECK: CPT

## 2021-01-29 PROCEDURE — G1004 CDSM NDSC: HCPCS

## 2021-01-29 RX ADMIN — IOHEXOL 85 ML: 350 INJECTION, SOLUTION INTRAVENOUS at 10:15

## 2021-02-03 ENCOUNTER — OFFICE VISIT (OUTPATIENT)
Dept: FAMILY MEDICINE CLINIC | Facility: CLINIC | Age: 71
End: 2021-02-03
Payer: COMMERCIAL

## 2021-02-03 VITALS
DIASTOLIC BLOOD PRESSURE: 70 MMHG | OXYGEN SATURATION: 97 % | TEMPERATURE: 97.2 F | SYSTOLIC BLOOD PRESSURE: 128 MMHG | WEIGHT: 168 LBS | HEART RATE: 78 BPM | HEIGHT: 61 IN | RESPIRATION RATE: 18 BRPM | BODY MASS INDEX: 31.72 KG/M2

## 2021-02-03 DIAGNOSIS — I63.031 CEREBROVASCULAR ACCIDENT (CVA) DUE TO THROMBOSIS OF RIGHT CAROTID ARTERY (HCC): ICD-10-CM

## 2021-02-03 DIAGNOSIS — I10 ESSENTIAL HYPERTENSION: ICD-10-CM

## 2021-02-03 DIAGNOSIS — M65.342 TRIGGER FINGER, LEFT RING FINGER: ICD-10-CM

## 2021-02-03 DIAGNOSIS — E78.5 HYPERLIPIDEMIA, UNSPECIFIED HYPERLIPIDEMIA TYPE: ICD-10-CM

## 2021-02-03 DIAGNOSIS — E03.9 HYPOTHYROIDISM, UNSPECIFIED TYPE: ICD-10-CM

## 2021-02-03 DIAGNOSIS — K21.9 GASTROESOPHAGEAL REFLUX DISEASE WITHOUT ESOPHAGITIS: Primary | ICD-10-CM

## 2021-02-03 PROCEDURE — 99214 OFFICE O/P EST MOD 30 MIN: CPT | Performed by: FAMILY MEDICINE

## 2021-02-03 RX ORDER — ATORVASTATIN CALCIUM 40 MG/1
40 TABLET, FILM COATED ORAL EVERY EVENING
Qty: 90 TABLET | Refills: 1 | Status: SHIPPED | OUTPATIENT
Start: 2021-02-03 | End: 2021-05-11

## 2021-02-03 RX ORDER — ATORVASTATIN CALCIUM 40 MG/1
TABLET, FILM COATED ORAL
Qty: 90 TABLET | Refills: 1 | Status: SHIPPED | OUTPATIENT
Start: 2021-02-03 | End: 2021-05-05 | Stop reason: SDUPTHER

## 2021-02-03 NOTE — PROGRESS NOTES
BMI Counseling: Body mass index is 31 74 kg/m²  The BMI is above normal  Nutrition recommendations include decreasing portion sizes, encouraging healthy choices of fruits and vegetables, decreasing fast food intake, moderation in carbohydrate intake, increasing intake of lean protein, reducing intake of saturated and trans fat and reducing intake of cholesterol  Exercise recommendations include exercising 3-5 times per week  No pharmacotherapy was ordered  Patient referred to PCP due to patient being overweight  Assessment/Plan:         Problem List Items Addressed This Visit        Digestive    Gastroesophageal reflux disease without esophagitis - Primary       Endocrine    Hypothyroidism       Cardiovascular and Mediastinum    Cerebrovascular accident (CVA) due to thrombosis of right carotid artery ([facility] [address] )    Hypertension       Other    Hyperlipidemia            Subjective:      Patient ID: Kolby Cadet is a 65 y o  female  Pt  here for checkup on HTN, CVA, hypothyroidism, lipids  Pt is doing well with her meds and is with no new issues  Pt  Also with trigger finger on R ring finger  The following portions of the patient's history were reviewed and updated as appropriate:   Past Medical History:  She has a past medical history of Cerebrovascular accident (CVA) ([facility] [address] ), Disease of thyroid gland, Hypertension, Hypertension, Hypothyroidism, Hypothyroidism (10/6/2018), and Mixed hyperlipidemia ,  _______________________________________________________________________  Medical Problems:  does not have any pertinent problems on file ,  _______________________________________________________________________  Past Surgical History:   has a past surgical history that includes IR stroke alert (10/6/2018) and Carotid stent  ,  _______________________________________________________________________  Family History:  family history includes Heart disease in her family  ,  _______________________________________________________________________  Social History:   reports that she has never smoked  She has never used smokeless tobacco  She reports current alcohol use  She reports that she does not use drugs  ,  _______________________________________________________________________  Allergies:  has No Known Allergies     _______________________________________________________________________  Current Outpatient Medications   Medication Sig Dispense Refill    aspirin (ECOTRIN) 325 mg EC tablet Take 1 tablet (325 mg total) by mouth daily for 360 days 90 tablet 3    atorvastatin (LIPITOR) 40 mg tablet Take 1 tablet (40 mg total) by mouth every evening 90 tablet 1    levothyroxine 88 mcg tablet Take 1 tablet (88 mcg total) by mouth daily 90 tablet 1    lisinopril (ZESTRIL) 20 mg tablet Take 1 tablet (20 mg total) by mouth daily 90 tablet 1    Melatonin 10 MG TABS Take 10 mg by mouth daily at bedtime      famotidine (PEPCID) 20 mg/2 5 mL oral suspension Take 2 5 mL (20 mg total) by mouth 2 (two) times a day (Patient not taking: Reported on 1/15/2021) 50 mL 0     No current facility-administered medications for this visit       _______________________________________________________________________  Review of Systems   Constitutional: Negative for activity change, appetite change, fatigue and fever  HENT: Negative for congestion, ear pain, postnasal drip, rhinorrhea, sinus pressure, sinus pain, sneezing and sore throat  Eyes: Negative for pain and redness  Respiratory: Negative for apnea, cough, chest tightness, shortness of breath and wheezing  Cardiovascular: Negative for chest pain, palpitations and leg swelling  Gastrointestinal: Negative for abdominal pain, constipation, diarrhea, nausea and vomiting  Endocrine: Negative for cold intolerance and heat intolerance  Genitourinary: Negative for difficulty urinating, dysuria, frequency, hematuria and urgency  Musculoskeletal: Negative for arthralgias, back pain, gait problem and myalgias  Skin: Negative for rash  Neurological: Negative for dizziness, speech difficulty, weakness, numbness and headaches  Hematological: Does not bruise/bleed easily  Psychiatric/Behavioral: Negative for agitation, confusion and hallucinations  Objective:  Vitals:    02/03/21 1034   BP: 128/70   BP Location: Left arm   Patient Position: Sitting   Cuff Size: Standard   Pulse: 78   Resp: 18   Temp: (!) 97 2 °F (36 2 °C)   TempSrc: Temporal   SpO2: 97%   Weight: 76 2 kg (168 lb)   Height: 5' 1" (1 549 m)     Body mass index is 31 74 kg/m²  Physical Exam  Vitals signs and nursing note reviewed  Constitutional:       Appearance: She is well-developed  HENT:      Head: Normocephalic and atraumatic  Nose: Nose normal       Mouth/Throat:      Mouth: Mucous membranes are moist    Eyes:      General: No scleral icterus  Conjunctiva/sclera: Conjunctivae normal       Pupils: Pupils are equal, round, and reactive to light  Neck:      Musculoskeletal: Normal range of motion and neck supple  Thyroid: No thyromegaly  Cardiovascular:      Rate and Rhythm: Normal rate and regular rhythm  Pulmonary:      Effort: Pulmonary effort is normal       Breath sounds: Normal breath sounds  No wheezing  Abdominal:      General: Bowel sounds are normal  There is no distension  Palpations: Abdomen is soft  Tenderness: There is no abdominal tenderness  There is no guarding or rebound  Musculoskeletal: Normal range of motion  General: No tenderness or deformity  Skin:     General: Skin is warm and dry  Findings: No erythema or rash  Neurological:      Mental Status: She is alert and oriented to person, place, and time  Sensory: No sensory deficit  Psychiatric:         Mood and Affect: Mood normal          Behavior: Behavior normal          Thought Content:  Thought content normal  Judgment: Judgment normal

## 2021-02-05 ENCOUNTER — OFFICE VISIT (OUTPATIENT)
Dept: NEUROSURGERY | Facility: CLINIC | Age: 71
End: 2021-02-05
Payer: COMMERCIAL

## 2021-02-05 VITALS
HEIGHT: 61 IN | RESPIRATION RATE: 16 BRPM | TEMPERATURE: 97.2 F | SYSTOLIC BLOOD PRESSURE: 154 MMHG | BODY MASS INDEX: 31.91 KG/M2 | WEIGHT: 169 LBS | DIASTOLIC BLOOD PRESSURE: 72 MMHG | HEART RATE: 86 BPM

## 2021-02-05 DIAGNOSIS — I63.031 CEREBROVASCULAR ACCIDENT (CVA) DUE TO THROMBOSIS OF RIGHT CAROTID ARTERY (HCC): Primary | ICD-10-CM

## 2021-02-05 PROCEDURE — 1036F TOBACCO NON-USER: CPT | Performed by: RADIOLOGY

## 2021-02-05 PROCEDURE — 3078F DIAST BP <80 MM HG: CPT | Performed by: RADIOLOGY

## 2021-02-05 PROCEDURE — 1160F RVW MEDS BY RX/DR IN RCRD: CPT | Performed by: RADIOLOGY

## 2021-02-05 PROCEDURE — 3008F BODY MASS INDEX DOCD: CPT | Performed by: RADIOLOGY

## 2021-02-05 PROCEDURE — 3077F SYST BP >= 140 MM HG: CPT | Performed by: RADIOLOGY

## 2021-02-05 PROCEDURE — 99213 OFFICE O/P EST LOW 20 MIN: CPT | Performed by: RADIOLOGY

## 2021-02-05 RX ORDER — ASPIRIN 81 MG/1
81 TABLET ORAL DAILY
Qty: 30 TABLET | Refills: 0 | Status: SHIPPED | OUTPATIENT
Start: 2021-02-05 | End: 2021-03-02

## 2021-02-05 NOTE — PROGRESS NOTES
Assessment/Plan:     Diagnoses and all orders for this visit:    Cerebrovascular accident (CVA) due to thrombosis of right carotid artery (HCC)  -     aspirin (ECOTRIN LOW STRENGTH) 81 mg EC tablet; Take 1 tablet (81 mg total) by mouth daily  -     ticagrelor (BRILINTA) 90 MG; Take 1 tablet (90 mg total) by mouth every 12 (twelve) hours for 180 doses  -     CTA neck w wo contrast; Future        Discussion Summary:   Shanna Cea has approximately 70% focal restenosis in her right ICA stent on my review of the CTA  She is asymptomatic and has very good collaterals from the PCOM and ACOM  We discussed how the literature paradoxically shows FELIPA re-stenosis has very little risk of future stroke so guidelines are not clear on whether there should be repeat intervention or not  We have agreed to use CTA surveillance every 6 months  If there is worsening clinically, we will then consider intervention  I have restarted her on DAPT therapy with Brilinta  She will be on this regimen at a minimum until her next CTA, possibly indefinitely  The patient, patient's family was counseled regarding diagnostic results, instructions for management  Total time of encounter was 60 minutes and 35 minutes was spent counseling  Chief Complaint: Follow-up      Patient ID: Max Plata is a 65 y o  female    HPI    Ms  Lorena Mcmillan presents for follow-up of her carotid stenosis  She reports no changes in her health  No new stroke-like symptoms  Review of Systems   Constitutional: Negative  HENT: Negative  Eyes: Negative  Respiratory: Negative  Cardiovascular: Negative  Gastrointestinal: Negative  Endocrine: Negative  Genitourinary: Negative  Musculoskeletal: Negative  Skin: Negative  Allergic/Immunologic: Negative  Neurological: Negative  Negative for syncope  Hematological: Bruises/bleeds easily ([id number])  Psychiatric/Behavioral: Negative          I have personally reviewed the MA's review of systems and made changes as necessary  The following portions of the patient's history were reviewed and updated as appropriate: allergies, current medications, past family history, past medical history, past social history, past surgical history and problem list       Active Ambulatory Problems     Diagnosis Date Noted    Cerebrovascular accident (CVA) due to thrombosis of right carotid artery ([facility] [address] ) 10/06/2018    Hypothyroidism 10/06/2018    Normocytic anemia due to blood loss 10/08/2018    Slow transit constipation 10/12/2018    Hyperlipidemia 05/08/2020    Gastroesophageal reflux disease without esophagitis 05/08/2020    Well adult exam 11/02/2020    Hypertension      Resolved Ambulatory Problems     Diagnosis Date Noted    Carotid stenosis, symptomatic, with infarction ([facility] [address] ) 10/09/2018    Insomnia 10/12/2018     Past Medical History:   Diagnosis Date    [facility]     Disease of thyroid gland     Mixed hyperlipidemia        Past Surgical History:   Procedure Laterality Date    CAROTID STENT      IR STROKE ALERT  10/6/2018             Vitals:    02/05/21 0807   BP: 154/72   Pulse: 86   Resp: 16   Temp: (!) 97 2 °F (36 2 °C)         Objective:    Physical Exam  Constitutional:       Appearance: Normal appearance  HENT:      Head: Normocephalic and atraumatic  Eyes:      Extraocular Movements: Extraocular movements intact  Pupils: Pupils are equal, round, and reactive to light  Pulmonary:      Effort: Pulmonary effort is normal    Musculoskeletal: Normal range of motion  Skin:     General: Skin is warm  Neurological:      General: No focal deficit present  Mental Status: She is alert and oriented to person, place, and time  Mental status is at baseline  Cranial Nerves: No cranial nerve deficit  Sensory: No sensory deficit  Motor: No weakness        Coordination: Coordination normal       Gait: Gait normal    Psychiatric:         Mood and Affect: Mood normal          Behavior: Behavior normal          Thought Content: Thought content normal          Judgment: Judgment normal        Neurologic Exam     Mental Status   Oriented to person, place, and time  Cranial Nerves     CN III, IV, VI   Pupils are equal, round, and reactive to light  Results/Data:  I have reviewed the results and images from the CTA in detail with the patient

## 2021-02-08 ENCOUNTER — TELEPHONE (OUTPATIENT)
Dept: NEUROSURGERY | Facility: CLINIC | Age: 71
End: 2021-02-08

## 2021-02-08 NOTE — TELEPHONE ENCOUNTER
Received call from patient regarding her Brilinta  She hasn't received it yet and wanted to know if we should call some into her local pharmacy in  The meantime  She has about 7 days worth left from a previous script  I called the [facility] number they said that they need patient to call them back to go over her shipping address and payment method  They said she should call [phone] and use ref # [id number]  I called patient back and informed her of this she will be reaching out to them shortly  I told her to give the office a call back on Friday if she hasn't received her shipment yet and we can discuss small amount of pills to be e-scribed to her local pharmacy  Patient appreciative

## 2021-03-02 DIAGNOSIS — I63.031 CEREBROVASCULAR ACCIDENT (CVA) DUE TO THROMBOSIS OF RIGHT CAROTID ARTERY (HCC): ICD-10-CM

## 2021-03-02 DIAGNOSIS — Z23 ENCOUNTER FOR IMMUNIZATION: ICD-10-CM

## 2021-03-02 RX ORDER — ACETAMINOPHEN/DIPHENHYDRAMINE 500MG-25MG
TABLET ORAL
Qty: 30 TABLET | Refills: 0 | Status: SHIPPED | OUTPATIENT
Start: 2021-03-02 | End: 2021-08-13 | Stop reason: ALTCHOICE

## 2021-03-04 ENCOUNTER — IMMUNIZATIONS (OUTPATIENT)
Dept: FAMILY MEDICINE CLINIC | Facility: HOSPITAL | Age: 71
End: 2021-03-04

## 2021-03-04 DIAGNOSIS — Z23 ENCOUNTER FOR IMMUNIZATION: Primary | ICD-10-CM

## 2021-03-04 PROCEDURE — 91300 SARS-COV-2 / COVID-19 MRNA VACCINE (PFIZER-BIONTECH) 30 MCG: CPT

## 2021-03-04 PROCEDURE — 0001A SARS-COV-2 / COVID-19 MRNA VACCINE (PFIZER-BIONTECH) 30 MCG: CPT

## 2021-03-25 ENCOUNTER — IMMUNIZATIONS (OUTPATIENT)
Dept: FAMILY MEDICINE CLINIC | Facility: HOSPITAL | Age: 71
End: 2021-03-25

## 2021-03-25 DIAGNOSIS — Z23 ENCOUNTER FOR IMMUNIZATION: Primary | ICD-10-CM

## 2021-03-25 PROCEDURE — 91300 SARS-COV-2 / COVID-19 MRNA VACCINE (PFIZER-BIONTECH) 30 MCG: CPT

## 2021-03-25 PROCEDURE — 0002A SARS-COV-2 / COVID-19 MRNA VACCINE (PFIZER-BIONTECH) 30 MCG: CPT

## 2021-04-16 ENCOUNTER — HOSPITAL ENCOUNTER (OUTPATIENT)
Dept: RADIOLOGY | Facility: MEDICAL CENTER | Age: 71
Discharge: HOME | End: 2021-04-16
Payer: COMMERCIAL

## 2021-04-16 VITALS — BODY MASS INDEX: 30.96 KG/M2 | HEIGHT: 61 IN | WEIGHT: 164 LBS

## 2021-04-16 DIAGNOSIS — I63.031 CEREBROVASCULAR ACCIDENT (CVA) DUE TO THROMBOSIS OF RIGHT CAROTID ARTERY (HCC): ICD-10-CM

## 2021-04-16 DIAGNOSIS — Z12.31 SCREENING MAMMOGRAM FOR HIGH-RISK PATIENT: ICD-10-CM

## 2021-04-16 PROCEDURE — 77063 BREAST TOMOSYNTHESIS BI: CPT

## 2021-04-16 PROCEDURE — 77067 SCR MAMMO BI INCL CAD: CPT

## 2021-04-16 RX ORDER — TICAGRELOR 90 MG/1
TABLET ORAL
Qty: 180 TABLET | Refills: 3 | Status: SHIPPED | OUTPATIENT
Start: 2021-04-16 | End: 2022-02-23

## 2021-04-29 ENCOUNTER — RA CDI HCC (OUTPATIENT)
Dept: OTHER | Facility: HOSPITAL | Age: 71
End: 2021-04-29

## 2021-04-29 LAB
ALBUMIN SERPL-MCNC: 3.9 G/DL (ref 3.6–5.1)
ALBUMIN/GLOB SERPL: 1.6 (CALC) (ref 1–2.5)
ALP SERPL-CCNC: 65 U/L (ref 37–153)
ALT SERPL-CCNC: 19 U/L (ref 6–29)
AST SERPL-CCNC: 20 U/L (ref 10–35)
BILIRUB SERPL-MCNC: 0.6 MG/DL (ref 0.2–1.2)
BUN SERPL-MCNC: 17 MG/DL (ref 7–25)
BUN/CREAT SERPL: NORMAL (CALC) (ref 6–22)
CALCIUM SERPL-MCNC: 9 MG/DL (ref 8.6–10.4)
CHLORIDE SERPL-SCNC: 108 MMOL/L (ref 98–110)
CHOLEST SERPL-MCNC: 126 MG/DL
CHOLEST/HDLC SERPL: 1.9 (CALC)
CO2 SERPL-SCNC: 26 MMOL/L (ref 20–32)
CREAT SERPL-MCNC: 0.73 MG/DL (ref 0.6–0.93)
GLOBULIN SER CALC-MCNC: 2.4 G/DL (CALC) (ref 1.9–3.7)
GLUCOSE SERPL-MCNC: 87 MG/DL (ref 65–99)
HDLC SERPL-MCNC: 67 MG/DL
LDLC SERPL CALC-MCNC: 47 MG/DL (CALC)
NONHDLC SERPL-MCNC: 59 MG/DL (CALC)
POTASSIUM SERPL-SCNC: 4.8 MMOL/L (ref 3.5–5.3)
PROT SERPL-MCNC: 6.3 G/DL (ref 6.1–8.1)
SL AMB EGFR AFRICAN AMERICAN: 95 ML/MIN/1.73M2
SL AMB EGFR NON AFRICAN AMERICAN: 82 ML/MIN/1.73M2
SODIUM SERPL-SCNC: 140 MMOL/L (ref 135–146)
T4 FREE SERPL-MCNC: 1.4 NG/DL (ref 0.8–1.8)
TRIGL SERPL-MCNC: 42 MG/DL
TSH SERPL-ACNC: 0.13 MIU/L (ref 0.4–4.5)

## 2021-04-29 NOTE — PROGRESS NOTES
[facility]  coding opportunities          Chart reviewed, no opportunity found: CHART REVIEWED, NO OPPORTUNITY FOUND              Patients insurance company: [employer] (Medicare Advantage and Commercial)

## 2021-05-03 NOTE — PROGRESS NOTES
Falls Plan of Care: balance, strength, and gait training instructions were provided  Home safety education provided  Assessment/Plan:         Problem List Items Addressed This Visit        Digestive    Gastroesophageal reflux disease without esophagitis - Primary     Patient to continue with present therapy and decrease caffeine, avoid ETOH and smoking to decrease acid production  Pt should also cease eating prior to bedtime and avoid excessive fluid intake prior to sleep  May use antacids as needed for breakthrough GERD            Cardiovascular and Mediastinum    Cerebrovascular accident (CVA) due to thrombosis of right carotid artery [facility])     Patient is stable  and will continue present plan of care and reassess at next routine visit         Hypertension     Patient is stable with current anti-hypertensive medicine and continue to follow a low sodium diet and take current medication            Other    Hyperlipidemia     Patient  is stable with current medication and we discussed a low fat low cholesterol diet  Weight loss also discussed for this will help lower cholesterol also  Recheck lipids in 6 months  Subjective:      Patient ID: Nate Patterson is a 65 y o  female  This 77-year-old white female here today for her checkup for her hypertension history of stroke hypothyroidism hyperlipidemia  Patient had seen her neurologist recently and was put back on Brilinta because she had a little bit increase in her carotid plaques  Patient is doing well with her medication and refill of her at in a retiree wellness program paperwork for today  Patient is doing well with her medicines and does not think she has any refills at this point  Patient also had her mammogram done back in April and was okay and will be soon needing a Cologuard    Patient also had seen her recent health  through [address] [place] [address] Dr romero as she had picked up a murmur on her cardiac exam   We will evaluate that further today      The following portions of the patient's history were reviewed and updated as appropriate:   Past Medical History:  She has a past medical history of Hypertension and Hypothyroidism  ,  _______________________________________________________________________  Medical Problems:  does not have any pertinent problems on file ,  _______________________________________________________________________  Past Surgical History:   has a past surgical history that includes IR stroke alert (10/6/2018) and Carotid stent  ,  _______________________________________________________________________  Family History:  family history includes Heart disease in her family; No Known Problems in her brother, daughter, daughter, father, maternal grandfather, maternal grandmother, mother, paternal grandfather, paternal grandmother, and sister  ,  _______________________________________________________________________  Social History:   reports that she has never smoked  She has never used smokeless tobacco  She reports current alcohol use  She reports that she does not use drugs  ,  _______________________________________________________________________  Allergies:  has No Known Allergies     _______________________________________________________________________  Current Outpatient Medications   Medication Sig Dispense Refill    atorvastatin (LIPITOR) 40 mg tablet take 1 tablet by mouth once daily 90 tablet 1    atorvastatin (LIPITOR) 40 mg tablet Take 1 tablet (40 mg total) by mouth every evening 90 tablet 1    Brilinta 90 MG TAKE 1 TABLET BY MOUTH  EVERY 12 HOURS 180 tablet 3    famotidine (PEPCID) 20 mg/2 5 mL oral suspension Take 2 5 mL (20 mg total) by mouth 2 (two) times a day (Patient not taking: Reported on 1/15/2021) 50 mL 0    levothyroxine 88 mcg tablet Take 1 tablet (88 mcg total) by mouth daily 90 tablet 1    lisinopril (ZESTRIL) 20 mg tablet Take 1 tablet (20 mg total) by mouth daily 90 tablet 1    Melatonin 10 MG TABS Take 10 mg by mouth daily at bedtime      RA Aspirin EC 81 MG EC tablet take 1 tablet by mouth once daily 30 tablet 0     No current facility-administered medications for this visit       _______________________________________________________________________  Review of Systems   Constitutional: Negative for activity change, appetite change, fatigue and fever  HENT: Negative for congestion, ear pain, postnasal drip, rhinorrhea, sinus pressure, sinus pain, sneezing and sore throat  Eyes: Negative for pain and redness  Respiratory: Negative for apnea, cough, chest tightness, shortness of breath and wheezing  Cardiovascular: Negative for chest pain, palpitations and leg swelling  Gastrointestinal: Negative for abdominal pain, constipation, diarrhea, nausea and vomiting  Endocrine: Negative for cold intolerance and heat intolerance  Genitourinary: Negative for difficulty urinating, dysuria, frequency, hematuria and urgency  Musculoskeletal: Negative for arthralgias, back pain, gait problem and myalgias  Skin: Negative for rash  Neurological: Negative for dizziness, speech difficulty, weakness, numbness and headaches  Hematological: Does not bruise/bleed easily  Psychiatric/Behavioral: Negative for agitation, confusion and hallucinations  Objective: There were no vitals filed for this visit  There is no height or weight on file to calculate BMI  Physical Exam  Vitals signs and nursing note reviewed  Constitutional:       Appearance: She is well-developed  HENT:      Head: Normocephalic and atraumatic  Nose: Nose normal    Eyes:      General: No scleral icterus  Conjunctiva/sclera: Conjunctivae normal       Pupils: Pupils are equal, round, and reactive to light  Neck:      Musculoskeletal: Normal range of motion and neck supple  Thyroid: No thyromegaly  Cardiovascular:      Rate and Rhythm: Normal rate and regular rhythm        Heart sounds: Murmur present  Pulmonary:      Effort: Pulmonary effort is normal       Breath sounds: Normal breath sounds  No wheezing  Abdominal:      General: Bowel sounds are normal  There is no distension  Palpations: Abdomen is soft  Tenderness: There is no abdominal tenderness  There is no guarding or rebound  Musculoskeletal: Normal range of motion  General: No tenderness or deformity  Skin:     General: Skin is warm and dry  Findings: No erythema or rash  Neurological:      Mental Status: She is alert and oriented to person, place, and time  Sensory: No sensory deficit  Psychiatric:         Behavior: Behavior normal          Thought Content:  Thought content normal          Judgment: Judgment normal

## 2021-05-04 DIAGNOSIS — I63.031 CEREBROVASCULAR ACCIDENT (CVA) DUE TO THROMBOSIS OF RIGHT CAROTID ARTERY (HCC): ICD-10-CM

## 2021-05-04 NOTE — TELEPHONE ENCOUNTER
Patient's  sent [employer] message requesting a refill of her brillinta  It looks like Dr Lorna Kumar gave her a year supply at the last fill  I recommended that they contact the mail order pharmacy to see if they already sent out the next refill  They have the telephone number for them  I encouraged the  to give me a call if he has any trouble or needs anything from us  He verbalized an understanding

## 2021-05-05 ENCOUNTER — OFFICE VISIT (OUTPATIENT)
Dept: FAMILY MEDICINE CLINIC | Facility: CLINIC | Age: 71
End: 2021-05-05
Payer: COMMERCIAL

## 2021-05-05 VITALS
BODY MASS INDEX: 31.7 KG/M2 | RESPIRATION RATE: 16 BRPM | OXYGEN SATURATION: 98 % | HEART RATE: 92 BPM | DIASTOLIC BLOOD PRESSURE: 80 MMHG | WEIGHT: 167.9 LBS | HEIGHT: 61 IN | TEMPERATURE: 97.9 F | SYSTOLIC BLOOD PRESSURE: 128 MMHG

## 2021-05-05 DIAGNOSIS — Z12.12 SCREENING FOR COLORECTAL CANCER: ICD-10-CM

## 2021-05-05 DIAGNOSIS — E78.2 MIXED HYPERLIPIDEMIA: ICD-10-CM

## 2021-05-05 DIAGNOSIS — I63.031 CEREBROVASCULAR ACCIDENT (CVA) DUE TO THROMBOSIS OF RIGHT CAROTID ARTERY (HCC): ICD-10-CM

## 2021-05-05 DIAGNOSIS — R01.1 MURMUR, CARDIAC: ICD-10-CM

## 2021-05-05 DIAGNOSIS — Z12.11 SCREENING FOR COLORECTAL CANCER: ICD-10-CM

## 2021-05-05 DIAGNOSIS — K21.9 GASTROESOPHAGEAL REFLUX DISEASE WITHOUT ESOPHAGITIS: Primary | ICD-10-CM

## 2021-05-05 DIAGNOSIS — I10 ESSENTIAL HYPERTENSION: ICD-10-CM

## 2021-05-05 PROCEDURE — 99214 OFFICE O/P EST MOD 30 MIN: CPT | Performed by: FAMILY MEDICINE

## 2021-05-05 PROCEDURE — 1101F PT FALLS ASSESS-DOCD LE1/YR: CPT | Performed by: FAMILY MEDICINE

## 2021-05-05 PROCEDURE — 3288F FALL RISK ASSESSMENT DOCD: CPT | Performed by: FAMILY MEDICINE

## 2021-05-11 DIAGNOSIS — I63.031 CEREBROVASCULAR ACCIDENT (CVA) DUE TO THROMBOSIS OF RIGHT CAROTID ARTERY (HCC): ICD-10-CM

## 2021-05-11 RX ORDER — ATORVASTATIN CALCIUM 40 MG/1
TABLET, FILM COATED ORAL
Qty: 90 TABLET | Refills: 1 | Status: SHIPPED | OUTPATIENT
Start: 2021-05-11 | End: 2021-08-13 | Stop reason: SDUPTHER

## 2021-05-19 ENCOUNTER — VBI (OUTPATIENT)
Dept: ADMINISTRATIVE | Facility: OTHER | Age: 71
End: 2021-05-19

## 2021-05-26 DIAGNOSIS — I10 ESSENTIAL HYPERTENSION: ICD-10-CM

## 2021-05-26 RX ORDER — LISINOPRIL 20 MG/1
TABLET ORAL
Qty: 90 TABLET | Refills: 1 | Status: SHIPPED | OUTPATIENT
Start: 2021-05-26 | End: 2021-06-01 | Stop reason: SDUPTHER

## 2021-06-01 DIAGNOSIS — I10 ESSENTIAL HYPERTENSION: ICD-10-CM

## 2021-06-01 RX ORDER — LISINOPRIL 20 MG/1
20 TABLET ORAL DAILY
Qty: 90 TABLET | Refills: 0 | Status: SHIPPED | OUTPATIENT
Start: 2021-06-01 | End: 2021-12-05 | Stop reason: SDUPTHER

## 2021-06-08 ENCOUNTER — HOSPITAL ENCOUNTER (OUTPATIENT)
Dept: NON INVASIVE DIAGNOSTICS | Facility: CLINIC | Age: 71
Discharge: HOME | End: 2021-06-08
Payer: COMMERCIAL

## 2021-06-08 DIAGNOSIS — R01.1 MURMUR, CARDIAC: ICD-10-CM

## 2021-06-08 PROCEDURE — 93306 TTE W/DOPPLER COMPLETE: CPT | Performed by: INTERNAL MEDICINE

## 2021-06-08 PROCEDURE — 93306 TTE W/DOPPLER COMPLETE: CPT

## 2021-06-10 ENCOUNTER — RA CDI HCC (OUTPATIENT)
Dept: OTHER | Facility: HOSPITAL | Age: 71
End: 2021-06-10

## 2021-06-10 NOTE — PROGRESS NOTES
[facility]  coding opportunities          Chart reviewed, no opportunity found: CHART REVIEWED, NO OPPORTUNITY FOUND                     Patients insurance company: [facility] [address]  (Medicare Advantage and Commercial)

## 2021-06-13 PROBLEM — R01.1 MURMUR, CARDIAC: Status: ACTIVE | Noted: 2021-06-13

## 2021-06-13 NOTE — PROGRESS NOTES
Falls Plan of Care: balance, strength, and gait training instructions were provided  Home safety education provided  Assessment/Plan:         Problem List Items Addressed This Visit        Endocrine    Hypothyroidism     Patient to continue current dose of thyroid medicine and recheck TSH in 6 months            Cardiovascular and Mediastinum    Cerebrovascular accident (CVA) due to thrombosis of right carotid artery [facility])     Patient is stable  and will continue present plan of care and reassess at next routine visit  All questions about this problem from patient were answered today  Hypertension       Other    Hyperlipidemia    Murmur, cardiac - Primary     ECHO shows  No significant  Pathology  Subjective:      Patient ID: Nuris Drummond is a 65 y o  female  This 64-year-old white female here today for checkup on hypothyroidism history of CVA hypertension hyperlipidemia and recent murmur  Patient echocardiogram done which essentially was normal shows little bit aortic regurgitation but no stenosis and good wall motion  Patient had her echocardiogram reviewed with her in depth this visit today  We then looked at the video itself  Patient doing well with her medications she is doing okay with her diet needs no refills will see her back for her next routine checkup  The following portions of the patient's history were reviewed and updated as appropriate:   Past Medical History:  She has a past medical history of Hypertension and Hypothyroidism  ,  _______________________________________________________________________  Medical Problems:  does not have any pertinent problems on file ,  _______________________________________________________________________  Past Surgical History:   has a past surgical history that includes IR stroke alert (10/6/2018) and Carotid stent  ,  _______________________________________________________________________  Family History:  family history includes Heart disease in her family; No Known Problems in her brother, daughter, daughter, father, maternal grandfather, maternal grandmother, mother, paternal grandfather, paternal grandmother, and sister  ,  _______________________________________________________________________  Social History:   reports that she has never smoked  She has never used smokeless tobacco  She reports current alcohol use  She reports that she does not use drugs  ,  _______________________________________________________________________  Allergies:  has No Known Allergies     _______________________________________________________________________  Current Outpatient Medications   Medication Sig Dispense Refill    atorvastatin (LIPITOR) 40 mg tablet take 1 tablet by mouth every evening 90 tablet 1    Brilinta 90 MG TAKE 1 TABLET BY MOUTH  EVERY 12 HOURS 180 tablet 3    levothyroxine 88 mcg tablet Take 1 tablet (88 mcg total) by mouth daily 90 tablet 1    lisinopril (ZESTRIL) 20 mg tablet Take 1 tablet (20 mg total) by mouth daily 90 tablet 0    Melatonin 10 MG TABS Take 10 mg by mouth daily at bedtime      RA Aspirin EC 81 MG EC tablet take 1 tablet by mouth once daily 30 tablet 0     No current facility-administered medications for this visit      _______________________________________________________________________  Review of Systems   Constitutional: Negative for activity change, appetite change, fatigue and fever  HENT: Negative for congestion, ear pain, postnasal drip, rhinorrhea, sinus pressure, sinus pain, sneezing and sore throat  Eyes: Negative for pain and redness  Respiratory: Negative for apnea, cough, chest tightness, shortness of breath and wheezing  Cardiovascular: Negative for chest pain, palpitations and leg swelling  Gastrointestinal: Negative for abdominal pain, constipation, diarrhea, nausea and vomiting  Endocrine: Negative for cold intolerance and heat intolerance     Genitourinary: Negative for difficulty urinating, dysuria, frequency, hematuria and urgency  Musculoskeletal: Negative for arthralgias, back pain, gait problem and myalgias  Skin: Negative for rash  Neurological: Negative for dizziness, speech difficulty, weakness, numbness and headaches  Hematological: Does not bruise/bleed easily  Psychiatric/Behavioral: Negative for agitation, confusion and hallucinations  Objective: There were no vitals filed for this visit  There is no height or weight on file to calculate BMI  Physical Exam  Vitals and nursing note reviewed  Constitutional:       Appearance: She is well-developed  HENT:      Head: Normocephalic and atraumatic  Nose: Nose normal       Mouth/Throat:      Mouth: Mucous membranes are moist    Eyes:      General: No scleral icterus  Conjunctiva/sclera: Conjunctivae normal       Pupils: Pupils are equal, round, and reactive to light  Neck:      Thyroid: No thyromegaly  Cardiovascular:      Rate and Rhythm: Normal rate and regular rhythm  Heart sounds: Murmur heard  Pulmonary:      Effort: Pulmonary effort is normal       Breath sounds: Normal breath sounds  No wheezing  Abdominal:      General: Bowel sounds are normal  There is no distension  Palpations: Abdomen is soft  Tenderness: There is no abdominal tenderness  There is no guarding or rebound  Musculoskeletal:         General: No tenderness or deformity  Normal range of motion  Cervical back: Normal range of motion and neck supple  Skin:     General: Skin is warm and dry  Findings: No erythema or rash  Neurological:      Mental Status: She is alert and oriented to person, place, and time  Sensory: No sensory deficit  Psychiatric:         Mood and Affect: Mood normal          Behavior: Behavior normal          Thought Content:  Thought content normal          Judgment: Judgment normal

## 2021-06-16 ENCOUNTER — OFFICE VISIT (OUTPATIENT)
Dept: FAMILY MEDICINE CLINIC | Facility: CLINIC | Age: 71
End: 2021-06-16
Payer: COMMERCIAL

## 2021-06-16 VITALS
SYSTOLIC BLOOD PRESSURE: 122 MMHG | BODY MASS INDEX: 31.34 KG/M2 | WEIGHT: 166 LBS | TEMPERATURE: 97.7 F | OXYGEN SATURATION: 99 % | RESPIRATION RATE: 16 BRPM | DIASTOLIC BLOOD PRESSURE: 70 MMHG | HEIGHT: 61 IN | HEART RATE: 77 BPM

## 2021-06-16 DIAGNOSIS — Z12.11 SCREENING FOR COLORECTAL CANCER: ICD-10-CM

## 2021-06-16 DIAGNOSIS — I10 ESSENTIAL HYPERTENSION: ICD-10-CM

## 2021-06-16 DIAGNOSIS — E03.4 HYPOTHYROIDISM DUE TO ACQUIRED ATROPHY OF THYROID: ICD-10-CM

## 2021-06-16 DIAGNOSIS — E78.2 MIXED HYPERLIPIDEMIA: ICD-10-CM

## 2021-06-16 DIAGNOSIS — I63.031 CEREBROVASCULAR ACCIDENT (CVA) DUE TO THROMBOSIS OF RIGHT CAROTID ARTERY (HCC): ICD-10-CM

## 2021-06-16 DIAGNOSIS — Z12.12 SCREENING FOR COLORECTAL CANCER: ICD-10-CM

## 2021-06-16 DIAGNOSIS — R01.1 MURMUR, CARDIAC: Primary | ICD-10-CM

## 2021-06-16 PROCEDURE — 1036F TOBACCO NON-USER: CPT | Performed by: FAMILY MEDICINE

## 2021-06-16 PROCEDURE — 3725F SCREEN DEPRESSION PERFORMED: CPT | Performed by: FAMILY MEDICINE

## 2021-06-16 PROCEDURE — 3078F DIAST BP <80 MM HG: CPT | Performed by: FAMILY MEDICINE

## 2021-06-16 PROCEDURE — 3074F SYST BP LT 130 MM HG: CPT | Performed by: FAMILY MEDICINE

## 2021-06-16 PROCEDURE — 99214 OFFICE O/P EST MOD 30 MIN: CPT | Performed by: FAMILY MEDICINE

## 2021-06-16 PROCEDURE — 3008F BODY MASS INDEX DOCD: CPT | Performed by: FAMILY MEDICINE

## 2021-06-16 PROCEDURE — 1160F RVW MEDS BY RX/DR IN RCRD: CPT | Performed by: FAMILY MEDICINE

## 2021-07-07 DIAGNOSIS — E03.9 HYPOTHYROIDISM, UNSPECIFIED TYPE: ICD-10-CM

## 2021-07-07 RX ORDER — LEVOTHYROXINE SODIUM 88 UG/1
88 TABLET ORAL DAILY
Qty: 90 TABLET | Refills: 0 | Status: SHIPPED | OUTPATIENT
Start: 2021-07-07 | End: 2021-10-08 | Stop reason: SDUPTHER

## 2021-08-04 ENCOUNTER — APPOINTMENT (OUTPATIENT)
Dept: LAB | Facility: MEDICAL CENTER | Age: 71
End: 2021-08-04
Payer: COMMERCIAL

## 2021-08-04 ENCOUNTER — TELEPHONE (OUTPATIENT)
Dept: NEUROSURGERY | Facility: CLINIC | Age: 71
End: 2021-08-04

## 2021-08-04 DIAGNOSIS — Z01.812 PRE-PROCEDURAL LABORATORY EXAMINATION: ICD-10-CM

## 2021-08-04 DIAGNOSIS — Z01.812 PRE-PROCEDURAL LABORATORY EXAMINATION: Primary | ICD-10-CM

## 2021-08-04 LAB
BUN SERPL-MCNC: 11 MG/DL (ref 5–25)
CREAT SERPL-MCNC: 0.7 MG/DL (ref 0.6–1.3)
GFR SERPL CREATININE-BSD FRML MDRD: 86 ML/MIN/1.73SQ M

## 2021-08-04 PROCEDURE — 36415 COLL VENOUS BLD VENIPUNCTURE: CPT

## 2021-08-04 PROCEDURE — 82565 ASSAY OF CREATININE: CPT

## 2021-08-04 PROCEDURE — 84520 ASSAY OF UREA NITROGEN: CPT

## 2021-08-04 NOTE — PROGRESS NOTES
Received communication from front office requesting rx for bun and creat to be drawn prior to CTA  Placed orders as needed

## 2021-08-04 NOTE — TELEPHONE ENCOUNTER
Received call from CT at [address] [place] [address] stating pt needs new blood work  Yoly entered script  Pt was called and asked to go today so they will have results for tomorrow  Pt agreed will go for blood work today

## 2021-08-05 ENCOUNTER — HOSPITAL ENCOUNTER (OUTPATIENT)
Dept: RADIOLOGY | Facility: MEDICAL CENTER | Age: 71
Discharge: HOME | End: 2021-08-05
Payer: COMMERCIAL

## 2021-08-05 DIAGNOSIS — I63.031 CEREBROVASCULAR ACCIDENT (CVA) DUE TO THROMBOSIS OF RIGHT CAROTID ARTERY (HCC): ICD-10-CM

## 2021-08-05 PROCEDURE — 70498 CT ANGIOGRAPHY NECK: CPT

## 2021-08-05 RX ADMIN — IOHEXOL 85 ML: 350 INJECTION, SOLUTION INTRAVENOUS at 11:23

## 2021-08-09 ENCOUNTER — RA CDI HCC (OUTPATIENT)
Dept: OTHER | Facility: HOSPITAL | Age: 71
End: 2021-08-09

## 2021-08-09 NOTE — PROGRESS NOTES
[facility]  coding opportunities          Chart reviewed, no opportunity found: CHART REVIEWED, NO OPPORTUNITY FOUND                     Patients insurance company: [facility] [address]  (Medicare Advantage and Commercial)

## 2021-08-13 ENCOUNTER — OFFICE VISIT (OUTPATIENT)
Dept: FAMILY MEDICINE CLINIC | Facility: CLINIC | Age: 71
End: 2021-08-13
Payer: COMMERCIAL

## 2021-08-13 ENCOUNTER — OFFICE VISIT (OUTPATIENT)
Dept: NEUROSURGERY | Facility: CLINIC | Age: 71
End: 2021-08-13
Payer: COMMERCIAL

## 2021-08-13 VITALS
HEART RATE: 80 BPM | WEIGHT: 167 LBS | OXYGEN SATURATION: 97 % | BODY MASS INDEX: 31.53 KG/M2 | HEIGHT: 61 IN | DIASTOLIC BLOOD PRESSURE: 74 MMHG | SYSTOLIC BLOOD PRESSURE: 120 MMHG | TEMPERATURE: 98.1 F

## 2021-08-13 VITALS
BODY MASS INDEX: 31.15 KG/M2 | DIASTOLIC BLOOD PRESSURE: 70 MMHG | TEMPERATURE: 97.2 F | RESPIRATION RATE: 16 BRPM | HEIGHT: 61 IN | SYSTOLIC BLOOD PRESSURE: 144 MMHG | WEIGHT: 165 LBS | HEART RATE: 90 BPM

## 2021-08-13 DIAGNOSIS — I63.031 CEREBROVASCULAR ACCIDENT (CVA) DUE TO THROMBOSIS OF RIGHT CAROTID ARTERY (HCC): Primary | ICD-10-CM

## 2021-08-13 DIAGNOSIS — I10 ESSENTIAL HYPERTENSION: Primary | ICD-10-CM

## 2021-08-13 DIAGNOSIS — I63.031 CEREBROVASCULAR ACCIDENT (CVA) DUE TO THROMBOSIS OF RIGHT CAROTID ARTERY (HCC): ICD-10-CM

## 2021-08-13 DIAGNOSIS — E03.4 HYPOTHYROIDISM DUE TO ACQUIRED ATROPHY OF THYROID: ICD-10-CM

## 2021-08-13 DIAGNOSIS — E78.2 MIXED HYPERLIPIDEMIA: ICD-10-CM

## 2021-08-13 DIAGNOSIS — K21.9 GASTROESOPHAGEAL REFLUX DISEASE WITHOUT ESOPHAGITIS: ICD-10-CM

## 2021-08-13 PROCEDURE — 1036F TOBACCO NON-USER: CPT | Performed by: RADIOLOGY

## 2021-08-13 PROCEDURE — 3074F SYST BP LT 130 MM HG: CPT | Performed by: FAMILY MEDICINE

## 2021-08-13 PROCEDURE — 1160F RVW MEDS BY RX/DR IN RCRD: CPT | Performed by: FAMILY MEDICINE

## 2021-08-13 PROCEDURE — 3008F BODY MASS INDEX DOCD: CPT | Performed by: FAMILY MEDICINE

## 2021-08-13 PROCEDURE — 3078F DIAST BP <80 MM HG: CPT | Performed by: FAMILY MEDICINE

## 2021-08-13 PROCEDURE — 99214 OFFICE O/P EST MOD 30 MIN: CPT | Performed by: FAMILY MEDICINE

## 2021-08-13 PROCEDURE — 99214 OFFICE O/P EST MOD 30 MIN: CPT | Performed by: RADIOLOGY

## 2021-08-13 RX ORDER — ATORVASTATIN CALCIUM 40 MG/1
40 TABLET, FILM COATED ORAL EVERY EVENING
Qty: 90 TABLET | Refills: 1 | Status: SHIPPED | OUTPATIENT
Start: 2021-08-13 | End: 2021-11-04 | Stop reason: SDUPTHER

## 2021-08-13 NOTE — PROGRESS NOTES
Assessment/Plan:     Diagnoses and all orders for this visit:    Cerebrovascular accident (CVA) due to thrombosis of right carotid artery ([address] )  -     CTA neck w wo contrast; Future      Discussion Summary:   Inez Craft is doing well  Her CTA is stable with 50-70% stenosis  Treatment is not needed at this time  She may discontinue ASA and continue with Brilinta monotherapy  She will return in one year with a repeat CTA neck  Chief Complaint: Follow-up      Patient ID: Crystal Dyer is a 66 y o  female    HPI    Ms Bertha Prater returns for follow-up of her right carotid stenosis  She is in her usual state of health  Denies any headaches  No new stroke-like changes  Stable vision  Review of Systems   Constitutional: Negative  HENT: Negative  Eyes: Positive for visual disturbance (since her stroke)  Respiratory: Negative  Cardiovascular: Negative  Gastrointestinal: Negative  Endocrine: Negative  Genitourinary: Negative  Musculoskeletal: Negative  Skin: Negative  Allergic/Immunologic: Negative  Neurological: Negative  Negative for syncope  Hematological: Bruises/bleeds easily (medication)  Psychiatric/Behavioral: Negative  I have personally reviewed the MA's review of systems and made changes as necessary      The following portions of the patient's history were reviewed and updated as appropriate: allergies, current medications, past family history, past medical history, past social history, past surgical history and problem list       Active Ambulatory Problems     Diagnosis Date Noted    Cerebrovascular accident (CVA) due to thrombosis of right carotid artery ([address] ) 10/06/2018    Hypothyroidism 10/06/2018    Normocytic anemia due to blood loss 10/08/2018    Slow transit constipation 10/12/2018    Hyperlipidemia 05/08/2020    Gastroesophageal reflux disease without esophagitis 05/08/2020    Well adult exam 11/02/2020    Hypertension     Murmur, cardiac 06/13/2021 Resolved Ambulatory Problems     Diagnosis Date Noted    Carotid stenosis, symptomatic, with infarction ([facility] ) 10/09/2018    Insomnia 10/12/2018     No Additional Past Medical History       Past Surgical History:   Procedure Laterality Date    CAROTID STENT      IR STROKE ALERT  10/6/2018       Current Outpatient Medications   Medication Sig Dispense Refill    atorvastatin (LIPITOR) 40 mg tablet take 1 tablet by mouth every evening 90 tablet 1    Brilinta 90 MG TAKE 1 TABLET BY MOUTH  EVERY 12 HOURS 180 tablet 3    levothyroxine 88 mcg tablet Take 1 tablet (88 mcg total) by mouth daily 90 tablet 0    lisinopril (ZESTRIL) 20 mg tablet Take 1 tablet (20 mg total) by mouth daily 90 tablet 0    Melatonin 10 MG TABS Take 10 mg by mouth daily at bedtime      RA Aspirin EC 81 MG EC tablet take 1 tablet by mouth once daily 30 tablet 0     No current facility-administered medications for this visit  Vitals:    08/13/21 0949   BP: 144/70   Pulse: 90   Resp: 16   Temp: (!) 97 2 °F (36 2 °C)         Objective:    Physical Exam  Neurologic Exam    Results/Data:  I have reviewed the results and images from the CTA in detail with the patient

## 2021-08-13 NOTE — PROGRESS NOTES
Falls Plan of Care: balance, strength, and gait training instructions were provided  Home safety education provided  Assessment/Plan:         Problem List Items Addressed This Visit        Digestive    Gastroesophageal reflux disease without esophagitis     Patient to continue with present therapy and decrease caffeine, avoid ETOH and smoking to decrease acid production  Pt should also cease eating prior to bedtime and avoid excessive fluid intake prior to sleep  May use antacids as needed for breakthrough GERD  All pateint questions answered today about this condition  Endocrine    Hypothyroidism     Patient to continue current dose of thyroid medicine and recheck TSH in 6 months            Cardiovascular and Mediastinum    Cerebrovascular accident (CVA) due to thrombosis of right carotid artery [facility])     Patient is stable  and will continue present plan of care and reassess at next routine visit  All questions about this problem from patient were answered today  Relevant Medications    atorvastatin (LIPITOR) 40 mg tablet    Hypertension - Primary     Patient is stable with current anti-hypertensive medicine and continue to follow a low sodium diet and take current medication  All questions about this condition were answered today  Relevant Orders    TSH + Free T4    Comprehensive metabolic panel    CBC and differential    Magnesium    Uric acid    Urinalysis with microscopic       Other    Hyperlipidemia     Patient  is stable with current medication and we discussed a low fat low cholesterol diet  Weight loss also discussed for this will help lower cholesterol also  Recheck lipids in 6 months  Relevant Medications    atorvastatin (LIPITOR) 40 mg tablet    Other Relevant Orders    Lipid Panel with Direct LDL reflex            Subjective:      Patient ID: Hammad Pugh is a 66 y o  female  This is a 75-year-old white female today for checkup status post CVA    Patient also with GERD hyperlipidemia hypertension hypothyroidism and is doing well  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 and no surgical interventions were necessary this will be medical management  Patient will take her Brilinta twice a day and will be stopping her aspirin  The following portions of the patient's history were reviewed and updated as appropriate:   Past Medical History:  She has a past medical history of Disease of thyroid gland, Hyperlipidemia (5/8/2020), Hypertension, Hypothyroidism, and Stroke ([address] ) (10/23/2018)  ,  _______________________________________________________________________  Medical Problems:  does not have any pertinent problems on file ,  _______________________________________________________________________  Past Surgical History:   has a past surgical history that includes IR stroke alert (10/6/2018) and Carotid stent  ,  _______________________________________________________________________  Family History:  family history includes Heart disease in her family; No Known Problems in her brother, daughter, daughter, father, maternal grandfather, maternal grandmother, mother, paternal grandfather, paternal grandmother, and sister  ,  _______________________________________________________________________  Social History:   reports that she has never smoked  She has never used smokeless tobacco  She reports that she does not drink alcohol and does not use drugs  ,  _______________________________________________________________________  Allergies:  has No Known Allergies     _______________________________________________________________________  Current Outpatient Medications   Medication Sig Dispense Refill    atorvastatin (LIPITOR) 40 mg tablet Take 1 tablet (40 mg total) by mouth every evening 90 tablet 1    Brilinta 90 MG TAKE 1 TABLET BY MOUTH  EVERY 12 HOURS 180 tablet 3    levothyroxine 88 mcg tablet Take 1 tablet (88 mcg total) by mouth daily 90 tablet 0    lisinopril (ZESTRIL) 20 mg tablet Take 1 tablet (20 mg total) by mouth daily 90 tablet 0    Melatonin 10 MG TABS Take 10 mg by mouth daily at bedtime       No current facility-administered medications for this visit      _______________________________________________________________________  Review of Systems   Constitutional: Negative for activity change, appetite change, fatigue and fever  HENT: Negative for congestion, ear pain, postnasal drip, rhinorrhea, sinus pressure, sinus pain, sneezing and sore throat  Eyes: Negative for pain and redness  Respiratory: Negative for apnea, cough, chest tightness, shortness of breath and wheezing  Cardiovascular: Negative for chest pain, palpitations and leg swelling  Gastrointestinal: Negative for abdominal pain, constipation, diarrhea, nausea and vomiting  Endocrine: Negative for cold intolerance and heat intolerance  Genitourinary: Negative for difficulty urinating, dysuria, frequency, hematuria and urgency  Musculoskeletal: Negative for arthralgias, back pain, gait problem and myalgias  Skin: Negative for rash  Neurological: Negative for dizziness, speech difficulty, weakness, numbness and headaches  Hematological: Does not bruise/bleed easily  Psychiatric/Behavioral: Negative for agitation, confusion and hallucinations  Objective:  Vitals:    08/13/21 1553   BP: 120/74   BP Location: Left arm   Patient Position: Sitting   Cuff Size: Standard   Pulse: 80   Temp: 98 1 °F (36 7 °C)   TempSrc: Temporal   SpO2: 97%   Weight: 75 8 kg (167 lb)   Height: 5' 1" (1 549 m)     Body mass index is 31 55 kg/m²  Physical Exam  Vitals and nursing note reviewed  Constitutional:       Appearance: She is well-developed  She is obese  HENT:      Head: Normocephalic and atraumatic  Nose: Nose normal       Mouth/Throat:      Mouth: Mucous membranes are moist    Eyes:      General: No scleral icterus       Conjunctiva/sclera: Conjunctivae normal  Pupils: Pupils are equal, round, and reactive to light  Neck:      Thyroid: No thyromegaly  Cardiovascular:      Rate and Rhythm: Normal rate and regular rhythm  Pulmonary:      Effort: Pulmonary effort is normal       Breath sounds: Normal breath sounds  No wheezing  Abdominal:      General: Bowel sounds are normal  There is no distension  Palpations: Abdomen is soft  Tenderness: There is no abdominal tenderness  There is no guarding or rebound  Musculoskeletal:         General: No tenderness or deformity  Normal range of motion  Cervical back: Normal range of motion and neck supple  Skin:     General: Skin is warm and dry  Findings: No erythema or rash  Neurological:      Mental Status: She is alert and oriented to person, place, and time  Sensory: No sensory deficit  Psychiatric:         Mood and Affect: Mood normal          Behavior: Behavior normal          Thought Content:  Thought content normal          Judgment: Judgment normal

## 2021-08-13 NOTE — ASSESSMENT & PLAN NOTE
Patient to continue current dose of thyroid medicine and recheck TSH in 6 months Detail Level: Simple

## 2021-10-08 DIAGNOSIS — E03.9 HYPOTHYROIDISM, UNSPECIFIED TYPE: ICD-10-CM

## 2021-10-08 RX ORDER — LEVOTHYROXINE SODIUM 88 UG/1
88 TABLET ORAL DAILY
Qty: 90 TABLET | Refills: 0 | Status: SHIPPED | OUTPATIENT
Start: 2021-10-08 | End: 2022-01-06 | Stop reason: SDUPTHER

## 2021-11-04 DIAGNOSIS — I63.031 CEREBROVASCULAR ACCIDENT (CVA) DUE TO THROMBOSIS OF RIGHT CAROTID ARTERY (HCC): ICD-10-CM

## 2021-11-05 RX ORDER — ATORVASTATIN CALCIUM 40 MG/1
40 TABLET, FILM COATED ORAL EVERY EVENING
Qty: 90 TABLET | Refills: 0 | Status: SHIPPED | OUTPATIENT
Start: 2021-11-05 | End: 2022-02-14 | Stop reason: SDUPTHER

## 2021-11-10 LAB
ALBUMIN SERPL-MCNC: 3.8 G/DL (ref 3.6–5.1)
ALBUMIN/GLOB SERPL: 1.5 (CALC) (ref 1–2.5)
ALP SERPL-CCNC: 76 U/L (ref 37–153)
ALT SERPL-CCNC: 12 U/L (ref 6–29)
APPEARANCE UR: ABNORMAL
AST SERPL-CCNC: 17 U/L (ref 10–35)
BACTERIA UR QL AUTO: ABNORMAL /HPF
BASOPHILS # BLD AUTO: 30 CELLS/UL (ref 0–200)
BASOPHILS NFR BLD AUTO: 0.8 %
BILIRUB SERPL-MCNC: 0.4 MG/DL (ref 0.2–1.2)
BILIRUB UR QL STRIP: NEGATIVE
BUN SERPL-MCNC: 19 MG/DL (ref 7–25)
BUN/CREAT SERPL: NORMAL (CALC) (ref 6–22)
CALCIUM SERPL-MCNC: 9.2 MG/DL (ref 8.6–10.4)
CHLORIDE SERPL-SCNC: 109 MMOL/L (ref 98–110)
CHOLEST SERPL-MCNC: 140 MG/DL
CHOLEST/HDLC SERPL: 2.1 (CALC)
CO2 SERPL-SCNC: 26 MMOL/L (ref 20–32)
COLOR UR: YELLOW
CREAT SERPL-MCNC: 0.62 MG/DL (ref 0.6–0.93)
EOSINOPHIL # BLD AUTO: 130 CELLS/UL (ref 15–500)
EOSINOPHIL NFR BLD AUTO: 3.5 %
ERYTHROCYTE [DISTWIDTH] IN BLOOD BY AUTOMATED COUNT: 12.5 % (ref 11–15)
GLOBULIN SER CALC-MCNC: 2.5 G/DL (CALC) (ref 1.9–3.7)
GLUCOSE SERPL-MCNC: 88 MG/DL (ref 65–99)
GLUCOSE UR QL STRIP: NEGATIVE
HCT VFR BLD AUTO: 33.5 % (ref 35–45)
HDLC SERPL-MCNC: 67 MG/DL
HGB BLD-MCNC: 10.9 G/DL (ref 11.7–15.5)
HGB UR QL STRIP: ABNORMAL
HYALINE CASTS #/AREA URNS LPF: ABNORMAL /LPF
KETONES UR QL STRIP: NEGATIVE
LDLC SERPL CALC-MCNC: 63 MG/DL (CALC)
LEUKOCYTE ESTERASE UR QL STRIP: ABNORMAL
LYMPHOCYTES # BLD AUTO: 881 CELLS/UL (ref 850–3900)
LYMPHOCYTES NFR BLD AUTO: 23.8 %
MAGNESIUM SERPL-MCNC: 2.1 MG/DL (ref 1.5–2.5)
MCH RBC QN AUTO: 29.8 PG (ref 27–33)
MCHC RBC AUTO-ENTMCNC: 32.5 G/DL (ref 32–36)
MCV RBC AUTO: 91.5 FL (ref 80–100)
MONOCYTES # BLD AUTO: 370 CELLS/UL (ref 200–950)
MONOCYTES NFR BLD AUTO: 10 %
NEUTROPHILS # BLD AUTO: 2290 CELLS/UL (ref 1500–7800)
NEUTROPHILS NFR BLD AUTO: 61.9 %
NITRITE UR QL STRIP: NEGATIVE
NONHDLC SERPL-MCNC: 73 MG/DL (CALC)
PH UR STRIP: ABNORMAL [PH] (ref 5–8)
PLATELET # BLD AUTO: 282 THOUSAND/UL (ref 140–400)
PMV BLD REES-ECKER: 10.1 FL (ref 7.5–12.5)
POTASSIUM SERPL-SCNC: 4.9 MMOL/L (ref 3.5–5.3)
PROT SERPL-MCNC: 6.3 G/DL (ref 6.1–8.1)
PROT UR QL STRIP: NEGATIVE
RBC # BLD AUTO: 3.66 MILLION/UL (ref 3.8–5.1)
RBC #/AREA URNS HPF: ABNORMAL /HPF
SL AMB EGFR AFRICAN AMERICAN: 104 ML/MIN/1.73M2
SL AMB EGFR NON AFRICAN AMERICAN: 89 ML/MIN/1.73M2
SODIUM SERPL-SCNC: 141 MMOL/L (ref 135–146)
SP GR UR STRIP: 1.02 (ref 1–1.03)
SQUAMOUS #/AREA URNS HPF: ABNORMAL /HPF
T4 FREE SERPL-MCNC: 1.3 NG/DL (ref 0.8–1.8)
TRIGL SERPL-MCNC: 35 MG/DL
TSH SERPL-ACNC: 0.17 MIU/L (ref 0.4–4.5)
URATE SERPL-MCNC: 4.6 MG/DL (ref 2.5–7)
WBC # BLD AUTO: 3.7 THOUSAND/UL (ref 3.8–10.8)
WBC #/AREA URNS HPF: ABNORMAL /HPF

## 2021-11-17 ENCOUNTER — OFFICE VISIT (OUTPATIENT)
Dept: FAMILY MEDICINE CLINIC | Facility: CLINIC | Age: 71
End: 2021-11-17
Payer: COMMERCIAL

## 2021-11-17 VITALS
SYSTOLIC BLOOD PRESSURE: 148 MMHG | HEIGHT: 61 IN | HEART RATE: 86 BPM | BODY MASS INDEX: 30.58 KG/M2 | RESPIRATION RATE: 18 BRPM | DIASTOLIC BLOOD PRESSURE: 70 MMHG | WEIGHT: 162 LBS | OXYGEN SATURATION: 98 % | TEMPERATURE: 98.2 F

## 2021-11-17 DIAGNOSIS — I10 PRIMARY HYPERTENSION: ICD-10-CM

## 2021-11-17 DIAGNOSIS — I63.031 CEREBROVASCULAR ACCIDENT (CVA) DUE TO THROMBOSIS OF RIGHT CAROTID ARTERY (HCC): ICD-10-CM

## 2021-11-17 DIAGNOSIS — Z12.12 SCREENING FOR COLORECTAL CANCER: Primary | ICD-10-CM

## 2021-11-17 DIAGNOSIS — Z00.00 WELL ADULT EXAM: ICD-10-CM

## 2021-11-17 DIAGNOSIS — E03.4 HYPOTHYROIDISM DUE TO ACQUIRED ATROPHY OF THYROID: ICD-10-CM

## 2021-11-17 DIAGNOSIS — Z23 IMMUNIZATION DUE: ICD-10-CM

## 2021-11-17 DIAGNOSIS — E78.2 MIXED HYPERLIPIDEMIA: ICD-10-CM

## 2021-11-17 DIAGNOSIS — Z12.11 SCREENING FOR COLORECTAL CANCER: Primary | ICD-10-CM

## 2021-11-17 DIAGNOSIS — K21.9 GASTROESOPHAGEAL REFLUX DISEASE WITHOUT ESOPHAGITIS: ICD-10-CM

## 2021-11-17 PROCEDURE — 99214 OFFICE O/P EST MOD 30 MIN: CPT | Performed by: FAMILY MEDICINE

## 2021-11-17 PROCEDURE — 1160F RVW MEDS BY RX/DR IN RCRD: CPT | Performed by: FAMILY MEDICINE

## 2021-11-17 PROCEDURE — 1036F TOBACCO NON-USER: CPT | Performed by: FAMILY MEDICINE

## 2021-11-17 PROCEDURE — 3077F SYST BP >= 140 MM HG: CPT | Performed by: FAMILY MEDICINE

## 2021-11-17 PROCEDURE — 1125F AMNT PAIN NOTED PAIN PRSNT: CPT | Performed by: FAMILY MEDICINE

## 2021-11-17 PROCEDURE — 3288F FALL RISK ASSESSMENT DOCD: CPT | Performed by: FAMILY MEDICINE

## 2021-11-17 PROCEDURE — 3078F DIAST BP <80 MM HG: CPT | Performed by: FAMILY MEDICINE

## 2021-11-17 PROCEDURE — 1170F FXNL STATUS ASSESSED: CPT | Performed by: FAMILY MEDICINE

## 2021-11-17 PROCEDURE — G0439 PPPS, SUBSEQ VISIT: HCPCS | Performed by: FAMILY MEDICINE

## 2021-11-17 PROCEDURE — G0008 ADMIN INFLUENZA VIRUS VAC: HCPCS

## 2021-11-17 PROCEDURE — 90662 IIV NO PRSV INCREASED AG IM: CPT

## 2021-11-30 NOTE — DISCHARGE INSTRUCTIONS
You are a 77 y o  female who presented to the [facility] 10/6 after developing slurred speech and left sided weakness  You have a past medical history significant for hypothyroidism  You were found to have occlusions in some of the major vessels supplying your brain, particularly on the right  A clot buster called tPA was not administered, but you did undergo urgent endovascular intervention with balloon angioplasty and stenting of the right ICA origin with approximately 40% residual narrowing remaining through the stented segment  Non-flow limiting thrombus remained just above the stent  You had clear improvement in her speech and intellgibility following your procedure  You were started on double anti-platelet therapy with aspirin and Plavix  Imaging of your heart showed no clot source  10/7 MRI Brain confirmed extensive recent infarcts in the right hemisphere  No bleeding was noted  Your course was complicated by blood loss anemia requiring transfusion of blood  Your blood counts have been stable since  Repeat CTH on day of discharge was stable  You were discharged to the acute rehab unit on 10/12/18  Here we managed the following issues:    R MCA CVA: We started you back on your aspirin  You are now on doublet anti-platelet therapy with aspirin and brillinta  You will need a repeat CTA of your head and neck to evaluate the residual clot that was seen in your imaging  If it is still there, you will need repeat intervention with Dr Chato Cage to decrease the risk of having another stroke  We continued your atorvastatin from inpatient, and monitored your blood pressures which were well-controlled  Left Visual Field Deficit: You were given a referral to Neuro-Optometry for evaluation  You will also do vision therapy as an outpatient  You can call Dr Amber Deluca, RADHA at [place] to set an appointment: [phone]    Constipation: You are now on senna/colace daily   IF you do not have a bowel movement in 2-3 days, you should take 17g of miralax mixed with water  Hypothyroidism: Continue your current dose of levothyroxine    Acute Blood Loss Anemia: If you develop symptoms of lightheadedness/dizziness let your primary care physician know  We did check your stool for blood, which was negative  Please follow-up with your primary care physician in 2 weeks  Intermittent High blood pressure: Please check your blood pressure every day and keep a log  Follow-up with your physician within 1 week  See below for parameters on when to call your physician  Please see your doctors listed in the follow up providers section of your discharge paperwork, and take the discharge paperwork with you to your appointments  ***  Should you develop feelings of significant hopelessness, severe depression, severe anxiety, or suicide you should call 911 or obtain transportation to nearest   [employer] Suicide Prevention Lifeline is 6-163.606.3751 and is available 24 hrs/day  If you have any questions or concerns regarding your acute rehabilitation stay including issues with medications, rehabilitation, and follow-up plan, please call:   [address] [place] [address] at [phone]  You should have the following blood work done after discharge:  None  Follow-up with outpatient physicians regarding the results  You are restricted from driving until cleared by outpatient physician and cleared through a formal driving evaluation  Please note changes may have been made to your medications please refer to your discharge paperwork for your current medications and take this list with you to all your doctors appointments for your doctors to review    Please do not resume a home medication unless the medication reconciliation sheet indicates to do so, please do not assume that a medication that you were given a prescription for is the same as a medication you have at home based on both medications having the same name as dosages and frequency may have changed  Please check your blood pressure  and keep a log that you will bring with you to your follow-up doctors' appointments  >Please contact your family doctor or cardiologist immediately for a blood pressure below 100/50   >Please contact your family doctor or cardiologist as soon as possible for blood pressure greater than 160/100    Please avoid NSAID (including but not limited to advil, aleve, motrin, naproxen, ibuprofen, mobic, meloxicam, diclofenac etc) medications as NSAID medications may increase your risk of bleeding (which can be life-threatening), stroke, heart attack, kidney disease,  delay bone healing  Unless specifically noted in your medication list provided to you in your discharge paper work, please discuss with your family doctor prior to resuming any vitamins, minerals, supplements you may have been taking prior to your hospitalization     Please note a summary of your hospital stay with relevant information for your doctors will try to be sent to them  Please confirm with your doctors at your follow up visits that they have received this summary and have them contact [address] [place] [address] if they have not received them along with any other medical records they may require  Leonila Murillo Phone Number:  [phone]    Constipation   WHAT YOU NEED TO KNOW:   Constipation is when you have hard, dry bowel movements, or you go longer than usual between bowel movements  DISCHARGE INSTRUCTIONS:   Seek care immediately if:   · You have blood in your bowel movements  · You have a fever and abdominal pain with the constipation  Contact your healthcare provider if:   · Your constipation gets worse  · You start to vomit  · You have questions or concerns about your condition or care  Medicines:   · Medicine or a fiber supplement  may help make your bowel movement softer   A laxative may help relax and loosen your intestines to help you have a bowel movement  You may also be given medicine to increase fluid in your intestines  The fluid may help move bowel movements through your intestines  · Take your medicine as directed  Contact your healthcare provider if you think your medicine is not helping or if you have side effects  Tell him of her if you are allergic to any medicine  Keep a list of the medicines, vitamins, and herbs you take  Include the amounts, and when and why you take them  Bring the list or the pill bottles to follow-up visits  Carry your medicine list with you in case of an emergency  Manage your constipation:   · Drink liquids as directed  You may need to drink extra liquids to help soften and move your bowels  Ask how much liquid to drink each day and which liquids are best for you  · Eat high-fiber foods  This may help decrease constipation by adding bulk to your bowel movements  High-fiber foods include fruit, vegetables, whole-grain breads and cereals, and beans  Your healthcare provider or dietitian can help you create a high-fiber meal plan  · Exercise regularly  Regular physical activity can help stimulate your intestines  Ask which exercises are best for you  · Schedule a time each day to have a bowel movement  This may help train your body to have regular bowel movements  Bend forward while you are on the toilet to help move the bowel movement out  Sit on the toilet for at least 10 minutes, even if you do not have a bowel movement  Follow up with your healthcare provider as directed:  Write down your questions so you remember to ask them during your visits  © 2017 [employer]  Information is for End User's use only and may not be sold, redistributed or otherwise used for commercial purposes  All illustrations and images included in CareNotes® are the copyrighted property of [employer] M [employer]  or [employer]win Rhoad[employer]    The above information is an  only  It is not intended as medical advice for individual conditions or treatments  Talk to your doctor, nurse or pharmacist before following any medical regimen to see if it is safe and effective for you  Effects of a Stroke   WHAT YOU NEED TO KNOW:   The effects of a stroke may be different for everyone  They may depend on where the stroke happened in your brain and how much damage occurred there  Some people may make a full recovery  Other people may have long-term effects  It is important to talk to your healthcare provider about any symptoms you have after a stroke  There are medicines and therapies to help manage the effects of a stroke  DISCHARGE INSTRUCTIONS:   Call 911 or have someone else call 911 for any of the following:   · You have any of the following signs of a stroke:      ¨ Numbness or drooping on one side of your face     ¨ Weakness in an arm or leg    ¨ Confusion or difficulty speaking    ¨ Dizziness, a severe headache, or vision loss    ·            · You cannot be woken up  · You fall and hit your head  · You have a seizure  · You feel lightheaded, short of breath, and have chest pain  · You cough up blood  Seek care immediately if:   · Your arm or leg is painful, red, or larger than normal      · You feel weak, dizzy, or faint  · You have a fall without hitting your head  · Your blood sugar level or blood pressure is higher or lower than your healthcare provider said it should be  · You bleed heavily or cannot stop bleeding from a cut or injury  · You have a new, severe headache  Contact your healthcare provider if:   · You have a fever  · You have a rash  · You have new or worsening symptoms  · You feel extremely sad or anxious  · You feel you are unable to cope with your condition  · You have trouble sleeping  · You have open sores  · You choke or cough when eating or drinking       · You have questions or concerns about your condition or care  Problems with language, speech, and memory:   · Difficulty finding the right words or putting complete sentences together    · Difficulty putting words together that make sense    · Difficulty paying attention or a short attention span    · Difficulty writing or reading    · Problems with memory or being disoriented to time, place, or situation    · Problems thinking clearly or feeling confused    · Difficulty understanding written or spoken language or learning something new  Muscle and nerve problems:  A stroke may affect one side of your body or part of one side  You may be at an increased risk for falling if you have difficulty moving your leg muscles   You may have any of the following:  · Inability to move your arm, leg, or one side of your face (paralysis)    · Muscle weakness, spasms, or muscles that stay in one position (contracted)    · Poor balance, difficulty walking, or difficulty grasping objects    · Changes in your vision or poor vision    · Ignoring, or being unaware of one side of your body    · Numbness of your arm, hand, fingers, leg, foot, or toes    · Pain, tickling, or prickling in weak or paralyzed parts of your body  Bowel and bladder problems:   · Loss of control of your urine or bowel movements    · Feeling like you have to urinate frequently, even when your bladder is not full    · Difficulty emptying your bladder or constipation  Swallowing and eating problems:   · Difficulty swallowing food    · Difficulty feeding yourself    · A lack of taste or a change in the way you taste things    · An increased risk for aspiration (food moves into the lungs) and pneumonia due to swallowing problems  Changes in personality or mood:   · Depression, sadness, irritability, or hopelessness    · Anger, frustration, or anxiety    · Difficulty controlling emotions or expressing inappropriate emotions    · Quick mood changes  Fatigue:   · Low energy levels    · Tiring easily    · A lack of motivation  Problems sleeping:   · Development of sleep apnea    · Changes in sleep such as sleeping too much or not enough  Problems with sexual function:   · Difficulty having or keeping an erection    · Vaginal dryness    · A decreased interest in sex  Self care after a stroke:   · Go to rehabilitation (rehab) as directed  Rehab is an important part of treatment  A speech therapist helps you relearn or improve your ability to talk and swallow  You may start slowly and start doing more difficult tasks over time  Physical therapists can help you gain strength and build endurance  Occupational therapists teach you new ways to do daily activities, such as getting dressed  Therapy can help you improve your ability to walk or keep your balance  Your therapy may include tasks or movements you will need to do for everyday activities  An example is being able to raise or lower yourself from a chair  · Prevent falls in your home  Remove anything you might trip over  Tape electrical cords down  Keep paths clear throughout your home  Make sure your home is well lighted  Put nonslip materials on surfaces that might be slippery  An example is your bathtub or shower floor  · Use assistive devices  A cane or walker may help you keep your balance as you walk  · Manage other medical conditions  Manage your heart conditions, blood pressure, and diabetes  Management of these conditions can reduce your risk for another stroke  Take your medicines as directed  Follow your healthcare provider's instructions for diet  · Join a support group  Life after a stroke can be difficult  It may be helpful to talk to others who have had a stroke  There are also support groups for family members or support persons of those whom have had a stroke  Ask your healthcare provider for more information about support groups    Know the signs of depression: Depression may happen after you have a stroke  Depression can lower your quality of life and cause you to stop doing things to help you be stronger  Depression can cause you to become less independent  Know the signs of depression so that you can receive help  Tell your family and friends that if they see these signs, to let your healthcare provider know  You may show any of the following signs of depression:  · Extreme sadness    · Avoiding social interaction with family or friends    · A lack of interest in things you once enjoyed    · Irritability    · Trouble sleeping    · Low energy levels    · A change in eating habits or sudden weight gain or loss  Driving after a stroke:  Do not drive a car without talking to your healthcare provider or occupational therapist  The effects of a stroke may make it difficult or unsafe for you to drive  You may need to have a  evaluation before you can safely and legally drive a car  You may also need to take a  training program or get special equipment installed in your car  Ask your healthcare provider for more information about driving after a stroke  For more information and support:   · National Stroke Association  [address] , [place] [address]  Phone: 3- 155 - [phone]  Web Address: [employer]   org  Follow up with your healthcare provider as directed: You may need to come in for regular tests of your brain function  If you are taking warfarin, you will need to come in for regular blood tests  Your INR levels will also need to be checked  These tests help make sure you are taking the right amount of warfarin  Write down your questions so you remember to ask them during your visits  © 2017 [employer] Information is for End User's use only and may not be sold, redistributed or otherwise used for commercial purposes  All illustrations and images included in CareNotes® are the copyrighted property of [employer] M , Inc  or [employer]win Rhoades    The above information is an  only  It is not intended as medical advice for individual conditions or treatments  Talk to your doctor, nurse or pharmacist before following any medical regimen to see if it is safe and effective for you  Carotid Artery Disease   WHAT YOU NEED TO KNOW:   Carotid artery disease is a condition that causes narrow or blocked carotid arteries  Your carotid arteries are the blood vessels that supply your brain with most of the blood it needs to work  You have 2 carotid arteries, one on each side of your neck  DISCHARGE INSTRUCTIONS:   Call 911 for any of the following:   · You have any of the following signs of a stroke:      ¨ Numbness or drooping on one side of your face     ¨ Weakness in an arm or leg    ¨ Confusion or difficulty speaking    ¨ Dizziness, a severe headache, or vision loss    · You have any of the following signs of a heart attack:      ¨ Squeezing, pressure, or pain in your chest that lasts longer than 5 minutes or returns    ¨ Discomfort or pain in your back, neck, jaw, stomach, or arm     ¨ Trouble breathing    ¨ Nausea or vomiting    ¨ Lightheadedness or a sudden cold sweat, especially with chest pain or trouble breathing  Contact your healthcare provider if:   · You have questions or concerns about your condition or care  Medicines:   · Take aspirin if directed  Your healthcare provider may suggest that you take an aspirin a day to prevent blood clots from forming in the carotid arteries  If your healthcare provider wants you to take aspirin daily, do not take acetaminophen or ibuprofen instead  · Take your medicine as directed  Contact your healthcare provider if you think your medicine is not helping or if you have side effects  Tell him or her if you are allergic to any medicine  Keep a list of the medicines, vitamins, and herbs you take  Include the amounts, and when and why you take them   Bring the list or the pill bottles to follow-up visits  Carry your medicine list with you in case of an emergency  Warning signs of a stroke: The word F A S T  can help you remember and recognize warning signs of a stroke  · F = Face:  One side of the face droops  · A = Arms:  One arm starts to drop when both arms are raised  · S = Speech:  Speech is slurred or sounds different than usual     · T = Time:  A person who is having a stroke needs to be seen immediately  A stroke is a medical emergency that needs immediate treatment  Some medicines and treatments work best if given within a few hours of a stroke  Manage carotid artery disease:   · Eat a variety of healthy foods  Healthy foods include fruit, vegetables, whole-grain breads, low-fat dairy products, lean meat, and fish  Choose fish that are high in omega-3 fatty acids, such as salmon and fresh tuna  Ask your healthcare provider for more information on a heart healthy diet and the DASH eating plan  · Limit sodium (salt)  Sodium may increase your blood pressure  Add less table salt to your foods  Read food labels and choose foods that are low in sodium  Your healthcare provider may suggest you follow a low sodium diet  · Reach or maintain a healthy weight  Extra weight makes your heart work harder  Ask your healthcare provider how much you should weight  He can help you create a safe weight loss plan  Even a weight loss of 10% of your body weight can help your heart function better  · Exercise as directed  Exercise helps improve heart function and can help you manage your weight  Exercise can also help lower your cholesterol and blood sugar levels  Try to get at least 30 minutes of exercise at least 5 times each week  Try to be active every day  Your healthcare provider can help you create an exercise plan that works best for you  · Limit alcohol  Alcohol can increase your blood pressure and triglyceride levels  Men should limit alcohol to 2 drinks per day   Women should limit alcohol to 1 drink per day  A drink of alcohol is 12 ounces of beer, 5 ounces of wine, or 1½ ounces of liquor  · Do not smoke  Nicotine and other chemicals in cigarettes and cigars can cause heart and lung damage  Ask your healthcare provider for information if you currently smoke and need help to quit  E-cigarettes or smokeless tobacco still contain nicotine  Talk to your healthcare provider before you use these products  Follow up with your healthcare provider as directed:  Write down your questions so you remember to ask them during your visits  © 2017 [facility] Information is for End User's use only and may not be sold, redistributed or otherwise used for commercial purposes  All illustrations and images included in CareNotes® are the copyrighted property of [employer] , Inc  or [employer]win Rhoades  The above information is an  only  It is not intended as medical advice for individual conditions or treatments  Talk to your doctor, nurse or pharmacist before following any medical regimen to see if it is safe and effective for you  Carotid Artery Stent Insertion   WHAT YOU NEED TO KNOW:   Carotid artery stent insertion is a surgery to widen a narrowed carotid artery  A stent is a small cylinder-shaped tube that widens a blood vessel  The carotid artery is a large blood vessel found in your neck that carries blood and oxygen to your brain  DISCHARGE INSTRUCTIONS:   Medicines:   · Antiplatelets , such as aspirin, help prevent blood clots  Take your antiplatelet medicine exactly as directed  These medicines make it more likely for you to bleed or bruise  If you are told to take aspirin, do not take acetaminophen or ibuprofen instead  · Pain medicine: You may be given a prescription medicine to decrease pain  Do not wait until the pain is severe before you take this medicine  · Take your medicine as directed    Contact your healthcare provider if you think your medicine is not helping or if you have side effects  Tell him or her if you are allergic to any medicine  Keep a list of the medicines, vitamins, and herbs you take  Include the amounts, and when and why you take them  Bring the list or the pill bottles to follow-up visits  Carry your medicine list with you in case of an emergency  Follow up with your healthcare provider as directed: If you need an MRI, wait at least 6 to 8 weeks after stent placement, or as directed  Write down your questions so you remember to ask them during your visits  Contact your healthcare provider if:   · You have a fever  · You have chills or a cough, or you feel weak and achy  · Your skin is itchy, swollen, or has a rash  · You have questions or concerns about your condition or care  Seek care immediately or call 911 if:   · You have bleeding, increased bruising, or swelling where the catheter was inserted  · You are dizzy, weak, confused, or faint  · You lose feeling in a part of your body  · You suddenly feel lightheaded and short of breath  · You have chest pain when you take a deep breath or cough  · You cough up blood  · Your arm or leg feels warm, tender, and painful  It may look swollen and red  © 2017 [employer]  Information is for End User's use only and may not be sold, redistributed or otherwise used for commercial purposes  All illustrations and images included in CareNotes® are the copyrighted property of [employer]  or [employer]win Rhoades  The above information is an  only  It is not intended as medical advice for individual conditions or treatments  Talk to your doctor, nurse or pharmacist before following any medical regimen to see if it is safe and effective for you  Yes

## 2021-12-02 ENCOUNTER — TELEPHONE (OUTPATIENT)
Dept: FAMILY MEDICINE CLINIC | Facility: CLINIC | Age: 71
End: 2021-12-02

## 2021-12-05 DIAGNOSIS — I10 ESSENTIAL HYPERTENSION: ICD-10-CM

## 2021-12-06 RX ORDER — LISINOPRIL 20 MG/1
20 TABLET ORAL DAILY
Qty: 90 TABLET | Refills: 0 | Status: SHIPPED | OUTPATIENT
Start: 2021-12-06 | End: 2022-03-01

## 2022-01-06 DIAGNOSIS — E03.9 HYPOTHYROIDISM, UNSPECIFIED TYPE: ICD-10-CM

## 2022-01-07 RX ORDER — LEVOTHYROXINE SODIUM 88 UG/1
88 TABLET ORAL DAILY
Qty: 90 TABLET | Refills: 0 | Status: SHIPPED | OUTPATIENT
Start: 2022-01-07 | End: 2022-04-13 | Stop reason: SDUPTHER

## 2022-02-14 DIAGNOSIS — I63.031 CEREBROVASCULAR ACCIDENT (CVA) DUE TO THROMBOSIS OF RIGHT CAROTID ARTERY (HCC): ICD-10-CM

## 2022-02-14 RX ORDER — ATORVASTATIN CALCIUM 40 MG/1
40 TABLET, FILM COATED ORAL EVERY EVENING
Qty: 90 TABLET | Refills: 0 | Status: SHIPPED | OUTPATIENT
Start: 2022-02-14 | End: 2022-05-10 | Stop reason: SDUPTHER

## 2022-02-17 NOTE — PROGRESS NOTES
BMI Counseling: There is no height or weight on file to calculate BMI  The BMI is above normal  Nutrition recommendations include decreasing portion sizes, encouraging healthy choices of fruits and vegetables, decreasing fast food intake, consuming healthier snacks, limiting drinks that contain sugar, moderation in carbohydrate intake, increasing intake of lean protein, reducing intake of saturated and trans fat and reducing intake of cholesterol  Exercise recommendations include exercising 3-5 times per week  No pharmacotherapy was ordered  Patient referred to PCP  Rationale for BMI follow-up plan is due to patient being overweight or obese  Falls Plan of Care: balance, strength, and gait training instructions were provided  Home safety education provided  Assessment/Plan:         Problem List Items Addressed This Visit        Digestive    Gastroesophageal reflux disease without esophagitis - Primary     Patient to continue with present therapy and decrease caffeine, avoid ETOH and smoking to decrease acid production  Pt should also cease eating prior to bedtime and avoid excessive fluid intake prior to sleep  May use antacids as needed for breakthrough GERD  All pateint questions answered today about this condition  Endocrine    Hypothyroidism     Patient to continue current dose of thyroid medicine and recheck TSH in 6 months            Cardiovascular and Mediastinum    Cerebrovascular accident (CVA) due to thrombosis of right carotid artery [facility])     Patient is stable  and will continue present plan of care and reassess at next routine visit  All questions about this problem from patient were answered today  Hypertension     Patient is stable with current anti-hypertensive medicine and continue to follow a low sodium diet and take current medication  All questions about this condition were answered today              Other    Hyperlipidemia     Patient  is stable with current medication and we discussed a low fat low cholesterol diet  Weight loss also discussed for this will help lower cholesterol also  Recheck lipids in 6 months  Other Visit Diagnoses     Screening for colorectal cancer        Encounter for screening mammogram for breast cancer        Menopause                Subjective:      Patient ID: Allyssa Devlin is a 66 y o  female  I this is a 78-year-old female here today for checkup on multiple medical problems patient with history of CVA hypertension hypothyroidism hyperlipidemia and hot is doing well  Patient with a recent fall out of her car patient stumbled after getting her foot caught her per strep getting out of her car  Patient had struck her face on the sidewalk she had some bruising but no problem with breathing and pain control is under control  Patient is due for lab work for next visit and she which she needs prescription refills she will call us when she needs them  Patient needs a mammogram and her DEXA scan done for her next visit      The following portions of the patient's history were reviewed and updated as appropriate:   Past Medical History:  She has a past medical history of Disease of thyroid gland, Hyperlipidemia (5/8/2020), Hypertension, Hypothyroidism, and Stroke ([facility] [place] [address] ) (10/23/2018)  ,  _______________________________________________________________________  Medical Problems:  does not have any pertinent problems on file ,  _______________________________________________________________________  Past Surgical History:   has a past surgical history that includes IR stroke alert (10/6/2018) and Carotid stent  ,  _______________________________________________________________________  Family History:  family history includes Heart disease in her family;  No Known Problems in her brother, daughter, daughter, father, maternal grandfather, maternal grandmother, mother, paternal grandfather, paternal grandmother, and sister  ,  _______________________________________________________________________  Social History:   reports that she has never smoked  She has never used smokeless tobacco  She reports that she does not drink alcohol and does not use drugs  ,  _______________________________________________________________________  Allergies:  has No Known Allergies     _______________________________________________________________________  Current Outpatient Medications   Medication Sig Dispense Refill    atorvastatin (LIPITOR) 40 mg tablet Take 1 tablet (40 mg total) by mouth every evening 90 tablet 0    Brilinta 90 MG TAKE 1 TABLET BY MOUTH  EVERY 12 HOURS 180 tablet 3    levothyroxine 88 mcg tablet Take 1 tablet (88 mcg total) by mouth daily 90 tablet 0    lisinopril (ZESTRIL) 20 mg tablet Take 1 tablet (20 mg total) by mouth daily 90 tablet 0    Melatonin 10 MG TABS Take 10 mg by mouth daily at bedtime       No current facility-administered medications for this visit      _______________________________________________________________________  Review of Systems   Constitutional: Negative for activity change, appetite change, fatigue and fever  HENT: Negative for congestion, ear pain, postnasal drip, rhinorrhea, sinus pressure, sinus pain, sneezing and sore throat  Eyes: Negative for pain and redness  Respiratory: Negative for apnea, cough, chest tightness, shortness of breath and wheezing  Cardiovascular: Negative for chest pain, palpitations and leg swelling  Gastrointestinal: Negative for abdominal pain, constipation, diarrhea, nausea and vomiting  Endocrine: Negative for cold intolerance and heat intolerance  Genitourinary: Negative for difficulty urinating, dysuria, frequency, hematuria and urgency  Musculoskeletal: Negative for arthralgias, back pain, gait problem and myalgias  Skin: Negative for rash  Neurological: Negative for dizziness, speech difficulty, weakness, numbness and headaches  Hematological: Does not bruise/bleed easily  Psychiatric/Behavioral: Negative for agitation, confusion and hallucinations  Objective: There were no vitals filed for this visit  There is no height or weight on file to calculate BMI  Physical Exam  Vitals and nursing note reviewed  Constitutional:       Appearance: She is well-developed  HENT:      Head: Normocephalic and atraumatic  Nose: Nose normal       Mouth/Throat:      Mouth: Mucous membranes are moist    Eyes:      General: No scleral icterus  Conjunctiva/sclera: Conjunctivae normal       Pupils: Pupils are equal, round, and reactive to light  Neck:      Thyroid: No thyromegaly  Cardiovascular:      Rate and Rhythm: Normal rate and regular rhythm  Pulmonary:      Effort: Pulmonary effort is normal       Breath sounds: Normal breath sounds  No wheezing  Abdominal:      General: Bowel sounds are normal  There is no distension  Palpations: Abdomen is soft  Tenderness: There is no abdominal tenderness  There is no guarding or rebound  Musculoskeletal:         General: No tenderness or deformity  Normal range of motion  Cervical back: Normal range of motion and neck supple  Skin:     General: Skin is warm and dry  Findings: No erythema or rash  Neurological:      Mental Status: She is alert and oriented to person, place, and time  Sensory: No sensory deficit  Psychiatric:         Mood and Affect: Mood normal          Behavior: Behavior normal          Thought Content:  Thought content normal          Judgment: Judgment normal

## 2022-02-18 ENCOUNTER — OFFICE VISIT (OUTPATIENT)
Dept: FAMILY MEDICINE CLINIC | Facility: CLINIC | Age: 72
End: 2022-02-18
Payer: COMMERCIAL

## 2022-02-18 VITALS
BODY MASS INDEX: 30.96 KG/M2 | SYSTOLIC BLOOD PRESSURE: 136 MMHG | OXYGEN SATURATION: 98 % | HEIGHT: 61 IN | DIASTOLIC BLOOD PRESSURE: 68 MMHG | TEMPERATURE: 97.3 F | HEART RATE: 96 BPM | WEIGHT: 164 LBS

## 2022-02-18 DIAGNOSIS — Z78.0 MENOPAUSE: ICD-10-CM

## 2022-02-18 DIAGNOSIS — E78.2 MIXED HYPERLIPIDEMIA: ICD-10-CM

## 2022-02-18 DIAGNOSIS — I10 PRIMARY HYPERTENSION: ICD-10-CM

## 2022-02-18 DIAGNOSIS — K21.9 GASTROESOPHAGEAL REFLUX DISEASE WITHOUT ESOPHAGITIS: Primary | ICD-10-CM

## 2022-02-18 DIAGNOSIS — Z12.11 SCREENING FOR COLORECTAL CANCER: ICD-10-CM

## 2022-02-18 DIAGNOSIS — I63.031 CEREBROVASCULAR ACCIDENT (CVA) DUE TO THROMBOSIS OF RIGHT CAROTID ARTERY (HCC): ICD-10-CM

## 2022-02-18 DIAGNOSIS — E03.4 HYPOTHYROIDISM DUE TO ACQUIRED ATROPHY OF THYROID: ICD-10-CM

## 2022-02-18 DIAGNOSIS — Z12.12 SCREENING FOR COLORECTAL CANCER: ICD-10-CM

## 2022-02-18 DIAGNOSIS — Z12.31 ENCOUNTER FOR SCREENING MAMMOGRAM FOR BREAST CANCER: ICD-10-CM

## 2022-02-18 PROCEDURE — 1036F TOBACCO NON-USER: CPT | Performed by: FAMILY MEDICINE

## 2022-02-18 PROCEDURE — 3075F SYST BP GE 130 - 139MM HG: CPT | Performed by: FAMILY MEDICINE

## 2022-02-18 PROCEDURE — 3078F DIAST BP <80 MM HG: CPT | Performed by: FAMILY MEDICINE

## 2022-02-18 PROCEDURE — 99214 OFFICE O/P EST MOD 30 MIN: CPT | Performed by: FAMILY MEDICINE

## 2022-02-18 PROCEDURE — 1160F RVW MEDS BY RX/DR IN RCRD: CPT | Performed by: FAMILY MEDICINE

## 2022-02-18 PROCEDURE — 3008F BODY MASS INDEX DOCD: CPT | Performed by: FAMILY MEDICINE

## 2022-02-20 DIAGNOSIS — I63.031 CEREBROVASCULAR ACCIDENT (CVA) DUE TO THROMBOSIS OF RIGHT CAROTID ARTERY (HCC): ICD-10-CM

## 2022-02-23 RX ORDER — TICAGRELOR 90 MG/1
TABLET ORAL
Qty: 180 TABLET | Refills: 3 | Status: SHIPPED | OUTPATIENT
Start: 2022-02-23

## 2022-03-01 DIAGNOSIS — I10 ESSENTIAL HYPERTENSION: ICD-10-CM

## 2022-03-01 RX ORDER — LISINOPRIL 20 MG/1
TABLET ORAL
Qty: 90 TABLET | Refills: 0 | Status: SHIPPED | OUTPATIENT
Start: 2022-03-01 | End: 2022-03-08 | Stop reason: SDUPTHER

## 2022-03-08 DIAGNOSIS — I10 ESSENTIAL HYPERTENSION: ICD-10-CM

## 2022-03-08 RX ORDER — LISINOPRIL 20 MG/1
20 TABLET ORAL DAILY
Qty: 90 TABLET | Refills: 0 | Status: SHIPPED | OUTPATIENT
Start: 2022-03-08 | End: 2022-06-02

## 2022-04-13 DIAGNOSIS — E03.9 HYPOTHYROIDISM, UNSPECIFIED TYPE: ICD-10-CM

## 2022-04-13 RX ORDER — LEVOTHYROXINE SODIUM 88 UG/1
88 TABLET ORAL DAILY
Qty: 90 TABLET | Refills: 0 | Status: SHIPPED | OUTPATIENT
Start: 2022-04-13 | End: 2022-07-11

## 2022-05-10 DIAGNOSIS — I63.031 CEREBROVASCULAR ACCIDENT (CVA) DUE TO THROMBOSIS OF RIGHT CAROTID ARTERY (HCC): ICD-10-CM

## 2022-05-11 RX ORDER — ATORVASTATIN CALCIUM 40 MG/1
40 TABLET, FILM COATED ORAL EVERY EVENING
Qty: 90 TABLET | Refills: 0 | Status: SHIPPED | OUTPATIENT
Start: 2022-05-11

## 2022-05-27 LAB
ALBUMIN SERPL-MCNC: 3.9 G/DL (ref 3.6–5.1)
ALBUMIN/GLOB SERPL: 1.5 (CALC) (ref 1–2.5)
ALP SERPL-CCNC: 67 U/L (ref 37–153)
ALT SERPL-CCNC: 13 U/L (ref 6–29)
AST SERPL-CCNC: 19 U/L (ref 10–35)
BILIRUB SERPL-MCNC: 0.6 MG/DL (ref 0.2–1.2)
BUN SERPL-MCNC: 12 MG/DL (ref 7–25)
BUN/CREAT SERPL: NORMAL (CALC) (ref 6–22)
CALCIUM SERPL-MCNC: 9.2 MG/DL (ref 8.6–10.4)
CHLORIDE SERPL-SCNC: 107 MMOL/L (ref 98–110)
CHOLEST SERPL-MCNC: 137 MG/DL
CHOLEST/HDLC SERPL: 2 (CALC)
CO2 SERPL-SCNC: 28 MMOL/L (ref 20–32)
CREAT SERPL-MCNC: 0.76 MG/DL (ref 0.6–0.93)
GLOBULIN SER CALC-MCNC: 2.6 G/DL (CALC) (ref 1.9–3.7)
GLUCOSE SERPL-MCNC: 81 MG/DL (ref 65–99)
HDLC SERPL-MCNC: 70 MG/DL
LDLC SERPL CALC-MCNC: 56 MG/DL (CALC)
NONHDLC SERPL-MCNC: 67 MG/DL (CALC)
POTASSIUM SERPL-SCNC: 4.2 MMOL/L (ref 3.5–5.3)
PROT SERPL-MCNC: 6.5 G/DL (ref 6.1–8.1)
SL AMB EGFR AFRICAN AMERICAN: 90 ML/MIN/1.73M2
SL AMB EGFR NON AFRICAN AMERICAN: 78 ML/MIN/1.73M2
SODIUM SERPL-SCNC: 141 MMOL/L (ref 135–146)
TRIGL SERPL-MCNC: 39 MG/DL
TSH SERPL-ACNC: 1.06 MIU/L (ref 0.4–4.5)

## 2022-06-01 ENCOUNTER — OFFICE VISIT (OUTPATIENT)
Dept: FAMILY MEDICINE CLINIC | Facility: CLINIC | Age: 72
End: 2022-06-01
Payer: COMMERCIAL

## 2022-06-01 VITALS
HEIGHT: 61 IN | RESPIRATION RATE: 18 BRPM | DIASTOLIC BLOOD PRESSURE: 66 MMHG | SYSTOLIC BLOOD PRESSURE: 122 MMHG | OXYGEN SATURATION: 98 % | HEART RATE: 64 BPM | WEIGHT: 163 LBS | TEMPERATURE: 97.9 F | BODY MASS INDEX: 30.78 KG/M2

## 2022-06-01 DIAGNOSIS — Z12.31 ENCOUNTER FOR SCREENING MAMMOGRAM FOR BREAST CANCER: Primary | ICD-10-CM

## 2022-06-01 DIAGNOSIS — E78.2 MIXED HYPERLIPIDEMIA: ICD-10-CM

## 2022-06-01 DIAGNOSIS — E03.4 HYPOTHYROIDISM DUE TO ACQUIRED ATROPHY OF THYROID: ICD-10-CM

## 2022-06-01 DIAGNOSIS — I10 PRIMARY HYPERTENSION: ICD-10-CM

## 2022-06-01 DIAGNOSIS — Z78.0 MENOPAUSE: ICD-10-CM

## 2022-06-01 DIAGNOSIS — Z12.11 SCREEN FOR COLON CANCER: ICD-10-CM

## 2022-06-01 DIAGNOSIS — K21.9 GASTROESOPHAGEAL REFLUX DISEASE WITHOUT ESOPHAGITIS: ICD-10-CM

## 2022-06-01 DIAGNOSIS — I63.031 CEREBROVASCULAR ACCIDENT (CVA) DUE TO THROMBOSIS OF RIGHT CAROTID ARTERY (HCC): ICD-10-CM

## 2022-06-01 PROCEDURE — 1160F RVW MEDS BY RX/DR IN RCRD: CPT | Performed by: FAMILY MEDICINE

## 2022-06-01 PROCEDURE — 3288F FALL RISK ASSESSMENT DOCD: CPT | Performed by: FAMILY MEDICINE

## 2022-06-01 PROCEDURE — 3725F SCREEN DEPRESSION PERFORMED: CPT | Performed by: FAMILY MEDICINE

## 2022-06-01 PROCEDURE — 3008F BODY MASS INDEX DOCD: CPT | Performed by: FAMILY MEDICINE

## 2022-06-01 PROCEDURE — 99214 OFFICE O/P EST MOD 30 MIN: CPT | Performed by: FAMILY MEDICINE

## 2022-06-01 PROCEDURE — 1036F TOBACCO NON-USER: CPT | Performed by: FAMILY MEDICINE

## 2022-06-01 PROCEDURE — 1101F PT FALLS ASSESS-DOCD LE1/YR: CPT | Performed by: FAMILY MEDICINE

## 2022-06-01 PROCEDURE — 3078F DIAST BP <80 MM HG: CPT | Performed by: FAMILY MEDICINE

## 2022-06-01 PROCEDURE — 3074F SYST BP LT 130 MM HG: CPT | Performed by: FAMILY MEDICINE

## 2022-06-01 NOTE — PROGRESS NOTES
Falls Plan of Care: balance, strength, and gait training instructions were provided  Home safety education provided  Assessment/Plan:         Problem List Items Addressed This Visit        Digestive    Gastroesophageal reflux disease without esophagitis     Patient to continue with present therapy and decrease caffeine, avoid ETOH and smoking to decrease acid production  Pt should also cease eating prior to bedtime and avoid excessive fluid intake prior to sleep  May use antacids as needed for breakthrough GERD  All pateint questions answered today about this condition  Endocrine    Hypothyroidism     Patient to continue current dose of thyroid medicine and recheck TSH in 6 months              Cardiovascular and Mediastinum    Cerebrovascular accident (CVA) due to thrombosis of right carotid artery [facility])     Patient is stable  and will continue present plan of care and reassess at next routine visit  All questions about this problem from patient were answered today  Hypertension     Patient is stable with current anti-hypertensive medicine and continue to follow a low sodium diet and take current medication  All questions about this condition were answered today  Other    Hyperlipidemia     Patient  is stable with current medication and we discussed a low fat low cholesterol diet  Weight loss also discussed for this will help lower cholesterol also  Recheck lipids in 6 months  Other Visit Diagnoses     Encounter for screening mammogram for breast cancer    -  Primary    Menopause        Screen for colon cancer        Relevant Orders    Cologuard            Subjective:      Patient ID: Kenny Evangelista is a 66 y o  female  This is a 68-year-old female here today for check up on GERD hypothyroidism history of CVA hypertension hypothyroidism and is doing very well  Patient is going for a mammogram as well as her DEXA scan next week    Reviewed her lab work today and she is doing very well with her cholesterol  Her liver function tests are also normal her thyroid is within normal limits  Patient denied any refills and will call for refills when it is appropriate  The following portions of the patient's history were reviewed and updated as appropriate:   Past Medical History:  She has a past medical history of Disease of thyroid gland, Hyperlipidemia (5/8/2020), Hypertension, Hypothyroidism, and Stroke ([address] ) (10/23/2018)  ,  _______________________________________________________________________  Medical Problems:  does not have any pertinent problems on file ,  _______________________________________________________________________  Past Surgical History:   has a past surgical history that includes IR stroke alert (10/6/2018) and Carotid stent  ,  _______________________________________________________________________  Family History:  family history includes Heart disease in her family; No Known Problems in her brother, daughter, daughter, father, maternal grandfather, maternal grandmother, mother, paternal grandfather, paternal grandmother, and sister  ,  _______________________________________________________________________  Social History:   reports that she has never smoked  She has never used smokeless tobacco  She reports that she does not drink alcohol and does not use drugs  ,  _______________________________________________________________________  Allergies:  has No Known Allergies     _______________________________________________________________________  Current Outpatient Medications   Medication Sig Dispense Refill    atorvastatin (LIPITOR) 40 mg tablet Take 1 tablet (40 mg total) by mouth every evening 90 tablet 0    Brilinta 90 MG TAKE 1 TABLET BY MOUTH  EVERY 12 HOURS 180 tablet 3    levothyroxine 88 mcg tablet Take 1 tablet (88 mcg total) by mouth daily 90 tablet 0    lisinopril (ZESTRIL) 20 mg tablet Take 1 tablet (20 mg total) by mouth daily 90 tablet 0    Melatonin 10 MG TABS Take 10 mg by mouth daily at bedtime       No current facility-administered medications for this visit      _______________________________________________________________________  Review of Systems   Constitutional: Negative for activity change, appetite change, fatigue and fever  HENT: Negative for congestion, ear pain, postnasal drip, rhinorrhea, sinus pressure, sinus pain, sneezing and sore throat  Eyes: Negative for pain and redness  Respiratory: Negative for apnea, cough, chest tightness, shortness of breath and wheezing  Cardiovascular: Negative for chest pain, palpitations and leg swelling  Gastrointestinal: Negative for abdominal pain, constipation, diarrhea, nausea and vomiting  Endocrine: Negative for cold intolerance and heat intolerance  Genitourinary: Negative for difficulty urinating, dysuria, frequency, hematuria and urgency  Musculoskeletal: Negative for arthralgias, back pain, gait problem and myalgias  Skin: Negative for rash  Neurological: Negative for dizziness, speech difficulty, weakness, numbness and headaches  Hematological: Does not bruise/bleed easily  Psychiatric/Behavioral: Negative for agitation, confusion and hallucinations  Objective:  Vitals:    06/01/22 0910   BP: 122/66   BP Location: Left arm   Patient Position: Sitting   Cuff Size: Standard   Pulse: 64   Resp: 18   Temp: 97 9 °F (36 6 °C)   SpO2: 98%   Weight: 73 9 kg (163 lb)   Height: 5' 1" (1 549 m)     Body mass index is 30 8 kg/m²  Physical Exam  Vitals and nursing note reviewed  Constitutional:       Appearance: She is well-developed  HENT:      Head: Normocephalic and atraumatic  Nose: Nose normal    Eyes:      General: No scleral icterus  Conjunctiva/sclera: Conjunctivae normal       Pupils: Pupils are equal, round, and reactive to light  Neck:      Thyroid: No thyromegaly     Pulmonary:      Effort: Pulmonary effort is normal  Breath sounds: Normal breath sounds  No wheezing  Abdominal:      General: Bowel sounds are normal  There is no distension  Palpations: Abdomen is soft  Tenderness: There is no abdominal tenderness  There is no guarding or rebound  Musculoskeletal:         General: No tenderness or deformity  Normal range of motion  Cervical back: Normal range of motion and neck supple  Skin:     General: Skin is warm and dry  Findings: No erythema or rash  Neurological:      Mental Status: She is alert and oriented to person, place, and time  Sensory: No sensory deficit  Psychiatric:         Behavior: Behavior normal          Thought Content:  Thought content normal          Judgment: Judgment normal

## 2022-06-02 DIAGNOSIS — I10 ESSENTIAL HYPERTENSION: ICD-10-CM

## 2022-06-02 RX ORDER — LISINOPRIL 20 MG/1
TABLET ORAL
Qty: 90 TABLET | Refills: 0 | Status: SHIPPED | OUTPATIENT
Start: 2022-06-02 | End: 2022-06-10 | Stop reason: SDUPTHER

## 2022-06-07 ENCOUNTER — HOSPITAL ENCOUNTER (OUTPATIENT)
Dept: RADIOLOGY | Facility: MEDICAL CENTER | Age: 72
Discharge: HOME | End: 2022-06-07
Payer: COMMERCIAL

## 2022-06-07 VITALS — HEIGHT: 61 IN | WEIGHT: 163 LBS | BODY MASS INDEX: 30.78 KG/M2

## 2022-06-07 DIAGNOSIS — Z12.31 ENCOUNTER FOR SCREENING MAMMOGRAM FOR BREAST CANCER: ICD-10-CM

## 2022-06-07 PROCEDURE — 77067 SCR MAMMO BI INCL CAD: CPT

## 2022-06-07 PROCEDURE — 77063 BREAST TOMOSYNTHESIS BI: CPT

## 2022-06-10 DIAGNOSIS — I10 ESSENTIAL HYPERTENSION: ICD-10-CM

## 2022-06-10 RX ORDER — LISINOPRIL 20 MG/1
20 TABLET ORAL DAILY
Qty: 90 TABLET | Refills: 0 | Status: SHIPPED | OUTPATIENT
Start: 2022-06-10

## 2022-06-11 LAB — COLOGUARD RESULT REPORTABLE: POSITIVE

## 2022-06-16 DIAGNOSIS — R19.5 POSITIVE COLORECTAL CANCER SCREENING USING COLOGUARD TEST: Primary | ICD-10-CM

## 2022-07-10 DIAGNOSIS — E03.9 HYPOTHYROIDISM, UNSPECIFIED TYPE: ICD-10-CM

## 2022-07-11 RX ORDER — LEVOTHYROXINE SODIUM 88 UG/1
TABLET ORAL
Qty: 90 TABLET | Refills: 0 | Status: SHIPPED | OUTPATIENT
Start: 2022-07-11 | End: 2022-10-06

## 2022-08-11 ENCOUNTER — HOSPITAL ENCOUNTER (OUTPATIENT)
Dept: RADIOLOGY | Facility: MEDICAL CENTER | Age: 72
Discharge: HOME | End: 2022-08-11
Payer: COMMERCIAL

## 2022-08-11 DIAGNOSIS — I63.031 CEREBROVASCULAR ACCIDENT (CVA) DUE TO THROMBOSIS OF RIGHT CAROTID ARTERY (HCC): ICD-10-CM

## 2022-08-11 PROCEDURE — 70498 CT ANGIOGRAPHY NECK: CPT

## 2022-08-11 RX ADMIN — IOHEXOL 65 ML: 350 INJECTION, SOLUTION INTRAVENOUS at 10:41

## 2022-08-19 ENCOUNTER — OFFICE VISIT (OUTPATIENT)
Dept: NEUROSURGERY | Facility: CLINIC | Age: 72
End: 2022-08-19
Payer: COMMERCIAL

## 2022-08-19 VITALS
BODY MASS INDEX: 28.89 KG/M2 | OXYGEN SATURATION: 99 % | WEIGHT: 157 LBS | SYSTOLIC BLOOD PRESSURE: 144 MMHG | HEIGHT: 62 IN | HEART RATE: 69 BPM | DIASTOLIC BLOOD PRESSURE: 64 MMHG | TEMPERATURE: 97.3 F

## 2022-08-19 DIAGNOSIS — I63.031 CEREBROVASCULAR ACCIDENT (CVA) DUE TO THROMBOSIS OF RIGHT CAROTID ARTERY (HCC): Primary | ICD-10-CM

## 2022-08-19 PROCEDURE — 1160F RVW MEDS BY RX/DR IN RCRD: CPT | Performed by: NURSE PRACTITIONER

## 2022-08-19 PROCEDURE — 99214 OFFICE O/P EST MOD 30 MIN: CPT | Performed by: NURSE PRACTITIONER

## 2022-08-19 PROCEDURE — 3078F DIAST BP <80 MM HG: CPT | Performed by: NURSE PRACTITIONER

## 2022-08-19 PROCEDURE — 3077F SYST BP >= 140 MM HG: CPT | Performed by: NURSE PRACTITIONER

## 2022-08-19 NOTE — PROGRESS NOTES
Neurosurgery Office Note  Bertin Martínez 74 y o  female MRN: [id number]      Assessment/Plan     Cerebrovascular accident (CVA) due to thrombosis of right carotid artery [facility])  Patient seen in outpatient office for 1 year follow-up status post right ICA occlusion with stent placement and status post R MCA M1 thrombectomy on 10/6/18  · In January 2021 on carotid ultrasound patient was found to have 70-99% stenosis of right ICA and stent  Patient was on aspirin 325 mg at that time but was asymptomatic  She had short interval follow-up with CTA which demonstrated about 70% restenosis of stent  She was restarted on DAPT at that time with repeat imaging in 6 months  Repeat CTA demonstrated stable 50-70% stenosis and aspirin was stopped and she was just continued on Brilinta which she continues today  Imaging reviewed with Dr Madden:    CTA neck w/wo 08/11/2022: There is mild right internal carotid artery bulb within the stent, slightly improved compared to the prior examination  Above this stent the vessel is normal in caliber  Plan:  · Reviewed imaging with patient and   · Continue Brilinta 90 mg  · Patient will follow-up in 1 year with VAS carotid Dopplers or sooner if she develops worsening symptoms  · Patient aware if she develops any stroke-like symptoms to proceed to the ED for further evaluation  · In regards to patient's fatigue, spoke with patient about reaching out to her PCP in regards to further workup  · Recommendations reviewed with patient  Patient showed understanding and is agreeable to recommendations  · Patient made aware to contact Neurosurgery with any further questions or concerns  Diagnoses and all orders for this visit:    Cerebrovascular accident (CVA) due to thrombosis of right carotid artery ([address] )  -     VAS carotid complete study;  Future          CHIEF COMPLAINT    Chief Complaint   Patient presents with    Follow-up     Cerebrovascular accident (CVA) due to thrombosis of right carotid artery        HISTORY    History of Present Illness     74y o  year old female with past medical history significant for hypothyroidism, hyperlipidemia, and CVA due to thrombus of right carotid artery  Patient seen in outpatient office today for 1 year follow-up status post right ICA occlusion with stent placement and status post right MCA M1 thrombectomy on 10/06/2018  Per chart review on 10/06/2018 patient woke up approximately at 5:30 a m  with inability to move her left side and difficulty talking  Patient was brought in to [facility] where CTA revealed a right carotid occlusion and right MCA occlusion  Patient was transferred to [facility] for further evaluation and possible thrombectomy  Patient underwent a stenting for right ICA occlusion and right MCA M1 thrombectomy  Patient was discharged on Brilinta and aspirin  Per chart review patient's speech improved as well as her left visual deficit  Patient was seen in follow-up 11/02/2018 and was discontinued on Brilinta and just continued on aspirin  In January 2021 patient is Dopplers demonstrated 70-99% stenosis of right ICA/stent  Patient was on aspirin 325 mg at that time  Patient was asymptomatic and had close follow-up in 2-3 weeks with CTA  CTA demonstrated 70% restenosis of sent, she was restarted on dual anti-platelet with follow-up in 6 months  CTA at that time demonstrated stable 50-70% stenosis, aspirin was stopped at that time and patient was continued just on Brilinta  She reports some fatigue but is very active  She continues to endorse left visual field deficit but states that has been the same and has not worsened since her stroke  She denies any stroke-like symptoms  She denies any headaches, dizziness, blurry vision, chest pain, shortness of breath, abdominal pain, nausea, vomiting, diarrhea, no problems with bowel or bladder, no new weakness or numbness/tingling    She denies any new stroke-like symptoms  She denies any recent falls or traumas or difficulty with her balance  Patient remains on Brilinta  HPI    See Discussion    REVIEW OF SYSTEMS    Review of Systems   Constitutional: Positive for fatigue (pushes through it)  HENT: Negative  Eyes: Positive for visual disturbance (since her stroke)  Respiratory: Negative  Cardiovascular: Negative  Gastrointestinal: Negative  Endocrine: Positive for heat intolerance  Genitourinary: Negative  Musculoskeletal: Negative  Skin: Negative  Allergic/Immunologic: Negative  Neurological: Negative  Hematological: Bruises/bleeds easily (medication)  Psychiatric/Behavioral: Positive for sleep disturbance  All other systems reviewed and are negative  ROS reviewed with patient and agree and changes were made as needed  Meds/Allergies     Current Outpatient Medications   Medication Sig Dispense Refill    atorvastatin (LIPITOR) 40 mg tablet Take 1 tablet (40 mg total) by mouth every evening 90 tablet 0    Brilinta 90 MG TAKE 1 TABLET BY MOUTH  EVERY 12 HOURS 180 tablet 3    levothyroxine 88 mcg tablet take 1 tablet by mouth once daily 90 tablet 0    lisinopril (ZESTRIL) 20 mg tablet Take 1 tablet (20 mg total) by mouth daily 90 tablet 0    Melatonin 10 MG TABS Take 10 mg by mouth daily at bedtime       No current facility-administered medications for this visit  No Known Allergies    PAST HISTORY    Past Medical History:   Diagnosis Date    Disease of thyroid gland     Hyperlipidemia 5/8/2020    Hypertension     Hypothyroidism     Stroke ([facility] [address] ) 10/23/2018       Past Surgical History:   Procedure Laterality Date    CAROTID STENT      IR STROKE ALERT  10/6/2018       Social History     Tobacco Use    Smoking status: Never Smoker    Smokeless tobacco: Never Used   Vaping Use    Vaping Use: Never used   Substance Use Topics    Alcohol use: Never     Comment: Social drinker    Drug use:  No Family History   Problem Relation Age of Onset    No Known Problems Mother     No Known Problems Father     Heart disease Family     No Known Problems Sister     No Known Problems Daughter     No Known Problems Maternal Grandmother     No Known Problems Maternal Grandfather     No Known Problems Paternal Grandmother     No Known Problems Paternal Grandfather     No Known Problems Daughter     No Known Problems Brother          Above history personally reviewed  EXAM    Vitals:Blood pressure 144/64, pulse 69, temperature (!) 97 3 °F (36 3 °C), temperature source Temporal, height 5' 1 5" (1 562 m), weight 71 2 kg (157 lb), SpO2 99 %  ,Body mass index is 29 18 kg/m²  Physical Exam  Vitals reviewed  Constitutional:       General: She is awake  She is not in acute distress  Appearance: Normal appearance  She is not ill-appearing  HENT:      Head: Normocephalic and atraumatic  Eyes:      Extraocular Movements: Extraocular movements intact and EOM normal       Conjunctiva/sclera: Conjunctivae normal       Pupils: Pupils are equal, round, and reactive to light  Cardiovascular:      Rate and Rhythm: Normal rate  Pulmonary:      Effort: Pulmonary effort is normal  No respiratory distress  Chest:      Chest wall: No tenderness  Abdominal:      General: There is no distension  Palpations: Abdomen is soft  Tenderness: There is no abdominal tenderness  Musculoskeletal:         General: Normal range of motion  Cervical back: Normal range of motion and neck supple  Skin:     General: Skin is warm and dry  Neurological:      Mental Status: She is alert and oriented to person, place, and time  Coordination: Finger-Nose-Finger Test normal       Gait: Gait is intact  Deep Tendon Reflexes: Strength normal       Reflex Scores:       Bicep reflexes are 2+ on the right side and 2+ on the left side         Patellar reflexes are 3+ on the right side and 3+ on the left side   Psychiatric:         Attention and Perception: Attention and perception normal          Mood and Affect: Mood and affect normal          Speech: Speech normal          Behavior: Behavior normal  Behavior is cooperative  Thought Content: Thought content normal          Cognition and Memory: Cognition and memory normal          Judgment: Judgment normal          Neurologic Exam     Mental Status   Oriented to person, place, and time  Follows 2 step commands  Attention: normal  Concentration: normal    Speech: speech is normal   Level of consciousness: alert  Knowledge: good  Able to perform simple calculations  Able to name object  Normal comprehension  Cranial Nerves     CN III, IV, VI   Pupils are equal, round, and reactive to light  Extraocular motions are normal    CN III: no CN III palsy  CN VI: no CN VI palsy  Nystagmus: none   Diplopia: none  Conjugate gaze: present    CN V   Facial sensation intact  CN VII   Facial expression full, symmetric  CN VIII   CN VIII normal    Hearing: intact    CN IX, X   CN IX normal      CN XI   CN XI normal      CN XII   CN XII normal      Motor Exam   Muscle bulk: normal  Overall muscle tone: normal  Right arm pronator drift: absent  Left arm pronator drift: absent    Strength   Strength 5/5 throughout       Sensory Exam   Light touch normal    Proprioception normal    JPS and DST intact     Gait, Coordination, and Reflexes     Gait  Gait: normal    Coordination   Finger to nose coordination: normal    Tremor   Resting tremor: absent  Intention tremor: absent  Action tremor: absent    Reflexes   Right biceps: 2+  Left biceps: 2+  Right patellar: 3+  Left patellar: 3+  Right Bello: absent  Left Bello: present  Right ankle clonus: absent  Left ankle clonus: absent        MEDICAL DECISION MAKING    Imaging Studies:     CTA neck w wo contrast    Result Date: 8/12/2022  Narrative: CTA NECK INDICATION: I63 031: Cerebral infarction due to thrombosis of right carotid artery COMPARISON:  8/5/2021 is the most recent of several prior exams  TECHNIQUE:  0 625 mm images from the aortic arch through the [place] after administration of IV contrast   This examination, like all CT scans performed in the [facility], was performed utilizing techniques to minimize radiation  dose exposure, including the use of iterative reconstruction and automated exposure control  3-D reconstructions and multiplanar MIP images were obtained  3D rendering was performed on an independent workstation  Rad dose 372 76 mGy-cm IV Contrast:  65 mL of iohexol (OMNIPAQUE)  IMAGE QUALITY:   Diagnostic  FINDINGS: CERVICAL VASCULATURE AORTIC ARCH AND GREAT VESSELS:  Normal aortic arch and great vessel origins  Normal visualized subclavian vessels  RIGHT VERTEBRAL ARTERY CERVICAL SEGMENT:  Normal origin  The vessel is normal in caliber throughout the neck  LEFT VERTEBRAL ARTERY CERVICAL SEGMENT:  Normal origin  The vessel is normal in caliber throughout the neck  RIGHT EXTRACRANIAL CAROTID SEGMENT:  Right carotid stent noted within the distal common carotid artery extending across the bifurcation into the proximal cervical internal carotid artery is again identified  There is mild narrowing of the vessel within the midportion of the stent, within the carotid bulb measuring approximately 50%  This appears slightly improved compared to the most recent examination  Above the stented vessel is normal in caliber  LEFT EXTRACRANIAL CAROTID SEGMENT:  Normal caliber common carotid artery  Normal bifurcation and cervical internal carotid artery  No stenosis or dissection  NASCET criteria was used to determine the degree of internal carotid artery diameter stenosis  INTRACRANIAL VASCULATURE ANTERIOR CIRCULATION: The visualized intracranial internal carotid arteries are unremarkable  Normal visualized anterior and middle cerebral artery branches   POSTERIOR CIRCULATION: 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 which appears to terminate within the posterior inferior cerebellar artery  VENOUS STRUCTURES:  The visualized dural venous sinuses and venous structures of the neck are unremarkable  Please note this is not a complete CT angiogram of the brain  BONY STRUCTURES:  Stable bony structures with no acute osseous abnormality  Osteomalacia of the maxilla and mandible with no dentition  SOFT TISSUES OF THE NECK:   Thyroid gland is mildly enlarged, in particular the left lobe  This is unchanged from the prior examination  THORACIC INLET:  Unremarkable  VISUALIZED BRAIN PARENCHYMA:  No acute intracranial pathology  NASCET criteria was used to determine the degree of internal carotid artery diameter stenosis  Impression: There is mild right internal carotid artery bulb within the stent, slightly improved compared to the prior examination  Above this stent the vessel is normal in caliber  Workstation performed: [id number]       I have personally reviewed pertinent reports     and I have personally reviewed pertinent films in PACS

## 2022-08-19 NOTE — PATIENT INSTRUCTIONS
Patient will follow-up in 1 year with carotid Dopplers or sooner symptoms worsen    Continue Brilinta

## 2022-08-19 NOTE — ASSESSMENT & PLAN NOTE
Patient seen in outpatient office for 1 year follow-up status post right ICA occlusion with stent placement and status post R MCA M1 thrombectomy on 10/6/18  · In January 2021 on carotid ultrasound patient was found to have 70-99% stenosis of right ICA and stent  Patient was on aspirin 325 mg at that time but was asymptomatic  She had short interval follow-up with CTA which demonstrated about 70% restenosis of stent  She was restarted on DAPT at that time with repeat imaging in 6 months  Repeat CTA demonstrated stable 50-70% stenosis and aspirin was stopped and she was just continued on Brilinta which she continues today  Imaging reviewed with Dr Madden:    CTA neck w/wo 08/11/2022: There is mild right internal carotid artery bulb within the stent, slightly improved compared to the prior examination  Above this stent the vessel is normal in caliber  Plan:  · Reviewed imaging with patient and   · Continue Brilinta 90 mg  · Patient will follow-up in 1 year with VAS carotid Dopplers or sooner if she develops worsening symptoms  · Patient aware if she develops any stroke-like symptoms to proceed to the ED for further evaluation  · In regards to patient's fatigue, spoke with patient about reaching out to her PCP in regards to further workup  · Recommendations reviewed with patient  Patient showed understanding and is agreeable to recommendations  · Patient made aware to contact Neurosurgery with any further questions or concerns

## 2022-09-05 DIAGNOSIS — I10 ESSENTIAL HYPERTENSION: ICD-10-CM

## 2022-09-06 RX ORDER — LISINOPRIL 20 MG/1
TABLET ORAL
Qty: 90 TABLET | Refills: 0 | Status: SHIPPED | OUTPATIENT
Start: 2022-09-06 | End: 2022-09-13 | Stop reason: SDUPTHER

## 2022-09-07 ENCOUNTER — RA CDI HCC (OUTPATIENT)
Dept: OTHER | Facility: HOSPITAL | Age: 72
End: 2022-09-07

## 2022-09-07 NOTE — PROGRESS NOTES
[facility]  coding opportunities       Chart reviewed, no opportunity found:   [address]        Patients Insurance     Medicare Insurance: [employer] Advantage

## 2022-09-08 NOTE — PROGRESS NOTES
Falls Plan of Care: balance, strength, and gait training instructions were provided  Home safety education provided  Assessment/Plan:         Problem List Items Addressed This Visit        Digestive    Gastroesophageal reflux disease without esophagitis - Primary     Patient to continue with present therapy and decrease caffeine, avoid ETOH and smoking to decrease acid production  Pt should also cease eating prior to bedtime and avoid excessive fluid intake prior to sleep  May use antacids as needed for breakthrough GERD  All pateint questions answered today about this condition  Endocrine    Hypothyroidism     Patient to continue current dose of thyroid medicine and recheck TSH in 6 months            Cardiovascular and Mediastinum    Cerebrovascular accident (CVA) due to thrombosis of right carotid artery ([facility] [address] )     improving         Relevant Medications    atorvastatin (LIPITOR) 40 mg tablet    Hypertension     Patient is stable with current anti-hypertensive medicine and continue to follow a low sodium diet and take current medication  All questions about this condition were answered today  Relevant Orders    TSH + Free T4    Comprehensive metabolic panel    CBC and differential    Magnesium    Uric acid    Urinalysis with microscopic       Other    Hyperlipidemia     Patient  is stable with current medication and we discussed a low fat low cholesterol diet  Weight loss also discussed for this will help lower cholesterol also  Recheck lipids in 6 months  Relevant Medications    atorvastatin (LIPITOR) 40 mg tablet    Other Relevant Orders    Lipid Panel with Direct LDL reflex      Other Visit Diagnoses     Menopause        Relevant Orders    DXA bone density spine hip and pelvis    Immunization due        Relevant Orders    influenza vaccine, high-dose, PF 0 7 mL (FLUZONE HIGH-DOSE) (Completed)            Subjective:      Patient ID: Eliane Gomez is a 74 y o  female      HPI    The following portions of the patient's history were reviewed and updated as appropriate:   Past Medical History:  She has a past medical history of Disease of thyroid gland, Hyperlipidemia (5/8/2020), Hypertension, Hypothyroidism, and Stroke ([address] ) (10/23/2018)  ,  _______________________________________________________________________  Medical Problems:  does not have any pertinent problems on file ,  _______________________________________________________________________  Past Surgical History:   has a past surgical history that includes IR stroke alert (10/6/2018) and Carotid stent  ,  _______________________________________________________________________  Family History:  family history includes Heart disease in her family; No Known Problems in her brother, daughter, daughter, father, maternal grandfather, maternal grandmother, mother, paternal grandfather, paternal grandmother, and sister  ,  _______________________________________________________________________  Social History:   reports that she has never smoked  She has never used smokeless tobacco  She reports that she does not drink alcohol and does not use drugs  ,  _______________________________________________________________________  Allergies:  has No Known Allergies     _______________________________________________________________________  Current Outpatient Medications   Medication Sig Dispense Refill    atorvastatin (LIPITOR) 40 mg tablet Take 1 tablet (40 mg total) by mouth every evening 90 tablet 0    Brilinta 90 MG TAKE 1 TABLET BY MOUTH  EVERY 12 HOURS 180 tablet 3    levothyroxine 88 mcg tablet take 1 tablet by mouth once daily 90 tablet 0    lisinopril (ZESTRIL) 20 mg tablet take 1 tablet by mouth once daily 90 tablet 0    Melatonin 10 MG TABS Take 10 mg by mouth daily at bedtime       No current facility-administered medications for this visit      _______________________________________________________________________  Review of Systems Objective:  Vitals:    09/09/22 0854   BP: 138/80   BP Location: Left arm   Patient Position: Sitting   Cuff Size: Standard   Pulse: 82   Resp: 16   Temp: 98 1 °F (36 7 °C)   TempSrc: Temporal   SpO2: 99%   Weight: 73 9 kg (163 lb)   Height: 5' 1 5" (1 562 m)     Body mass index is 30 3 kg/m²       Physical Exam

## 2022-09-09 ENCOUNTER — OFFICE VISIT (OUTPATIENT)
Dept: FAMILY MEDICINE CLINIC | Facility: CLINIC | Age: 72
End: 2022-09-09
Payer: COMMERCIAL

## 2022-09-09 VITALS
HEIGHT: 62 IN | SYSTOLIC BLOOD PRESSURE: 138 MMHG | TEMPERATURE: 98.1 F | HEART RATE: 82 BPM | BODY MASS INDEX: 30 KG/M2 | RESPIRATION RATE: 16 BRPM | WEIGHT: 163 LBS | DIASTOLIC BLOOD PRESSURE: 80 MMHG | OXYGEN SATURATION: 99 %

## 2022-09-09 DIAGNOSIS — E78.2 MIXED HYPERLIPIDEMIA: ICD-10-CM

## 2022-09-09 DIAGNOSIS — Z78.0 MENOPAUSE: ICD-10-CM

## 2022-09-09 DIAGNOSIS — Z23 IMMUNIZATION DUE: ICD-10-CM

## 2022-09-09 DIAGNOSIS — I63.031 CEREBROVASCULAR ACCIDENT (CVA) DUE TO THROMBOSIS OF RIGHT CAROTID ARTERY (HCC): ICD-10-CM

## 2022-09-09 DIAGNOSIS — E03.4 HYPOTHYROIDISM DUE TO ACQUIRED ATROPHY OF THYROID: ICD-10-CM

## 2022-09-09 DIAGNOSIS — I10 PRIMARY HYPERTENSION: ICD-10-CM

## 2022-09-09 DIAGNOSIS — K21.9 GASTROESOPHAGEAL REFLUX DISEASE WITHOUT ESOPHAGITIS: Primary | ICD-10-CM

## 2022-09-09 PROCEDURE — 3075F SYST BP GE 130 - 139MM HG: CPT | Performed by: FAMILY MEDICINE

## 2022-09-09 PROCEDURE — 1160F RVW MEDS BY RX/DR IN RCRD: CPT | Performed by: FAMILY MEDICINE

## 2022-09-09 PROCEDURE — G0008 ADMIN INFLUENZA VIRUS VAC: HCPCS | Performed by: FAMILY MEDICINE

## 2022-09-09 PROCEDURE — 3079F DIAST BP 80-89 MM HG: CPT | Performed by: FAMILY MEDICINE

## 2022-09-09 PROCEDURE — 99214 OFFICE O/P EST MOD 30 MIN: CPT | Performed by: FAMILY MEDICINE

## 2022-09-09 PROCEDURE — 90662 IIV NO PRSV INCREASED AG IM: CPT | Performed by: FAMILY MEDICINE

## 2022-09-09 RX ORDER — ATORVASTATIN CALCIUM 40 MG/1
40 TABLET, FILM COATED ORAL EVERY EVENING
Qty: 90 TABLET | Refills: 0 | Status: SHIPPED | OUTPATIENT
Start: 2022-09-09

## 2022-09-13 DIAGNOSIS — I10 ESSENTIAL HYPERTENSION: ICD-10-CM

## 2022-09-13 RX ORDER — LISINOPRIL 20 MG/1
20 TABLET ORAL DAILY
Qty: 90 TABLET | Refills: 0 | Status: SHIPPED | OUTPATIENT
Start: 2022-09-13 | End: 2022-09-13 | Stop reason: SDUPTHER

## 2022-09-14 RX ORDER — LISINOPRIL 20 MG/1
20 TABLET ORAL DAILY
Qty: 90 TABLET | Refills: 0 | Status: SHIPPED | OUTPATIENT
Start: 2022-09-14

## 2022-10-06 DIAGNOSIS — E03.9 HYPOTHYROIDISM, UNSPECIFIED TYPE: ICD-10-CM

## 2022-10-06 RX ORDER — LEVOTHYROXINE SODIUM 88 UG/1
TABLET ORAL
Qty: 90 TABLET | Refills: 0 | Status: SHIPPED | OUTPATIENT
Start: 2022-10-06 | End: 2022-10-13 | Stop reason: SDUPTHER

## 2022-10-13 DIAGNOSIS — E03.9 HYPOTHYROIDISM, UNSPECIFIED TYPE: ICD-10-CM

## 2022-10-13 RX ORDER — LEVOTHYROXINE SODIUM 88 UG/1
88 TABLET ORAL DAILY
Qty: 90 TABLET | Refills: 0 | Status: SHIPPED | OUTPATIENT
Start: 2022-10-13

## 2022-11-09 DIAGNOSIS — I63.031 CEREBROVASCULAR ACCIDENT (CVA) DUE TO THROMBOSIS OF RIGHT CAROTID ARTERY (HCC): ICD-10-CM

## 2022-11-10 RX ORDER — ATORVASTATIN CALCIUM 40 MG/1
40 TABLET, FILM COATED ORAL EVERY EVENING
Qty: 90 TABLET | Refills: 0 | Status: SHIPPED | OUTPATIENT
Start: 2022-11-10

## 2022-12-05 LAB
ALBUMIN SERPL-MCNC: 3.7 G/DL (ref 3.6–5.1)
ALBUMIN/GLOB SERPL: 1.5 (CALC) (ref 1–2.5)
ALP SERPL-CCNC: 62 U/L (ref 37–153)
ALT SERPL-CCNC: 12 U/L (ref 6–29)
AST SERPL-CCNC: 15 U/L (ref 10–35)
BASOPHILS # BLD AUTO: 30 CELLS/UL (ref 0–200)
BASOPHILS NFR BLD AUTO: 0.7 %
BILIRUB SERPL-MCNC: 0.5 MG/DL (ref 0.2–1.2)
BUN SERPL-MCNC: 18 MG/DL (ref 7–25)
BUN/CREAT SERPL: NORMAL (CALC) (ref 6–22)
CALCIUM SERPL-MCNC: 9.1 MG/DL (ref 8.6–10.4)
CHLORIDE SERPL-SCNC: 108 MMOL/L (ref 98–110)
CHOLEST SERPL-MCNC: 124 MG/DL
CHOLEST/HDLC SERPL: 1.9 (CALC)
CO2 SERPL-SCNC: 28 MMOL/L (ref 20–32)
CREAT SERPL-MCNC: 0.72 MG/DL (ref 0.6–1)
EOSINOPHIL # BLD AUTO: 172 CELLS/UL (ref 15–500)
EOSINOPHIL NFR BLD AUTO: 4 %
ERYTHROCYTE [DISTWIDTH] IN BLOOD BY AUTOMATED COUNT: 13 % (ref 11–15)
GFR/BSA.PRED SERPLBLD CYS-BASED-ARV: 88 ML/MIN/1.73M2
GLOBULIN SER CALC-MCNC: 2.5 G/DL (CALC) (ref 1.9–3.7)
GLUCOSE SERPL-MCNC: 85 MG/DL (ref 65–99)
HCT VFR BLD AUTO: 32.7 % (ref 35–45)
HDLC SERPL-MCNC: 67 MG/DL
HGB BLD-MCNC: 10.7 G/DL (ref 11.7–15.5)
LDLC SERPL CALC-MCNC: 48 MG/DL (CALC)
LYMPHOCYTES # BLD AUTO: 1208 CELLS/UL (ref 850–3900)
LYMPHOCYTES NFR BLD AUTO: 28.1 %
MAGNESIUM SERPL-MCNC: 2.1 MG/DL (ref 1.5–2.5)
MCH RBC QN AUTO: 30.1 PG (ref 27–33)
MCHC RBC AUTO-ENTMCNC: 32.7 G/DL (ref 32–36)
MCV RBC AUTO: 91.9 FL (ref 80–100)
MONOCYTES # BLD AUTO: 374 CELLS/UL (ref 200–950)
MONOCYTES NFR BLD AUTO: 8.7 %
NEUTROPHILS # BLD AUTO: 2516 CELLS/UL (ref 1500–7800)
NEUTROPHILS NFR BLD AUTO: 58.5 %
NONHDLC SERPL-MCNC: 57 MG/DL (CALC)
PLATELET # BLD AUTO: 289 THOUSAND/UL (ref 140–400)
PMV BLD REES-ECKER: 10.3 FL (ref 7.5–12.5)
POTASSIUM SERPL-SCNC: 4.9 MMOL/L (ref 3.5–5.3)
PROT SERPL-MCNC: 6.2 G/DL (ref 6.1–8.1)
RBC # BLD AUTO: 3.56 MILLION/UL (ref 3.8–5.1)
SODIUM SERPL-SCNC: 143 MMOL/L (ref 135–146)
T4 FREE SERPL-MCNC: 1.3 NG/DL (ref 0.8–1.8)
TRIGL SERPL-MCNC: 33 MG/DL
TSH SERPL-ACNC: 0.23 MIU/L (ref 0.4–4.5)
URATE SERPL-MCNC: 4.9 MG/DL (ref 2.5–7)
WBC # BLD AUTO: 4.3 THOUSAND/UL (ref 3.8–10.8)

## 2022-12-08 DIAGNOSIS — I10 ESSENTIAL HYPERTENSION: ICD-10-CM

## 2022-12-08 RX ORDER — LISINOPRIL 20 MG/1
20 TABLET ORAL DAILY
Qty: 90 TABLET | Refills: 0 | Status: SHIPPED | OUTPATIENT
Start: 2022-12-08

## 2022-12-13 NOTE — PROGRESS NOTES
Answers for HPI/ROS submitted by the patient on 12/12/2022  How would you rate your overall health?: good  Compared to last year, how is your physical health?: same  In general, how satisfied are you with your life?: satisfied  Compared to last year, how is your eyesight?: same  Compared to last year, how is your hearing?: same  Compared to last year, how is your emotional/mental health?: same  How often is anger a problem for you?: never, rarely  How often do you feel unusually tired/fatigued?: sometimes  In the past 7 days, how much pain have you experienced?: none  In the past 6 months, have you lost or gained 10 pounds without trying?: No  One or more falls in the last year: No  In the past 6 months, have you accidentally leaked urine?: Yes  Do you have trouble with the stairs inside or outside your home?: No  Does your home have working smoke alarms?: Yes  Does your home have a carbon monoxide monitor?: Yes  Which safety hazards (if any) have you experienced in your home? Please select all that apply : none  How would you describe your current diet?  Please select all that apply : No Added Salt  In addition to prescription medications, are you taking any over-the-counter supplements?: Yes  If yes, what supplements are you taking?: Melatonin  Can you manage your medications?: Yes  Are you currently taking any opioid medications?: No  Can you walk and transfer into and out of your bed and chair?: Yes  Can you dress and groom yourself?: Yes  Can you bathe or shower yourself?: Yes  Can you feed yourself?: Yes  Can you do your laundry/ housekeeping?: Yes  Can you manage your money, pay your bills, and track your expenses?: Yes  Can you make your own meals?: Yes  Can you do your own shopping?: Yes  Within the last 12 months, have you had any hospitalizations or Emergency Department visits?: No  Do you have a living will?: No  Do you have a Durable POA (Power of ) for healthcare decisions?: No  Do you have an Advanced Directive for end of life decisions?: No  How often have you used an illegal drug (including marijuana) or a prescription medication for non-medical reasons in the past year?: never  What is the typical number of drinks you consume in a day?: 0  What is the typical number of drinks you consume in a week?: 0  How often did you have a drink containing alcohol in the past year?: never  How many drinks did you have on a typical day  when you were drinking in the past year?: 0  How often did you have 6 or more drinks on one occasion in the past year?: never    Name: Wilfrido Velasco      : 1950      MRN: [id number]  Encounter Provider: Amanda Johns MD  Encounter Date: 2022   Encounter department: [address] [place] [address] Place     1  Well adult exam    2  Gastroesophageal reflux disease without esophagitis  Assessment & Plan:  Patient to continue with present therapy and decrease caffeine, avoid ETOH and smoking to decrease acid production  Pt should also cease eating prior to bedtime and avoid excessive fluid intake prior to sleep  May use antacids as needed for breakthrough GERD  All pateint questions answered today about this condition  3  Hypothyroidism due to acquired atrophy of thyroid  Assessment & Plan:  Patient to continue current dose of thyroid medicine and recheck TSH in 6 months      4  Cerebrovascular accident (CVA) due to thrombosis of right carotid artery [facility])  Assessment & Plan:  Patient is stable  and will continue present plan of care and reassess at next routine visit  All questions about this problem from patient were answered today  5  Primary hypertension  Assessment & Plan:  Patient is stable with current anti-hypertensive medicine and continue to follow a low sodium diet and take current medication  All questions about this condition were answered today        6  Mixed hyperlipidemia  Assessment & Plan:  Patient  is stable with current medication and we discussed a low fat low cholesterol diet  Weight loss also discussed for this will help lower cholesterol also  Recheck lipids in 6 months  7  Other fatigue  -     Iron Panel (Includes Ferritin, Iron Sat%, Iron, and TIBC); Future  -     CBC and differential; Future    8  Anemia, unspecified type  -     Iron Panel (Includes Ferritin, Iron Sat%, Iron, and TIBC); Future  -     CBC and differential; Future    9  Menopause  -     DXA bone density spine hip and pelvis; Future; Expected date: 12/14/2022        Falls Plan of Care: balance, strength, and gait training instructions were provided  Home safety education provided  Urinary Incontinence Plan of Care: counseling topics discussed: practice Kegel (pelvic floor strengthening) exercises, use restroom every 2 hours, limit alcohol, caffeine, spicy foods, and acidic foods, limiting fluid intake 3-4 hours before bed, weight loss, preventing constipation and limiting fluid intake to 60 oz  per day  Subjective     Is a 77-year-old female here today for checkup on Medicare wellness as well as multiple medical problems  Patient do have a history of hypertension high per lipidemia history of a CVA  Patient is on a systemic anticoagulation and is set up for colonoscopy the beginning of the first week of the year  Patient has mild anemia with hemoglobin less than 11 which has been stable I will check it for next time she comes after she has her colonoscopy and probably EGD  Patient will also have some iron studies done 2  Patient denied any refills at this point and she will call when she needs them  Review of Systems   Constitutional: Negative for activity change, appetite change, fatigue and fever  HENT: Negative for congestion, ear pain, postnasal drip, rhinorrhea, sinus pressure, sinus pain, sneezing and sore throat  Eyes: Negative for pain and redness     Respiratory: Negative for apnea, cough, chest tightness, shortness of breath and wheezing  Cardiovascular: Negative for chest pain, palpitations and leg swelling  Gastrointestinal: Negative for abdominal pain, constipation, diarrhea, nausea and vomiting  Endocrine: Negative for cold intolerance and heat intolerance  Genitourinary: Negative for difficulty urinating, dysuria, frequency, hematuria and urgency  Musculoskeletal: Negative for arthralgias, back pain, gait problem and myalgias  Skin: Negative for rash  Neurological: Negative for dizziness, speech difficulty, weakness, numbness and headaches  Hematological: Does not bruise/bleed easily  Psychiatric/Behavioral: Negative for agitation, confusion and hallucinations         Past Medical History:   Diagnosis Date   • Disease of thyroid gland    • Hyperlipidemia 5/8/2020   • Hypertension    • Hypothyroidism    • Stroke ([facility] [address] ) 10/23/2018     Past Surgical History:   Procedure Laterality Date   • CAROTID STENT     • IR STROKE ALERT  10/6/2018     Family History   Problem Relation Age of Onset   • No Known Problems Mother    • No Known Problems Father    • Heart disease Family    • No Known Problems Sister    • No Known Problems Daughter    • No Known Problems Maternal Grandmother    • No Known Problems Maternal Grandfather    • No Known Problems Paternal Grandmother    • No Known Problems Paternal Grandfather    • No Known Problems Daughter    • No Known Problems Brother      Social History     Socioeconomic History   • Marital status: /Civil Union     Spouse name: Ladarius Baca   • Number of children: 2   • Years of education: None   • Highest education level: None   Occupational History   • None   Tobacco Use   • Smoking status: Never   • Smokeless tobacco: Never   Vaping Use   • Vaping Use: Never used   Substance and Sexual Activity   • Alcohol use: Never     Comment: Social drinker   • Drug use: No   • Sexual activity: Not Currently     Partners: Male     Birth control/protection: None   Other Topics Concern   • None   Social History Narrative    Most recent tobacco use screenin2019     Do you currently or have you served in the [employer]:   No      Were you activated, into active duty, as a member of the [employer] or as a Reservist:   No      Occupation:   retired      Marital status:        Exercise level: Moderate      Diet:   Regular      General stress level:   Medium      Alcohol intake:   None      Caffeine intake: Moderate      Seat belts used routinely:   Yes      Sunscreen used routinely:   Yes      Smoke alarm in home:   Yes      Advance directive:   No      Has the Patient had a mammogram to screen for breast cancer within 24 months:   No      Social Determinants of Health     Financial Resource Strain: Low Risk    • Difficulty of Paying Living Expenses: Not very hard   Food Insecurity: Not on file   Transportation Needs: No Transportation Needs   • Lack of Transportation (Medical): No   • Lack of Transportation (Non-Medical):  No   Physical Activity: Not on file   Stress: Not on file   Social Connections: Not on file   Intimate Partner Violence: Not on file   Housing Stability: Not on file     Current Outpatient Medications on File Prior to Visit   Medication Sig   • atorvastatin (LIPITOR) 40 mg tablet Take 1 tablet (40 mg total) by mouth every evening   • Brilinta 90 MG TAKE 1 TABLET BY MOUTH  EVERY 12 HOURS   • levothyroxine 88 mcg tablet Take 1 tablet (88 mcg total) by mouth daily   • lisinopril (ZESTRIL) 20 mg tablet Take 1 tablet (20 mg total) by mouth daily   • Melatonin 10 MG TABS Take 10 mg by mouth daily at bedtime     No Known Allergies  Immunization History   Administered Date(s) Administered   • COVID-19 PFIZER VACCINE 0 3 ML IM 2021, 2021   • INFLUENZA 2015, 2017, 2022   • Influenza Quadrivalent 3 years and older 10/01/2018, 10/01/2018   • Influenza, high dose seasonal 0 7 mL 10/12/2018, 2020, 2021, 2022   • Pneumococcal Conjugate 13-Valent 07/19/2017   • Pneumococcal Polysaccharide PPV23 01/18/2019   • Tdap 06/20/2018   • influenza, injectable, quadrivalent 10/01/2018   • influenza, trivalent, adjuvanted 10/07/2019       Objective     /60 (BP Location: Left arm, Patient Position: Sitting, Cuff Size: Standard)   Pulse 88   Temp (!) 96 9 °F (36 1 °C) (Temporal)   Ht 5' 1 5" (1 562 m)   Wt 73 kg (161 lb)   SpO2 99%   BMI 29 93 kg/m²     Physical Exam  Vitals and nursing note reviewed  Constitutional:       Appearance: She is well-developed  HENT:      Head: Normocephalic and atraumatic  Nose: Nose normal    Eyes:      General: No scleral icterus  Conjunctiva/sclera: Conjunctivae normal       Pupils: Pupils are equal, round, and reactive to light  Neck:      Thyroid: No thyromegaly  Cardiovascular:      Rate and Rhythm: Normal rate and regular rhythm  Pulmonary:      Effort: Pulmonary effort is normal       Breath sounds: Normal breath sounds  No wheezing  Abdominal:      General: Bowel sounds are normal  There is no distension  Palpations: Abdomen is soft  Tenderness: There is no abdominal tenderness  There is no guarding or rebound  Musculoskeletal:         General: No tenderness or deformity  Normal range of motion  Cervical back: Normal range of motion and neck supple  Skin:     General: Skin is warm and dry  Findings: No erythema or rash  Neurological:      Mental Status: She is alert and oriented to person, place, and time  Sensory: No sensory deficit  Psychiatric:         Mood and Affect: Mood normal          Behavior: Behavior normal          Thought Content:  Thought content normal          Judgment: Judgment normal        Juan Alberto Forde MD

## 2022-12-14 ENCOUNTER — OFFICE VISIT (OUTPATIENT)
Dept: FAMILY MEDICINE CLINIC | Facility: CLINIC | Age: 72
End: 2022-12-14

## 2022-12-14 VITALS
DIASTOLIC BLOOD PRESSURE: 60 MMHG | WEIGHT: 161 LBS | HEART RATE: 88 BPM | BODY MASS INDEX: 29.63 KG/M2 | HEIGHT: 62 IN | OXYGEN SATURATION: 99 % | SYSTOLIC BLOOD PRESSURE: 134 MMHG | TEMPERATURE: 96.9 F

## 2022-12-14 DIAGNOSIS — D64.9 ANEMIA, UNSPECIFIED TYPE: ICD-10-CM

## 2022-12-14 DIAGNOSIS — E78.2 MIXED HYPERLIPIDEMIA: ICD-10-CM

## 2022-12-14 DIAGNOSIS — I63.031 CEREBROVASCULAR ACCIDENT (CVA) DUE TO THROMBOSIS OF RIGHT CAROTID ARTERY (HCC): ICD-10-CM

## 2022-12-14 DIAGNOSIS — K21.9 GASTROESOPHAGEAL REFLUX DISEASE WITHOUT ESOPHAGITIS: ICD-10-CM

## 2022-12-14 DIAGNOSIS — I10 PRIMARY HYPERTENSION: ICD-10-CM

## 2022-12-14 DIAGNOSIS — R53.83 OTHER FATIGUE: ICD-10-CM

## 2022-12-14 DIAGNOSIS — Z00.00 WELL ADULT EXAM: Primary | ICD-10-CM

## 2022-12-14 DIAGNOSIS — Z78.0 MENOPAUSE: ICD-10-CM

## 2022-12-14 DIAGNOSIS — E03.4 HYPOTHYROIDISM DUE TO ACQUIRED ATROPHY OF THYROID: ICD-10-CM

## 2022-12-14 NOTE — PATIENT INSTRUCTIONS
Medicare Preventive Visit Patient Instructions  Thank you for completing your Welcome to Medicare Visit or Medicare Annual Wellness Visit today  Your next wellness visit will be due in one year (12/15/2023)  The screening/preventive services that you may require over the next 5-10 years are detailed below  Some tests may not apply to you based off risk factors and/or age  Screening tests ordered at today's visit but not completed yet may show as past due  Also, please note that scanned in results may not display below  Preventive Screenings:  Service Recommendations Previous Testing/Comments   Colorectal Cancer Screening  * Colonoscopy    * Fecal Occult Blood Test (FOBT)/Fecal Immunochemical Test (FIT)  * Fecal DNA/Cologuard Test  * Flexible Sigmoidoscopy Age: 39-70 years old   Colonoscopy: every 10 years (may be performed more frequently if at higher risk)  OR  FOBT/FIT: every 1 year  OR  Cologuard: every 3 years  OR  Sigmoidoscopy: every 5 years  Screening may be recommended earlier than age 39 if at higher risk for colorectal cancer  Also, an individualized decision between you and your healthcare provider will decide whether screening between the ages of 74-80 would be appropriate  Colonoscopy: Not on file  FOBT/FIT: Not on file  Cologuard: 06/06/2022  Sigmoidoscopy: Not on file    Screening Current     Breast Cancer Screening Age: 36 years old  Frequency: every 1-2 years  Not required if history of left and right mastectomy Mammogram: 06/07/2022    Screening Current   Cervical Cancer Screening Between the ages of 21-29, pap smear recommended once every 3 years  Between the ages of 33-67, can perform pap smear with HPV co-testing every 5 years     Recommendations may differ for women with a history of total hysterectomy, cervical cancer, or abnormal pap smears in past  Pap Smear: Not on file    Screening Not Indicated   Hepatitis C Screening Once for adults born between 1945 and 1965  More frequently in patients at high risk for Hepatitis C Hep C Antibody: 11/25/2020    Screening Current   Diabetes Screening 1-2 times per year if you're at risk for diabetes or have pre-diabetes Fasting glucose: No results in last 5 years (No results in last 5 years)  A1C: 5 0 % (10/8/2018)  Screening Current   Cholesterol Screening Once every 5 years if you don't have a lipid disorder  May order more often based on risk factors  Lipid panel: 12/05/2022    Screening Not Indicated  History Lipid Disorder     Other Preventive Screenings Covered by Medicare:  1  Abdominal Aortic Aneurysm (AAA) Screening: covered once if your at risk  You're considered to be at risk if you have a family history of AAA  2  Lung Cancer Screening: covers low dose CT scan once per year if you meet all of the following conditions: (1) Age 50-69; (2) No signs or symptoms of lung cancer; (3) Current smoker or have quit smoking within the last 15 years; (4) You have a tobacco smoking history of at least 20 pack years (packs per day multiplied by number of years you smoked); (5) You get a written order from a healthcare provider  3  Glaucoma Screening: covered annually if you're considered high risk: (1) You have diabetes OR (2) Family history of glaucoma OR (3)  aged 48 and older OR (3)  American aged 72 and older  3  Osteoporosis Screening: covered every 2 years if you meet one of the following conditions: (1) You're estrogen deficient and at risk for osteoporosis based off medical history and other findings; (2) Have a vertebral abnormality; (3) On glucocorticoid therapy for more than 3 months; (4) Have primary hyperparathyroidism; (5) On osteoporosis medications and need to assess response to drug therapy  · Last bone density test (DXA Scan): Not on file  5  HIV Screening: covered annually if you're between the age of 12-76  Also covered annually if you are younger than 13 and older than 72 with risk factors for HIV infection  For pregnant patients, it is covered up to 3 times per pregnancy  Immunizations:  Immunization Recommendations   Influenza Vaccine Annual influenza vaccination during flu season is recommended for all persons aged >= 6 months who do not have contraindications   Pneumococcal Vaccine   * Pneumococcal conjugate vaccine = PCV13 (Prevnar 13), PCV15 (Vaxneuvance), PCV20 (Prevnar 20)  * Pneumococcal polysaccharide vaccine = PPSV23 (Pneumovax) Adults 25-60 years old: 1-3 doses may be recommended based on certain risk factors  Adults 72 years old: 1-2 doses may be recommended based off what pneumonia vaccine you previously received   Hepatitis B Vaccine 3 dose series if at intermediate or high risk (ex: diabetes, end stage renal disease, liver disease)   Tetanus (Td) Vaccine - COST NOT COVERED BY MEDICARE PART B Following completion of primary series, a booster dose should be given every 10 years to maintain immunity against tetanus  Td may also be given as tetanus wound prophylaxis  Tdap Vaccine - COST NOT COVERED BY MEDICARE PART B Recommended at least once for all adults  For pregnant patients, recommended with each pregnancy  Shingles Vaccine (Shingrix) - COST NOT COVERED BY MEDICARE PART B  2 shot series recommended in those aged 48 and above     Health Maintenance Due:      Topic Date Due   • Breast Cancer Screening: Mammogram  06/07/2023   • Colorectal Cancer Screening  06/06/2025   • Hepatitis C Screening  Completed     Immunizations Due:      Topic Date Due   • Hepatitis B Vaccine (1 of 3 - 3-dose series) Never done   • COVID-19 Vaccine (3 - Booster for [place] series) 08/25/2021     Advance Directives   What are advance directives? Advance directives are legal documents that state your wishes and plans for medical care  These plans are made ahead of time in case you lose your ability to make decisions for yourself   Advance directives can apply to any medical decision, such as the treatments you want, and if you want to donate organs  What are the types of advance directives? There are many types of advance directives, and each state has rules about how to use them  You may choose a combination of any of the following:  · Living will: This is a written record of the treatment you want  You can also choose which treatments you do not want, which to limit, and which to stop at a certain time  This includes surgery, medicine, IV fluid, and tube feedings  · Durable power of  for healthcare [facility]): This is a written record that states who you want to make healthcare choices for you when you are unable to make them for yourself  This person, called a proxy, is usually a family member or a friend  You may choose more than 1 proxy  · Do not resuscitate (DNR) order:  A DNR order is used in case your heart stops beating or you stop breathing  It is a request not to have certain forms of treatment, such as CPR  A DNR order may be included in other types of advance directives  · Medical directive: This covers the care that you want if you are in a coma, near death, or unable to make decisions for yourself  You can list the treatments you want for each condition  Treatment may include pain medicine, surgery, blood transfusions, dialysis, IV or tube feedings, and a ventilator (breathing machine)  · Values history: This document has questions about your views, beliefs, and how you feel and think about life  This information can help others choose the care that you would choose  Why are advance directives important? An advance directive helps you control your care  Although spoken wishes may be used, it is better to have your wishes written down  Spoken wishes can be misunderstood, or not followed  Treatments may be given even if you do not want them  An advance directive may make it easier for your family to make difficult choices about your care     Urinary Incontinence   Urinary incontinence (UI)  is when you lose control of your bladder  UI develops because your bladder cannot store or empty urine properly  The 3 most common types of UI are stress incontinence, urge incontinence, or both  Medicines:   · May be given to help strengthen your bladder control  Report any side effects of medication to your healthcare provider  Do pelvic muscle exercises often:  Your pelvic muscles help you stop urinating  Squeeze these muscles tight for 5 seconds, then relax for 5 seconds  Gradually work up to squeezing for 10 seconds  Do 3 sets of 15 repetitions a day, or as directed  This will help strengthen your pelvic muscles and improve bladder control  Train your bladder:  Go to the bathroom at set times, such as every 2 hours, even if you do not feel the urge to go  You can also try to hold your urine when you feel the urge to go  For example, hold your urine for 5 minutes when you feel the urge to go  As that becomes easier, hold your urine for 10 minutes  Self-care:   · Keep a UI record  Write down how often you leak urine and how much you leak  Make a note of what you were doing when you leaked urine  · Drink liquids as directed  You may need to limit the amount of liquid you drink to help control your urine leakage  Do not drink any liquid right before you go to bed  Limit or do not have drinks that contain caffeine or alcohol  · Prevent constipation  Eat a variety of high-fiber foods  Good examples are high-fiber cereals, beans, vegetables, and whole-grain breads  Walking is the best way to trigger your intestines to have a bowel movement  · Exercise regularly and maintain a healthy weight  Weight loss and exercise will decrease pressure on your bladder and help you control your leakage  · Use a catheter as directed  to help empty your bladder  A catheter is a tiny, plastic tube that is put into your bladder to drain your urine  · Go to behavior therapy as directed    Behavior therapy may be used to help you learn to control your urge to urinate  Weight Management   Why it is important to manage your weight:  Being overweight increases your risk of health conditions such as heart disease, high blood pressure, type 2 diabetes, and certain types of cancer  It can also increase your risk for osteoarthritis, sleep apnea, and other respiratory problems  Aim for a slow, steady weight loss  Even a small amount of weight loss can lower your risk of health problems  How to lose weight safely:  A safe and healthy way to lose weight is to eat fewer calories and get regular exercise  You can lose up about 1 pound a week by decreasing the number of calories you eat by 500 calories each day  Healthy meal plan for weight management:  A healthy meal plan includes a variety of foods, contains fewer calories, and helps you stay healthy  A healthy meal plan includes the following:  · Eat whole-grain foods more often  A healthy meal plan should contain fiber  Fiber is the part of grains, fruits, and vegetables that is not broken down by your body  Whole-grain foods are healthy and provide extra fiber in your diet  Some examples of whole-grain foods are whole-wheat breads and pastas, oatmeal, brown rice, and bulgur  · Eat a variety of vegetables every day  Include dark, leafy greens such as spinach, kale, bertin greens, and mustard greens  Eat yellow and orange vegetables such as carrots, sweet potatoes, and winter squash  · Eat a variety of fruits every day  Choose fresh or canned fruit (canned in its own juice or light syrup) instead of juice  Fruit juice has very little or no fiber  · Eat low-fat dairy foods  Drink fat-free (skim) milk or 1% milk  Eat fat-free yogurt and low-fat cottage cheese  Try low-fat cheeses such as mozzarella and other reduced-fat cheeses  · Choose meat and other protein foods that are low in fat  Choose beans or other legumes such as split peas or lentils   Choose fish, skinless poultry (chicken or turkey), or lean cuts of red meat (beef or pork)  Before you cook meat or poultry, cut off any visible fat  · Use less fat and oil  Try baking foods instead of frying them  Add less fat, such as margarine, sour cream, regular salad dressing and mayonnaise to foods  Eat fewer high-fat foods  Some examples of high-fat foods include french fries, doughnuts, ice cream, and cakes  · Eat fewer sweets  Limit foods and drinks that are high in sugar  This includes candy, cookies, regular soda, and sweetened drinks  Exercise:  Exercise at least 30 minutes per day on most days of the week  Some examples of exercise include walking, biking, dancing, and swimming  You can also fit in more physical activity by taking the stairs instead of the elevator or parking farther away from stores  Ask your healthcare provider about the best exercise plan for you  © Copyright [employer] 2018 Information is for End User's use only and may not be sold, redistributed or otherwise used for commercial purposes   All illustrations and images included in CareNotes® are the copyrighted property of [employer]  or [address] [place] [address]

## 2022-12-14 NOTE — PROGRESS NOTES
Assessment and Plan:     Problem List Items Addressed This Visit        Digestive    Gastroesophageal reflux disease without esophagitis     Patient to continue with present therapy and decrease caffeine, avoid ETOH and smoking to decrease acid production  Pt should also cease eating prior to bedtime and avoid excessive fluid intake prior to sleep  May use antacids as needed for breakthrough GERD  All pateint questions answered today about this condition  Endocrine    Hypothyroidism     Patient to continue current dose of thyroid medicine and recheck TSH in 6 months            Cardiovascular and Mediastinum    Cerebrovascular accident (CVA) due to thrombosis of right carotid artery [facility])     Patient is stable  and will continue present plan of care and reassess at next routine visit  All questions about this problem from patient were answered today  Hypertension     Patient is stable with current anti-hypertensive medicine and continue to follow a low sodium diet and take current medication  All questions about this condition were answered today  Other    Hyperlipidemia     Patient  is stable with current medication and we discussed a low fat low cholesterol diet  Weight loss also discussed for this will help lower cholesterol also  Recheck lipids in 6 months  Well adult exam - Primary   Other Visit Diagnoses     Other fatigue        Relevant Orders    Iron Panel (Includes Ferritin, Iron Sat%, Iron, and TIBC)    CBC and differential    Anemia, unspecified type        Relevant Orders    Iron Panel (Includes Ferritin, Iron Sat%, Iron, and TIBC)    CBC and differential    Menopause        Relevant Orders    DXA bone density spine hip and pelvis           Preventive health issues were discussed with patient, and age appropriate screening tests were ordered as noted in patient's After Visit Summary    Personalized health advice and appropriate referrals for health education or preventive services given if needed, as noted in patient's After Visit Summary       History of Present Illness:     Patient presents for a Medicare Wellness Visit    HPI   Patient Care Team:  Juan Alberto Forde MD as PCP - General (Family Medicine)     Review of Systems:     Review of Systems     Problem List:     Patient Active Problem List   Diagnosis   • Cerebrovascular accident (CVA) due to thrombosis of right carotid artery ([facility] [address] )   • Hypothyroidism   • Normocytic anemia due to blood loss   • Slow transit constipation   • Hyperlipidemia   • Gastroesophageal reflux disease without esophagitis   • Well adult exam   • Hypertension   • Murmur, cardiac      Past Medical and Surgical History:     Past Medical History:   Diagnosis Date   • Disease of thyroid gland    • Hyperlipidemia 5/8/2020   • Hypertension    • Hypothyroidism    • Stroke ([facility] [address] ) 10/23/2018     Past Surgical History:   Procedure Laterality Date   • CAROTID STENT     • IR STROKE ALERT  10/6/2018      Family History:     Family History   Problem Relation Age of Onset   • No Known Problems Mother    • No Known Problems Father    • Heart disease Family    • No Known Problems Sister    • No Known Problems Daughter    • No Known Problems Maternal Grandmother    • No Known Problems Maternal Grandfather    • No Known Problems Paternal Grandmother    • No Known Problems Paternal Grandfather    • No Known Problems Daughter    • No Known Problems Brother       Social History:     Social History     Socioeconomic History   • Marital status: /Civil Union     Spouse name: Ladarius Baca   • Number of children: 2   • Years of education: None   • Highest education level: None   Occupational History   • None   Tobacco Use   • Smoking status: Never   • Smokeless tobacco: Never   Vaping Use   • Vaping Use: Never used   Substance and Sexual Activity   • Alcohol use: Never     Comment: Social drinker   • Drug use: No   • Sexual activity: Not Currently     Partners: Male     Birth control/protection: None   Other Topics Concern   • None   Social History Narrative    Most recent tobacco use screenin2019     Do you currently or have you served in the [employer]:   No      Were you activated, into active duty, as a member of the [employer] or as a Reservist:   No      Occupation:   retired      Marital status:        Exercise level: Moderate      Diet:   Regular      General stress level:   Medium      Alcohol intake:   None      Caffeine intake: Moderate      Seat belts used routinely:   Yes      Sunscreen used routinely:   Yes      Smoke alarm in home:   Yes      Advance directive:   No      Has the Patient had a mammogram to screen for breast cancer within 24 months:   No      Social Determinants of Health     Financial Resource Strain: Low Risk    • Difficulty of Paying Living Expenses: Not very hard   Food Insecurity: Not on file   Transportation Needs: No Transportation Needs   • Lack of Transportation (Medical): No   • Lack of Transportation (Non-Medical): No   Physical Activity: Not on file   Stress: Not on file   Social Connections: Not on file   Intimate Partner Violence: Not on file   Housing Stability: Not on file      Medications and Allergies:     Current Outpatient Medications   Medication Sig Dispense Refill   • atorvastatin (LIPITOR) 40 mg tablet Take 1 tablet (40 mg total) by mouth every evening 90 tablet 0   • Brilinta 90 MG TAKE 1 TABLET BY MOUTH  EVERY 12 HOURS 180 tablet 3   • levothyroxine 88 mcg tablet Take 1 tablet (88 mcg total) by mouth daily 90 tablet 0   • lisinopril (ZESTRIL) 20 mg tablet Take 1 tablet (20 mg total) by mouth daily 90 tablet 0   • Melatonin 10 MG TABS Take 10 mg by mouth daily at bedtime       No current facility-administered medications for this visit       No Known Allergies   Immunizations:     Immunization History   Administered Date(s) Administered   • COVID-19 PFIZER VACCINE 0 3 ML IM 2021, 2021   • INFLUENZA 11/16/2015, 09/26/2017, 09/09/2022   • Influenza Quadrivalent 3 years and older 10/01/2018, 10/01/2018   • Influenza, high dose seasonal 0 7 mL 10/12/2018, 11/02/2020, 11/17/2021, 09/09/2022   • Pneumococcal Conjugate 13-Valent 07/19/2017   • Pneumococcal Polysaccharide PPV23 01/18/2019   • Tdap 06/20/2018   • influenza, injectable, quadrivalent 10/01/2018   • influenza, trivalent, adjuvanted 10/07/2019      Health Maintenance:         Topic Date Due   • Breast Cancer Screening: Mammogram  06/07/2023   • Colorectal Cancer Screening  06/06/2025   • Hepatitis C Screening  Completed         Topic Date Due   • Hepatitis B Vaccine (1 of 3 - 3-dose series) Never done   • COVID-19 Vaccine (3 - Booster for Pfizer series) 08/25/2021      Medicare Screening Tests and Risk Assessments:         Health Risk Assessment:   Patient rates overall health as good  Patient feels that their physical health rating is same  Patient is satisfied with their life  Eyesight was rated as same  Hearing was rated as same  Patient feels that their emotional and mental health rating is same  Patients states they are never, rarely angry  Patient states they are sometimes unusually tired/fatigued  Pain experienced in the last 7 days has been none  Patient states that she has experienced no weight loss or gain in last 6 months  Fall Risk Screening: In the past year, patient has experienced: no history of falling in past year      Urinary Incontinence Screening:   Patient has leaked urine accidently in the last six months  Home Safety:  Patient does not have trouble with stairs inside or outside of their home  Patient has working smoke alarms and has working carbon monoxide detector  Home safety hazards include: none  Nutrition:   Current diet is No Added Salt  Medications:   Patient is currently taking over-the-counter supplements  OTC medications include: Melatonin  Patient is able to manage medications       Activities of Daily Living (ADLs)/Instrumental Activities of Daily Living (IADLs):   Walk and transfer into and out of bed and chair?: Yes  Dress and groom yourself?: Yes    Bathe or shower yourself?: Yes    Feed yourself? Yes  Do your laundry/housekeeping?: Yes  Manage your money, pay your bills and track your expenses?: Yes  Make your own meals?: Yes    Do your own shopping?: Yes    Previous Hospitalizations:   Any hospitalizations or ED visits within the last 12 months?: No      Advance Care Planning:   Living will: No    Durable POA for healthcare: No    Advanced directive: No      PREVENTIVE SCREENINGS      Cardiovascular Screening:    General: Screening Not Indicated and History Lipid Disorder      Diabetes Screening:     General: Screening Current      Colorectal Cancer Screening:     General: Screening Current      Breast Cancer Screening:     General: Screening Current      Cervical Cancer Screening:    General: Screening Not Indicated      Lung Cancer Screening:     General: Screening Not Indicated      Hepatitis C Screening:    General: Screening Current    Screening, Brief Intervention, and Referral to Treatment (SBIRT)    Screening  Typical number of drinks in a day: 0  Typical number of drinks in a week: 0  Interpretation: Low risk drinking behavior  AUDIT-C Screenin) How often did you have a drink containing alcohol in the past year? never  2) How many drinks did you have on a typical day when you were drinking in the past year? 0  3) How often did you have 6 or more drinks on one occasion in the past year? never    AUDIT-C Score: 0  Interpretation: Score 0-2 (female): Negative screen for alcohol misuse    Single Item Drug Screening:  How often have you used an illegal drug (including marijuana) or a prescription medication for non-medical reasons in the past year? never    Single Item Drug Screen Score: 0  Interpretation: Negative screen for possible drug use disorder    No results found       Physical Exam:     /60 (BP Location: Left arm, Patient Position: Sitting, Cuff Size: Standard)   Pulse 88   Temp (!) 96 9 °F (36 1 °C) (Temporal)   Ht 5' 1 5" (1 562 m)   Wt 73 kg (161 lb)   SpO2 99%   BMI 29 93 kg/m²     Physical Exam     Case Sinclair MD

## 2023-01-05 ENCOUNTER — TELEPHONE (OUTPATIENT)
Dept: GASTROENTEROLOGY | Facility: CLINIC | Age: 73
End: 2023-01-05

## 2023-01-05 ENCOUNTER — TELEPHONE (OUTPATIENT)
Dept: NEUROSURGERY | Facility: CLINIC | Age: 73
End: 2023-01-05

## 2023-01-05 ENCOUNTER — OFFICE VISIT (OUTPATIENT)
Dept: GASTROENTEROLOGY | Facility: CLINIC | Age: 73
End: 2023-01-05

## 2023-01-05 VITALS
HEART RATE: 79 BPM | WEIGHT: 161 LBS | HEIGHT: 61 IN | DIASTOLIC BLOOD PRESSURE: 79 MMHG | SYSTOLIC BLOOD PRESSURE: 150 MMHG | BODY MASS INDEX: 30.4 KG/M2

## 2023-01-05 DIAGNOSIS — I63.031 CEREBROVASCULAR ACCIDENT (CVA) DUE TO THROMBOSIS OF RIGHT CAROTID ARTERY (HCC): ICD-10-CM

## 2023-01-05 DIAGNOSIS — Z12.11 SCREEN FOR COLON CANCER: ICD-10-CM

## 2023-01-05 DIAGNOSIS — R19.5 POSITIVE COLORECTAL CANCER SCREENING USING COLOGUARD TEST: Primary | ICD-10-CM

## 2023-01-05 RX ORDER — POLYETHYLENE GLYCOL 3350, SODIUM SULFATE ANHYDROUS, SODIUM BICARBONATE, SODIUM CHLORIDE, POTASSIUM CHLORIDE 236; 22.74; 6.74; 5.86; 2.97 G/4L; G/4L; G/4L; G/4L; G/4L
4 POWDER, FOR SOLUTION ORAL ONCE
Qty: 4000 ML | Refills: 0 | Status: SHIPPED | OUTPATIENT
Start: 2023-01-05 | End: 2023-01-05

## 2023-01-05 NOTE — TELEPHONE ENCOUNTER
Received the following request from Dr Dominic Boss:     Josie Wells MD  to Me    [place]    2:19 PM  Ideally colonoscopy would be performed on ASA 81 mg  If not possible, relatively safe to temporarily hold Brilinta  Faxed this instruction back to GI as requested

## 2023-01-05 NOTE — TELEPHONE ENCOUNTER
Scheduled date of colonoscopy (as of today):1/26/23  Physician performing colonoscopy: Kaylee  Location of colonoscopy: [facility]  Bowel prep reviewed with patient: GoLytely  Instructions reviewed with patient by: Jan Redo  Clearances: Geovani - Brilinta/Chano

## 2023-01-05 NOTE — TELEPHONE ENCOUNTER
Received fax from Dr Camelia Alexander office requesting clearance to discontinue Brilinta in preparation for upcoming colonoscopy  Noted patient last seen 8/19/2022 and instructed to continue on this for the next year  Will route a message to provider to determine if it is ok for her to discontinue for the procedure and return fax to office as appropriate

## 2023-01-05 NOTE — H&P (VIEW-ONLY)
[facility] - Outpatient Consultation  Sofi Bro 74 y o  female MRN: [id number]  Encounter: [id number]          ASSESSMENT AND PLAN:      1  Positive colorectal cancer screening using Cologuard test  -     Ambulatory referral for colonoscopy    2  Screen for colon cancer    3  Cerebrovascular accident (CVA) due to thrombosis of right carotid artery [facility])      Patient has stool Cologuard positive, never had colonoscopy done, she needs a colonoscopy for further evaluation of above, procedure and prep discussed at length, schedule first screening colonoscopy in the next few weeks  Her hemoglobin is around 10 7 though stable, unclear etiology  Patient denies any upper GI symptoms and/or heartburn  History of CVA right carotid artery stent and on Brilinta      ______________________________________________________________________    HPI:      Patient had a Cologuard test came back positive in June, came in for evaluation  She denies any apparent blood in stools melena hematochezia bowels generally regular no apparent excessive constipation diarrhea abdominal pain nausea vomiting dysphagia coughing choking spells nocturnal reflux regurgitation bronchitis pneumonias or history of CVA CAD CAD  Has a history of CVA, right core carotid stent  Takes Brilinta  Denies any acid reflux indigestion  Diet medications more than 10 system reviewed  Is not vegetarian, denies any bleeding anywhere else  REVIEW OF SYSTEMS:    CONSTITUTIONAL: Denies any fever, chills, rigors, and weight loss  HEENT: No earache or tinnitus  CARDIOVASCULAR: No chest pain or palpitations  RESPIRATORY: Denies any cough, hemoptysis, shortness of breath or dyspnea on exertion  GASTROINTESTINAL: As noted in the History of Present Illness  GENITOURINARY: Denies any hematuria or dysuria  NEUROLOGIC: No dizziness or vertigo  MUSCULOSKELETAL: Denies any joint swellings  SKIN: Denies skin rashes or itching  ENDOCRINE: Denies excessive thirst  Denies intolerance to heat or cold  PSYCHOSOCIAL: Denies depression or anxiety  Denies any recent memory loss  Historical Information   Past Medical History:   Diagnosis Date   • Disease of thyroid gland    • Hyperlipidemia 5/8/2020   • Hypertension    • Hypothyroidism    • Stroke ([facility] [address] ) 10/23/2018     Past Surgical History:   Procedure Laterality Date   • CAROTID STENT     • IR STROKE ALERT  10/6/2018     Social History   Social History     Substance and Sexual Activity   Alcohol Use Yes    Comment: Social drinker     Social History     Substance and Sexual Activity   Drug Use Never     Social History     Tobacco Use   Smoking Status Never   Smokeless Tobacco Never     Family History   Problem Relation Age of Onset   • No Known Problems Mother    • No Known Problems Father    • Heart disease Family    • No Known Problems Sister    • No Known Problems Daughter    • No Known Problems Maternal Grandmother    • No Known Problems Maternal Grandfather    • No Known Problems Paternal Grandmother    • No Known Problems Paternal Grandfather    • No Known Problems Daughter    • No Known Problems Brother        Meds/Allergies       Current Outpatient Medications:   •  atorvastatin (LIPITOR) 40 mg tablet  •  Brilinta 90 MG  •  levothyroxine 88 mcg tablet  •  lisinopril (ZESTRIL) 20 mg tablet  •  Melatonin 10 MG TABS    No Known Allergies        Objective     Blood pressure 150/79, pulse 79, height 5' 1" (1 549 m), weight 73 kg (161 lb)  Body mass index is 30 42 kg/m²  PHYSICAL EXAM:      General Appearance:   Alert, cooperative, no distress   HEENT:   Normocephalic, atraumatic, anicteric  Neck:  Supple, symmetrical, trachea midline   Lungs:   Clear to auscultation bilaterally; no rales, rhonchi or wheezing; respirations unlabored    Heart[de-identified]   Regular rate and rhythm; no murmur     Abdomen:   Soft, non-tender, non-distended; normal bowel sounds; no masses, no organomegaly Genitalia:   Deferred    Rectal:   Deferred    Extremities:  No cyanosis, clubbing or edema    Skin:  No jaundice, rashes, or lesions    Lymph nodes:  No palpable cervical lymphadenopathy        Lab Results:   No visits with results within 1 Day(s) from this visit     Latest known visit with results is:   Orders Only on 12/05/2022   Component Date Value   • Total Cholesterol 12/05/2022 124    • HDL 12/05/2022 67    • Triglycerides 12/05/2022 33    • LDL Calculated 12/05/2022 48    • Chol HDLC Ratio 12/05/2022 1 9    • Non-HDL Cholesterol 12/05/2022 57    • Magnesium, Serum 12/05/2022 2 1    • Uric Acid 12/05/2022 4 9    • Glucose, Random 12/05/2022 85    • BUN 12/05/2022 18    • Creatinine 12/05/2022 0 72    • eGFR 12/05/2022 88    • SL AMB BUN/CREATININE RA* 37/45/2550 NOT APPLICABLE    • Sodium 91/42/1148 143    • Potassium 12/05/2022 4 9    • Chloride 12/05/2022 108    • CO2 12/05/2022 28    • Calcium 12/05/2022 9 1    • Protein, Total 12/05/2022 6 2    • Albumin 12/05/2022 3 7    • Globulin 12/05/2022 2 5    • Albumin/Globulin Ratio 12/05/2022 1 5    • TOTAL BILIRUBIN 12/05/2022 0 5    • Alkaline Phosphatase 12/05/2022 62    • AST 12/05/2022 15    • ALT 12/05/2022 12    • White Blood Cell Count 12/05/2022 4 3    • Red Blood Cell Count 12/05/2022 3 56 (L)    • Hemoglobin 12/05/2022 10 7 (L)    • HCT 12/05/2022 32 7 (L)    • MCV 12/05/2022 91 9    • MCH 12/05/2022 30 1    • MCHC 12/05/2022 32 7    • RDW 12/05/2022 13 0    • Platelet Count 05/93/1065 289    • SL AMB MPV 12/05/2022 10 3    • Neutrophils (Absolute) 12/05/2022 2,516    • Lymphocytes (Absolute) 12/05/2022 1,208    • Monocytes (Absolute) 12/05/2022 374    • Eosinophils (Absolute) 12/05/2022 172    • Basophils ABS 12/05/2022 30    • Neutrophils 12/05/2022 58 5    • Lymphocytes 12/05/2022 28 1    • Monocytes 12/05/2022 8 7    • Eosinophils 12/05/2022 4 0    • Basophils PCT 12/05/2022 0 7    • Free t4 12/05/2022 1 3    • TSH 12/05/2022 0 23 (L) Radiology Results:   No results found

## 2023-01-05 NOTE — TELEPHONE ENCOUNTER
Patient is currently on Brilinta, faxed cardiac clearance to be completed by Dr Armida Lee prior to Colonoscopy with Dr Seb Rodríguez on 1/26/23      P- [phone]  f- [phone]

## 2023-01-05 NOTE — PROGRESS NOTES
[facility] [address] [facility] - Outpatient Consultation  Guzmán An 74 y o  female MRN: [id number]  Encounter: [id number]          ASSESSMENT AND PLAN:      1  Positive colorectal cancer screening using Cologuard test  -     Ambulatory referral for colonoscopy    2  Screen for colon cancer    3  Cerebrovascular accident (CVA) due to thrombosis of right carotid artery [facility])      Patient has stool Cologuard positive, never had colonoscopy done, she needs a colonoscopy for further evaluation of above, procedure and prep discussed at length, schedule first screening colonoscopy in the next few weeks  Her hemoglobin is around 10 7 though stable, unclear etiology  Patient denies any upper GI symptoms and/or heartburn  History of CVA right carotid artery stent and on Brilinta      ______________________________________________________________________    HPI:      Patient had a Cologuard test came back positive in June, came in for evaluation  She denies any apparent blood in stools melena hematochezia bowels generally regular no apparent excessive constipation diarrhea abdominal pain nausea vomiting dysphagia coughing choking spells nocturnal reflux regurgitation bronchitis pneumonias or history of CVA CAD CAD  Has a history of CVA, right core carotid stent  Takes Brilinta  Denies any acid reflux indigestion  Diet medications more than 10 system reviewed  Is not vegetarian, denies any bleeding anywhere else  REVIEW OF SYSTEMS:    CONSTITUTIONAL: Denies any fever, chills, rigors, and weight loss  HEENT: No earache or tinnitus  CARDIOVASCULAR: No chest pain or palpitations  RESPIRATORY: Denies any cough, hemoptysis, shortness of breath or dyspnea on exertion  GASTROINTESTINAL: As noted in the History of Present Illness  GENITOURINARY: Denies any hematuria or dysuria  NEUROLOGIC: No dizziness or vertigo  MUSCULOSKELETAL: Denies any joint swellings  SKIN: Denies skin rashes or itching  ENDOCRINE: Denies excessive thirst  Denies intolerance to heat or cold  PSYCHOSOCIAL: Denies depression or anxiety  Denies any recent memory loss  Historical Information   Past Medical History:   Diagnosis Date   • Disease of thyroid gland    • Hyperlipidemia 5/8/2020   • Hypertension    • Hypothyroidism    • Stroke ([facility] [address] ) 10/23/2018     Past Surgical History:   Procedure Laterality Date   • CAROTID STENT     • IR STROKE ALERT  10/6/2018     Social History   Social History     Substance and Sexual Activity   Alcohol Use Yes    Comment: Social drinker     Social History     Substance and Sexual Activity   Drug Use Never     Social History     Tobacco Use   Smoking Status Never   Smokeless Tobacco Never     Family History   Problem Relation Age of Onset   • No Known Problems Mother    • No Known Problems Father    • Heart disease Family    • No Known Problems Sister    • No Known Problems Daughter    • No Known Problems Maternal Grandmother    • No Known Problems Maternal Grandfather    • No Known Problems Paternal Grandmother    • No Known Problems Paternal Grandfather    • No Known Problems Daughter    • No Known Problems Brother        Meds/Allergies       Current Outpatient Medications:   •  atorvastatin (LIPITOR) 40 mg tablet  •  Brilinta 90 MG  •  levothyroxine 88 mcg tablet  •  lisinopril (ZESTRIL) 20 mg tablet  •  Melatonin 10 MG TABS    No Known Allergies        Objective     Blood pressure 150/79, pulse 79, height 5' 1" (1 549 m), weight 73 kg (161 lb)  Body mass index is 30 42 kg/m²  PHYSICAL EXAM:      General Appearance:   Alert, cooperative, no distress   HEENT:   Normocephalic, atraumatic, anicteric  Neck:  Supple, symmetrical, trachea midline   Lungs:   Clear to auscultation bilaterally; no rales, rhonchi or wheezing; respirations unlabored    Heart[de-identified]   Regular rate and rhythm; no murmur     Abdomen:   Soft, non-tender, non-distended; normal bowel sounds; no masses, no organomegaly Genitalia:   Deferred    Rectal:   Deferred    Extremities:  No cyanosis, clubbing or edema    Skin:  No jaundice, rashes, or lesions    Lymph nodes:  No palpable cervical lymphadenopathy        Lab Results:   No visits with results within 1 Day(s) from this visit     Latest known visit with results is:   Orders Only on 12/05/2022   Component Date Value   • Total Cholesterol 12/05/2022 124    • HDL 12/05/2022 67    • Triglycerides 12/05/2022 33    • LDL Calculated 12/05/2022 48    • Chol HDLC Ratio 12/05/2022 1 9    • Non-HDL Cholesterol 12/05/2022 57    • Magnesium, Serum 12/05/2022 2 1    • Uric Acid 12/05/2022 4 9    • Glucose, Random 12/05/2022 85    • BUN 12/05/2022 18    • Creatinine 12/05/2022 0 72    • eGFR 12/05/2022 88    • SL AMB BUN/CREATININE RA* 05/45/3124 NOT APPLICABLE    • Sodium 20/78/2297 143    • Potassium 12/05/2022 4 9    • Chloride 12/05/2022 108    • CO2 12/05/2022 28    • Calcium 12/05/2022 9 1    • Protein, Total 12/05/2022 6 2    • Albumin 12/05/2022 3 7    • Globulin 12/05/2022 2 5    • Albumin/Globulin Ratio 12/05/2022 1 5    • TOTAL BILIRUBIN 12/05/2022 0 5    • Alkaline Phosphatase 12/05/2022 62    • AST 12/05/2022 15    • ALT 12/05/2022 12    • White Blood Cell Count 12/05/2022 4 3    • Red Blood Cell Count 12/05/2022 3 56 (L)    • Hemoglobin 12/05/2022 10 7 (L)    • HCT 12/05/2022 32 7 (L)    • MCV 12/05/2022 91 9    • MCH 12/05/2022 30 1    • MCHC 12/05/2022 32 7    • RDW 12/05/2022 13 0    • Platelet Count 42/84/0993 289    • SL AMB MPV 12/05/2022 10 3    • Neutrophils (Absolute) 12/05/2022 2,516    • Lymphocytes (Absolute) 12/05/2022 1,208    • Monocytes (Absolute) 12/05/2022 374    • Eosinophils (Absolute) 12/05/2022 172    • Basophils ABS 12/05/2022 30    • Neutrophils 12/05/2022 58 5    • Lymphocytes 12/05/2022 28 1    • Monocytes 12/05/2022 8 7    • Eosinophils 12/05/2022 4 0    • Basophils PCT 12/05/2022 0 7    • Free t4 12/05/2022 1 3    • TSH 12/05/2022 0 23 (L) Radiology Results:   No results found

## 2023-01-06 NOTE — TELEPHONE ENCOUNTER
I called and notified patient of 5 day Brilinta hold  I also notified patient to take for those 5 days ASA 81 mg over the counter per Dr Fernandes [place] recommendation, patient agreed and expressed understanding

## 2023-01-06 NOTE — TELEPHONE ENCOUNTER
I called and notified patient of 5 day Brilinta hold  I also notified patient to take for those 5 days ASA 81 mg over the counter per Dr Joan Pepe recommendation, patient agreed and expressed understanding

## 2023-01-10 DIAGNOSIS — E03.9 HYPOTHYROIDISM, UNSPECIFIED TYPE: ICD-10-CM

## 2023-01-10 RX ORDER — LEVOTHYROXINE SODIUM 88 UG/1
88 TABLET ORAL DAILY
Qty: 90 TABLET | Refills: 0 | Status: SHIPPED | OUTPATIENT
Start: 2023-01-10

## 2023-01-26 ENCOUNTER — ANESTHESIA (OUTPATIENT)
Dept: GASTROENTEROLOGY | Facility: AMBULATORY SURGERY CENTER | Age: 73
End: 2023-01-26

## 2023-01-26 ENCOUNTER — ANESTHESIA EVENT (OUTPATIENT)
Dept: GASTROENTEROLOGY | Facility: AMBULATORY SURGERY CENTER | Age: 73
End: 2023-01-26

## 2023-01-26 ENCOUNTER — HOSPITAL ENCOUNTER (OUTPATIENT)
Dept: GASTROENTEROLOGY | Facility: AMBULATORY SURGERY CENTER | Age: 73
Discharge: HOME | End: 2023-01-26

## 2023-01-26 VITALS
TEMPERATURE: 96 F | DIASTOLIC BLOOD PRESSURE: 86 MMHG | HEIGHT: 62 IN | RESPIRATION RATE: 16 BRPM | BODY MASS INDEX: 28.71 KG/M2 | OXYGEN SATURATION: 99 % | SYSTOLIC BLOOD PRESSURE: 184 MMHG | HEART RATE: 87 BPM | WEIGHT: 156 LBS

## 2023-01-26 DIAGNOSIS — Z12.11 SCREEN FOR COLON CANCER: ICD-10-CM

## 2023-01-26 DIAGNOSIS — R19.5 POSITIVE COLORECTAL CANCER SCREENING USING COLOGUARD TEST: ICD-10-CM

## 2023-01-26 RX ORDER — SODIUM CHLORIDE, SODIUM LACTATE, POTASSIUM CHLORIDE, CALCIUM CHLORIDE 600; 310; 30; 20 MG/100ML; MG/100ML; MG/100ML; MG/100ML
20 INJECTION, SOLUTION INTRAVENOUS CONTINUOUS
Status: DISCONTINUED | OUTPATIENT
Start: 2023-01-26 | End: 2023-01-30 | Stop reason: HOSPADM

## 2023-01-26 RX ORDER — SODIUM CHLORIDE, SODIUM LACTATE, POTASSIUM CHLORIDE, CALCIUM CHLORIDE 600; 310; 30; 20 MG/100ML; MG/100ML; MG/100ML; MG/100ML
INJECTION, SOLUTION INTRAVENOUS CONTINUOUS PRN
Status: DISCONTINUED | OUTPATIENT
Start: 2023-01-26 | End: 2023-01-26

## 2023-01-26 RX ORDER — PROPOFOL 10 MG/ML
INJECTION, EMULSION INTRAVENOUS AS NEEDED
Status: DISCONTINUED | OUTPATIENT
Start: 2023-01-26 | End: 2023-01-26

## 2023-01-26 RX ADMIN — PROPOFOL 50 MG: 10 INJECTION, EMULSION INTRAVENOUS at 11:43

## 2023-01-26 RX ADMIN — PROPOFOL 50 MG: 10 INJECTION, EMULSION INTRAVENOUS at 11:45

## 2023-01-26 RX ADMIN — SODIUM CHLORIDE, SODIUM LACTATE, POTASSIUM CHLORIDE, CALCIUM CHLORIDE: 600; 310; 30; 20 INJECTION, SOLUTION INTRAVENOUS at 11:36

## 2023-01-26 RX ADMIN — PROPOFOL 20 MG: 10 INJECTION, EMULSION INTRAVENOUS at 11:48

## 2023-01-26 RX ADMIN — PROPOFOL 100 MG: 10 INJECTION, EMULSION INTRAVENOUS at 11:41

## 2023-01-26 NOTE — ANESTHESIA PREPROCEDURE EVALUATION
Procedure:  COLONOSCOPY    Relevant Problems   CARDIO   (+) Hyperlipidemia   (+) Hypertension   (+) Murmur, cardiac      ENDO   (+) Hypothyroidism      GI/HEPATIC   (+) Gastroesophageal reflux disease without esophagitis      HEMATOLOGY   (+) Normocytic anemia due to blood loss      NEURO/PSYCH   (+) Cerebrovascular accident (CVA) due to thrombosis of right carotid artery ([facility] [address] )      Stroke about 3 years ago - completely recovered   Physical Exam    Airway    Mallampati score: II  TM Distance: <3 FB  Neck ROM: full     Dental   Comment: Denture are snug - patient sleeps with them in, upper dentures and lower dentures,     Cardiovascular      Pulmonary      Other Findings        Anesthesia Plan  ASA Score- 3     Anesthesia Type- IV sedation with anesthesia with ASA Monitors  Additional Monitors:   Airway Plan:     Comment: Last of PO bowel prep: 06:00  Plan Factors-Exercise tolerance (METS): >4 METS  Chart reviewed  Patient summary reviewed  Patient is not a current smoker  Induction- intravenous  Postoperative Plan-     Informed Consent- Anesthetic plan and risks discussed with patient  I personally reviewed this patient with the CRNA  Discussed and agreed on the Anesthesia Plan with the CRNA  Matteo Boswell

## 2023-01-26 NOTE — ANESTHESIA POSTPROCEDURE EVALUATION
Post-Op Assessment Note    CV Status:  Stable  Pain Score: 0    Pain management: adequate     Mental Status:  Alert and awake   Hydration Status:  Euvolemic   PONV Controlled:  Controlled   Airway Patency:  Patent      Post Op Vitals Reviewed: Yes      Staff: CRNA         No notable events documented      BP  110/63   Temp     Pulse  64   Resp   14   SpO2   98

## 2023-01-26 NOTE — INTERVAL H&P NOTE
H&P reviewed  After examining the patient I find no changes in the patients condition since the H&P had been written      Vitals:    01/26/23 1119   BP: (!) 182/80   Pulse: 95   Resp: 18   Temp: (!) 96 °F (35 6 °C)   SpO2: 98%

## 2023-01-27 DIAGNOSIS — I63.031 CEREBROVASCULAR ACCIDENT (CVA) DUE TO THROMBOSIS OF RIGHT CAROTID ARTERY (HCC): ICD-10-CM

## 2023-01-28 RX ORDER — TICAGRELOR 90 MG/1
TABLET ORAL
Qty: 180 TABLET | Refills: 3 | Status: SHIPPED | OUTPATIENT
Start: 2023-01-28

## 2023-02-07 DIAGNOSIS — I63.031 CEREBROVASCULAR ACCIDENT (CVA) DUE TO THROMBOSIS OF RIGHT CAROTID ARTERY (HCC): ICD-10-CM

## 2023-02-07 RX ORDER — ATORVASTATIN CALCIUM 40 MG/1
TABLET, FILM COATED ORAL
Qty: 90 TABLET | Refills: 0 | Status: SHIPPED | OUTPATIENT
Start: 2023-02-07

## 2023-03-05 DIAGNOSIS — I10 ESSENTIAL HYPERTENSION: ICD-10-CM

## 2023-03-05 RX ORDER — LISINOPRIL 20 MG/1
TABLET ORAL
Qty: 90 TABLET | Refills: 0 | Status: SHIPPED | OUTPATIENT
Start: 2023-03-05 | End: 2023-03-13 | Stop reason: SDUPTHER

## 2023-03-10 LAB
BASOPHILS # BLD AUTO: 30 CELLS/UL (ref 0–200)
BASOPHILS NFR BLD AUTO: 0.8 %
EOSINOPHIL # BLD AUTO: 229 CELLS/UL (ref 15–500)
EOSINOPHIL NFR BLD AUTO: 6.2 %
ERYTHROCYTE [DISTWIDTH] IN BLOOD BY AUTOMATED COUNT: 12.9 % (ref 11–15)
FERRITIN SERPL-MCNC: 28 NG/ML (ref 16–288)
HCT VFR BLD AUTO: 35 % (ref 35–45)
HGB BLD-MCNC: 11.7 G/DL (ref 11.7–15.5)
IRON SATN MFR SERPL: 29 % (CALC) (ref 16–45)
IRON SERPL-MCNC: 100 MCG/DL (ref 45–160)
LYMPHOCYTES # BLD AUTO: 955 CELLS/UL (ref 850–3900)
LYMPHOCYTES NFR BLD AUTO: 25.8 %
MCH RBC QN AUTO: 30.6 PG (ref 27–33)
MCHC RBC AUTO-ENTMCNC: 33.4 G/DL (ref 32–36)
MCV RBC AUTO: 91.6 FL (ref 80–100)
MONOCYTES # BLD AUTO: 337 CELLS/UL (ref 200–950)
MONOCYTES NFR BLD AUTO: 9.1 %
NEUTROPHILS # BLD AUTO: 2150 CELLS/UL (ref 1500–7800)
NEUTROPHILS NFR BLD AUTO: 58.1 %
PLATELET # BLD AUTO: 270 THOUSAND/UL (ref 140–400)
PMV BLD REES-ECKER: 10.4 FL (ref 7.5–12.5)
RBC # BLD AUTO: 3.82 MILLION/UL (ref 3.8–5.1)
TIBC SERPL-MCNC: 348 MCG/DL (CALC) (ref 250–450)
WBC # BLD AUTO: 3.7 THOUSAND/UL (ref 3.8–10.8)

## 2023-03-13 ENCOUNTER — RA CDI HCC (OUTPATIENT)
Dept: OTHER | Facility: HOSPITAL | Age: 73
End: 2023-03-13

## 2023-03-13 DIAGNOSIS — I10 ESSENTIAL HYPERTENSION: ICD-10-CM

## 2023-03-13 NOTE — PROGRESS NOTES
[facility]  coding opportunities       Chart reviewed, no opportunity found:   [address]        Patients Insurance     Medicare Insurance: [employer]

## 2023-03-14 RX ORDER — LISINOPRIL 20 MG/1
20 TABLET ORAL DAILY
Qty: 90 TABLET | Refills: 0 | Status: SHIPPED | OUTPATIENT
Start: 2023-03-14 | End: 2023-03-17 | Stop reason: SDUPTHER

## 2023-03-17 ENCOUNTER — OFFICE VISIT (OUTPATIENT)
Dept: FAMILY MEDICINE CLINIC | Facility: CLINIC | Age: 73
End: 2023-03-17

## 2023-03-17 VITALS
HEIGHT: 62 IN | TEMPERATURE: 97.5 F | HEART RATE: 78 BPM | DIASTOLIC BLOOD PRESSURE: 88 MMHG | SYSTOLIC BLOOD PRESSURE: 136 MMHG | OXYGEN SATURATION: 98 % | BODY MASS INDEX: 30.18 KG/M2 | WEIGHT: 164 LBS

## 2023-03-17 DIAGNOSIS — K21.9 GASTROESOPHAGEAL REFLUX DISEASE WITHOUT ESOPHAGITIS: ICD-10-CM

## 2023-03-17 DIAGNOSIS — I10 PRIMARY HYPERTENSION: ICD-10-CM

## 2023-03-17 DIAGNOSIS — I10 ESSENTIAL HYPERTENSION: ICD-10-CM

## 2023-03-17 DIAGNOSIS — E78.2 MIXED HYPERLIPIDEMIA: ICD-10-CM

## 2023-03-17 DIAGNOSIS — E03.4 HYPOTHYROIDISM DUE TO ACQUIRED ATROPHY OF THYROID: Primary | ICD-10-CM

## 2023-03-17 RX ORDER — LISINOPRIL 20 MG/1
20 TABLET ORAL DAILY
Qty: 90 TABLET | Refills: 1 | Status: SHIPPED | OUTPATIENT
Start: 2023-03-17

## 2023-03-17 NOTE — PROGRESS NOTES
Name: Kelsi Rodriguez      : 1950      MRN: [id number]  Encounter Provider: Tian Johnston MD  Encounter Date: 3/17/2023   Encounter department: [address]     1  Well adult exam    2  Hypothyroidism due to acquired atrophy of thyroid  Assessment & Plan:  Patient to continue current dose of thyroid medicine and recheck TSH in 6 months    Orders:  -     TSH, 3rd generation with Free T4 reflex; Future    3  Primary hypertension  Assessment & Plan:  Patient is stable with current anti-hypertensive medicine and continue to follow a low sodium diet and take current medication  All questions about this condition were answered today  4  Mixed hyperlipidemia  Assessment & Plan:  Patient  is stable with current medication and we discussed a low fat low cholesterol diet  Weight loss also discussed for this will help lower cholesterol also  Recheck lipids in 6 months  Orders:  -     Comprehensive metabolic panel; Future  -     Lipid Panel with Direct LDL reflex; Future    5  Gastroesophageal reflux disease without esophagitis  Assessment & Plan:  Patient to continue with present therapy and decrease caffeine, avoid ETOH and smoking to decrease acid production  Pt should also cease eating prior to bedtime and avoid excessive fluid intake prior to sleep  May use antacids as needed for breakthrough GERD  All pateint questions answered today about this condition  6  Essential hypertension  -     lisinopril (ZESTRIL) 20 mg tablet; Take 1 tablet (20 mg total) by mouth daily      BMI Counseling: There is no height or weight on file to calculate BMI   The BMI is above normal  Nutrition recommendations include decreasing portion sizes, encouraging healthy choices of fruits and vegetables, decreasing fast food intake, consuming healthier snacks, limiting drinks that contain sugar, moderation in carbohydrate intake, increasing intake of lean protein, reducing intake of saturated and trans fat and reducing intake of cholesterol  Exercise recommendations include exercising 3-5 times per week  No pharmacotherapy was ordered  Patient referred to PCP  Rationale for BMI follow-up plan is due to patient being overweight or obese  Falls Plan of Care: balance, strength, and gait training instructions were provided  Home safety education provided  Urinary Incontinence Plan of Care: counseling topics discussed: practice Kegel (pelvic floor strengthening) exercises, use restroom every 2 hours, limit alcohol, caffeine, spicy foods, and acidic foods, limiting fluid intake 3-4 hours before bed, weight loss, preventing constipation and limiting fluid intake to 60 oz  per day  Subjective     75-year-old female today for yearly physical exam checkup on multiple medical problems patient hypothyroidism hyperlipidemia hypertension history of stroke and is doing very well  He is due for a yearly lab work soon  And we filled out her physical form for at today  Patient also needs refills on her blood pressure medicine that was sent to the pharmacy      Review of Systems    Past Medical History:   Diagnosis Date   • Disease of thyroid gland    • Hyperlipidemia 5/8/2020   • Hypertension    • Hypothyroidism    • Stroke ([facility] [address] ) 10/23/2018     Past Surgical History:   Procedure Laterality Date   • CAROTID STENT     • IR STROKE ALERT  10/06/2018   • TONSILLECTOMY       Family History   Problem Relation Age of Onset   • No Known Problems Mother    • No Known Problems Father    • Heart disease Family    • No Known Problems Sister    • No Known Problems Daughter    • No Known Problems Maternal Grandmother    • No Known Problems Maternal Grandfather    • No Known Problems Paternal Grandmother    • No Known Problems Paternal Grandfather    • No Known Problems Daughter    • No Known Problems Brother      Social History     Socioeconomic History   • Marital status: /Civil Union     Spouse name: Brayden Diaz   • Number of children: 2   • Years of education: None   • Highest education level: None   Occupational History   • None   Tobacco Use   • Smoking status: Never   • Smokeless tobacco: Never   Vaping Use   • Vaping Use: Never used   Substance and Sexual Activity   • Alcohol use: Yes     Comment: Social drinker   • Drug use: Never   • Sexual activity: Not Currently     Partners: Male     Birth control/protection: None   Other Topics Concern   • None   Social History Narrative    Most recent tobacco use screenin2019     Do you currently or have you served in [employer]:   No      Were you activated, into active duty, as a member of the [employer] or as a Reservist:   No      Occupation:   retired      Marital status:        Exercise level: Moderate      Diet:   Regular      General stress level:   Medium      Alcohol intake:   None      Caffeine intake: Moderate      Seat belts used routinely:   Yes      Sunscreen used routinely:   Yes      Smoke alarm in home:   Yes      Advance directive:   No      Has the Patient had a mammogram to screen for breast cancer within 24 months:   No      Social Determinants of Health     Financial Resource Strain: Low Risk    • Difficulty of Paying Living Expenses: Not very hard   Food Insecurity: Not on file   Transportation Needs: No Transportation Needs   • Lack of Transportation (Medical): No   • Lack of Transportation (Non-Medical):  No   Physical Activity: Not on file   Stress: Not on file   Social Connections: Not on file   Intimate Partner Violence: Not on file   Housing Stability: Not on file     Current Outpatient Medications on File Prior to Visit   Medication Sig   • atorvastatin (LIPITOR) 40 mg tablet take 1 tablet by mouth every evening   • Brilinta 90 MG TAKE 1 TABLET BY MOUTH  EVERY 12 HOURS   • levothyroxine 88 mcg tablet Take 1 tablet (88 mcg total) by mouth daily   • Melatonin 10 MG TABS Take 10 mg by mouth daily at bedtime   • [DISCONTINUED] lisinopril (ZESTRIL) 20 mg tablet Take 1 tablet (20 mg total) by mouth daily     No Known Allergies  Immunization History   Administered Date(s) Administered   • COVID-19 PFIZER VACCINE 0 3 ML IM 03/04/2021, 03/25/2021   • INFLUENZA 11/16/2015, 09/26/2017, 09/09/2022   • Influenza Quadrivalent 3 years and older 10/01/2018, 10/01/2018   • Influenza, high dose seasonal 0 7 mL 10/12/2018, 11/02/2020, 11/17/2021, 09/09/2022   • Pneumococcal Conjugate 13-Valent 07/19/2017   • Pneumococcal Polysaccharide PPV23 01/18/2019   • Tdap 06/20/2018   • influenza, injectable, quadrivalent 10/01/2018   • influenza, trivalent, adjuvanted 10/07/2019       Objective     /88 (BP Location: Left arm, Patient Position: Sitting, Cuff Size: Standard)   Pulse 78   Temp 97 5 °F (36 4 °C) (Temporal)   Ht 5' 1 5" (1 562 m)   Wt 74 4 kg (164 lb)   SpO2 98%   BMI 30 49 kg/m²     Physical Exam  Juan Antonio Hill MD

## 2023-04-06 DIAGNOSIS — E03.9 HYPOTHYROIDISM, UNSPECIFIED TYPE: ICD-10-CM

## 2023-04-06 RX ORDER — LEVOTHYROXINE SODIUM 88 UG/1
88 TABLET ORAL DAILY
Qty: 90 TABLET | Refills: 0 | Status: SHIPPED | OUTPATIENT
Start: 2023-04-06 | End: 2023-04-07

## 2023-04-07 DIAGNOSIS — E03.9 HYPOTHYROIDISM, UNSPECIFIED TYPE: ICD-10-CM

## 2023-04-07 RX ORDER — LEVOTHYROXINE SODIUM 88 UG/1
TABLET ORAL
Qty: 90 TABLET | Refills: 0 | Status: SHIPPED | OUTPATIENT
Start: 2023-04-07

## 2023-05-09 DIAGNOSIS — I63.031 CEREBROVASCULAR ACCIDENT (CVA) DUE TO THROMBOSIS OF RIGHT CAROTID ARTERY (HCC): ICD-10-CM

## 2023-05-09 RX ORDER — ATORVASTATIN CALCIUM 40 MG/1
TABLET, FILM COATED ORAL
Qty: 90 TABLET | Refills: 0 | Status: SHIPPED | OUTPATIENT
Start: 2023-05-09

## 2023-06-16 LAB
ALBUMIN SERPL-MCNC: 3.9 G/DL (ref 3.6–5.1)
ALBUMIN/GLOB SERPL: 1.5 (CALC) (ref 1–2.5)
ALP SERPL-CCNC: 61 U/L (ref 37–153)
ALT SERPL-CCNC: 14 U/L (ref 6–29)
AST SERPL-CCNC: 17 U/L (ref 10–35)
BILIRUB SERPL-MCNC: 0.6 MG/DL (ref 0.2–1.2)
BUN SERPL-MCNC: 13 MG/DL (ref 7–25)
BUN/CREAT SERPL: NORMAL (CALC) (ref 6–22)
CALCIUM SERPL-MCNC: 9.3 MG/DL (ref 8.6–10.4)
CHLORIDE SERPL-SCNC: 105 MMOL/L (ref 98–110)
CHOLEST SERPL-MCNC: 136 MG/DL
CHOLEST/HDLC SERPL: 2 (CALC)
CO2 SERPL-SCNC: 28 MMOL/L (ref 20–32)
CREAT SERPL-MCNC: 0.78 MG/DL (ref 0.6–1)
GFR/BSA.PRED SERPLBLD CYS-BASED-ARV: 80 ML/MIN/1.73M2
GLOBULIN SER CALC-MCNC: 2.6 G/DL (CALC) (ref 1.9–3.7)
GLUCOSE SERPL-MCNC: 79 MG/DL (ref 65–99)
HDLC SERPL-MCNC: 69 MG/DL
LDLC SERPL CALC-MCNC: 53 MG/DL (CALC)
NONHDLC SERPL-MCNC: 67 MG/DL (CALC)
POTASSIUM SERPL-SCNC: 4.5 MMOL/L (ref 3.5–5.3)
PROT SERPL-MCNC: 6.5 G/DL (ref 6.1–8.1)
SODIUM SERPL-SCNC: 140 MMOL/L (ref 135–146)
T4 FREE SERPL-MCNC: 1.3 NG/DL (ref 0.8–1.8)
TRIGL SERPL-MCNC: 64 MG/DL
TSH SERPL-ACNC: 0.3 MIU/L (ref 0.4–4.5)

## 2023-06-24 DIAGNOSIS — I10 ESSENTIAL HYPERTENSION: ICD-10-CM

## 2023-06-26 RX ORDER — LISINOPRIL 20 MG/1
20 TABLET ORAL DAILY
Qty: 90 TABLET | Refills: 0 | Status: SHIPPED | OUTPATIENT
Start: 2023-06-26

## 2023-07-10 DIAGNOSIS — E03.9 HYPOTHYROIDISM, UNSPECIFIED TYPE: ICD-10-CM

## 2023-07-10 RX ORDER — LEVOTHYROXINE SODIUM 88 UG/1
88 TABLET ORAL DAILY
Qty: 90 TABLET | Refills: 0 | Status: SHIPPED | OUTPATIENT
Start: 2023-07-10

## 2023-08-02 ENCOUNTER — OFFICE VISIT (OUTPATIENT)
Dept: FAMILY MEDICINE CLINIC | Facility: CLINIC | Age: 73
End: 2023-08-02
Payer: COMMERCIAL

## 2023-08-02 ENCOUNTER — TELEPHONE (OUTPATIENT)
Dept: FAMILY MEDICINE CLINIC | Facility: CLINIC | Age: 73
End: 2023-08-02

## 2023-08-02 VITALS
DIASTOLIC BLOOD PRESSURE: 74 MMHG | SYSTOLIC BLOOD PRESSURE: 134 MMHG | HEIGHT: 62 IN | OXYGEN SATURATION: 98 % | WEIGHT: 164 LBS | TEMPERATURE: 97.3 F | RESPIRATION RATE: 18 BRPM | HEART RATE: 73 BPM | BODY MASS INDEX: 30.18 KG/M2

## 2023-08-02 DIAGNOSIS — I63.031 CEREBROVASCULAR ACCIDENT (CVA) DUE TO THROMBOSIS OF RIGHT CAROTID ARTERY (HCC): ICD-10-CM

## 2023-08-02 DIAGNOSIS — E78.2 MIXED HYPERLIPIDEMIA: ICD-10-CM

## 2023-08-02 DIAGNOSIS — Z12.31 ENCOUNTER FOR SCREENING MAMMOGRAM FOR BREAST CANCER: Primary | ICD-10-CM

## 2023-08-02 DIAGNOSIS — I10 PRIMARY HYPERTENSION: ICD-10-CM

## 2023-08-02 DIAGNOSIS — R53.83 OTHER FATIGUE: ICD-10-CM

## 2023-08-02 DIAGNOSIS — Z78.0 MENOPAUSE: ICD-10-CM

## 2023-08-02 DIAGNOSIS — Z23 ENCOUNTER FOR IMMUNIZATION: ICD-10-CM

## 2023-08-02 DIAGNOSIS — E78.2 MIXED HYPERLIPIDEMIA: Primary | ICD-10-CM

## 2023-08-02 DIAGNOSIS — K21.9 GASTROESOPHAGEAL REFLUX DISEASE WITHOUT ESOPHAGITIS: ICD-10-CM

## 2023-08-02 DIAGNOSIS — E03.4 HYPOTHYROIDISM DUE TO ACQUIRED ATROPHY OF THYROID: ICD-10-CM

## 2023-08-02 PROCEDURE — 99214 OFFICE O/P EST MOD 30 MIN: CPT | Performed by: FAMILY MEDICINE

## 2023-08-02 RX ORDER — AMLODIPINE BESYLATE 10 MG/1
10 TABLET ORAL DAILY
Qty: 30 TABLET | Refills: 1 | Status: SHIPPED | OUTPATIENT
Start: 2023-08-02 | End: 2023-08-05

## 2023-08-02 NOTE — PROGRESS NOTES
Name: Mitchel Bravo      : 1950      MRN: [id number]  Encounter Provider: Hilda Westfall MD  Encounter Date: 2023   Encounter department: [address] [place] [address]     1. Encounter for screening mammogram for breast cancer  -     Mammo screening bilateral w 3d & cad; Future; Expected date: 2023    2. Cerebrovascular accident (CVA) due to thrombosis of right carotid artery [facility])  Assessment & Plan:  Patient is stable  and will continue present plan of care and reassess at next routine visit. All questions about this problem from patient were answered today. 3. Hypothyroidism due to acquired atrophy of thyroid  Assessment & Plan:  Patient to continue current dose of thyroid medicine and recheck TSH in 6 months      4. Mixed hyperlipidemia  Assessment & Plan:  Patient  is stable with current medication and we discussed a low fat low cholesterol diet. Weight loss also discussed for this will help lower cholesterol also. Recheck lipids in 6 months. 5. Gastroesophageal reflux disease without esophagitis  Assessment & Plan:  Patient to continue with present therapy and decrease caffeine, avoid ETOH and smoking to decrease acid production. Pt should also cease eating prior to bedtime and avoid excessive fluid intake prior to sleep. May use antacids as needed for breakthrough GERD. All pateint questions answered today about this condition. 6. Primary hypertension  Assessment & Plan:  Patient is stable with current anti-hypertensive medicine and continue to follow a low sodium diet and take current medication. All questions about this condition were answered today. Orders:  -     amLODIPine (NORVASC) 10 mg tablet; Take 1 tablet (10 mg total) by mouth daily    7. Encounter for immunization    8. Menopause  -     DXA bone density spine hip and pelvis;  Future; Expected date: 2023        Falls Plan of Care: balance, strength, and gait training instructions were provided. Home safety education provided. Urinary Incontinence Plan of Care: counseling topics discussed: practice Kegel (pelvic floor strengthening) exercises, use restroom every 2 hours, limit alcohol, caffeine, spicy foods, and acidic foods, limiting fluid intake 3-4 hours before bed, weight loss, preventing constipation and limiting fluid intake to 60 oz. per day. Subjective     Is a 47-year-old female today for checkup on multimedical problems patient with history of CVA hypothyroidism hypertension hyperlipidemia. Patient's been complaining about having some being said unrestful sleep. Spouse does not report any episodes of apnea with her but she just does not feel well rested when she sleeps at night. Patient does take some melatonin which she is not really quite sure is even helping. Discussed with her the importance of a sleep study and she would like to think about that. Patient had lab work done and it was reviewed for her today and she is doing very well with her thyroid as well as her cholesterol. Where if she needs any refills and will call to refill when appropriate. Patient also states that she has been developing some dry cough and thinks it may be due to her lisinopril. Patient states she also has fatigue which may relate to her sleeping problem but it could also be a problem with her lisinopril. We will see about doing a drug holiday from lisinopril and will give him amlodipine approximately 10 mg daily. We will see the patient back in approximately 6 weeks with this. Review of Systems   Constitutional: Negative for activity change, appetite change, fatigue and fever. HENT: Negative for congestion, ear pain, postnasal drip, rhinorrhea, sinus pressure, sinus pain, sneezing and sore throat. Eyes: Negative for pain and redness. Respiratory: Negative for apnea, cough, chest tightness, shortness of breath and wheezing.     Cardiovascular: Negative for chest pain, palpitations and leg swelling. Gastrointestinal: Negative for abdominal pain, constipation, diarrhea, nausea and vomiting. Endocrine: Negative for cold intolerance and heat intolerance. Genitourinary: Negative for difficulty urinating, dysuria, frequency, hematuria and urgency. Musculoskeletal: Negative for arthralgias, back pain, gait problem and myalgias. Skin: Negative for rash. Neurological: Negative for dizziness, speech difficulty, weakness, numbness and headaches. Hematological: Does not bruise/bleed easily. Psychiatric/Behavioral: Negative for agitation, confusion and hallucinations.        Past Medical History:   Diagnosis Date   • Disease of thyroid gland    • Hyperlipidemia 5/8/2020   • Hypertension    • Hypothyroidism    • Stroke ([address]) 10/23/2018     Past Surgical History:   Procedure Laterality Date   • CAROTID STENT     • IR STROKE ALERT  10/06/2018   • TONSILLECTOMY       Family History   Problem Relation Age of Onset   • No Known Problems Mother    • No Known Problems Father    • Heart disease Family    • No Known Problems Sister    • No Known Problems Daughter    • No Known Problems Maternal Grandmother    • No Known Problems Maternal Grandfather    • No Known Problems Paternal Grandmother    • No Known Problems Paternal Grandfather    • No Known Problems Daughter    • No Known Problems Brother      Social History     Socioeconomic History   • Marital status: /Civil Union     Spouse name: Salina Kanner   • Number of children: 2   • Years of education: None   • Highest education level: None   Occupational History   • None   Tobacco Use   • Smoking status: Never   • Smokeless tobacco: Never   Vaping Use   • Vaping Use: Never used   Substance and Sexual Activity   • Alcohol use: Yes     Comment: Social drinker   • Drug use: Never   • Sexual activity: Not Currently     Partners: Male     Birth control/protection: None   Other Topics Concern   • None   Social History Narrative Most recent tobacco use screenin2019     Do you currently or have you served in the [address] [place] [address]:   No      Were you activated, into active duty, as a member of the [employer] or as a Reservist:   No      Occupation:   retired      Marital status:        Exercise level: Moderate      Diet:   Regular      General stress level:   Medium      Alcohol intake:   None      Caffeine intake: Moderate      Seat belts used routinely:   Yes      Sunscreen used routinely:   Yes      Smoke alarm in home:   Yes      Advance directive:   No      Has the Patient had a mammogram to screen for breast cancer within 24 months:   No      Social Determinants of Health     Financial Resource Strain: Low Risk  (2022)    Overall Financial Resource Strain (CARDIA)    • Difficulty of Paying Living Expenses: Not very hard   Food Insecurity: No Food Insecurity (2021)    Hunger Vital Sign    • Worried About Running Out of Food in the Last Year: Never true    • Ran Out of Food in the Last Year: Never true   Transportation Needs: No Transportation Needs (2022)    PRAPARE - Transportation    • Lack of Transportation (Medical): No    • Lack of Transportation (Non-Medical): No   Physical Activity: Insufficiently Active (2020)    Exercise Vital Sign    • Days of Exercise per Week: 3 days    • Minutes of Exercise per Session: 30 min   Stress: No Stress Concern Present (2020)    [address] [place] [address]    • Feeling of Stress : Not at all   Social Connections: Unknown (2020)    Social Connection and Isolation Panel [NHANES]    • Frequency of Communication with Friends and Family: More than three times a week    • Frequency of Social Gatherings with Friends and Family:  Three times a week    • Attends Orthodox Services: Never    • Active Member of Clubs or Organizations: No    • Attends Club or Organization Meetings: Never    • Marital Status: Not on file   Intimate Partner Violence: Not At Risk (11/2/2020)    Humiliation, Afraid, Rape, and Kick questionnaire    • Fear of Current or Ex-Partner: No    • Emotionally Abused: No    • Physically Abused: No    • Sexually Abused: No   Housing Stability: Not on file     Current Outpatient Medications on File Prior to Visit   Medication Sig   • atorvastatin (LIPITOR) 40 mg tablet take 1 tablet by mouth every evening   • Brilinta 90 MG TAKE 1 TABLET BY MOUTH  EVERY 12 HOURS   • levothyroxine 88 mcg tablet Take 1 tablet (88 mcg total) by mouth daily   • Melatonin 10 MG TABS Take 10 mg by mouth daily at bedtime   • [DISCONTINUED] lisinopril (ZESTRIL) 20 mg tablet Take 1 tablet (20 mg total) by mouth daily     No Known Allergies  Immunization History   Administered Date(s) Administered   • COVID-19 PFIZER VACCINE 0.3 ML IM 03/04/2021, 03/25/2021   • INFLUENZA 11/16/2015, 09/26/2017, 09/09/2022   • Influenza Quadrivalent 3 years and older 10/01/2018, 10/01/2018   • Influenza, high dose seasonal 0.7 mL 10/12/2018, 11/02/2020, 11/17/2021, 09/09/2022   • Pneumococcal Conjugate 13-Valent 07/19/2017   • Pneumococcal Polysaccharide PPV23 01/18/2019   • Tdap 06/20/2018   • influenza, injectable, quadrivalent 10/01/2018   • influenza, trivalent, adjuvanted 10/07/2019       Objective     /74 (BP Location: Left arm, Patient Position: Sitting, Cuff Size: Standard)   Pulse 73   Temp (!) 97.3 °F (36.3 °C)   Resp 18   Ht 5' 1.5" (1.562 m)   Wt 74.4 kg (164 lb)   SpO2 98%   BMI 30.49 kg/m²     Physical Exam  Vitals and nursing note reviewed. Constitutional:       Appearance: She is well-developed. HENT:      Head: Normocephalic and atraumatic. Nose: Nose normal.      Mouth/Throat:      Mouth: Mucous membranes are moist.   Eyes:      General: No scleral icterus. Conjunctiva/sclera: Conjunctivae normal.      Pupils: Pupils are equal, round, and reactive to light. Neck:      Thyroid: No thyromegaly. Cardiovascular:      Rate and Rhythm: Normal rate and regular rhythm. Pulmonary:      Effort: Pulmonary effort is normal.      Breath sounds: Normal breath sounds. No wheezing. Abdominal:      General: Bowel sounds are normal. There is no distension. Palpations: Abdomen is soft. Tenderness: There is no abdominal tenderness. There is no guarding or rebound. Musculoskeletal:         General: No tenderness or deformity. Normal range of motion. Cervical back: Normal range of motion and neck supple. Skin:     General: Skin is warm and dry. Findings: No erythema or rash. Neurological:      Mental Status: She is alert and oriented to person, place, and time. Sensory: No sensory deficit. Psychiatric:         Mood and Affect: Mood normal.         Behavior: Behavior normal.         Thought Content:  Thought content normal.         Judgment: Judgment normal.       Rajendra Momin MD

## 2023-08-04 DIAGNOSIS — I10 PRIMARY HYPERTENSION: ICD-10-CM

## 2023-08-05 RX ORDER — AMLODIPINE BESYLATE 10 MG/1
10 TABLET ORAL DAILY
Qty: 90 TABLET | Refills: 1 | Status: SHIPPED | OUTPATIENT
Start: 2023-08-05

## 2023-08-13 DIAGNOSIS — I63.031 CEREBROVASCULAR ACCIDENT (CVA) DUE TO THROMBOSIS OF RIGHT CAROTID ARTERY (HCC): ICD-10-CM

## 2023-08-13 RX ORDER — ATORVASTATIN CALCIUM 40 MG/1
TABLET, FILM COATED ORAL
Qty: 90 TABLET | Refills: 0 | Status: SHIPPED | OUTPATIENT
Start: 2023-08-13

## 2023-08-26 ENCOUNTER — HOSPITAL ENCOUNTER (OUTPATIENT)
Dept: VASCULAR ULTRASOUND | Facility: HOSPITAL | Age: 73
Discharge: HOME | End: 2023-08-26
Payer: COMMERCIAL

## 2023-08-26 DIAGNOSIS — I63.031 CEREBROVASCULAR ACCIDENT (CVA) DUE TO THROMBOSIS OF RIGHT CAROTID ARTERY (HCC): ICD-10-CM

## 2023-08-26 PROCEDURE — 93880 EXTRACRANIAL BILAT STUDY: CPT

## 2023-08-26 PROCEDURE — 93880 EXTRACRANIAL BILAT STUDY: CPT | Performed by: SURGERY

## 2023-09-07 LAB
ALBUMIN SERPL-MCNC: 3.9 G/DL (ref 3.6–5.1)
ALBUMIN/GLOB SERPL: 1.6 (CALC) (ref 1–2.5)
ALP SERPL-CCNC: 66 U/L (ref 37–153)
ALT SERPL-CCNC: 13 U/L (ref 6–29)
APPEARANCE UR: CLEAR
AST SERPL-CCNC: 16 U/L (ref 10–35)
BACTERIA UR QL AUTO: ABNORMAL /HPF
BASOPHILS # BLD AUTO: 28 CELLS/UL (ref 0–200)
BASOPHILS NFR BLD AUTO: 0.7 %
BILIRUB SERPL-MCNC: 0.5 MG/DL (ref 0.2–1.2)
BILIRUB UR QL STRIP: NEGATIVE
BUN SERPL-MCNC: 10 MG/DL (ref 7–25)
BUN/CREAT SERPL: NORMAL (CALC) (ref 6–22)
CALCIUM SERPL-MCNC: 8.7 MG/DL (ref 8.6–10.4)
CHLORIDE SERPL-SCNC: 104 MMOL/L (ref 98–110)
CHOLEST SERPL-MCNC: 137 MG/DL
CHOLEST/HDLC SERPL: 2 (CALC)
CO2 SERPL-SCNC: 25 MMOL/L (ref 20–32)
COLOR UR: YELLOW
CREAT SERPL-MCNC: 0.78 MG/DL (ref 0.6–1)
EOSINOPHIL # BLD AUTO: 200 CELLS/UL (ref 15–500)
EOSINOPHIL NFR BLD AUTO: 5 %
ERYTHROCYTE [DISTWIDTH] IN BLOOD BY AUTOMATED COUNT: 12.3 % (ref 11–15)
GFR/BSA.PRED SERPLBLD CYS-BASED-ARV: 79 ML/MIN/1.73M2
GLOBULIN SER CALC-MCNC: 2.5 G/DL (CALC) (ref 1.9–3.7)
GLUCOSE SERPL-MCNC: 88 MG/DL (ref 65–99)
GLUCOSE UR QL STRIP: NEGATIVE
HCT VFR BLD AUTO: 34.7 % (ref 35–45)
HDLC SERPL-MCNC: 69 MG/DL
HGB BLD-MCNC: 11.2 G/DL (ref 11.7–15.5)
HGB UR QL STRIP: NEGATIVE
HYALINE CASTS #/AREA URNS LPF: ABNORMAL /LPF
KETONES UR QL STRIP: NEGATIVE
LDLC SERPL CALC-MCNC: 55 MG/DL (CALC)
LEUKOCYTE ESTERASE UR QL STRIP: ABNORMAL
LYMPHOCYTES # BLD AUTO: 1172 CELLS/UL (ref 850–3900)
LYMPHOCYTES NFR BLD AUTO: 29.3 %
MAGNESIUM SERPL-MCNC: 1.9 MG/DL (ref 1.5–2.5)
MCH RBC QN AUTO: 30.9 PG (ref 27–33)
MCHC RBC AUTO-ENTMCNC: 32.3 G/DL (ref 32–36)
MCV RBC AUTO: 95.9 FL (ref 80–100)
MONOCYTES # BLD AUTO: 416 CELLS/UL (ref 200–950)
MONOCYTES NFR BLD AUTO: 10.4 %
NEUTROPHILS # BLD AUTO: 2184 CELLS/UL (ref 1500–7800)
NEUTROPHILS NFR BLD AUTO: 54.6 %
NITRITE UR QL STRIP: NEGATIVE
NONHDLC SERPL-MCNC: 68 MG/DL (CALC)
PH UR STRIP: 5.5 [PH] (ref 5–8)
PLATELET # BLD AUTO: 293 THOUSAND/UL (ref 140–400)
PMV BLD REES-ECKER: 9.6 FL (ref 7.5–12.5)
POTASSIUM SERPL-SCNC: 4.3 MMOL/L (ref 3.5–5.3)
PROT SERPL-MCNC: 6.4 G/DL (ref 6.1–8.1)
PROT UR QL STRIP: NEGATIVE
RBC # BLD AUTO: 3.62 MILLION/UL (ref 3.8–5.1)
RBC #/AREA URNS HPF: ABNORMAL /HPF
SODIUM SERPL-SCNC: 137 MMOL/L (ref 135–146)
SP GR UR STRIP: 1 (ref 1–1.03)
SQUAMOUS #/AREA URNS HPF: ABNORMAL /HPF
T4 FREE SERPL-MCNC: 1.1 NG/DL (ref 0.8–1.8)
TRIGL SERPL-MCNC: 58 MG/DL
TSH SERPL-ACNC: 0.32 MIU/L (ref 0.4–4.5)
URATE SERPL-MCNC: 4.3 MG/DL (ref 2.5–7)
WBC # BLD AUTO: 4 THOUSAND/UL (ref 3.8–10.8)
WBC #/AREA URNS HPF: ABNORMAL /HPF

## 2023-09-08 NOTE — ASSESSMENT & PLAN NOTE
Returns for 1 year follow-up s/p right MCA M1 thrombectomy and right ICA stent for occlusion 10/6/18 by Dr. Ronny Hernandez. · Follow up duplex January 2021 suggested 70-99% in-stent stenosis of right ICA --> ~70% in-stent stenosis on CTA. She was restarted on DAPT and repeat imaging in 6 months showed stable 50-70% stenosis. ASA was d/c and she continues on Brilinta and Lipitor. · Doing well. No new complaints. · Exam: slight left HF weakness, brisk reflexes throughout with Bello's     Imaging:  · Carotid duplex 8/26/23: RIGHT: The internal carotid artery stent is patent without significant restenosis. Vertebral artery flow is antegrade. There is no significant subclavian artery disease. LEFT: There is <50% stenosis in the internal carotid artery. Minimal plaque is heterogenous and smooth. Vertebral artery flow is antegrade. There is no significant subclavian artery disease. Plan:  · Reviewed imaging with patient, , and Dr. Ronny Hernandez. Patent right ICA stent. · Recommend continued surveillance. · Continue Brilinta 90 mg and Lipitor 40 mg  · Follow-up with PCP for new foot/ankle swelling since starting Norvasc. · Reviewed red flag s/s. · Follow-up in 1 year with carotid duplex as joint appointment with Dr. Ronny Hernandez. Call sooner with any questions or concerns.

## 2023-09-11 ENCOUNTER — OFFICE VISIT (OUTPATIENT)
Dept: NEUROSURGERY | Facility: CLINIC | Age: 73
End: 2023-09-11
Payer: COMMERCIAL

## 2023-09-11 VITALS
HEART RATE: 91 BPM | OXYGEN SATURATION: 99 % | TEMPERATURE: 98.1 F | HEIGHT: 61 IN | DIASTOLIC BLOOD PRESSURE: 80 MMHG | WEIGHT: 158.8 LBS | RESPIRATION RATE: 19 BRPM | BODY MASS INDEX: 29.98 KG/M2 | SYSTOLIC BLOOD PRESSURE: 142 MMHG

## 2023-09-11 DIAGNOSIS — I63.031 CEREBROVASCULAR ACCIDENT (CVA) DUE TO THROMBOSIS OF RIGHT CAROTID ARTERY (HCC): Primary | ICD-10-CM

## 2023-09-11 PROCEDURE — 99213 OFFICE O/P EST LOW 20 MIN: CPT | Performed by: PHYSICIAN ASSISTANT

## 2023-09-11 NOTE — PROGRESS NOTES
Neurosurgery Office Note  Venita Lee 74 y.o. female MRN: [id number]      Assessment/Plan     Cerebrovascular accident (CVA) due to thrombosis of right carotid artery [facility])  Returns for 1 year follow-up s/p right MCA M1 thrombectomy and right ICA stent for occlusion 10/6/18 by Dr. Micheal Kapoor. · Follow up duplex January 2021 suggested 70-99% in-stent stenosis of right ICA --> ~70% in-stent stenosis on CTA. She was restarted on DAPT and repeat imaging in 6 months showed stable 50-70% stenosis. ASA was d/c and she continues on Brilinta and Lipitor. · Doing well. No new complaints. · Exam: slight left HF weakness, brisk reflexes throughout with Bello's     Imaging:  · Carotid duplex 8/26/23: RIGHT: The internal carotid artery stent is patent without significant restenosis. Vertebral artery flow is antegrade. There is no significant subclavian artery disease. LEFT: There is <50% stenosis in the internal carotid artery. Minimal plaque is heterogenous and smooth. Vertebral artery flow is antegrade. There is no significant subclavian artery disease. Plan:  · Reviewed imaging with patient, , and Dr. Micheal Kapoor. Patent right ICA stent. · Recommend continued surveillance. · Continue Brilinta 90 mg and Lipitor 40 mg  · Follow-up with PCP for new foot/ankle swelling since starting Norvasc. · Reviewed red flag s/s. · Follow-up in 1 year with carotid duplex as joint appointment with Dr. Micheal Kapoor. Call sooner with any questions or concerns. Diagnoses and all orders for this visit:    Cerebrovascular accident (CVA) due to thrombosis of right carotid artery ([address])  -     VAS carotid complete study;  Future          I have spent a total time of 30 minutes on 09/11/23 in caring for this patient including Diagnostic results, Instructions for management, Patient and family education, Risk factor reductions, Impressions, Counseling / Coordination of care, Documenting in the medical record, Reviewing / ordering tests, medicine, procedures  , Obtaining or reviewing history   and Communicating with other healthcare professionals . CHIEF COMPLAINT    Chief Complaint   Patient presents with   • Follow-up       HISTORY    History of Present Illness     74y.o. year old female     74year old female seen for 1 year follow up status post right MCA M1 thrombectomy and right ICA stent for occlusion 10/6/2018 by Dr. Shaila Tristan manifesting as left-sided weakness and dysarthria. She has slight residual weakness in her left leg especially if she is tired. Otherwise states she has been doing well, stays active. Since starting Norvasc, she has been having swelling in her feet and ankles. Denies headache, blurry or double vision, dizziness, pain/numbness in her extremities, dropping objects, difficulty with fine motor skills, issues with gait or balance, BBI. Follow-up carotid duplex in 2021 suggested recurrence within her right ICA stent, Brilinta was readded to her 325 mg aspirin. Subsequent CTAs showed improvement and she is back on Brilinta only and Lipitor. See Discussion    REVIEW OF SYSTEMS    Review of Systems   Constitutional: Positive for fatigue (pushes through it improved with medicateion). HENT: Positive for tinnitus (at times). Eyes: Negative for visual disturbance. Respiratory: Negative. Cardiovascular: Negative. Gastrointestinal: Negative. Endocrine: Positive for heat intolerance. Genitourinary: Negative. Musculoskeletal: Negative. Negative for gait problem. Skin: Negative. Allergic/Immunologic: Negative. Neurological: Negative. Negative for dizziness, weakness, light-headedness, numbness and headaches. Hematological: Bruises/bleeds easily (medication). Psychiatric/Behavioral: Positive for sleep disturbance. Negative for confusion. All other systems reviewed and are negative. ROS obtained by MA. Reviewed. See HPI.      Meds/Allergies     Current Outpatient Medications Medication Sig Dispense Refill   • amLODIPine (NORVASC) 10 mg tablet take 1 tablet by mouth once daily 90 tablet 1   • atorvastatin (LIPITOR) 40 mg tablet take 1 tablet by mouth every evening 90 tablet 0   • Brilinta 90 MG TAKE 1 TABLET BY MOUTH  EVERY 12 HOURS 180 tablet 3   • levothyroxine 88 mcg tablet Take 1 tablet (88 mcg total) by mouth daily 90 tablet 0   • Melatonin 10 MG TABS Take 10 mg by mouth daily at bedtime       No current facility-administered medications for this visit. No Known Allergies    PAST HISTORY    Past Medical History:   Diagnosis Date   • Disease of thyroid gland    • Hyperlipidemia 5/8/2020   • Hypertension    • Hypothyroidism    • Stroke ([address]) 10/23/2018       Past Surgical History:   Procedure Laterality Date   • CAROTID STENT     • IR STROKE ALERT  10/06/2018   • TONSILLECTOMY         Social History     Tobacco Use   • Smoking status: Never   • Smokeless tobacco: Never   Vaping Use   • Vaping Use: Never used   Substance Use Topics   • Alcohol use: Yes     Comment: Social drinker   • Drug use: Never       Family History   Problem Relation Age of Onset   • No Known Problems Mother    • No Known Problems Father    • Heart disease Family    • No Known Problems Sister    • No Known Problems Daughter    • No Known Problems Maternal Grandmother    • No Known Problems Maternal Grandfather    • No Known Problems Paternal Grandmother    • No Known Problems Paternal Grandfather    • No Known Problems Daughter    • No Known Problems Brother          Above history personally reviewed. EXAM    Vitals:Blood pressure 142/80, pulse 91, temperature 98.1 °F (36.7 °C), temperature source Temporal, resp. rate 19, height 5' 1" (1.549 m), weight 72 kg (158 lb 12.8 oz), SpO2 99 %. ,Body mass index is 30 kg/m². Physical Exam  Vitals reviewed. Constitutional:       General: She is awake. Appearance: Normal appearance. HENT:      Head: Normocephalic and atraumatic.    Eyes: Extraocular Movements: EOM normal.      Conjunctiva/sclera: Conjunctivae normal.      Pupils: Pupils are equal, round, and reactive to light. Cardiovascular:      Rate and Rhythm: Normal rate. Pulmonary:      Effort: Pulmonary effort is normal.   Skin:     General: Skin is warm and dry. Neurological:      Mental Status: She is alert and oriented to person, place, and time. Coordination: Finger-Nose-Finger Test normal.      Gait: Gait is intact. Deep Tendon Reflexes:      Reflex Scores:       Bicep reflexes are 3+ on the right side and 3+ on the left side. Brachioradialis reflexes are 3+ on the right side and 3+ on the left side. Patellar reflexes are 2+ on the right side and 2+ on the left side. Achilles reflexes are 3+ on the right side and 3+ on the left side. Psychiatric:         Attention and Perception: Attention and perception normal.         Mood and Affect: Mood and affect normal.         Speech: Speech normal.         Behavior: Behavior normal. Behavior is cooperative. Thought Content: Thought content normal.         Cognition and Memory: Cognition and memory normal.         Judgment: Judgment normal.         Neurologic Exam     Mental Status   Oriented to person, place, and time. Follows 2 step commands. Attention: normal. Concentration: normal.   Speech: speech is normal   Level of consciousness: alert  Knowledge: good. Able to perform simple calculations. Able to name object. Able to repeat. Normal comprehension. Cranial Nerves     CN III, IV, VI   Pupils are equal, round, and reactive to light. Extraocular motions are normal.   Right pupil: Shape: regular. Reactivity: brisk. Consensual response: intact. Left pupil: Shape: regular. Reactivity: brisk. Consensual response: intact.    CN III: no CN III palsy  CN VI: no CN VI palsy  Nystagmus: none   Ophthalmoparesis: none  Upgaze: normal  Downgaze: normal  Conjugate gaze: present    CN V   Facial sensation intact. CN VII   Facial expression full, symmetric. CN VIII   Hearing: intact    CN XI   Right trapezius strength: normal  Left trapezius strength: normal    CN XII   CN XII normal.     Motor Exam   Muscle bulk: normal  Overall muscle tone: normal  Right arm pronator drift: absent  Left arm pronator drift: absent  BUE - 5/5  RLE - 5/5,  LLE - HF 4+/5, otherwise 5/5       Sensory Exam   Light touch normal.     Gait, Coordination, and Reflexes     Gait  Gait: normal    Coordination   Finger to nose coordination: normal    Tremor   Resting tremor: absent  Intention tremor: absent  Action tremor: absent    Reflexes   Right brachioradialis: 3+  Left brachioradialis: 3+  Right biceps: 3+  Left biceps: 3+  Right patellar: 2+  Left patellar: 2+  Right achilles: 3+  Left achilles: 3+  Right Bello: present  Left Bello: present  Right ankle clonus: absent  Left ankle clonus: absent        MEDICAL DECISION MAKING    Imaging Studies:     VAS carotid complete study    Result Date: 8/26/2023  Narrative:  [facility] REPORT [facility]INICAL: Indications:  Yearly surveillance of carotid artery disease, post intervention right ICA stent placement. Patient is asymptomatic at this time. Risk Factors The patient has history of HTN and Hyperlipidemia. She has no history of Diabetes. Clinical Right Pressure:  162/65 mm Hg, Left Pressure:  155/70 mm Hg. FINDINGS:  Right              Impression  PSV  EDV (cm/s)  Direction of Flow  Ratio  Dist. ICA - Stent              170          51                      2.67  Mid. ICA - Stent                80          18                      1.26  Prox.  ICA - Stent               83          13                      1.30  Dist CCA                        59          15                            Mid CCA                         64          13                      0.57  Prox CCA                       111          20                            Ext Carotid                    393 35                      6.17  Prox Vert                       43           8  Antegrade                 Subclavian         Normal      142           0                             Left               Impression  PSV  EDV (cm/s)  Direction of Flow  Ratio  Dist. ICA                       91          25                      1.10  Mid. ICA                       108          26                      1.30  Prox. ICA          1 - 49%      64          16                      0.78  Dist CCA                        89          32                            Mid CCA                         83          16                      0.68  Prox CCA                       122          21                            Ext Carotid                    111          11                      1.35  Prox Vert                       57          13  Antegrade                 Subclavian         Normal      142           0                               CONCLUSION:  Impression RIGHT: The internal carotid artery stent is patent without significant restenosis. Vertebral artery flow is antegrade. There is no significant subclavian artery disease. LEFT: There is <50% stenosis in the internal carotid artery. Minimal plaque is heterogenous and smooth. Vertebral artery flow is antegrade. There is no significant subclavian artery disease. Compared to previous study on 01/08/21, the previous right in stent restenosis is not visualized. Recommend repeat testing in 6 months as per protocol unless otherwise indicated. SIGNATURE: Electronically Signed by: Delgado Avalos on 2023-08-26 02:32:24 PM      I have personally reviewed pertinent reports.    and I have personally reviewed pertinent films in PACS

## 2023-09-12 NOTE — PROGRESS NOTES
Name: Josh Zamudio      : 1950      MRN: [id number]  Encounter Provider: Alexandro Ivan MD  Encounter Date: 2023   Encounter department: [address] [place] [address]     1. Primary hypertension  Assessment & Plan:  Patient is stable with current anti-hypertensive medicine and continue to follow a low sodium diet and take current medication. All questions about this condition were answered today. Orders:  -     valsartan (DIOVAN) 160 mg tablet; Take 1 tablet (160 mg total) by mouth daily    2. Gastroesophageal reflux disease without esophagitis  Assessment & Plan:  Patient to continue with present therapy and decrease caffeine, avoid ETOH and smoking to decrease acid production. Pt should also cease eating prior to bedtime and avoid excessive fluid intake prior to sleep. May use antacids as needed for breakthrough GERD. All pateint questions answered today about this condition. 3. Mixed hyperlipidemia  Assessment & Plan:  Patient  is stable with current medication and we discussed a low fat low cholesterol diet. Weight loss also discussed for this will help lower cholesterol also. Recheck lipids in 6 months. 4. Hypothyroidism due to acquired atrophy of thyroid  Assessment & Plan:  Patient to continue current dose of thyroid medicine and recheck TSH in 6 months      5. Cerebrovascular accident (CVA) due to thrombosis of right carotid artery [facility])  Assessment & Plan:  Patient is stable  and will continue present plan of care and reassess at next routine visit. All questions about this problem from patient were answered today. Falls Plan of Care: balance, strength, and gait training instructions were provided. Home safety education provided.      Urinary Incontinence Plan of Care: counseling topics discussed: practice Kegel (pelvic floor strengthening) exercises, use restroom every 2 hours, limit alcohol, caffeine, spicy foods, and acidic foods, limiting fluid intake 3-4 hours before bed, weight loss, preventing constipation and limiting fluid intake to 60 oz. per day. Alex     Is a 77-year-old female here today for checkup on multimedical problems patient with hypertension history of stroke hyperlipidemia hypothyroidism some GERD and is here for checkup on her blood pressure. She is doing well with rest of her medical problems except for her blood pressure. Her blood pressure was high today she has developed some swelling in her feet. She has been started on amlodipine because she had a cough with her lisinopril she now has developed some swelling from the amlodipine and we will going to stop that we will give her Diovan to 60 mg once a day patient is scheduled to come see his back in December we will check her blood pressure back at her next appointment with her . Review of Systems   Constitutional: Negative for activity change, appetite change, fatigue and fever. HENT: Negative for congestion, ear pain, postnasal drip, rhinorrhea, sinus pressure, sinus pain, sneezing and sore throat. Eyes: Negative for pain and redness. Respiratory: Negative for apnea, cough, chest tightness, shortness of breath and wheezing. Cardiovascular: Positive for leg swelling. Negative for chest pain and palpitations. Gastrointestinal: Negative for abdominal pain, constipation, diarrhea, nausea and vomiting. Endocrine: Negative for cold intolerance and heat intolerance. Genitourinary: Negative for difficulty urinating, dysuria, frequency, hematuria and urgency. Musculoskeletal: Negative for arthralgias, back pain, gait problem and myalgias. Skin: Negative for rash. Neurological: Negative for dizziness, speech difficulty, weakness, numbness and headaches. Hematological: Does not bruise/bleed easily. Psychiatric/Behavioral: Negative for agitation, confusion and hallucinations.        Past Medical History:   Diagnosis Date   • Disease of thyroid gland    • Hyperlipidemia 2020   • Hypertension    • Hypothyroidism    • Stroke [facility]) 10/23/2018     Past Surgical History:   Procedure Laterality Date   • CAROTID STENT     • IR STROKE ALERT  10/06/2018   • TONSILLECTOMY       Family History   Problem Relation Age of Onset   • No Known Problems Mother    • No Known Problems Father    • Heart disease Family    • No Known Problems Sister    • No Known Problems Daughter    • No Known Problems Maternal Grandmother    • No Known Problems Maternal Grandfather    • No Known Problems Paternal Grandmother    • No Known Problems Paternal Grandfather    • No Known Problems Daughter    • No Known Problems Brother      Social History     Socioeconomic History   • Marital status: /Civil Union     Spouse name: Eliu Kilgore   • Number of children: 2   • Years of education: None   • Highest education level: None   Occupational History   • None   Tobacco Use   • Smoking status: Never   • Smokeless tobacco: Never   Vaping Use   • Vaping Use: Never used   Substance and Sexual Activity   • Alcohol use: Yes     Comment: Social drinker   • Drug use: Never   • Sexual activity: Not Currently     Partners: Male     Birth control/protection: None   Other Topics Concern   • None   Social History Narrative    Most recent tobacco use screenin2019     Do you currently or have you served in the [address] [place] [address] [employer]:   No      Were you activated, into active duty, as a member of the [employer] or as a Reservist:   No      Occupation:   retired      Marital status:        Exercise level: Moderate      Diet:   Regular      General stress level:   Medium      Alcohol intake:   None      Caffeine intake:    Moderate      Seat belts used routinely:   Yes      Sunscreen used routinely:   Yes      Smoke alarm in home:   Yes      Advance directive:   No      Has the Patient had a mammogram to screen for breast cancer within 24 months:   No      Social Determinants of Health     Financial Resource Strain: Low Risk  (12/12/2022)    Overall Financial Resource Strain (CARDIA)    • Difficulty of Paying Living Expenses: Not very hard   Food Insecurity: No Food Insecurity (5/5/2021)    Hunger Vital Sign    • Worried About Running Out of Food in the Last Year: Never true    • Ran Out of Food in the Last Year: Never true   Transportation Needs: No Transportation Needs (12/12/2022)    PRAPARE - Transportation    • Lack of Transportation (Medical): No    • Lack of Transportation (Non-Medical): No   Physical Activity: Insufficiently Active (11/2/2020)    Exercise Vital Sign    • Days of Exercise per Week: 3 days    • Minutes of Exercise per Session: 30 min   Stress: No Stress Concern Present (11/2/2020)    [address] [facility]    • Feeling of Stress : Not at all   Social Connections: Unknown (11/2/2020)    Social Connection and Isolation Panel [NHANES]    • Frequency of Communication with Friends and Family: More than three times a week    • Frequency of Social Gatherings with Friends and Family:  Three times a week    • Attends Jainism Services: Never    • Active Member of Clubs or Organizations: No    • Attends Club or Organization Meetings: Never    • Marital Status: Not on file   Intimate Partner Violence: Not At Risk (11/2/2020)    Humiliation, Afraid, Rape, and Kick questionnaire    • Fear of Current or Ex-Partner: No    • Emotionally Abused: No    • Physically Abused: No    • Sexually Abused: No   Housing Stability: Not on file     Current Outpatient Medications on File Prior to Visit   Medication Sig   • amLODIPine (NORVASC) 10 mg tablet take 1 tablet by mouth once daily   • atorvastatin (LIPITOR) 40 mg tablet take 1 tablet by mouth every evening   • Brilinta 90 MG TAKE 1 TABLET BY MOUTH  EVERY 12 HOURS   • levothyroxine 88 mcg tablet Take 1 tablet (88 mcg total) by mouth daily   • Melatonin 10 MG TABS Take 10 mg by mouth daily at bedtime     Allergies   Allergen Reactions   • Amlodipine Edema     Immunization History   Administered Date(s) Administered   • COVID-19 PFIZER VACCINE 0.3 ML IM 03/04/2021, 03/25/2021   • INFLUENZA 11/16/2015, 09/26/2017, 09/09/2022   • Influenza Quadrivalent 3 years and older 10/01/2018, 10/01/2018   • Influenza, high dose seasonal 0.7 mL 10/12/2018, 11/02/2020, 11/17/2021, 09/09/2022   • Pneumococcal Conjugate 13-Valent 07/19/2017   • Pneumococcal Polysaccharide PPV23 01/18/2019   • Tdap 06/20/2018   • influenza, injectable, quadrivalent 10/01/2018   • influenza, trivalent, adjuvanted 10/07/2019       Objective     /80 (BP Location: Left arm, Patient Position: Sitting, Cuff Size: Standard)   Pulse 93   Temp (!) 97.3 °F (36.3 °C) (Skin)   Ht 5' 1" (1.549 m)   Wt 73.9 kg (163 lb)   SpO2 99%   BMI 30.80 kg/m²     Physical Exam  Vitals and nursing note reviewed. Constitutional:       Appearance: She is well-developed. HENT:      Head: Normocephalic and atraumatic. Nose: Nose normal.      Mouth/Throat:      Mouth: Mucous membranes are moist.   Eyes:      General: No scleral icterus. Conjunctiva/sclera: Conjunctivae normal.      Pupils: Pupils are equal, round, and reactive to light. Neck:      Thyroid: No thyromegaly. Cardiovascular:      Rate and Rhythm: Normal rate and regular rhythm. Pulmonary:      Effort: Pulmonary effort is normal.      Breath sounds: Normal breath sounds. No wheezing. Abdominal:      General: Bowel sounds are normal. There is no distension. Palpations: Abdomen is soft. Tenderness: There is no abdominal tenderness. There is no guarding or rebound. Musculoskeletal:         General: No tenderness or deformity. Normal range of motion. Cervical back: Normal range of motion and neck supple. Right lower leg: Edema present. Left lower leg: Edema present. Skin:     General: Skin is warm and dry. Findings: No erythema or rash. Neurological:      Mental Status: She is alert and oriented to person, place, and time. Sensory: No sensory deficit. Psychiatric:         Behavior: Behavior normal.         Thought Content:  Thought content normal.         Judgment: Judgment normal.       Augustus Maya MD

## 2023-09-13 ENCOUNTER — OFFICE VISIT (OUTPATIENT)
Dept: FAMILY MEDICINE CLINIC | Facility: CLINIC | Age: 73
End: 2023-09-13
Payer: COMMERCIAL

## 2023-09-13 VITALS
BODY MASS INDEX: 30.78 KG/M2 | SYSTOLIC BLOOD PRESSURE: 150 MMHG | HEART RATE: 93 BPM | TEMPERATURE: 97.3 F | WEIGHT: 163 LBS | HEIGHT: 61 IN | DIASTOLIC BLOOD PRESSURE: 80 MMHG | OXYGEN SATURATION: 99 %

## 2023-09-13 DIAGNOSIS — E78.2 MIXED HYPERLIPIDEMIA: ICD-10-CM

## 2023-09-13 DIAGNOSIS — E03.4 HYPOTHYROIDISM DUE TO ACQUIRED ATROPHY OF THYROID: ICD-10-CM

## 2023-09-13 DIAGNOSIS — I10 PRIMARY HYPERTENSION: Primary | ICD-10-CM

## 2023-09-13 DIAGNOSIS — I63.031 CEREBROVASCULAR ACCIDENT (CVA) DUE TO THROMBOSIS OF RIGHT CAROTID ARTERY (HCC): ICD-10-CM

## 2023-09-13 DIAGNOSIS — K21.9 GASTROESOPHAGEAL REFLUX DISEASE WITHOUT ESOPHAGITIS: ICD-10-CM

## 2023-09-13 PROCEDURE — 99214 OFFICE O/P EST MOD 30 MIN: CPT | Performed by: FAMILY MEDICINE

## 2023-09-13 RX ORDER — VALSARTAN 160 MG/1
160 TABLET ORAL DAILY
Qty: 30 TABLET | Refills: 5 | Status: SHIPPED | OUTPATIENT
Start: 2023-09-13

## 2023-09-14 ENCOUNTER — HOSPITAL ENCOUNTER (OUTPATIENT)
Dept: RADIOLOGY | Facility: MEDICAL CENTER | Age: 73
Discharge: HOME | End: 2023-09-14
Payer: COMMERCIAL

## 2023-09-14 VITALS — BODY MASS INDEX: 30.78 KG/M2 | HEIGHT: 61 IN | WEIGHT: 163 LBS

## 2023-09-14 DIAGNOSIS — Z12.31 ENCOUNTER FOR SCREENING MAMMOGRAM FOR BREAST CANCER: ICD-10-CM

## 2023-09-14 DIAGNOSIS — I10 PRIMARY HYPERTENSION: ICD-10-CM

## 2023-09-14 PROCEDURE — 77067 SCR MAMMO BI INCL CAD: CPT

## 2023-09-14 PROCEDURE — 77063 BREAST TOMOSYNTHESIS BI: CPT

## 2023-09-14 RX ORDER — VALSARTAN 160 MG/1
160 TABLET ORAL DAILY
Qty: 90 TABLET | Refills: 5 | OUTPATIENT
Start: 2023-09-14

## 2023-10-23 ENCOUNTER — HOSPITAL ENCOUNTER (OUTPATIENT)
Dept: RADIOLOGY | Facility: MEDICAL CENTER | Age: 73
Discharge: HOME | End: 2023-10-23
Payer: COMMERCIAL

## 2023-10-23 DIAGNOSIS — Z78.0 MENOPAUSE: ICD-10-CM

## 2023-10-23 PROCEDURE — 77080 DXA BONE DENSITY AXIAL: CPT

## 2023-11-08 DIAGNOSIS — I63.031 CEREBROVASCULAR ACCIDENT (CVA) DUE TO THROMBOSIS OF RIGHT CAROTID ARTERY (HCC): ICD-10-CM

## 2023-11-08 RX ORDER — ATORVASTATIN CALCIUM 40 MG/1
TABLET, FILM COATED ORAL
Qty: 90 TABLET | Refills: 1 | Status: SHIPPED | OUTPATIENT
Start: 2023-11-08

## 2023-12-05 LAB
BASOPHILS # BLD AUTO: 40 CELLS/UL (ref 0–200)
BASOPHILS NFR BLD AUTO: 0.6 %
EOSINOPHIL # BLD AUTO: 198 CELLS/UL (ref 15–500)
EOSINOPHIL NFR BLD AUTO: 3 %
ERYTHROCYTE [DISTWIDTH] IN BLOOD BY AUTOMATED COUNT: 12.6 % (ref 11–15)
HCT VFR BLD AUTO: 34.3 % (ref 35–45)
HGB BLD-MCNC: 11.6 G/DL (ref 11.7–15.5)
LYMPHOCYTES # BLD AUTO: 825 CELLS/UL (ref 850–3900)
LYMPHOCYTES NFR BLD AUTO: 12.5 %
MCH RBC QN AUTO: 30.9 PG (ref 27–33)
MCHC RBC AUTO-ENTMCNC: 33.8 G/DL (ref 32–36)
MCV RBC AUTO: 91.2 FL (ref 80–100)
MONOCYTES # BLD AUTO: 515 CELLS/UL (ref 200–950)
MONOCYTES NFR BLD AUTO: 7.8 %
NEUTROPHILS # BLD AUTO: 5023 CELLS/UL (ref 1500–7800)
NEUTROPHILS NFR BLD AUTO: 76.1 %
PLATELET # BLD AUTO: 300 THOUSAND/UL (ref 140–400)
PMV BLD REES-ECKER: 10.3 FL (ref 7.5–12.5)
RBC # BLD AUTO: 3.76 MILLION/UL (ref 3.8–5.1)
WBC # BLD AUTO: 6.6 THOUSAND/UL (ref 3.8–10.8)

## 2023-12-07 ENCOUNTER — RA CDI HCC (OUTPATIENT)
Dept: OTHER | Facility: HOSPITAL | Age: 73
End: 2023-12-07

## 2023-12-07 NOTE — PROGRESS NOTES
[facility] coding opportunities       Chart reviewed, no opportunity found: [address]        Patients Insurance     Medicare Insurance: [employer] Advantage

## 2023-12-14 NOTE — PROGRESS NOTES
Name: Fer Cotton      : 1950      MRN: [id number]  Encounter Provider: Eileen Jimenez MD  Encounter Date: 12/15/2023   Encounter department: [address] [place] [address] Avenue     1. Well adult exam    2. Hypothyroidism due to acquired atrophy of thyroid  Assessment & Plan:  Patient to continue current dose of thyroid medicine and recheck TSH in 6 months     Orders:  -     TSH, 3rd generation with Free T4 reflex; Future    3. Primary hypertension  Assessment & Plan:  Patient is stable with current anti-hypertensive medicine and continue to follow a low sodium diet and take current medication. All questions about this condition were answered today. Orders:  -     losartan (Cozaar) 100 MG tablet; Take 1 tablet (100 mg total) by mouth daily    4. Mixed hyperlipidemia  Assessment & Plan:  Patient  is stable with current medication and we discussed a low fat low cholesterol diet. Weight loss also discussed for this will help lower cholesterol also. Recheck lipids in 6 months. 5. Gastroesophageal reflux disease without esophagitis  Assessment & Plan:  Patient to continue with present therapy and decrease caffeine, avoid ETOH and smoking to decrease acid production. Pt should also cease eating prior to bedtime and avoid excessive fluid intake prior to sleep. May use antacids as needed for breakthrough GERD. All pateint questions answered today about this condition. 6. Cerebrovascular accident (CVA) due to thrombosis of right carotid artery [facility])  Assessment & Plan:  Patient is stable  and will continue present plan of care and reassess at next routine visit. All questions about this problem from patient were answered today. Depression Screening and Follow-up Plan: Patient was screened for depression during today's encounter. They screened negative with a PHQ-2 score of 0. Falls Plan of Care: balance, strength, and gait training instructions were provided. Home safety education provided. Urinary Incontinence Plan of Care: counseling topics discussed: practice Kegel (pelvic floor strengthening) exercises, use restroom every 2 hours, limit alcohol, caffeine, spicy foods, and acidic foods, limiting fluid intake 3-4 hours before bed, weight loss, preventing constipation and limiting fluid intake to 60 oz. per day. Subjective     This is a 12-year-old female here today for checkup for multiple medical problems as well as her Medicare wellness. Patient history of a CVA in the past is done very well with that. Also she has hypertension hypothyroidism hyperlipidemia. Patient's lab was reviewed today and she is doing very well with her labs her medicine is working. Patient had recently been on lisinopril and had some problems taking that also when she is give her some amlodipine in place of that which cause peripheral edema and she was changed over to Diovan. Patient is here to see how she doing with her blood pressure her blood pressure is doing very well however she states that she does not feel well taking the Diovan so we will change her to Cozaar and we will see her back at her next visit as scheduled doing with the Cozaar. Patient is due for her thyroid lab work at her next visit which will be in 3 months.       Review of Systems    Past Medical History:   Diagnosis Date   • Disease of thyroid gland    • Hyperlipidemia 5/8/2020   • Hypertension    • Hypothyroidism    • Stroke ([address]) 10/23/2018     Past Surgical History:   Procedure Laterality Date   • CAROTID STENT     • IR STROKE ALERT  10/06/2018   • TONSILLECTOMY       Family History   Problem Relation Age of Onset   • No Known Problems Mother    • No Known Problems Father    • Heart disease Family    • No Known Problems Sister    • No Known Problems Daughter    • No Known Problems Maternal Grandmother    • No Known Problems Maternal Grandfather    • No Known Problems Paternal Grandmother    • No Known Problems Paternal Grandfather    • No Known Problems Daughter    • No Known Problems Brother      Social History     Socioeconomic History   • Marital status: /Civil Union     Spouse name: Ashley Stephenson   • Number of children: 2   • Years of education: None   • Highest education level: None   Occupational History   • None   Tobacco Use   • Smoking status: Never   • Smokeless tobacco: Never   Vaping Use   • Vaping status: Never Used   Substance and Sexual Activity   • Alcohol use: Yes     Comment: Social drinker   • Drug use: Never   • Sexual activity: Not Currently     Partners: Male     Birth control/protection: None   Other Topics Concern   • None   Social History Narrative    Most recent tobacco use screenin2019     Do you currently or have you served in the [address] [place] [address] [employer]:   No      Were you activated, into active duty, as a member of the [employer] or as a Reservist:   No      Occupation:   retired      Marital status:        Exercise level: Moderate      Diet:   Regular      General stress level:   Medium      Alcohol intake:   None      Caffeine intake: Moderate      Seat belts used routinely:   Yes      Sunscreen used routinely:   Yes      Smoke alarm in home:   Yes      Advance directive:   No      Has the Patient had a mammogram to screen for breast cancer within 24 months:   No      Social Determinants of Health     Financial Resource Strain: Low Risk  (2023)    Overall Financial Resource Strain (CARDIA)    • Difficulty of Paying Living Expenses: Not hard at all   Food Insecurity: No Food Insecurity (2021)    Hunger Vital Sign    • Worried About Running Out of Food in the Last Year: Never true    • Ran Out of Food in the Last Year: Never true   Transportation Needs: No Transportation Needs (2023)    PRAPARE - Transportation    • Lack of Transportation (Medical): No    • Lack of Transportation (Non-Medical):  No   Physical Activity: Insufficiently Active (2020) Exercise Vital Sign    • Days of Exercise per Week: 3 days    • Minutes of Exercise per Session: 30 min   Stress: No Stress Concern Present (11/2/2020)    [address]    • Feeling of Stress : Not at all   Social Connections: Unknown (11/2/2020)    Social Connection and Isolation Panel [NHANES]    • Frequency of Communication with Friends and Family: More than three times a week    • Frequency of Social Gatherings with Friends and Family:  Three times a week    • Attends [employer] Services: Never    • Active Member of Clubs or Organizations: No    • Attends Club or Organization Meetings: Never    • Marital Status: Not on file   Intimate Partner Violence: Not At Risk (11/2/2020)    Humiliation, Afraid, Rape, and Kick questionnaire    • Fear of Current or Ex-Partner: No    • Emotionally Abused: No    • Physically Abused: No    • Sexually Abused: No   Housing Stability: Not on file     Current Outpatient Medications on File Prior to Visit   Medication Sig   • atorvastatin (LIPITOR) 40 mg tablet take 1 tablet by mouth every evening   • Brilinta 90 MG TAKE 1 TABLET BY MOUTH  EVERY 12 HOURS   • levothyroxine 88 mcg tablet Take 1 tablet (88 mcg total) by mouth daily   • Melatonin 10 MG TABS Take 10 mg by mouth daily at bedtime   • [DISCONTINUED] amLODIPine (NORVASC) 10 mg tablet take 1 tablet by mouth once daily   • [DISCONTINUED] valsartan (DIOVAN) 160 mg tablet Take 1 tablet (160 mg total) by mouth daily     Allergies   Allergen Reactions   • Amlodipine Edema     Immunization History   Administered Date(s) Administered   • COVID-19 PFIZER VACCINE 0.3 ML IM 03/04/2021, 03/25/2021   • INFLUENZA 11/16/2015, 09/26/2017, 09/09/2022   • Influenza Quadrivalent 3 years and older 10/01/2018, 10/01/2018   • Influenza, high dose seasonal 0.7 mL 10/12/2018, 11/02/2020, 11/17/2021, 09/09/2022   • Pneumococcal Conjugate 13-Valent 07/19/2017   • Pneumococcal Polysaccharide PPV23 01/18/2019   • Tdap 06/20/2018   • influenza, injectable, quadrivalent 10/01/2018   • influenza, trivalent, adjuvanted 10/07/2019       Objective     /64 (BP Location: Left arm, Patient Position: Sitting, Cuff Size: Standard)   Pulse 81   Temp (!) 97.4 °F (36.3 °C) (Skin)   Ht 5' 1" (1.549 m)   Wt 72.6 kg (160 lb)   SpO2 99%   BMI 30.23 kg/m²     Physical Exam  Ezequiel Richard MD    Answers submitted by the patient for this visit:  Medicare Annual Wellness Visit (Submitted on 12/8/2023)  How would you rate your overall health?: good  Compared to last year, how is your physical health?: same  In general, how satisfied are you with your life?: satisfied  Compared to last year, how is your eyesight?: same  Compared to last year, how is your hearing?: same  Compared to last year, how is your emotional/mental health?: same  How often is anger a problem for you?: never, rarely  How often do you feel unusually tired/fatigued?: sometimes  In the past 7 days, how much pain have you experienced?: none  In the past 6 months, have you lost or gained 10 pounds without trying?: No  One or more falls in the last year: No  In the past 6 months, have you accidentally leaked urine?: Yes  Do you have trouble with the stairs inside or outside your home?: No  Does your home have working smoke alarms?: Yes  Does your home have a carbon monoxide monitor?: Yes  Which safety hazards (if any) have you experienced in your home? Please select all that apply.: uneven floors  How would you describe your current diet?  Please select all that apply.: Regular, Low Carb, No Added Salt  In addition to prescription medications, are you taking any over-the-counter supplements?: No  Can you manage your medications?: Yes  Are you currently taking any opioid medications?: No  Can you walk and transfer into and out of your bed and chair?: Yes  Can you dress and groom yourself?: Yes  Can you bathe or shower yourself?: Yes  Can you feed yourself?: Yes  Can you do your laundry/ housekeeping?: Yes  Can you manage your money, pay your bills, and track your expenses?: Yes  Can you make your own meals?: Yes  Can you do your own shopping?: Yes  Please list your DME (Durable Medical Equipment) supplier, if you use one.: N/A  Within the last 12 months, have you had any hospitalizations or Emergency Department visits?: No  Do you have a living will?: No  Do you have a Durable POA (Power of ) for healthcare decisions?: No  Do you have an Advanced Directive for end of life decisions?: No  How often have you used an illegal drug (including marijuana) or a prescription medication for non-medical reasons in the past year?: never  What is the typical number of drinks you consume in a day?: 0  What is the typical number of drinks you consume in a week?: 0  How often did you have a drink containing alcohol in the past year?: never  How many drinks did you have on a typical day  when you were drinking in the past year?: 0  How often did you have 6 or more drinks on one occasion in the past year?: never

## 2023-12-15 ENCOUNTER — OFFICE VISIT (OUTPATIENT)
Dept: FAMILY MEDICINE CLINIC | Facility: CLINIC | Age: 73
End: 2023-12-15
Payer: COMMERCIAL

## 2023-12-15 VITALS
HEART RATE: 81 BPM | TEMPERATURE: 97.4 F | OXYGEN SATURATION: 99 % | WEIGHT: 160 LBS | BODY MASS INDEX: 30.21 KG/M2 | SYSTOLIC BLOOD PRESSURE: 142 MMHG | DIASTOLIC BLOOD PRESSURE: 64 MMHG | HEIGHT: 61 IN

## 2023-12-15 DIAGNOSIS — K21.9 GASTROESOPHAGEAL REFLUX DISEASE WITHOUT ESOPHAGITIS: ICD-10-CM

## 2023-12-15 DIAGNOSIS — I10 PRIMARY HYPERTENSION: ICD-10-CM

## 2023-12-15 DIAGNOSIS — E03.4 HYPOTHYROIDISM DUE TO ACQUIRED ATROPHY OF THYROID: ICD-10-CM

## 2023-12-15 DIAGNOSIS — Z00.00 WELL ADULT EXAM: Primary | ICD-10-CM

## 2023-12-15 DIAGNOSIS — E78.2 MIXED HYPERLIPIDEMIA: ICD-10-CM

## 2023-12-15 DIAGNOSIS — I63.031 CEREBROVASCULAR ACCIDENT (CVA) DUE TO THROMBOSIS OF RIGHT CAROTID ARTERY (HCC): ICD-10-CM

## 2023-12-15 PROCEDURE — 99214 OFFICE O/P EST MOD 30 MIN: CPT | Performed by: FAMILY MEDICINE

## 2023-12-15 PROCEDURE — G0439 PPPS, SUBSEQ VISIT: HCPCS | Performed by: FAMILY MEDICINE

## 2023-12-15 RX ORDER — LOSARTAN POTASSIUM 100 MG/1
100 TABLET ORAL DAILY
Qty: 90 TABLET | Refills: 1 | Status: SHIPPED | OUTPATIENT
Start: 2023-12-15

## 2023-12-15 NOTE — PATIENT INSTRUCTIONS
Medicare Preventive Visit Patient Instructions  Thank you for completing your Welcome to Medicare Visit or Medicare Annual Wellness Visit today. Your next wellness visit will be due in one year (12/15/2024). The screening/preventive services that you may require over the next 5-10 years are detailed below. Some tests may not apply to you based off risk factors and/or age. Screening tests ordered at today's visit but not completed yet may show as past due. Also, please note that scanned in results may not display below. Preventive Screenings:  Service Recommendations Previous Testing/Comments   Colorectal Cancer Screening  * Colonoscopy    * Fecal Occult Blood Test (FOBT)/Fecal Immunochemical Test (FIT)  * Fecal DNA/Cologuard Test  * Flexible Sigmoidoscopy Age: 43-73 years old   Colonoscopy: every 10 years (may be performed more frequently if at higher risk)  OR  FOBT/FIT: every 1 year  OR  Cologuard: every 3 years  OR  Sigmoidoscopy: every 5 years  Screening may be recommended earlier than age 39 if at higher risk for colorectal cancer. Also, an individualized decision between you and your healthcare provider will decide whether screening between the ages of 77-80 would be appropriate. Colonoscopy: 01/26/2023  FOBT/FIT: Not on file  Cologuard: 06/06/2022  Sigmoidoscopy: Not on file    Screening Current     Breast Cancer Screening Age: 36 years old  Frequency: every 1-2 years  Not required if history of left and right mastectomy Mammogram: 09/14/2023    Screening Current   Cervical Cancer Screening Between the ages of 21-29, pap smear recommended once every 3 years. Between the ages of 32-69, can perform pap smear with HPV co-testing every 5 years.    Recommendations may differ for women with a history of total hysterectomy, cervical cancer, or abnormal pap smears in past. Pap Smear: Not on file    Screening Not Indicated   Hepatitis C Screening Once for adults born between 1945 and 1965  More frequently in patients at high risk for Hepatitis C Hep C Antibody: 11/25/2020    Screening Current   Diabetes Screening 1-2 times per year if you're at risk for diabetes or have pre-diabetes Fasting glucose: No results in last 5 years (No results in last 5 years)  A1C: No results in last 5 years (No results in last 5 years)  Screening Current   Cholesterol Screening Once every 5 years if you don't have a lipid disorder. May order more often based on risk factors. Lipid panel: 09/06/2023    Screening Not Indicated  History Lipid Disorder     Other Preventive Screenings Covered by Medicare:  Abdominal Aortic Aneurysm (AAA) Screening: covered once if your at risk. You're considered to be at risk if you have a family history of AAA. Lung Cancer Screening: covers low dose CT scan once per year if you meet all of the following conditions: (1) Age 48-67; (2) No signs or symptoms of lung cancer; (3) Current smoker or have quit smoking within the last 15 years; (4) You have a tobacco smoking history of at least 20 pack years (packs per day multiplied by number of years you smoked); (5) You get a written order from a healthcare provider. Glaucoma Screening: covered annually if you're considered high risk: (1) You have diabetes OR (2) Family history of glaucoma OR (3)  aged 48 and older OR (3)  American aged 72 and older  Osteoporosis Screening: covered every 2 years if you meet one of the following conditions: (1) You're estrogen deficient and at risk for osteoporosis based off medical history and other findings; (2) Have a vertebral abnormality; (3) On glucocorticoid therapy for more than 3 months; (4) Have primary hyperparathyroidism; (5) On osteoporosis medications and need to assess response to drug therapy. Last bone density test (DXA Scan): 10/23/2023. HIV Screening: covered annually if you're between the age of 14-79.  Also covered annually if you are younger than 13 and older than 72 with risk factors for HIV infection. For pregnant patients, it is covered up to 3 times per pregnancy. Immunizations:  Immunization Recommendations   Influenza Vaccine Annual influenza vaccination during flu season is recommended for all persons aged >= 6 months who do not have contraindications   Pneumococcal Vaccine   * Pneumococcal conjugate vaccine = PCV13 (Prevnar 13), PCV15 (Vaxneuvance), PCV20 (Prevnar 20)  * Pneumococcal polysaccharide vaccine = PPSV23 (Pneumovax) Adults 14-44 yo with certain risk factors or if 69+ yo  If never received any pneumonia vaccine: recommend Prevnar 20 (PCV20)  Give PCV20 if previously received 1 dose of PCV13 or PPSV23   Hepatitis B Vaccine 3 dose series if at intermediate or high risk (ex: diabetes, end stage renal disease, liver disease)   Respiratory syncytial virus (RSV) Vaccine - COVERED BY MEDICARE PART D  * RSVPreF3 (Arexvy) CDC recommends that adults 61years of age and older may receive a single dose of RSV vaccine using shared clinical decision-making (SCDM)   Tetanus (Td) Vaccine - COST NOT COVERED BY MEDICARE PART B Following completion of primary series, a booster dose should be given every 10 years to maintain immunity against tetanus. Td may also be given as tetanus wound prophylaxis. Tdap Vaccine - COST NOT COVERED BY MEDICARE PART B Recommended at least once for all adults. For pregnant patients, recommended with each pregnancy.    Shingles Vaccine (Shingrix) - COST NOT COVERED BY MEDICARE PART B  2 shot series recommended in those 19 years and older who have or will have weakened immune systems or those 50 years and older     Health Maintenance Due:      Topic Date Due   • Breast Cancer Screening: Mammogram  09/14/2024   • Hepatitis C Screening  Completed   • Colorectal Cancer Screening  Discontinued     Immunizations Due:      Topic Date Due   • Influenza Vaccine (1) 09/01/2023   • COVID-19 Vaccine (3 - 2023-24 season) 09/01/2023     Advance Directives   What are advance directives? Advance directives are legal documents that state your wishes and plans for medical care. These plans are made ahead of time in case you lose your ability to make decisions for yourself. Advance directives can apply to any medical decision, such as the treatments you want, and if you want to donate organs. What are the types of advance directives? There are many types of advance directives, and each state has rules about how to use them. You may choose a combination of any of the following:  Living will: This is a written record of the treatment you want. You can also choose which treatments you do not want, which to limit, and which to stop at a certain time. This includes surgery, medicine, IV fluid, and tube feedings. Durable power of  for [facility]): This is a written record that states who you want to make healthcare choices for you when you are unable to make them for yourself. This person, called a proxy, is usually a family member or a friend. You may choose more than 1 proxy. Do not resuscitate (DNR) order:  A DNR order is used in case your heart stops beating or you stop breathing. It is a request not to have certain forms of treatment, such as CPR. A DNR order may be included in other types of advance directives. Medical directive: This covers the care that you want if you are in a coma, near death, or unable to make decisions for yourself. You can list the treatments you want for each condition. Treatment may include pain medicine, surgery, blood transfusions, dialysis, IV or tube feedings, and a ventilator (breathing machine). Values history: This document has questions about your views, beliefs, and how you feel and think about life. This information can help others choose the care that you would choose. Why are advance directives important? An advance directive helps you control your care.  Although spoken wishes may be used, it is better to have your wishes written down. Spoken wishes can be misunderstood, or not followed. Treatments may be given even if you do not want them. An advance directive may make it easier for your family to make difficult choices about your care. Urinary Incontinence   Urinary incontinence (UI)  is when you lose control of your bladder. UI develops because your bladder cannot store or empty urine properly. The 3 most common types of UI are stress incontinence, urge incontinence, or both. Medicines:   May be given to help strengthen your bladder control. Report any side effects of medication to your healthcare provider. Do pelvic muscle exercises often:  Your pelvic muscles help you stop urinating. Squeeze these muscles tight for 5 seconds, then relax for 5 seconds. Gradually work up to squeezing for 10 seconds. Do 3 sets of 15 repetitions a day, or as directed. This will help strengthen your pelvic muscles and improve bladder control. Train your bladder:  Go to the bathroom at set times, such as every 2 hours, even if you do not feel the urge to go. You can also try to hold your urine when you feel the urge to go. For example, hold your urine for 5 minutes when you feel the urge to go. As that becomes easier, hold your urine for 10 minutes. Self-care:   Keep a UI record. Write down how often you leak urine and how much you leak. Make a note of what you were doing when you leaked urine. Drink liquids as directed. You may need to limit the amount of liquid you drink to help control your urine leakage. Do not drink any liquid right before you go to bed. Limit or do not have drinks that contain caffeine or alcohol. Prevent constipation. Eat a variety of high-fiber foods. Good examples are high-fiber cereals, beans, vegetables, and whole-grain breads. Walking is the best way to trigger your intestines to have a bowel movement. Exercise regularly and maintain a healthy weight.   Weight loss and exercise will decrease pressure on your bladder and help you control your leakage. Use a catheter as directed  to help empty your bladder. A catheter is a tiny, plastic tube that is put into your bladder to drain your urine. Go to behavior therapy as directed. Behavior therapy may be used to help you learn to control your urge to urinate. Weight Management   Why it is important to manage your weight:  Being overweight increases your risk of health conditions such as heart disease, high blood pressure, type 2 diabetes, and certain types of cancer. It can also increase your risk for osteoarthritis, sleep apnea, and other respiratory problems. Aim for a slow, steady weight loss. Even a small amount of weight loss can lower your risk of health problems. How to lose weight safely:  A safe and healthy way to lose weight is to eat fewer calories and get regular exercise. You can lose up about 1 pound a week by decreasing the number of calories you eat by 500 calories each day. Healthy meal plan for weight management:  A healthy meal plan includes a variety of foods, contains fewer calories, and helps you stay healthy. A healthy meal plan includes the following:  Eat whole-grain foods more often. A healthy meal plan should contain fiber. Fiber is the part of grains, fruits, and vegetables that is not broken down by your body. Whole-grain foods are healthy and provide extra fiber in your diet. Some examples of whole-grain foods are whole-wheat breads and pastas, oatmeal, brown rice, and bulgur. Eat a variety of vegetables every day. Include dark, leafy greens such as spinach, kale, bertin greens, and mustard greens. Eat yellow and orange vegetables such as carrots, sweet potatoes, and winter squash. Eat a variety of fruits every day. Choose fresh or canned fruit (canned in its own juice or light syrup) instead of juice. Fruit juice has very little or no fiber. Eat low-fat dairy foods. Drink fat-free (skim) milk or 1% milk.  Eat fat-free yogurt and low-fat cottage cheese. Try low-fat cheeses such as mozzarella and other reduced-fat cheeses. Choose meat and other protein foods that are low in fat. Choose beans or other legumes such as split peas or lentils. Choose fish, skinless poultry (chicken or turkey), or lean cuts of red meat (beef or pork). Before you cook meat or poultry, cut off any visible fat. Use less fat and oil. Try baking foods instead of frying them. Add less fat, such as margarine, sour cream, regular salad dressing and mayonnaise to foods. Eat fewer high-fat foods. Some examples of high-fat foods include french fries, doughnuts, ice cream, and cakes. Eat fewer sweets. Limit foods and drinks that are high in sugar. This includes candy, cookies, regular soda, and sweetened drinks. Exercise:  Exercise at least 30 minutes per day on most days of the week. Some examples of exercise include walking, biking, dancing, and swimming. You can also fit in more physical activity by taking the stairs instead of the elevator or parking farther away from stores. Ask your healthcare provider about the best exercise plan for you. © Copyright [employer] 2018 Information is for End User's use only and may not be sold, redistributed or otherwise used for commercial purposes.  All illustrations and images included in CareNotes® are the copyrighted property of [employer] or [address] [place] [address]

## 2023-12-15 NOTE — PROGRESS NOTES
Assessment and Plan:     Problem List Items Addressed This Visit     Cerebrovascular accident (CVA) due to thrombosis of right carotid artery [facility])     Patient is stable  and will continue present plan of care and reassess at next routine visit. All questions about this problem from patient were answered today. Hypothyroidism     Patient to continue current dose of thyroid medicine and recheck TSH in 6 months          Relevant Orders    TSH, 3rd generation with Free T4 reflex    Hyperlipidemia     Patient  is stable with current medication and we discussed a low fat low cholesterol diet. Weight loss also discussed for this will help lower cholesterol also. Recheck lipids in 6 months. Gastroesophageal reflux disease without esophagitis     Patient to continue with present therapy and decrease caffeine, avoid ETOH and smoking to decrease acid production. Pt should also cease eating prior to bedtime and avoid excessive fluid intake prior to sleep. May use antacids as needed for breakthrough GERD. All pateint questions answered today about this condition. Well adult exam - Primary    Hypertension     Patient is stable with current anti-hypertensive medicine and continue to follow a low sodium diet and take current medication. All questions about this condition were answered today. Relevant Medications    losartan (Cozaar) 100 MG tablet       Depression Screening and Follow-up Plan: Patient was screened for depression during today's encounter. They screened negative with a PHQ-2 score of 0. Preventive health issues were discussed with patient, and age appropriate screening tests were ordered as noted in patient's After Visit Summary. Personalized health advice and appropriate referrals for health education or preventive services given if needed, as noted in patient's After Visit Summary.      History of Present Illness:     Patient presents for a Medicare Wellness Visit    HPI Patient Care Team:  Stephany Anthony MD as PCP - General (Family Medicine)     Review of Systems:     Review of Systems     Problem List:     Patient Active Problem List   Diagnosis   • Cerebrovascular accident (CVA) due to thrombosis of right carotid artery ([address])   • Hypothyroidism   • Normocytic anemia due to blood loss   • Slow transit constipation   • Hyperlipidemia   • Gastroesophageal reflux disease without esophagitis   • Well adult exam   • Hypertension   • Murmur, cardiac      Past Medical and Surgical History:     Past Medical History:   Diagnosis Date   • Disease of thyroid gland    • Hyperlipidemia 2020   • Hypertension    • Hypothyroidism    • Stroke ([address]) 10/23/2018     Past Surgical History:   Procedure Laterality Date   • CAROTID STENT     • IR STROKE ALERT  10/06/2018   • TONSILLECTOMY        Family History:     Family History   Problem Relation Age of Onset   • No Known Problems Mother    • No Known Problems Father    • Heart disease Family    • No Known Problems Sister    • No Known Problems Daughter    • No Known Problems Maternal Grandmother    • No Known Problems Maternal Grandfather    • No Known Problems Paternal Grandmother    • No Known Problems Paternal Grandfather    • No Known Problems Daughter    • No Known Problems Brother       Social History:     Social History     Socioeconomic History   • Marital status: /Civil Union     Spouse name: Placido Sarabia   • Number of children: 2   • Years of education: None   • Highest education level: None   Occupational History   • None   Tobacco Use   • Smoking status: Never   • Smokeless tobacco: Never   Vaping Use   • Vaping status: Never Used   Substance and Sexual Activity   • Alcohol use: Yes     Comment: Social drinker   • Drug use: Never   • Sexual activity: Not Currently     Partners: Male     Birth control/protection: None   Other Topics Concern   • None   Social History Narrative    Most recent tobacco use screenin2019     Do you currently or have you served in the [address] [place] [address]:   No      Were you activated, into active duty, as a member of the [employer] or as a Reservist:   No      Occupation:   retired      Marital status:        Exercise level: Moderate      Diet:   Regular      General stress level:   Medium      Alcohol intake:   None      Caffeine intake: Moderate      Seat belts used routinely:   Yes      Sunscreen used routinely:   Yes      Smoke alarm in home:   Yes      Advance directive:   No      Has the Patient had a mammogram to screen for breast cancer within 24 months:   No      Social Determinants of Health     Financial Resource Strain: Low Risk  (12/8/2023)    Overall Financial Resource Strain (CARDIA)    • Difficulty of Paying Living Expenses: Not hard at all   Food Insecurity: No Food Insecurity (5/5/2021)    Hunger Vital Sign    • Worried About Running Out of Food in the Last Year: Never true    • Ran Out of Food in the Last Year: Never true   Transportation Needs: No Transportation Needs (12/8/2023)    PRAPARE - Transportation    • Lack of Transportation (Medical): No    • Lack of Transportation (Non-Medical): No   Physical Activity: Insufficiently Active (11/2/2020)    Exercise Vital Sign    • Days of Exercise per Week: 3 days    • Minutes of Exercise per Session: 30 min   Stress: No Stress Concern Present (11/2/2020)    [address] [place] [address]    • Feeling of Stress : Not at all   Social Connections: Unknown (11/2/2020)    Social Connection and Isolation Panel [NHANES]    • Frequency of Communication with Friends and Family: More than three times a week    • Frequency of Social Gatherings with Friends and Family:  Three times a week    • Attends Baptism Services: Never    • Active Member of Clubs or Organizations: No    • Attends Club or Organization Meetings: Never    • Marital Status: Not on file   Intimate Partner Violence: Not At Risk (11/2/2020)    Humiliation, Afraid, Rape, and Kick questionnaire    • Fear of Current or Ex-Partner: No    • Emotionally Abused: No    • Physically Abused: No    • Sexually Abused: No   Housing Stability: Not on file      Medications and Allergies:     Current Outpatient Medications   Medication Sig Dispense Refill   • atorvastatin (LIPITOR) 40 mg tablet take 1 tablet by mouth every evening 90 tablet 1   • Brilinta 90 MG TAKE 1 TABLET BY MOUTH  EVERY 12 HOURS 180 tablet 3   • levothyroxine 88 mcg tablet Take 1 tablet (88 mcg total) by mouth daily 90 tablet 1   • losartan (Cozaar) 100 MG tablet Take 1 tablet (100 mg total) by mouth daily 90 tablet 1   • Melatonin 10 MG TABS Take 10 mg by mouth daily at bedtime       No current facility-administered medications for this visit. Allergies   Allergen Reactions   • Amlodipine Edema      Immunizations:     Immunization History   Administered Date(s) Administered   • COVID-19 PFIZER VACCINE 0.3 ML IM 03/04/2021, 03/25/2021   • INFLUENZA 11/16/2015, 09/26/2017, 09/09/2022   • Influenza Quadrivalent 3 years and older 10/01/2018, 10/01/2018   • Influenza, high dose seasonal 0.7 mL 10/12/2018, 11/02/2020, 11/17/2021, 09/09/2022   • Pneumococcal Conjugate 13-Valent 07/19/2017   • Pneumococcal Polysaccharide PPV23 01/18/2019   • Tdap 06/20/2018   • influenza, injectable, quadrivalent 10/01/2018   • influenza, trivalent, adjuvanted 10/07/2019      Health Maintenance:         Topic Date Due   • Breast Cancer Screening: Mammogram  09/14/2024   • Hepatitis C Screening  Completed   • Colorectal Cancer Screening  Discontinued         Topic Date Due   • Influenza Vaccine (1) 09/01/2023   • COVID-19 Vaccine (3 - 2023-24 season) 09/01/2023      Medicare Screening Tests and Risk Assessments:     Miki Borges is here for her Subsequent Wellness visit. Health Risk Assessment:   Patient rates overall health as good. Patient feels that their physical health rating is same.  Patient is satisfied with their life. Eyesight was rated as same. Hearing was rated as same. Patient feels that their emotional and mental health rating is same. Patients states they are never, rarely angry. Patient states they are sometimes unusually tired/fatigued. Pain experienced in the last 7 days has been none. Patient states that she has experienced no weight loss or gain in last 6 months. Depression Screening:   PHQ-2 Score: 0      Fall Risk Screening: In the past year, patient has experienced: no history of falling in past year      Urinary Incontinence Screening:   Patient has leaked urine accidently in the last six months. Home Safety:  Patient does not have trouble with stairs inside or outside of their home. Patient has working smoke alarms and has working carbon monoxide detector. Home safety hazards include: uneven floors. Nutrition:   Current diet is Regular, Low Carb and No Added Salt. Medications:   Patient is not currently taking any over-the-counter supplements. Patient is able to manage medications. Activities of Daily Living (ADLs)/Instrumental Activities of Daily Living (IADLs):   Walk and transfer into and out of bed and chair?: Yes  Dress and groom yourself?: Yes    Bathe or shower yourself?: Yes    Feed yourself?  Yes  Do your laundry/housekeeping?: Yes  Manage your money, pay your bills and track your expenses?: Yes  Make your own meals?: Yes    Do your own shopping?: Yes    Durable Medical Equipment Suppliers  N/A    Previous Hospitalizations:   Any hospitalizations or ED visits within the last 12 months?: No      Advance Care Planning:   Living will: No    Durable POA for healthcare: No    Advanced directive: No      PREVENTIVE SCREENINGS      Cardiovascular Screening:    General: Screening Not Indicated and History Lipid Disorder      Diabetes Screening:     General: Screening Current      Colorectal Cancer Screening:     General: Screening Current      Breast Cancer Screening:     General: Screening Current      Cervical Cancer Screening:    General: Screening Not Indicated      Lung Cancer Screening:     General: Screening Not Indicated      Hepatitis C Screening:    General: Screening Current    Screening, Brief Intervention, and Referral to Treatment (SBIRT)    Screening  Typical number of drinks in a day: 0  Typical number of drinks in a week: 0  Interpretation: Low risk drinking behavior. AUDIT-C Screenin) How often did you have a drink containing alcohol in the past year? never  2) How many drinks did you have on a typical day when you were drinking in the past year? 0  3) How often did you have 6 or more drinks on one occasion in the past year? never    AUDIT-C Score: 0  Interpretation: Score 0-2 (female): Negative screen for alcohol misuse    Single Item Drug Screening:  How often have you used an illegal drug (including marijuana) or a prescription medication for non-medical reasons in the past year? never    Single Item Drug Screen Score: 0  Interpretation: Negative screen for possible drug use disorder    No results found.      Physical Exam:     /64 (BP Location: Left arm, Patient Position: Sitting, Cuff Size: Standard)   Pulse 81   Temp (!) 97.4 °F (36.3 °C) (Skin)   Ht 5' 1" (1.549 m)   Wt 72.6 kg (160 lb)   SpO2 99%   BMI 30.23 kg/m²     Physical Exam     Kell MD Benjamin

## 2023-12-28 DIAGNOSIS — I63.031 CEREBROVASCULAR ACCIDENT (CVA) DUE TO THROMBOSIS OF RIGHT CAROTID ARTERY (HCC): ICD-10-CM

## 2023-12-29 RX ORDER — TICAGRELOR 90 MG/1
TABLET ORAL
Qty: 180 TABLET | Refills: 3 | Status: SHIPPED | OUTPATIENT
Start: 2023-12-29

## 2024-01-02 DIAGNOSIS — E03.9 HYPOTHYROIDISM, UNSPECIFIED TYPE: ICD-10-CM

## 2024-01-02 RX ORDER — LEVOTHYROXINE SODIUM 88 UG/1
88 TABLET ORAL DAILY
Qty: 90 TABLET | Refills: 1 | Status: SHIPPED | OUTPATIENT
Start: 2024-01-02

## 2024-02-08 NOTE — PROGRESS NOTES
Daily Note     Today's date: 2018  Patient name: Billie Yuen  : 1950  MRN: [id number]  Referring provider: Sylvia Kinney MD  Dx:   Encounter Diagnosis     ICD-10-CM    1  Cerebrovascular accident (CVA) due to thrombosis of right carotid artery (HCC) I63 031                   Subjective: "I don't see how this will help me "      Objective: See treatment diary below  Pt participated in skilled OT focusing on oculomotor mvmts w/attention to L visual field, convergence/divergence, board to table copy, /VS     Pt engaged in crossword puzzle activity w/construction of puzzle utilizing clutter tiles  Antonym card match placed to R/L in hallway to facilitate oculomotor mvmts in all planesw/head turns  Assessment: Tolerated treatment poor  Poor alignment of tiles copying crossword puzzle w/word placement  Additional time for task completion  Pt frequently frustrated during activity stating,"I don't know how this could help me " Post activity pt stated "that was fun " Pt verbalized frustration in regards to therapy session, decreased insight into current medical condition  AKIL and SPENCER educated pt on importance of therapy to attain stated goals per POC  Patient would benefit from continued OT      Plan: Continue per plan of care     INTERVENTION COMMENTS:  Diagnosis: Cerebrovascular accident (CVA) due to thrombosis of right carotid artery ([facility] [address] ) [I63 031]  Precautions: decreased hot/cold sensation on LUE  FOTO: 73 with 27% limitation  Insurance: [facility] [place] [address] REP [[id number]]  3 of ___ visits, PN due 18    Pt seen by Shelby BARNARD under direct supervision of Alex PALMER/CATHIE
NEGATIVE

## 2024-02-21 PROBLEM — Z00.00 WELL ADULT EXAM: Status: RESOLVED | Noted: 2020-11-02 | Resolved: 2024-02-21

## 2024-03-11 ENCOUNTER — RA CDI HCC (OUTPATIENT)
Dept: OTHER | Facility: HOSPITAL | Age: 74
End: 2024-03-11

## 2024-03-12 LAB — TSH SERPL-ACNC: 1.36 MIU/L (ref 0.4–4.5)

## 2024-03-16 NOTE — PROGRESS NOTES
Name: Mandy Severino      : 1950      MRN: [id number]  Encounter Provider: Patrick Humphrey MD  Encounter Date: 3/18/2024   Encounter department: [facility]    Assessment & Plan     1. Hypothyroidism due to acquired atrophy of thyroid  Assessment & Plan:  Patient to continue current dose of thyroid medicine and recheck TSH in 6 months       2. Primary hypertension  Assessment & Plan:  Patient is stable with current anti-hypertensive medicine and continue to follow a low sodium diet and take current medication. All questions about this condition were answered today.       3. Mixed hyperlipidemia  Assessment & Plan:  Patient  is stable with current medication and we discussed a low fat low cholesterol diet. Weight loss also discussed for this will help lower cholesterol also. Recheck lipids in 6 months.       4. Gastroesophageal reflux disease without esophagitis  Assessment & Plan:  Patient to continue with present therapy and decrease caffeine, avoid ETOH and smoking to decrease acid production. Pt should also cease eating prior to bedtime and avoid excessive fluid intake prior to sleep. May use antacids as needed for breakthrough GERD. All pateint questions answered today about this condition.       5. Cerebrovascular accident (CVA) due to thrombosis of right carotid artery (HCC)  Assessment & Plan:  Patient is stable  and will continue present plan of care and reassess at next routine visit. All questions about this problem from patient were answered today.           Falls Plan of Care: balance, strength, and gait training instructions were provided. Home safety education provided.     Urinary Incontinence Plan of Care: counseling topics discussed: practice Kegel (pelvic floor strengthening) exercises, use restroom every 2 hours, limit alcohol, caffeine, spicy foods, and acidic foods, limiting fluid intake 3-4 hours before bed, weight loss, preventing constipation and limiting  fluid intake to 60 oz. per day.       Subjective     73-year-old female today for checkup on multimedical poss patient hypothyroidism hypertension hyperlipidemia GERD history of CVA and is doing very well with her medication.  Patient her thyroid lab work done and her dosage is currently at 88.  This dosage is the correct dosage for her because her TSH is well-controlled.  Patient is doing well with her medications and will call when appropriate to refill her medications as she sees fit.      Review of Systems   Constitutional:  Negative for activity change, appetite change, fatigue and fever.   HENT:  Negative for congestion, ear pain, postnasal drip, rhinorrhea, sinus pressure, sinus pain, sneezing and sore throat.    Eyes:  Negative for pain and redness.   Respiratory:  Negative for apnea, cough, chest tightness, shortness of breath and wheezing.    Cardiovascular:  Negative for chest pain, palpitations and leg swelling.   Gastrointestinal:  Negative for abdominal pain, constipation, diarrhea, nausea and vomiting.   Endocrine: Negative for cold intolerance and heat intolerance.   Genitourinary:  Negative for difficulty urinating, dysuria, frequency, hematuria and urgency.   Musculoskeletal:  Negative for arthralgias, back pain, gait problem and myalgias.   Skin:  Negative for rash.   Neurological:  Negative for dizziness, speech difficulty, weakness, numbness and headaches.   Hematological:  Does not bruise/bleed easily.   Psychiatric/Behavioral:  Negative for agitation, confusion and hallucinations.        Past Medical History:   Diagnosis Date   • Disease of thyroid gland    • Hyperlipidemia 5/8/2020   • Hypertension    • Hypothyroidism    • Stroke (HCC) 10/23/2018     Past Surgical History:   Procedure Laterality Date   • CAROTID STENT     • IR STROKE ALERT  10/06/2018   • TONSILLECTOMY       Family History   Problem Relation Age of Onset   • No Known Problems Mother    • No Known Problems Father    • Heart  disease Family    • No Known Problems Sister    • No Known Problems Daughter    • No Known Problems Maternal Grandmother    • No Known Problems Maternal Grandfather    • No Known Problems Paternal Grandmother    • No Known Problems Paternal Grandfather    • No Known Problems Daughter    • No Known Problems Brother      Social History     Socioeconomic History   • Marital status: /Civil Union     Spouse name: Piter   • Number of children: 2   • Years of education: None   • Highest education level: None   Occupational History   • None   Tobacco Use   • Smoking status: Never     Passive exposure: Never   • Smokeless tobacco: Never   Vaping Use   • Vaping status: Never Used   Substance and Sexual Activity   • Alcohol use: Yes     Comment: Social drinker   • Drug use: No   • Sexual activity: Not Currently     Partners: Male     Birth control/protection: None   Other Topics Concern   • None   Social History Narrative    Most recent tobacco use screenin2019     Do you currently or have you served in the [employer]:   No      Were you activated, into active duty, as a member of the [employer] or as a Reservist:   No      Occupation:   retired      Marital status:        Exercise level:   Moderate      Diet:   Regular      General stress level:   Medium      Alcohol intake:   None      Caffeine intake:   Moderate      Seat belts used routinely:   Yes      Sunscreen used routinely:   Yes      Smoke alarm in home:   Yes      Advance directive:   No      Has the Patient had a mammogram to screen for breast cancer within 24 months:   No      Social Determinants of Health     Financial Resource Strain: Low Risk  (2023)    Overall Financial Resource Strain (CARDIA)    • Difficulty of Paying Living Expenses: Not hard at all   Food Insecurity: No Food Insecurity (2021)    Hunger Vital Sign    • Worried About Running Out of Food in the Last Year: Never true    • Ran Out of Food in the Last  Year: Never true   Transportation Needs: No Transportation Needs (12/8/2023)    PRAPARE - Transportation    • Lack of Transportation (Medical): No    • Lack of Transportation (Non-Medical): No   Physical Activity: Insufficiently Active (11/2/2020)    Exercise Vital Sign    • Days of Exercise per Week: 3 days    • Minutes of Exercise per Session: 30 min   Stress: No Stress Concern Present (11/2/2020)    [place] [employer] - Occupational Stress Questionnaire    • Feeling of Stress : Not at all   Social Connections: Unknown (11/2/2020)    Social Connection and Isolation Panel [NHANES]    • Frequency of Communication with Friends and Family: More than three times a week    • Frequency of Social Gatherings with Friends and Family: Three times a week    • Attends [employer] Services: Never    • Active Member of Clubs or Organizations: No    • Attends Club or Organization Meetings: Never    • Marital Status: Not on file   Intimate Partner Violence: Not At Risk (11/2/2020)    Humiliation, Afraid, Rape, and Kick questionnaire    • Fear of Current or Ex-Partner: No    • Emotionally Abused: No    • Physically Abused: No    • Sexually Abused: No   Housing Stability: Not on file     Current Outpatient Medications on File Prior to Visit   Medication Sig   • atorvastatin (LIPITOR) 40 mg tablet take 1 tablet by mouth every evening   • Brilinta 90 MG TAKE 1 TABLET BY MOUTH EVERY 12  HOURS   • levothyroxine 88 mcg tablet Take 1 tablet (88 mcg total) by mouth daily   • losartan (Cozaar) 100 MG tablet Take 1 tablet (100 mg total) by mouth daily   • Melatonin 10 MG TABS Take 10 mg by mouth daily at bedtime     Allergies   Allergen Reactions   • Amlodipine Edema     Immunization History   Administered Date(s) Administered   • COVID-19 PFIZER VACCINE 0.3 ML IM 03/04/2021, 03/25/2021, 12/03/2021   • INFLUENZA 11/16/2015, 09/26/2017, 09/09/2022   • Influenza Quadrivalent 3 years and older 10/01/2018, 10/01/2018   •  Influenza, high dose seasonal 0.7 mL 10/12/2018, 11/02/2020, 11/17/2021, 09/09/2022   • Pneumococcal Conjugate 13-Valent 07/19/2017   • Pneumococcal Polysaccharide PPV23 01/18/2019   • Tdap 06/20/2018   • influenza, injectable, quadrivalent 10/01/2018   • influenza, trivalent, adjuvanted 10/07/2019       Objective     /72 (BP Location: Left arm, Patient Position: Sitting, Cuff Size: Standard)   Pulse 85   Temp (!) 97.2 °F (36.2 °C) (Skin)   Wt 74.8 kg (165 lb)   SpO2 98%   BMI 31.18 kg/m²     Physical Exam  Vitals and nursing note reviewed.   Constitutional:       Appearance: She is well-developed.   HENT:      Head: Normocephalic and atraumatic.      Nose: Nose normal.      Mouth/Throat:      Mouth: Mucous membranes are moist.   Eyes:      General: No scleral icterus.     Conjunctiva/sclera: Conjunctivae normal.      Pupils: Pupils are equal, round, and reactive to light.   Neck:      Thyroid: No thyromegaly.   Cardiovascular:      Rate and Rhythm: Normal rate and regular rhythm.   Pulmonary:      Effort: Pulmonary effort is normal.      Breath sounds: Normal breath sounds. No wheezing.   Abdominal:      General: Bowel sounds are normal. There is no distension.      Palpations: Abdomen is soft.      Tenderness: There is no abdominal tenderness. There is no guarding or rebound.   Musculoskeletal:         General: No tenderness or deformity. Normal range of motion.      Cervical back: Normal range of motion and neck supple.   Skin:     General: Skin is warm and dry.      Findings: No erythema or rash.   Neurological:      Mental Status: She is alert and oriented to person, place, and time.      Sensory: No sensory deficit.   Psychiatric:         Mood and Affect: Mood normal.         Behavior: Behavior normal.         Thought Content: Thought content normal.         Judgment: Judgment normal.       Patrick Humphrey MD

## 2024-03-18 ENCOUNTER — OFFICE VISIT (OUTPATIENT)
Dept: FAMILY MEDICINE CLINIC | Facility: CLINIC | Age: 74
End: 2024-03-18
Payer: COMMERCIAL

## 2024-03-18 VITALS
TEMPERATURE: 97.2 F | WEIGHT: 165 LBS | OXYGEN SATURATION: 98 % | SYSTOLIC BLOOD PRESSURE: 138 MMHG | HEART RATE: 85 BPM | DIASTOLIC BLOOD PRESSURE: 72 MMHG | BODY MASS INDEX: 31.18 KG/M2

## 2024-03-18 DIAGNOSIS — I63.031 CEREBROVASCULAR ACCIDENT (CVA) DUE TO THROMBOSIS OF RIGHT CAROTID ARTERY (HCC): ICD-10-CM

## 2024-03-18 DIAGNOSIS — K21.9 GASTROESOPHAGEAL REFLUX DISEASE WITHOUT ESOPHAGITIS: ICD-10-CM

## 2024-03-18 DIAGNOSIS — E78.2 MIXED HYPERLIPIDEMIA: ICD-10-CM

## 2024-03-18 DIAGNOSIS — E03.4 HYPOTHYROIDISM DUE TO ACQUIRED ATROPHY OF THYROID: Primary | ICD-10-CM

## 2024-03-18 DIAGNOSIS — I10 PRIMARY HYPERTENSION: ICD-10-CM

## 2024-03-18 PROCEDURE — G2211 COMPLEX E/M VISIT ADD ON: HCPCS | Performed by: FAMILY MEDICINE

## 2024-03-18 PROCEDURE — 99214 OFFICE O/P EST MOD 30 MIN: CPT | Performed by: FAMILY MEDICINE

## 2024-05-08 DIAGNOSIS — I63.031 CEREBROVASCULAR ACCIDENT (CVA) DUE TO THROMBOSIS OF RIGHT CAROTID ARTERY (HCC): ICD-10-CM

## 2024-05-08 RX ORDER — ATORVASTATIN CALCIUM 40 MG/1
TABLET, FILM COATED ORAL
Qty: 90 TABLET | Refills: 1 | Status: SHIPPED | OUTPATIENT
Start: 2024-05-08 | End: 2024-05-10 | Stop reason: SDUPTHER

## 2024-05-10 DIAGNOSIS — I63.031 CEREBROVASCULAR ACCIDENT (CVA) DUE TO THROMBOSIS OF RIGHT CAROTID ARTERY (HCC): ICD-10-CM

## 2024-05-10 RX ORDER — ATORVASTATIN CALCIUM 40 MG/1
40 TABLET, FILM COATED ORAL EVERY EVENING
Qty: 90 TABLET | Refills: 1 | Status: SHIPPED | OUTPATIENT
Start: 2024-05-10

## 2024-06-18 ENCOUNTER — RA CDI HCC (OUTPATIENT)
Dept: OTHER | Facility: HOSPITAL | Age: 74
End: 2024-06-18

## 2024-06-26 ENCOUNTER — OFFICE VISIT (OUTPATIENT)
Dept: FAMILY MEDICINE CLINIC | Facility: CLINIC | Age: 74
End: 2024-06-26
Payer: COMMERCIAL

## 2024-06-26 VITALS
SYSTOLIC BLOOD PRESSURE: 108 MMHG | OXYGEN SATURATION: 99 % | HEART RATE: 76 BPM | RESPIRATION RATE: 16 BRPM | HEIGHT: 61 IN | DIASTOLIC BLOOD PRESSURE: 62 MMHG | TEMPERATURE: 97.3 F | WEIGHT: 161 LBS | BODY MASS INDEX: 30.4 KG/M2

## 2024-06-26 DIAGNOSIS — E03.4 HYPOTHYROIDISM DUE TO ACQUIRED ATROPHY OF THYROID: ICD-10-CM

## 2024-06-26 DIAGNOSIS — E78.2 MIXED HYPERLIPIDEMIA: ICD-10-CM

## 2024-06-26 DIAGNOSIS — K21.9 GASTROESOPHAGEAL REFLUX DISEASE WITHOUT ESOPHAGITIS: ICD-10-CM

## 2024-06-26 DIAGNOSIS — I10 PRIMARY HYPERTENSION: ICD-10-CM

## 2024-06-26 DIAGNOSIS — I63.031 CEREBROVASCULAR ACCIDENT (CVA) DUE TO THROMBOSIS OF RIGHT CAROTID ARTERY (HCC): Primary | ICD-10-CM

## 2024-06-26 PROCEDURE — G2211 COMPLEX E/M VISIT ADD ON: HCPCS | Performed by: FAMILY MEDICINE

## 2024-06-26 PROCEDURE — 99214 OFFICE O/P EST MOD 30 MIN: CPT | Performed by: FAMILY MEDICINE

## 2024-06-26 NOTE — PROGRESS NOTES
Ambulatory Visit  Name: Mandy Severino      : 1950      MRN: [id number]  Encounter Provider: Patrick Humphrey MD  Encounter Date: 2024   Encounter department: [facility]    Assessment & Plan   1. Cerebrovascular accident (CVA) due to thrombosis of right carotid artery (HCC)  Assessment & Plan:  Patient is stable  and will continue present plan of care and reassess at next routine visit. All questions about this problem from patient were answered today.   2. Gastroesophageal reflux disease without esophagitis  3. Mixed hyperlipidemia  Assessment & Plan:  Patient  is stable with current medication and we discussed a low fat low cholesterol diet. Weight loss also discussed for this will help lower cholesterol also. Recheck lipids in 6 months.   Orders:  -     Lipid Panel with Direct LDL reflex; Future  -     Lipid Panel with Direct LDL reflex  4. Primary hypertension  Assessment & Plan:  Patient is stable with current anti-hypertensive medicine and continue to follow a low sodium diet and take current medication. All questions about this condition were answered today.   Orders:  -     Comprehensive metabolic panel; Future  -     CBC and differential; Future  -     Magnesium; Future  -     Uric acid; Future  -     UA/M w/rflx Culture, Comp  -     Comprehensive metabolic panel  -     CBC and differential  -     Magnesium  -     Uric acid  5. Hypothyroidism due to acquired atrophy of thyroid  Assessment & Plan:  Patient to continue current dose of thyroid medicine and recheck TSH in 6 months   Orders:  -     TSH, 3rd generation with Free T4 reflex; Future  -     TSH, 3rd generation with Free T4 reflex      Falls Plan of Care: balance, strength, and gait training instructions were provided. Home safety education provided.     Urinary Incontinence Plan of Care: counseling topics discussed: practice Kegel (pelvic floor strengthening) exercises, use restroom every 2 hours, limit  alcohol, caffeine, spicy foods, and acidic foods, limiting fluid intake 3-4 hours before bed, weight loss, preventing constipation and limiting fluid intake to 60 oz. per day.       History of Present Illness     73-year-old female today for triple multiple Dr. Wilson patient with history of GERD hypertension hypothyroidism history of CVA here for her recheck.  Patient does not need any refills on her medicines and is due for her yearly lab work at her next visit which will be in September.  I have filled out her [employer] wellness program certification today.  Patient is doing well with her medications and does not need any refills at this point and she will call when appropriate to refill them.      Review of Systems  Past Medical History:   Diagnosis Date   • Disease of thyroid gland    • Hyperlipidemia 5/8/2020   • Hypertension    • Hypothyroidism    • Stroke (HCC) 10/23/2018     Past Surgical History:   Procedure Laterality Date   • CAROTID STENT     • IR STROKE ALERT  10/06/2018   • TONSILLECTOMY       Family History   Problem Relation Age of Onset   • No Known Problems Mother    • No Known Problems Father    • Heart disease Family    • No Known Problems Sister    • No Known Problems Daughter    • No Known Problems Maternal Grandmother    • No Known Problems Maternal Grandfather    • No Known Problems Paternal Grandmother    • No Known Problems Paternal Grandfather    • No Known Problems Daughter    • No Known Problems Brother      Social History     Tobacco Use   • Smoking status: Never     Passive exposure: Never   • Smokeless tobacco: Never   Vaping Use   • Vaping status: Never Used   Substance and Sexual Activity   • Alcohol use: Yes     Comment: Social drinker   • Drug use: No   • Sexual activity: Not Currently     Partners: Male     Birth control/protection: None     Current Outpatient Medications on File Prior to Visit   Medication Sig   • atorvastatin (LIPITOR) 40 mg tablet Take 1 tablet (40 mg  "total) by mouth every evening   • Brilinta 90 MG TAKE 1 TABLET BY MOUTH EVERY 12  HOURS   • levothyroxine 88 mcg tablet Take 1 tablet (88 mcg total) by mouth daily   • losartan (Cozaar) 100 MG tablet Take 1 tablet (100 mg total) by mouth daily   • Melatonin 10 MG TABS Take 10 mg by mouth daily at bedtime     Allergies   Allergen Reactions   • Amlodipine Edema     Immunization History   Administered Date(s) Administered   • COVID-19 PFIZER VACCINE 0.3 ML IM 03/04/2021, 03/25/2021, 12/03/2021   • INFLUENZA 11/16/2015, 09/26/2017, 09/09/2022   • Influenza Quadrivalent 3 years and older 10/01/2018, 10/01/2018   • Influenza, high dose seasonal 0.7 mL 10/12/2018, 11/02/2020, 11/17/2021, 09/09/2022   • Pneumococcal Conjugate 13-Valent 07/19/2017   • Pneumococcal Polysaccharide PPV23 01/18/2019   • Tdap 06/20/2018   • influenza, injectable, quadrivalent 10/01/2018   • influenza, trivalent, adjuvanted 10/07/2019     Objective     /62 (BP Location: Left arm, Patient Position: Sitting, Cuff Size: Standard)   Pulse 76   Temp (!) 97.3 °F (36.3 °C) (Temporal)   Resp 16   Ht 5' 1\" (1.549 m)   Wt 73 kg (161 lb)   SpO2 99%   BMI 30.42 kg/m²     Physical Exam  Administrative Statements         "

## 2024-07-01 DIAGNOSIS — E03.9 HYPOTHYROIDISM, UNSPECIFIED TYPE: ICD-10-CM

## 2024-07-02 RX ORDER — LEVOTHYROXINE SODIUM 88 UG/1
88 TABLET ORAL DAILY
Qty: 90 TABLET | Refills: 1 | Status: SHIPPED | OUTPATIENT
Start: 2024-07-02

## 2024-07-03 DIAGNOSIS — I10 PRIMARY HYPERTENSION: ICD-10-CM

## 2024-07-03 RX ORDER — LOSARTAN POTASSIUM 100 MG/1
100 TABLET ORAL DAILY
Qty: 90 TABLET | Refills: 1 | Status: SHIPPED | OUTPATIENT
Start: 2024-07-03

## 2024-08-01 ENCOUNTER — TELEPHONE (OUTPATIENT)
Dept: NEUROSURGERY | Facility: CLINIC | Age: 74
End: 2024-08-01

## 2024-08-01 NOTE — TELEPHONE ENCOUNTER
8/1/24 - CALLED PT AND LMOM LETTING PT KNOW THAT DUE TO SCHEDULE CHANGES, PT'S APT HAS BEEN MOVED TO 9:15 AM ON 9/13/24

## 2024-08-12 DIAGNOSIS — I63.031 CEREBROVASCULAR ACCIDENT (CVA) DUE TO THROMBOSIS OF RIGHT CAROTID ARTERY (HCC): ICD-10-CM

## 2024-08-13 RX ORDER — ATORVASTATIN CALCIUM 40 MG/1
40 TABLET, FILM COATED ORAL EVERY EVENING
Qty: 90 TABLET | Refills: 1 | Status: SHIPPED | OUTPATIENT
Start: 2024-08-13

## 2024-09-11 ENCOUNTER — HOSPITAL ENCOUNTER (OUTPATIENT)
Dept: RADIOLOGY | Facility: MEDICAL CENTER | Age: 74
Discharge: HOME | End: 2024-09-11
Payer: COMMERCIAL

## 2024-09-11 DIAGNOSIS — I63.031 CEREBROVASCULAR ACCIDENT (CVA) DUE TO THROMBOSIS OF RIGHT CAROTID ARTERY (HCC): ICD-10-CM

## 2024-09-11 PROCEDURE — 93880 EXTRACRANIAL BILAT STUDY: CPT | Performed by: INTERNAL MEDICINE

## 2024-09-11 PROCEDURE — 93880 EXTRACRANIAL BILAT STUDY: CPT

## 2024-09-11 NOTE — ASSESSMENT & PLAN NOTE
Returns for 1 year follow-up s/p right MCA M1 thrombectomy and right ICA stent for occlusion 10/6/18 by Dr. Madden.  Follow up duplex January 2021 suggested 70-99% in-stent stenosis of right ICA --> ~70% in-stent stenosis on CTA. She was restarted on DAPT and repeat imaging in 6 months showed stable 50-70% stenosis. ASA was d/c and she continues on Brilinta and Lipitor.  Doing well. No new complaints.   Exam: slight left HF weakness, brisk reflexes throughout     Imaging:  Carotid duplex 9/11/24:RIGHT: The internal carotid artery stent is patent without significant restenosis. Vertebral artery flow is antegrade. There is no significant subclavian artery disease. LEFT: There is <50% stenosis in the internal carotid artery. Minimal plaque is heterogenous and smooth. Vertebral artery flow is antegrade. There is no significant subclavian artery disease.    Plan:  Reviewed imaging with patient, , and Dr. Madden. Patent right ICA stent.    Recommend continued surveillance.  Continue Brilinta 90 mg and Lipitor 40 mg  Reviewed red flag s/s.  Follow-up in 1 year with carotid duplex as joint appointment with Dr. Madden.    Call sooner with any questions or concerns.

## 2024-09-13 ENCOUNTER — OFFICE VISIT (OUTPATIENT)
Dept: NEUROSURGERY | Facility: CLINIC | Age: 74
End: 2024-09-13
Payer: COMMERCIAL

## 2024-09-13 VITALS
TEMPERATURE: 97.8 F | OXYGEN SATURATION: 99 % | BODY MASS INDEX: 29.27 KG/M2 | SYSTOLIC BLOOD PRESSURE: 158 MMHG | HEIGHT: 61 IN | HEART RATE: 96 BPM | WEIGHT: 155 LBS | RESPIRATION RATE: 16 BRPM | DIASTOLIC BLOOD PRESSURE: 70 MMHG

## 2024-09-13 DIAGNOSIS — I63.031 CEREBROVASCULAR ACCIDENT (CVA) DUE TO THROMBOSIS OF RIGHT CAROTID ARTERY (HCC): Primary | ICD-10-CM

## 2024-09-13 PROCEDURE — 99213 OFFICE O/P EST LOW 20 MIN: CPT | Performed by: PHYSICIAN ASSISTANT

## 2024-09-13 NOTE — PROGRESS NOTES
Neurosurgery Office Note  Mandy Severino 74 y.o. female MRN: [id number]      Assessment & Plan     Cerebrovascular accident (CVA) due to thrombosis of right carotid artery (HCC)  Returns for 1 year follow-up s/p right MCA M1 thrombectomy and right ICA stent for occlusion 10/6/18 by Dr. Madden.  Follow up duplex January 2021 suggested 70-99% in-stent stenosis of right ICA --> ~70% in-stent stenosis on CTA. She was restarted on DAPT and repeat imaging in 6 months showed stable 50-70% stenosis. ASA was d/c and she continues on Brilinta and Lipitor.  Doing well. No new complaints.   Exam: slight left HF weakness, brisk reflexes throughout     Imaging:  Carotid duplex 9/11/24:RIGHT: The internal carotid artery stent is patent without significant restenosis. Vertebral artery flow is antegrade. There is no significant subclavian artery disease. LEFT: There is <50% stenosis in the internal carotid artery. Minimal plaque is heterogenous and smooth. Vertebral artery flow is antegrade. There is no significant subclavian artery disease.    Plan:  Reviewed imaging with patient, , and Dr. Madden. Patent right ICA stent.    Recommend continued surveillance.  Continue Brilinta 90 mg and Lipitor 40 mg  Reviewed red flag s/s.  Follow-up in 1 year with carotid duplex as joint appointment with Dr. Madden.    Call sooner with any questions or concerns.     Diagnoses and all orders for this visit:    Cerebrovascular accident (CVA) due to thrombosis of right carotid artery (HCC)  -     VAS carotid complete study; Future          I have spent a total time of 25 minutes on 09/13/24 in caring for this patient including Diagnostic results, Instructions for management, Patient and family education, Importance of tx compliance, Impressions, Counseling / Coordination of care, Documenting in the medical record, Reviewing / ordering tests, medicine, procedures  , Obtaining or reviewing history  , and Communicating with other healthcare  professionals .      CHIEF COMPLAINT    Chief Complaint   Patient presents with    Follow-up     With VAS carotid study       HISTORY    History of Present Illness     74 y.o. year old female     74 year old female seen for 1 year follow up s/p right MCA M1 thrombectomy and right ICA stent for occlusion 10/6/18 by Dr. Madden manifesting as left-sided weakness and dysarthria. She has slight residual weakness in her left leg especially if she is tired. Otherwise states she has been doing well, stays active.  No new neurologic complaints. Follow-up carotid duplex in 2021 suggested recurrence within her right ICA stent, Brilinta was readded to her 325 mg aspirin.  Subsequent CTAs showed improvement with a patent stent and she is back on Brilinta only and Lipitor.        See Discussion    REVIEW OF SYSTEMS    Review of Systems   HENT:  Positive for tinnitus (sometimes).    Eyes:  Negative for pain and visual disturbance.   Respiratory:  Negative for chest tightness and shortness of breath.    Gastrointestinal: Negative.    Genitourinary: Negative.    Musculoskeletal:  Negative for gait problem.   Neurological:  Negative for dizziness, tremors, seizures, syncope, speech difficulty, weakness, light-headedness, numbness and headaches.   Hematological:  Bruises/bleeds easily (meds).   Psychiatric/Behavioral:  Negative for confusion and decreased concentration.        ROS obtained by MA. Reviewed. See HPI.     Meds/Allergies     Current Outpatient Medications   Medication Sig Dispense Refill    atorvastatin (LIPITOR) 40 mg tablet Take 1 tablet (40 mg total) by mouth every evening 90 tablet 1    Brilinta 90 MG TAKE 1 TABLET BY MOUTH EVERY 12  HOURS 180 tablet 3    levothyroxine 88 mcg tablet Take 1 tablet (88 mcg total) by mouth daily 90 tablet 1    losartan (COZAAR) 100 MG tablet take 1 tablet by mouth once daily 90 tablet 1    Melatonin 10 MG TABS Take 10 mg by mouth daily at bedtime       No current facility-administered  "medications for this visit.       Allergies   Allergen Reactions    Amlodipine Edema       PAST HISTORY    Past Medical History:   Diagnosis Date    Carotid artery occlusion     Disease of thyroid gland     Hyperlipidemia 05/08/2020    Hypertension     Hypothyroidism     Stroke (HCC) 10/23/2018       Past Surgical History:   Procedure Laterality Date    CAROTID ENDARTERECTOMY      CAROTID STENT      CEREBRAL ANGIOGRAM      IR STROKE ALERT  10/06/2018    TONSILLECTOMY      VENTRICULOPERITONEAL SHUNT         Social History     Tobacco Use    Smoking status: Never     Passive exposure: Never    Smokeless tobacco: Never   Vaping Use    Vaping status: Never Used   Substance Use Topics    Alcohol use: Yes     Comment: Social drinker    Drug use: No       Family History   Problem Relation Age of Onset    No Known Problems Mother     No Known Problems Father     Heart disease Family     No Known Problems Sister     No Known Problems Daughter     No Known Problems Maternal Grandmother     No Known Problems Maternal Grandfather     No Known Problems Paternal Grandmother     No Known Problems Paternal Grandfather     No Known Problems Daughter     No Known Problems Brother          Above history personally reviewed.       EXAM    Vitals:Blood pressure 158/70, pulse 96, temperature 97.8 °F (36.6 °C), temperature source Temporal, resp. rate 16, height 5' 1\" (1.549 m), weight 70.3 kg (155 lb), SpO2 99%.,Body mass index is 29.29 kg/m².     Physical Exam  Vitals reviewed.   Constitutional:       General: She is awake.      Appearance: Normal appearance.   HENT:      Head: Normocephalic and atraumatic.   Eyes:      Extraocular Movements: EOM normal.      Conjunctiva/sclera: Conjunctivae normal.      Pupils: Pupils are equal, round, and reactive to light.   Cardiovascular:      Rate and Rhythm: Normal rate.   Pulmonary:      Effort: Pulmonary effort is normal.   Skin:     General: Skin is warm and dry.   Neurological:      Mental " Status: She is alert and oriented to person, place, and time.      Coordination: Finger-Nose-Finger Test normal.      Gait: Gait is intact.      Deep Tendon Reflexes:      Reflex Scores:       Bicep reflexes are 3+ on the right side and 3+ on the left side.       Brachioradialis reflexes are 3+ on the right side and 3+ on the left side.       Patellar reflexes are 3+ on the right side and 3+ on the left side.  Psychiatric:         Attention and Perception: Attention and perception normal.         Mood and Affect: Mood and affect normal.         Speech: Speech normal.         Behavior: Behavior normal. Behavior is cooperative.         Thought Content: Thought content normal.         Cognition and Memory: Cognition and memory normal.         Judgment: Judgment normal.         Neurologic Exam     Mental Status   Oriented to person, place, and time.   Oriented to year and month.   Follows 2 step commands.   Attention: normal. Concentration: normal.   Speech: speech is normal   Level of consciousness: alert  Knowledge: good. Able to perform simple calculations.   Able to name object. Normal comprehension.     Cranial Nerves     CN III, IV, VI   Pupils are equal, round, and reactive to light.  Extraocular motions are normal.   Right pupil: Shape: regular. Reactivity: brisk. Consensual response: intact.   Left pupil: Shape: regular. Reactivity: brisk. Consensual response: intact.   CN III: no CN III palsy  CN VI: no CN VI palsy  Nystagmus: none   Ophthalmoparesis: none  Upgaze: normal  Downgaze: normal  Conjugate gaze: present    CN V   Facial sensation intact.     CN VII   Facial expression full, symmetric.     CN VIII   Hearing: intact    CN XI   Right trapezius strength: normal  Left trapezius strength: normal    CN XII   CN XII normal.     Motor Exam   Muscle bulk: normal  Overall muscle tone: normal  Right arm pronator drift: absent  Left arm pronator drift: absent  BUE 5/5  RLE 5/5  LLE HF 4+/5, otherwise 5/5      Sensory Exam   Light touch normal.     Gait, Coordination, and Reflexes     Gait  Gait: normal    Coordination   Finger to nose coordination: normal    Tremor   Resting tremor: absent  Intention tremor: absent  Action tremor: absent    Reflexes   Right brachioradialis: 3+  Left brachioradialis: 3+  Right biceps: 3+  Left biceps: 3+  Right patellar: 3+  Left patellar: 3+  Right Bello: absent  Left Bello: absent        MEDICAL DECISION MAKING    Imaging Studies:     VAS carotid complete study    Result Date: 9/11/2024  Narrative:  [facility] REPORT [facility]INICAL: Indications: Yearly surveillance of carotid artery disease.  Patient is asymptomatic at this time. Risk Factors The patient has history of HTN and Hyperlipidemia.  She has no history of Diabetes. Clinical Right Pressure:  110/65 mm Hg, Left Pressure:  108/67 mm Hg.  FINDINGS:  Right              Impression     PSV  EDV (cm/s)  Direction of Flow  Ratio  Dist. ICA - Stent                  69          19                      1.27  Mid. ICA - Stent                  136          25                      2.49  Prox. ICA - Stent  Widely Patent   89          18                      1.63  Dist CCA                           94          17                            Mid CCA                            55          12                      0.59  Prox CCA                           93          15                            Ext Carotid                       232           6                      4.24  Prox Vert                          98           0  Antegrade                 Subclavian                        194          13                             Left               Impression  PSV  EDV (cm/s)  Direction of Flow  Ratio  Dist. ICA                       88          35                      0.96  Mid. ICA                       120          29                      1.33  Prox. ICA          1 - 49%      59          12                      0.65  Dist CCA                         95          18                            Mid CCA                         91          15                      0.59  Prox CCA                       153          20                            Ext Carotid                    121           0                      1.33  Prox Vert                       65          13  Antegrade                 Subclavian                     236           0                               CONCLUSION: Impression RIGHT: The internal carotid artery stent is patent without significant restenosis. Vertebral artery flow is antegrade. There is no significant subclavian artery disease.  LEFT: There is <50% stenosis in the internal carotid artery. Minimal plaque is heterogenous and smooth. Vertebral artery flow is antegrade. There is no significant subclavian artery disease.  Compared to previous study on 8/26/2023, there is no change. Recommend repeat testing in 6 months as per protocol unless otherwise indicated.  tech note: incidental finding of enlarged, heterogenous thyroid  SIGNATURE: Electronically Signed by: TIP ISAACS MD FACP [facility] on 2024-09-11 11:10:31 AM      Personally reviewed the following image studies in PACS and associated radiology reports: Ultrasound(s). My interpretation of the radiology images/reports is: as above.   (0) indicator not present

## 2024-09-16 ENCOUNTER — RA CDI HCC (OUTPATIENT)
Dept: OTHER | Facility: HOSPITAL | Age: 74
End: 2024-09-16

## 2024-09-20 LAB
ALBUMIN SERPL-MCNC: 3.9 G/DL (ref 3.6–5.1)
ALBUMIN/GLOB SERPL: 1.6 (CALC) (ref 1–2.5)
ALP SERPL-CCNC: 84 U/L (ref 37–153)
ALT SERPL-CCNC: 10 U/L (ref 6–29)
APPEARANCE UR: ABNORMAL
AST SERPL-CCNC: 14 U/L (ref 10–35)
BACTERIA UR QL AUTO: ABNORMAL /HPF
BASOPHILS # BLD AUTO: 40 CELLS/UL (ref 0–200)
BASOPHILS NFR BLD AUTO: 0.9 %
BILIRUB SERPL-MCNC: 0.5 MG/DL (ref 0.2–1.2)
BILIRUB UR QL STRIP: NEGATIVE
BUN SERPL-MCNC: 11 MG/DL (ref 7–25)
BUN/CREAT SERPL: NORMAL (CALC) (ref 6–22)
CALCIUM SERPL-MCNC: 9 MG/DL (ref 8.6–10.4)
CAOX CRY #/AREA URNS HPF: ABNORMAL /HPF
CHLORIDE SERPL-SCNC: 104 MMOL/L (ref 98–110)
CHOLEST SERPL-MCNC: 145 MG/DL
CHOLEST/HDLC SERPL: 2.2 (CALC)
CO2 SERPL-SCNC: 28 MMOL/L (ref 20–32)
COLOR UR: YELLOW
CREAT SERPL-MCNC: 0.67 MG/DL (ref 0.6–1)
EOSINOPHIL # BLD AUTO: 202 CELLS/UL (ref 15–500)
EOSINOPHIL NFR BLD AUTO: 4.6 %
ERYTHROCYTE [DISTWIDTH] IN BLOOD BY AUTOMATED COUNT: 12.7 % (ref 11–15)
GFR/BSA.PRED SERPLBLD CYS-BASED-ARV: 91 ML/MIN/1.73M2
GLOBULIN SER CALC-MCNC: 2.4 G/DL (CALC) (ref 1.9–3.7)
GLUCOSE SERPL-MCNC: 86 MG/DL (ref 65–99)
GLUCOSE UR QL STRIP: NEGATIVE
HCT VFR BLD AUTO: 37.3 % (ref 35–45)
HDLC SERPL-MCNC: 65 MG/DL
HGB BLD-MCNC: 12 G/DL (ref 11.7–15.5)
HGB UR QL STRIP: ABNORMAL
HYALINE CASTS #/AREA URNS LPF: ABNORMAL /LPF
KETONES UR QL STRIP: NEGATIVE
LDLC SERPL CALC-MCNC: 66 MG/DL (CALC)
LEUKOCYTE ESTERASE UR QL STRIP: ABNORMAL
LYMPHOCYTES # BLD AUTO: 1351 CELLS/UL (ref 850–3900)
LYMPHOCYTES NFR BLD AUTO: 30.7 %
MAGNESIUM SERPL-MCNC: 2.1 MG/DL (ref 1.5–2.5)
MCH RBC QN AUTO: 30.3 PG (ref 27–33)
MCHC RBC AUTO-ENTMCNC: 32.2 G/DL (ref 32–36)
MCV RBC AUTO: 94.2 FL (ref 80–100)
MONOCYTES # BLD AUTO: 414 CELLS/UL (ref 200–950)
MONOCYTES NFR BLD AUTO: 9.4 %
NEUTROPHILS # BLD AUTO: 2394 CELLS/UL (ref 1500–7800)
NEUTROPHILS NFR BLD AUTO: 54.4 %
NITRITE UR QL STRIP: NEGATIVE
NONHDLC SERPL-MCNC: 80 MG/DL (CALC)
PH UR STRIP: 5.5 [PH] (ref 5–8)
PLATELET # BLD AUTO: 276 THOUSAND/UL (ref 140–400)
PMV BLD REES-ECKER: 10.1 FL (ref 7.5–12.5)
POTASSIUM SERPL-SCNC: 4.7 MMOL/L (ref 3.5–5.3)
PROT SERPL-MCNC: 6.3 G/DL (ref 6.1–8.1)
PROT UR QL STRIP: ABNORMAL
RBC # BLD AUTO: 3.96 MILLION/UL (ref 3.8–5.1)
RBC #/AREA URNS HPF: ABNORMAL /HPF
SODIUM SERPL-SCNC: 139 MMOL/L (ref 135–146)
SP GR UR STRIP: 1.02 (ref 1–1.03)
SQUAMOUS #/AREA URNS HPF: ABNORMAL /HPF
TRIGL SERPL-MCNC: 61 MG/DL
TSH SERPL-ACNC: 1.71 MIU/L (ref 0.4–4.5)
URATE SERPL-MCNC: 4.4 MG/DL (ref 2.5–7)
WBC # BLD AUTO: 4.4 THOUSAND/UL (ref 3.8–10.8)
WBC #/AREA URNS HPF: ABNORMAL /HPF

## 2024-09-23 ENCOUNTER — OFFICE VISIT (OUTPATIENT)
Dept: FAMILY MEDICINE CLINIC | Facility: CLINIC | Age: 74
End: 2024-09-23
Payer: COMMERCIAL

## 2024-09-23 VITALS
WEIGHT: 159 LBS | SYSTOLIC BLOOD PRESSURE: 130 MMHG | HEART RATE: 91 BPM | BODY MASS INDEX: 30.02 KG/M2 | HEIGHT: 61 IN | RESPIRATION RATE: 16 BRPM | TEMPERATURE: 97.2 F | OXYGEN SATURATION: 99 % | DIASTOLIC BLOOD PRESSURE: 54 MMHG

## 2024-09-23 DIAGNOSIS — I63.031 CEREBROVASCULAR ACCIDENT (CVA) DUE TO THROMBOSIS OF RIGHT CAROTID ARTERY (HCC): ICD-10-CM

## 2024-09-23 DIAGNOSIS — I10 PRIMARY HYPERTENSION: ICD-10-CM

## 2024-09-23 DIAGNOSIS — Z12.31 SCREENING MAMMOGRAM FOR HIGH-RISK PATIENT: Primary | ICD-10-CM

## 2024-09-23 DIAGNOSIS — Z23 ENCOUNTER FOR IMMUNIZATION: ICD-10-CM

## 2024-09-23 DIAGNOSIS — E03.4 HYPOTHYROIDISM DUE TO ACQUIRED ATROPHY OF THYROID: ICD-10-CM

## 2024-09-23 DIAGNOSIS — E78.2 MIXED HYPERLIPIDEMIA: ICD-10-CM

## 2024-09-23 DIAGNOSIS — K21.9 GASTROESOPHAGEAL REFLUX DISEASE WITHOUT ESOPHAGITIS: ICD-10-CM

## 2024-09-23 PROCEDURE — 90662 IIV NO PRSV INCREASED AG IM: CPT | Performed by: FAMILY MEDICINE

## 2024-09-23 PROCEDURE — 99214 OFFICE O/P EST MOD 30 MIN: CPT | Performed by: FAMILY MEDICINE

## 2024-09-23 PROCEDURE — G0008 ADMIN INFLUENZA VIRUS VAC: HCPCS | Performed by: FAMILY MEDICINE

## 2024-09-23 NOTE — ASSESSMENT & PLAN NOTE
Patient is stable  and will continue present plan of care and reassess at next routine visit. All questions about this problem from patient were answered today.

## 2024-09-23 NOTE — PROGRESS NOTES
Ambulatory Visit  Name: Mandy Severino      : 1950      MRN: [id number]  Encounter Provider: Patrick Humphery MD  Encounter Date: 2024   Encounter department: [facility]    Assessment & Plan  Screening mammogram for high-risk patient    Orders:    Mammo screening bilateral w 3d and cad; Future    Cerebrovascular accident (CVA) due to thrombosis of right carotid artery (HCC)  Patient is stable  and will continue present plan of care and reassess at next routine visit. All questions about this problem from patient were answered today.          Gastroesophageal reflux disease without esophagitis  Patient to continue with present therapy and decrease caffeine, avoid ETOH and smoking to decrease acid production. Pt should also cease eating prior to bedtime and avoid excessive fluid intake prior to sleep. May use antacids as needed for breakthrough GERD. All pateint questions answered today about this condition.          Mixed hyperlipidemia  Patient  is stable with current medication and we discussed a low fat low cholesterol diet. Weight loss also discussed for this will help lower cholesterol also. Recheck lipids in 6 months.          Primary hypertension  Patient is stable with current anti-hypertensive medicine and continue to follow a low sodium diet and take current medication. All questions about this condition were answered today.          Hypothyroidism due to acquired atrophy of thyroid  Patient to continue current dose of thyroid medicine and recheck TSH in 6 months          Encounter for immunization    Orders:    influenza vaccine, high-dose, PF 0.5 mL (Fluzone High Dose)      Falls Plan of Care: balance, strength, and gait training instructions were provided. Home safety education provided.     Urinary Incontinence Plan of Care: counseling topics discussed: practice Kegel (pelvic floor strengthening) exercises, use restroom every 2 hours, limit alcohol, caffeine,  spicy foods, and acidic foods, limiting fluid intake 3-4 hours before bed, weight loss, preventing constipation and limiting fluid intake to 60 oz. per day.       History of Present Illness     74-year-old female today for her checkup on multimedical problems.  Patient history of CVA hyperlipidemia hypothyroidism and is doing actually quite well.  Patient had her yearly lab work done and she is doing great with her blood count she is having no problems her kidney function her cholesterol is well-controlled and her liver and is doing no problem with her liver function test.  Patient is not needing any refills at this time and will call when appropriate to refill.  She would like her flu shot which we will do for her today.      Review of Systems   Constitutional:  Negative for activity change, appetite change, fatigue and fever.   HENT:  Negative for congestion, ear pain, postnasal drip, rhinorrhea, sinus pressure, sinus pain, sneezing and sore throat.    Eyes:  Negative for pain and redness.   Respiratory:  Negative for apnea, cough, chest tightness, shortness of breath and wheezing.    Cardiovascular:  Negative for chest pain, palpitations and leg swelling.   Gastrointestinal:  Negative for abdominal pain, constipation, diarrhea, nausea and vomiting.   Endocrine: Negative for cold intolerance and heat intolerance.   Genitourinary:  Negative for difficulty urinating, dysuria, frequency, hematuria and urgency.   Musculoskeletal:  Negative for arthralgias, back pain, gait problem and myalgias.   Skin:  Negative for rash.   Neurological:  Negative for dizziness, speech difficulty, weakness, numbness and headaches.   Hematological:  Does not bruise/bleed easily.   Psychiatric/Behavioral:  Negative for agitation, confusion and hallucinations.      Past Medical History:   Diagnosis Date    Carotid artery occlusion     Disease of thyroid gland     Hyperlipidemia 05/08/2020    Hypertension     Hypothyroidism     Stroke  ([facility]) 10/23/2018     Past Surgical History:   Procedure Laterality Date    CAROTID ENDARTERECTOMY      CAROTID STENT      CEREBRAL ANGIOGRAM      IR STROKE ALERT  10/06/2018    TONSILLECTOMY      VENTRICULOPERITONEAL SHUNT       Family History   Problem Relation Age of Onset    No Known Problems Mother     No Known Problems Father     Heart disease Family     No Known Problems Sister     No Known Problems Daughter     No Known Problems Maternal Grandmother     No Known Problems Maternal Grandfather     No Known Problems Paternal Grandmother     No Known Problems Paternal Grandfather     No Known Problems Daughter     No Known Problems Brother      Social History     Tobacco Use    Smoking status: Never     Passive exposure: Never    Smokeless tobacco: Never   Vaping Use    Vaping status: Never Used   Substance and Sexual Activity    Alcohol use: Yes     Comment: Social drinker    Drug use: No    Sexual activity: Not Currently     Partners: Male     Birth control/protection: None     Current Outpatient Medications on File Prior to Visit   Medication Sig    atorvastatin (LIPITOR) 40 mg tablet Take 1 tablet (40 mg total) by mouth every evening    Brilinta 90 MG TAKE 1 TABLET BY MOUTH EVERY 12  HOURS    levothyroxine 88 mcg tablet Take 1 tablet (88 mcg total) by mouth daily    losartan (COZAAR) 100 MG tablet take 1 tablet by mouth once daily    Melatonin 10 MG TABS Take 10 mg by mouth daily at bedtime     Allergies   Allergen Reactions    Amlodipine Edema     Immunization History   Administered Date(s) Administered    COVID-19 PFIZER VACCINE 0.3 ML IM 03/04/2021, 03/25/2021, 12/03/2021    INFLUENZA 11/16/2015, 09/26/2017, 09/09/2022    Influenza Quadrivalent 3 years and older 10/01/2018, 10/01/2018    Influenza Split High Dose Preservative Free IM 09/23/2024    Influenza, high dose seasonal 0.7 mL 10/12/2018, 11/02/2020, 11/17/2021, 09/09/2022    Pneumococcal Conjugate 13-Valent 07/19/2017    Pneumococcal Polysaccharide  "PPV23 01/18/2019    Tdap 06/20/2018    influenza, injectable, quadrivalent 10/01/2018    influenza, trivalent, adjuvanted 10/07/2019     Objective     /54 (BP Location: Left arm, Patient Position: Sitting, Cuff Size: Standard)   Pulse 91   Temp (!) 97.2 °F (36.2 °C) (Temporal)   Resp 16   Ht 5' 1\" (1.549 m)   Wt 72.1 kg (159 lb)   SpO2 99%   BMI 30.04 kg/m²     Physical Exam  Constitutional:       Appearance: Normal appearance. She is not ill-appearing.   HENT:      Head: Normocephalic and atraumatic.      Right Ear: Tympanic membrane normal.      Left Ear: Tympanic membrane normal.      Nose: Nose normal.      Mouth/Throat:      Mouth: Mucous membranes are moist.   Eyes:      Extraocular Movements: Extraocular movements intact.      Conjunctiva/sclera: Conjunctivae normal.      Pupils: Pupils are equal, round, and reactive to light.   Cardiovascular:      Rate and Rhythm: Normal rate and regular rhythm.   Pulmonary:      Effort: Pulmonary effort is normal. No respiratory distress.      Breath sounds: Normal breath sounds. No wheezing.   Abdominal:      General: Bowel sounds are normal.      Palpations: Abdomen is soft.      Tenderness: There is no abdominal tenderness.   Musculoskeletal:         General: No tenderness. Normal range of motion.      Cervical back: Normal range of motion and neck supple.      Right lower leg: No edema.      Left lower leg: No edema.   Skin:     General: Skin is warm and dry.   Neurological:      Mental Status: She is alert and oriented to person, place, and time.   Psychiatric:         Mood and Affect: Mood normal.         Behavior: Behavior normal.         Thought Content: Thought content normal.         Judgment: Judgment normal.         "

## 2024-09-23 NOTE — ASSESSMENT & PLAN NOTE
Patient to continue with present therapy and decrease caffeine, avoid ETOH and smoking to decrease acid production. Pt should also cease eating prior to bedtime and avoid excessive fluid intake prior to sleep. May use antacids as needed for breakthrough GERD. All pateint questions answered today about this condition.

## 2024-09-23 NOTE — ASSESSMENT & PLAN NOTE
Patient  is stable with current medication and we discussed a low fat low cholesterol diet. Weight loss also discussed for this will help lower cholesterol also. Recheck lipids in 6 months.

## 2024-09-23 NOTE — ASSESSMENT & PLAN NOTE
Patient is stable with current anti-hypertensive medicine and continue to follow a low sodium diet and take current medication. All questions about this condition were answered today.

## 2024-09-26 ENCOUNTER — HOSPITAL ENCOUNTER (OUTPATIENT)
Dept: RADIOLOGY | Facility: MEDICAL CENTER | Age: 74
Discharge: HOME | End: 2024-09-26
Payer: COMMERCIAL

## 2024-09-26 VITALS — WEIGHT: 159 LBS | BODY MASS INDEX: 30.02 KG/M2 | HEIGHT: 61 IN

## 2024-09-26 DIAGNOSIS — Z12.31 SCREENING MAMMOGRAM FOR HIGH-RISK PATIENT: ICD-10-CM

## 2024-09-26 PROCEDURE — 77063 BREAST TOMOSYNTHESIS BI: CPT

## 2024-09-26 PROCEDURE — 77067 SCR MAMMO BI INCL CAD: CPT

## 2024-10-02 DIAGNOSIS — I10 PRIMARY HYPERTENSION: ICD-10-CM

## 2024-10-02 RX ORDER — LOSARTAN POTASSIUM 100 MG/1
100 TABLET ORAL DAILY
Qty: 90 TABLET | Refills: 1 | Status: SHIPPED | OUTPATIENT
Start: 2024-10-02

## 2024-11-13 DIAGNOSIS — I63.031 CEREBROVASCULAR ACCIDENT (CVA) DUE TO THROMBOSIS OF RIGHT CAROTID ARTERY (HCC): ICD-10-CM

## 2024-11-14 RX ORDER — TICAGRELOR 90 MG/1
90 TABLET ORAL 2 TIMES DAILY
Qty: 180 TABLET | Refills: 3 | Status: SHIPPED | OUTPATIENT
Start: 2024-11-14

## 2024-11-14 NOTE — TELEPHONE ENCOUNTER
Layla! Mandy is back with carotid US 9/2025, she had carotid stenting in 10/2018 and has been on Brilinta and liptor ever since. Can you please sign if you agree? Thanks!

## 2024-12-23 ENCOUNTER — RA CDI HCC (OUTPATIENT)
Dept: OTHER | Facility: HOSPITAL | Age: 74
End: 2024-12-23

## 2024-12-23 DIAGNOSIS — E03.9 HYPOTHYROIDISM, UNSPECIFIED TYPE: ICD-10-CM

## 2024-12-23 PROBLEM — Z86.73 HISTORY OF CVA (CEREBROVASCULAR ACCIDENT): Status: ACTIVE | Noted: 2018-10-06

## 2024-12-24 RX ORDER — LEVOTHYROXINE SODIUM 88 UG/1
88 TABLET ORAL DAILY
Qty: 90 TABLET | Refills: 1 | Status: SHIPPED | OUTPATIENT
Start: 2024-12-24

## 2024-12-28 NOTE — PROGRESS NOTES
Name: Mandy Severino      : 1950      MRN: [id number]  Encounter Provider: Patrick Humphrey MD  Encounter Date: 2024   Encounter department: [facility]  :  Assessment & Plan  Well adult exam         Gastroesophageal reflux disease without esophagitis  Patient to continue with present therapy and decrease caffeine, avoid ETOH and smoking to decrease acid production. Pt should also cease eating prior to bedtime and avoid excessive fluid intake prior to sleep. May use antacids as needed for breakthrough GERD. All pateint questions answered today about this condition.        History of CVA (cerebrovascular accident)  Patient is stable  and will continue present plan of care and reassess at next routine visit. All questions about this problem from patient were answered today.        Mixed hyperlipidemia  Patient  is stable with current medication and we discussed a low fat low cholesterol diet. Weight loss also discussed for this will help lower cholesterol also. Recheck lipids in 6 months.   Orders:  •  Comprehensive metabolic panel; Future  •  Lipid Panel with Direct LDL reflex; Future    Primary hypertension  Patient is stable with current anti-hypertensive medicine and continue to follow a low sodium diet and take current medication. All questions about this condition were answered today.        Hypothyroidism due to acquired atrophy of thyroid  Patient to continue current dose of thyroid medicine and recheck TSH in 6 months   Orders:  •  TSH, 3rd generation with Free T4 reflex; Future    Hypothyroidism, unspecified type  Patient to continue current dose of thyroid medicine and recheck TSH in 6 months   Orders:  •  levothyroxine 88 mcg tablet; Take 1 tablet (88 mcg total) by mouth daily          Falls Plan of Care: balance, strength, and gait training instructions were provided. Home safety education provided.     Urinary Incontinence Plan of Care: counseling topics  "discussed: practice Kegel (pelvic floor strengthening) exercises, use restroom every 2 hours, limit alcohol, caffeine, spicy foods, and acidic foods, limiting fluid intake 3-4 hours before bed, weight loss, preventing constipation and limiting fluid intake to 60 oz. per day.     History of Present Illness     74-year-old female here today for Medicare wellness checkup on multimedical problems patient with history of CVA hypothyroidism hyperlipidemia hypertension and is doing very well.  Patient is due for her lab work her next visit which I ordered for today has refills on her levothyroxine which was done for today and we will see her back in approximately 3 months.      Review of Systems   Constitutional:  Negative for activity change, appetite change, fatigue and fever.   HENT:  Negative for congestion, ear pain, postnasal drip, rhinorrhea, sinus pressure, sinus pain, sneezing and sore throat.    Eyes:  Negative for pain and redness.   Respiratory:  Negative for apnea, cough, chest tightness, shortness of breath and wheezing.    Cardiovascular:  Negative for chest pain, palpitations and leg swelling.   Gastrointestinal:  Negative for abdominal pain, constipation, diarrhea, nausea and vomiting.   Endocrine: Negative for cold intolerance and heat intolerance.   Genitourinary:  Negative for difficulty urinating, dysuria, frequency, hematuria and urgency.   Musculoskeletal:  Negative for arthralgias, back pain, gait problem and myalgias.   Skin:  Negative for rash.   Neurological:  Negative for dizziness, speech difficulty, weakness, numbness and headaches.   Hematological:  Does not bruise/bleed easily.   Psychiatric/Behavioral:  Negative for agitation, confusion and hallucinations.        Objective   /68 (BP Location: Left arm, Cuff Size: Standard)   Pulse 85   Temp 98.2 °F (36.8 °C)   Resp 18   Ht 5' 1\" (1.549 m)   Wt 72.1 kg (159 lb)   SpO2 98%   BMI 30.04 kg/m²      Physical Exam    Answers submitted " by the patient for this visit:  Medicare Annual Wellness Visit (Submitted on 12/29/2024)  How would you rate your overall health?: very good  Compared to last year, how is your physical health?: much better  In general, how satisfied are you with your life?: satisfied  Compared to last year, how is your eyesight?: same  Compared to last year, how is your hearing?: same  Compared to last year, how is your emotional/mental health?: same  How often is anger a problem for you?: never, rarely  How often do you feel unusually tired/fatigued?: sometimes  In the past 7 days, how much pain have you experienced?: none  In the past 6 months, have you lost or gained 10 pounds without trying?: No  One or more falls in the last year: No  In the past 6 months, have you accidentally leaked urine?: No  Do you have trouble with the stairs inside or outside your home?: No  Does your home have working smoke alarms?: Yes  Does your home have a carbon monoxide monitor?: Yes  Which safety hazards (if any) have you experienced in your home? Please select all that apply.: none  How would you describe your current diet? Please select all that apply.: No Added Salt  In addition to prescription medications, are you taking any over-the-counter supplements?: No  Can you manage your medications?: Yes  Are you currently taking any opioid medications?: No  Can you walk and transfer into and out of your bed and chair?: Yes  Can you dress and groom yourself?: Yes  Can you bathe or shower yourself?: Yes  Can you feed yourself?: Yes  Can you do your laundry/ housekeeping?: Yes  Can you manage your money, pay your bills, and track your expenses?: Yes  Can you make your own meals?: Yes  Can you do your own shopping?: Yes  Within the last 12 months, have you had any hospitalizations or Emergency Department visits?: No  Do you have a living will?: No  Do you have a Durable POA (Power of ) for healthcare decisions?: No  Do you have an Advanced  Directive for end of life decisions?: No  How often have you used an illegal drug (including marijuana) or a prescription medication for non-medical reasons in the past year?: never  What is the typical number of drinks you consume in a day?: 0  What is the typical number of drinks you consume in a week?: 0  How often did you have a drink containing alcohol in the past year?: never  How many drinks did you have on a typical day  when you were drinking in the past year?: 0  How often did you have 6 or more drinks on one occasion in the past year?: never

## 2024-12-28 NOTE — ASSESSMENT & PLAN NOTE
Patient  is stable with current medication and we discussed a low fat low cholesterol diet. Weight loss also discussed for this will help lower cholesterol also. Recheck lipids in 6 months.   Orders:  •  Comprehensive metabolic panel; Future  •  Lipid Panel with Direct LDL reflex; Future

## 2024-12-28 NOTE — ASSESSMENT & PLAN NOTE
Patient to continue current dose of thyroid medicine and recheck TSH in 6 months   Orders:  •  TSH, 3rd generation with Free T4 reflex; Future

## 2024-12-30 ENCOUNTER — OFFICE VISIT (OUTPATIENT)
Dept: FAMILY MEDICINE CLINIC | Facility: CLINIC | Age: 74
End: 2024-12-30
Payer: COMMERCIAL

## 2024-12-30 VITALS
DIASTOLIC BLOOD PRESSURE: 68 MMHG | HEIGHT: 61 IN | OXYGEN SATURATION: 98 % | WEIGHT: 159 LBS | BODY MASS INDEX: 30.02 KG/M2 | RESPIRATION RATE: 18 BRPM | HEART RATE: 85 BPM | TEMPERATURE: 98.2 F | SYSTOLIC BLOOD PRESSURE: 130 MMHG

## 2024-12-30 DIAGNOSIS — E78.2 MIXED HYPERLIPIDEMIA: ICD-10-CM

## 2024-12-30 DIAGNOSIS — Z00.00 WELL ADULT EXAM: Primary | ICD-10-CM

## 2024-12-30 DIAGNOSIS — E03.9 HYPOTHYROIDISM, UNSPECIFIED TYPE: ICD-10-CM

## 2024-12-30 DIAGNOSIS — E03.4 HYPOTHYROIDISM DUE TO ACQUIRED ATROPHY OF THYROID: ICD-10-CM

## 2024-12-30 DIAGNOSIS — K21.9 GASTROESOPHAGEAL REFLUX DISEASE WITHOUT ESOPHAGITIS: ICD-10-CM

## 2024-12-30 DIAGNOSIS — I10 PRIMARY HYPERTENSION: ICD-10-CM

## 2024-12-30 DIAGNOSIS — Z86.73 HISTORY OF CVA (CEREBROVASCULAR ACCIDENT): ICD-10-CM

## 2024-12-30 PROCEDURE — G0439 PPPS, SUBSEQ VISIT: HCPCS | Performed by: FAMILY MEDICINE

## 2024-12-30 PROCEDURE — 99214 OFFICE O/P EST MOD 30 MIN: CPT | Performed by: FAMILY MEDICINE

## 2024-12-30 RX ORDER — LEVOTHYROXINE SODIUM 88 UG/1
88 TABLET ORAL DAILY
Qty: 90 TABLET | Refills: 1 | Status: SHIPPED | OUTPATIENT
Start: 2024-12-30

## 2024-12-30 NOTE — ASSESSMENT & PLAN NOTE
Patient to continue current dose of thyroid medicine and recheck TSH in 6 months   Orders:  •  levothyroxine 88 mcg tablet; Take 1 tablet (88 mcg total) by mouth daily

## 2025-02-01 DIAGNOSIS — I63.031 CEREBROVASCULAR ACCIDENT (CVA) DUE TO THROMBOSIS OF RIGHT CAROTID ARTERY (HCC): ICD-10-CM

## 2025-02-02 DIAGNOSIS — E03.9 HYPOTHYROIDISM, UNSPECIFIED TYPE: ICD-10-CM

## 2025-02-02 DIAGNOSIS — I63.031 CEREBROVASCULAR ACCIDENT (CVA) DUE TO THROMBOSIS OF RIGHT CAROTID ARTERY (HCC): ICD-10-CM

## 2025-02-02 DIAGNOSIS — I10 PRIMARY HYPERTENSION: ICD-10-CM

## 2025-02-02 RX ORDER — LEVOTHYROXINE SODIUM 88 UG/1
88 TABLET ORAL DAILY
Qty: 90 TABLET | Refills: 1 | Status: SHIPPED | OUTPATIENT
Start: 2025-02-02

## 2025-02-02 RX ORDER — ATORVASTATIN CALCIUM 40 MG/1
40 TABLET, FILM COATED ORAL EVERY EVENING
Qty: 90 TABLET | Refills: 1 | Status: SHIPPED | OUTPATIENT
Start: 2025-02-02

## 2025-02-02 RX ORDER — LOSARTAN POTASSIUM 100 MG/1
100 TABLET ORAL DAILY
Qty: 90 TABLET | Refills: 1 | Status: SHIPPED | OUTPATIENT
Start: 2025-02-02

## 2025-02-02 RX ORDER — ATORVASTATIN CALCIUM 40 MG/1
40 TABLET, FILM COATED ORAL EVERY EVENING
Qty: 90 TABLET | Refills: 1 | Status: SHIPPED | OUTPATIENT
Start: 2025-02-02 | End: 2025-02-02 | Stop reason: SDUPTHER

## 2025-02-24 DIAGNOSIS — I63.031 CEREBROVASCULAR ACCIDENT (CVA) DUE TO THROMBOSIS OF RIGHT CAROTID ARTERY (HCC): ICD-10-CM

## 2025-02-24 RX ORDER — ATORVASTATIN CALCIUM 40 MG/1
40 TABLET, FILM COATED ORAL EVERY EVENING
Qty: 90 TABLET | Refills: 0 | Status: SHIPPED | OUTPATIENT
Start: 2025-02-24

## 2025-03-30 LAB
ALBUMIN SERPL-MCNC: 4.1 G/DL (ref 3.6–5.1)
ALBUMIN/GLOB SERPL: 1.6 (CALC) (ref 1–2.5)
ALP SERPL-CCNC: 77 U/L (ref 37–153)
ALT SERPL-CCNC: 10 U/L (ref 6–29)
AST SERPL-CCNC: 11 U/L (ref 10–35)
BILIRUB SERPL-MCNC: 0.5 MG/DL (ref 0.2–1.2)
BUN SERPL-MCNC: 10 MG/DL (ref 7–25)
BUN/CREAT SERPL: NORMAL (CALC) (ref 6–22)
CALCIUM SERPL-MCNC: 9.1 MG/DL (ref 8.6–10.4)
CHLORIDE SERPL-SCNC: 105 MMOL/L (ref 98–110)
CHOLEST SERPL-MCNC: 143 MG/DL
CHOLEST/HDLC SERPL: 2 (CALC)
CO2 SERPL-SCNC: 28 MMOL/L (ref 20–32)
CREAT SERPL-MCNC: 0.77 MG/DL (ref 0.6–1)
GFR/BSA.PRED SERPLBLD CYS-BASED-ARV: 80 ML/MIN/1.73M2
GLOBULIN SER CALC-MCNC: 2.5 G/DL (CALC) (ref 1.9–3.7)
GLUCOSE SERPL-MCNC: 80 MG/DL (ref 65–99)
HDLC SERPL-MCNC: 70 MG/DL
LDLC SERPL CALC-MCNC: 59 MG/DL (CALC)
NONHDLC SERPL-MCNC: 73 MG/DL (CALC)
POTASSIUM SERPL-SCNC: 4.1 MMOL/L (ref 3.5–5.3)
PROT SERPL-MCNC: 6.6 G/DL (ref 6.1–8.1)
SODIUM SERPL-SCNC: 140 MMOL/L (ref 135–146)
TRIGL SERPL-MCNC: 55 MG/DL
TSH SERPL-ACNC: 0.52 MIU/L (ref 0.4–4.5)

## 2025-04-30 ENCOUNTER — OFFICE VISIT (OUTPATIENT)
Dept: FAMILY MEDICINE CLINIC | Facility: CLINIC | Age: 75
End: 2025-04-30
Payer: COMMERCIAL

## 2025-04-30 VITALS
SYSTOLIC BLOOD PRESSURE: 118 MMHG | HEART RATE: 77 BPM | OXYGEN SATURATION: 98 % | HEIGHT: 61 IN | TEMPERATURE: 98.6 F | WEIGHT: 164 LBS | DIASTOLIC BLOOD PRESSURE: 64 MMHG | BODY MASS INDEX: 30.96 KG/M2

## 2025-04-30 DIAGNOSIS — Z86.73 HISTORY OF CVA (CEREBROVASCULAR ACCIDENT): ICD-10-CM

## 2025-04-30 DIAGNOSIS — E03.4 HYPOTHYROIDISM DUE TO ACQUIRED ATROPHY OF THYROID: ICD-10-CM

## 2025-04-30 DIAGNOSIS — K21.9 GASTROESOPHAGEAL REFLUX DISEASE WITHOUT ESOPHAGITIS: Primary | ICD-10-CM

## 2025-04-30 DIAGNOSIS — I10 PRIMARY HYPERTENSION: ICD-10-CM

## 2025-04-30 DIAGNOSIS — E78.2 MIXED HYPERLIPIDEMIA: ICD-10-CM

## 2025-04-30 PROCEDURE — 99214 OFFICE O/P EST MOD 30 MIN: CPT | Performed by: FAMILY MEDICINE

## 2025-04-30 PROCEDURE — G2211 COMPLEX E/M VISIT ADD ON: HCPCS | Performed by: FAMILY MEDICINE

## 2025-04-30 NOTE — PROGRESS NOTES
Name: Mandy Severino      : 1950      MRN: [id number]  Encounter Provider: Patrick Humphrey MD  Encounter Date: 2025   Encounter department: [facility]  :  Assessment & Plan  Gastroesophageal reflux disease without esophagitis  Patient to continue with present therapy and decrease caffeine, avoid ETOH and smoking to decrease acid production. Pt should also cease eating prior to bedtime and avoid excessive fluid intake prior to sleep. May use antacids as needed for breakthrough GERD. All pateint questions answered today about this condition.        History of CVA (cerebrovascular accident)  Patient is stable  and will continue present plan of care and reassess at next routine visit. All questions about this problem from patient were answered today.        Mixed hyperlipidemia  Patient  is stable with current medication and we discussed a low fat low cholesterol diet. Weight loss also discussed for this will help lower cholesterol also. Recheck lipids in 6 months.   Orders:  •  Lipid Panel with Direct LDL reflex; Future    Primary hypertension  Patient is stable with current anti-hypertensive medicine and continue to follow a low sodium diet and take current medication. All questions about this condition were answered today.   Orders:  •  Comprehensive metabolic panel; Future  •  CBC and differential; Future  •  TSH, 3rd generation with Free T4 reflex; Future  •  Magnesium; Future  •  Uric acid; Future  •  UA/M w/rflx Culture, Comp    Hypothyroidism due to acquired atrophy of thyroid  Patient had a change in thyroid dosing today and all their questions were answered. Patient to return to office in 2 months  for  recheck on TSH to monitor therapy efficacy.              Depression Screening and Follow-up Plan:   Clincally patient does not have depression. No treatment is required.     Falls Plan of Care: balance, strength, and gait training instructions were provided. Home safety  education provided.     Urinary Incontinence Plan of Care: counseling topics discussed: practice Kegel (pelvic floor strengthening) exercises, use restroom every 2 hours, limit alcohol, caffeine, spicy foods, and acidic foods, limiting fluid intake 3-4 hours before bed, weight loss, preventing constipation and limiting fluid intake to 60 oz. per day.     History of Present Illness   74-year-old female today for checkup on multimedical problems.  Patient with history of CVA hypertension hyperlipidemia hypothyroidism and is doing well.  Patient labs reviewed today her thyroid is well-controlled and her cholesterol is nice and low and she is having no problems with liver function test.  Patient does not need any prescription refills and will call when appropriate to refill we will do her yearly lab work which we see her back in approximately 6 months      Review of Systems   Constitutional:  Negative for activity change, appetite change, fatigue and fever.   HENT:  Negative for congestion, ear pain, postnasal drip, rhinorrhea, sinus pressure, sinus pain, sneezing and sore throat.    Eyes:  Negative for pain and redness.   Respiratory:  Negative for apnea, cough, chest tightness, shortness of breath and wheezing.    Cardiovascular:  Negative for chest pain, palpitations and leg swelling.   Gastrointestinal:  Negative for abdominal pain, constipation, diarrhea, nausea and vomiting.   Endocrine: Negative for cold intolerance and heat intolerance.   Genitourinary:  Negative for difficulty urinating, dysuria, frequency, hematuria and urgency.   Musculoskeletal:  Negative for arthralgias, back pain, gait problem and myalgias.   Skin:  Negative for rash.   Neurological:  Negative for dizziness, speech difficulty, weakness, numbness and headaches.   Hematological:  Does not bruise/bleed easily.   Psychiatric/Behavioral:  Negative for agitation, confusion and hallucinations.        Objective   /64 (BP Location: Left arm,  "Patient Position: Sitting, Cuff Size: Large)   Pulse 77   Temp 98.6 °F (37 °C) (Temporal)   Ht 5' 1\" (1.549 m)   Wt 74.4 kg (164 lb)   SpO2 98%   BMI 30.99 kg/m²      Physical Exam  Constitutional:       Appearance: Normal appearance. She is not ill-appearing.   HENT:      Head: Normocephalic and atraumatic.      Right Ear: Tympanic membrane normal.      Left Ear: Tympanic membrane normal.      Nose: Nose normal.      Mouth/Throat:      Mouth: Mucous membranes are moist.   Eyes:      Extraocular Movements: Extraocular movements intact.      Conjunctiva/sclera: Conjunctivae normal.      Pupils: Pupils are equal, round, and reactive to light.   Cardiovascular:      Rate and Rhythm: Normal rate and regular rhythm.   Pulmonary:      Effort: Pulmonary effort is normal. No respiratory distress.      Breath sounds: Normal breath sounds. No wheezing.   Abdominal:      General: Bowel sounds are normal.      Palpations: Abdomen is soft.      Tenderness: There is no abdominal tenderness.   Musculoskeletal:         General: No tenderness. Normal range of motion.      Cervical back: Normal range of motion and neck supple.      Right lower leg: No edema.      Left lower leg: No edema.   Skin:     General: Skin is warm and dry.   Neurological:      Mental Status: She is alert and oriented to person, place, and time.   Psychiatric:         Mood and Affect: Mood normal.         Behavior: Behavior normal.         Thought Content: Thought content normal.         Judgment: Judgment normal.         "

## 2025-04-30 NOTE — ASSESSMENT & PLAN NOTE
Patient had a change in thyroid dosing today and all their questions were answered. Patient to return to office in 2 months  for  recheck on TSH to monitor therapy efficacy.

## 2025-05-21 DIAGNOSIS — I63.031 CEREBROVASCULAR ACCIDENT (CVA) DUE TO THROMBOSIS OF RIGHT CAROTID ARTERY (HCC): ICD-10-CM

## 2025-05-21 RX ORDER — ATORVASTATIN CALCIUM 40 MG/1
40 TABLET, FILM COATED ORAL EVERY EVENING
Qty: 90 TABLET | Refills: 1 | Status: SHIPPED | OUTPATIENT
Start: 2025-05-21

## 2025-07-17 DIAGNOSIS — I10 PRIMARY HYPERTENSION: ICD-10-CM

## 2025-07-17 DIAGNOSIS — E03.9 HYPOTHYROIDISM, UNSPECIFIED TYPE: ICD-10-CM

## 2025-07-18 RX ORDER — LOSARTAN POTASSIUM 100 MG/1
100 TABLET ORAL DAILY
Qty: 90 TABLET | Refills: 3 | Status: SHIPPED | OUTPATIENT
Start: 2025-07-18

## 2025-07-18 RX ORDER — LEVOTHYROXINE SODIUM 88 UG/1
88 TABLET ORAL DAILY
Qty: 90 TABLET | Refills: 3 | Status: SHIPPED | OUTPATIENT
Start: 2025-07-18

## 2025-07-23 ENCOUNTER — RA CDI HCC (OUTPATIENT)
Dept: OTHER | Facility: HOSPITAL | Age: 75
End: 2025-07-23

## 2025-07-23 LAB
ALBUMIN SERPL-MCNC: 3.9 G/DL (ref 3.6–5.1)
ALBUMIN/GLOB SERPL: 1.6 (CALC) (ref 1–2.5)
ALP SERPL-CCNC: 70 U/L (ref 37–153)
ALT SERPL-CCNC: 11 U/L (ref 6–29)
AST SERPL-CCNC: 14 U/L (ref 10–35)
BASOPHILS # BLD AUTO: 39 CELLS/UL (ref 0–200)
BASOPHILS NFR BLD AUTO: 1.1 %
BILIRUB SERPL-MCNC: 0.5 MG/DL (ref 0.2–1.2)
BUN SERPL-MCNC: 14 MG/DL (ref 7–25)
BUN/CREAT SERPL: NORMAL (CALC) (ref 6–22)
CALCIUM SERPL-MCNC: 9.2 MG/DL (ref 8.6–10.4)
CHLORIDE SERPL-SCNC: 106 MMOL/L (ref 98–110)
CHOLEST SERPL-MCNC: 138 MG/DL
CHOLEST/HDLC SERPL: 1.9 (CALC)
CO2 SERPL-SCNC: 25 MMOL/L (ref 20–32)
CREAT SERPL-MCNC: 0.74 MG/DL (ref 0.6–1)
EOSINOPHIL # BLD AUTO: 179 CELLS/UL (ref 15–500)
EOSINOPHIL NFR BLD AUTO: 5.1 %
ERYTHROCYTE [DISTWIDTH] IN BLOOD BY AUTOMATED COUNT: 12.9 % (ref 11–15)
GFR/BSA.PRED SERPLBLD CYS-BASED-ARV: 83 ML/MIN/1.73M2
GLOBULIN SER CALC-MCNC: 2.5 G/DL (CALC) (ref 1.9–3.7)
GLUCOSE SERPL-MCNC: 83 MG/DL (ref 65–99)
HCT VFR BLD AUTO: 35.7 % (ref 35–45)
HDLC SERPL-MCNC: 72 MG/DL
HGB BLD-MCNC: 11.4 G/DL (ref 11.7–15.5)
LDLC SERPL CALC-MCNC: 53 MG/DL (CALC)
LYMPHOCYTES # BLD AUTO: 1082 CELLS/UL (ref 850–3900)
LYMPHOCYTES NFR BLD AUTO: 30.9 %
MAGNESIUM SERPL-MCNC: 2.1 MG/DL (ref 1.5–2.5)
MCH RBC QN AUTO: 30.8 PG (ref 27–33)
MCHC RBC AUTO-ENTMCNC: 31.9 G/DL (ref 32–36)
MCV RBC AUTO: 96.5 FL (ref 80–100)
MONOCYTES # BLD AUTO: 357 CELLS/UL (ref 200–950)
MONOCYTES NFR BLD AUTO: 10.2 %
NEUTROPHILS # BLD AUTO: 1845 CELLS/UL (ref 1500–7800)
NEUTROPHILS NFR BLD AUTO: 52.7 %
NONHDLC SERPL-MCNC: 66 MG/DL (CALC)
PLATELET # BLD AUTO: 282 THOUSAND/UL (ref 140–400)
PMV BLD REES-ECKER: 9.9 FL (ref 7.5–12.5)
POTASSIUM SERPL-SCNC: 4 MMOL/L (ref 3.5–5.3)
PROT SERPL-MCNC: 6.4 G/DL (ref 6.1–8.1)
RBC # BLD AUTO: 3.7 MILLION/UL (ref 3.8–5.1)
SODIUM SERPL-SCNC: 138 MMOL/L (ref 135–146)
T4 FREE SERPL-MCNC: 1.3 NG/DL (ref 0.8–1.8)
TRIGL SERPL-MCNC: 45 MG/DL
TSH SERPL-ACNC: 0.37 MIU/L (ref 0.4–4.5)
URATE SERPL-MCNC: 4.5 MG/DL (ref 2.5–7)
WBC # BLD AUTO: 3.5 THOUSAND/UL (ref 3.8–10.8)

## 2025-07-30 ENCOUNTER — OFFICE VISIT (OUTPATIENT)
Dept: FAMILY MEDICINE CLINIC | Facility: CLINIC | Age: 75
End: 2025-07-30
Payer: COMMERCIAL

## 2025-07-30 VITALS
HEIGHT: 61 IN | DIASTOLIC BLOOD PRESSURE: 62 MMHG | SYSTOLIC BLOOD PRESSURE: 140 MMHG | WEIGHT: 162 LBS | TEMPERATURE: 98.3 F | OXYGEN SATURATION: 98 % | HEART RATE: 84 BPM | BODY MASS INDEX: 30.58 KG/M2

## 2025-07-30 DIAGNOSIS — Z86.73 HISTORY OF CVA (CEREBROVASCULAR ACCIDENT): ICD-10-CM

## 2025-07-30 DIAGNOSIS — E03.4 HYPOTHYROIDISM DUE TO ACQUIRED ATROPHY OF THYROID: ICD-10-CM

## 2025-07-30 DIAGNOSIS — E78.00 PURE HYPERCHOLESTEROLEMIA: ICD-10-CM

## 2025-07-30 DIAGNOSIS — I10 PRIMARY HYPERTENSION: ICD-10-CM

## 2025-07-30 DIAGNOSIS — K21.9 GASTROESOPHAGEAL REFLUX DISEASE WITHOUT ESOPHAGITIS: Primary | ICD-10-CM

## 2025-07-30 PROCEDURE — G2211 COMPLEX E/M VISIT ADD ON: HCPCS | Performed by: FAMILY MEDICINE

## 2025-07-30 PROCEDURE — 99214 OFFICE O/P EST MOD 30 MIN: CPT | Performed by: FAMILY MEDICINE
